# Patient Record
Sex: FEMALE | Race: WHITE | NOT HISPANIC OR LATINO | Employment: OTHER | URBAN - METROPOLITAN AREA
[De-identification: names, ages, dates, MRNs, and addresses within clinical notes are randomized per-mention and may not be internally consistent; named-entity substitution may affect disease eponyms.]

---

## 2017-03-05 ENCOUNTER — PATIENT MESSAGE (OUTPATIENT)
Dept: PHYSICAL MEDICINE AND REHAB | Facility: CLINIC | Age: 67
End: 2017-03-05

## 2017-03-29 ENCOUNTER — PATIENT MESSAGE (OUTPATIENT)
Dept: PHYSICAL MEDICINE AND REHAB | Facility: CLINIC | Age: 67
End: 2017-03-29

## 2017-04-12 ENCOUNTER — PATIENT OUTREACH (OUTPATIENT)
Dept: ADMINISTRATIVE | Facility: HOSPITAL | Age: 67
End: 2017-04-12

## 2017-04-17 ENCOUNTER — OFFICE VISIT (OUTPATIENT)
Dept: PHYSICAL MEDICINE AND REHAB | Facility: CLINIC | Age: 67
End: 2017-04-17
Payer: MEDICARE

## 2017-04-17 VITALS
BODY MASS INDEX: 18.39 KG/M2 | WEIGHT: 103.81 LBS | HEIGHT: 63 IN | SYSTOLIC BLOOD PRESSURE: 108 MMHG | DIASTOLIC BLOOD PRESSURE: 67 MMHG | HEART RATE: 73 BPM

## 2017-04-17 DIAGNOSIS — M71.21 BAKER'S CYST, RIGHT: Primary | ICD-10-CM

## 2017-04-17 PROCEDURE — 99999 PR PBB SHADOW E&M-EST. PATIENT-LVL II: CPT | Mod: PBBFAC,,, | Performed by: PHYSICAL MEDICINE & REHABILITATION

## 2017-04-17 PROCEDURE — 1159F MED LIST DOCD IN RCRD: CPT | Mod: S$GLB,,, | Performed by: PHYSICAL MEDICINE & REHABILITATION

## 2017-04-17 PROCEDURE — 1160F RVW MEDS BY RX/DR IN RCRD: CPT | Mod: S$GLB,,, | Performed by: PHYSICAL MEDICINE & REHABILITATION

## 2017-04-17 PROCEDURE — 20611 DRAIN/INJ JOINT/BURSA W/US: CPT | Mod: S$GLB,,, | Performed by: PHYSICAL MEDICINE & REHABILITATION

## 2017-04-17 PROCEDURE — 1126F AMNT PAIN NOTED NONE PRSNT: CPT | Mod: S$GLB,,, | Performed by: PHYSICAL MEDICINE & REHABILITATION

## 2017-04-17 PROCEDURE — 99213 OFFICE O/P EST LOW 20 MIN: CPT | Mod: 25,S$GLB,, | Performed by: PHYSICAL MEDICINE & REHABILITATION

## 2017-04-17 PROCEDURE — 1157F ADVNC CARE PLAN IN RCRD: CPT | Mod: S$GLB,,, | Performed by: PHYSICAL MEDICINE & REHABILITATION

## 2017-04-18 NOTE — PROGRESS NOTES
"[facility]    CHIEF COMPLAINT:   Chief Complaint   Patient presents with    Knee Pain     Holliday's cyst has returned. Interested in having it drained and "glued".      HISTORY OF PRESENT ILLNESS: Esther Segura is a 67 y.o. female who presents to me in follow-up for her right knee pain.  I last saw her about 4 months ago where we drained her right knee Baker's cyst.  She notes significant reduction in her symptoms following that procedure.  She notes the cyst seems to have reaccumulated over past several weeks.  She notes no pain at rest, however when she stretches the leg it does irritate the back of her knee.  She reports reduced range of motion secondary to pain.  The pain is achy in nature.  There is no excess redness or warmth.  She is not taking any pain medicine for this issue.  She presents today for repeat aspiration of the right knee Baker's cyst.    Review of Systems   Constitutional: Negative for fever.   HENT: Negative for drooling.    Eyes: Negative for discharge.   Respiratory: Negative for choking.    Cardiovascular: Negative for chest pain.   Genitourinary: Negative for flank pain.   Skin: Negative for wound.   Allergic/Immunologic: Negative for immunocompromised state.   Neurological: Negative for tremors and syncope.   Psychiatric/Behavioral: Negative for behavioral problems.     Past Medical History:   Past Medical History:   Diagnosis Date    Fever blister     Hyperlipidemia     Osteopenia        Past Surgical History:   Past Surgical History:   Procedure Laterality Date    achiles tendon       broken hand Right        Family History:   Family History   Problem Relation Age of Onset    Cancer Mother      lymphoma    Osteoporosis Father     Breast cancer Maternal Aunt     Ovarian cancer Neg Hx     Melanoma Neg Hx     Multiple sclerosis Neg Hx     Psoriasis Neg Hx     Eczema Neg Hx     Lupus Neg Hx     Acne Neg Hx     Depression Neg Hx     Suicidality Neg " "Hx        Medications:   Current Outpatient Prescriptions on File Prior to Visit   Medication Sig Dispense Refill    aspirin (ECOTRIN) 81 MG EC tablet Take 81 mg by mouth once daily.        atorvastatin (LIPITOR) 10 MG tablet take 1 tablet by mouth once daily 90 tablet 3    calcium-vitamin D (CALCIUM WITH VITAMIN D) 600 mg(1,500mg) -400 unit Tab Take by mouth 2 (two) times daily.        cetirizine (ZYRTEC) 10 MG tablet Take 10 mg by mouth once daily.      ginkgo biloba 40 mg Tab Take by mouth once daily.        GLUCOSAMINE HCL/CHONDRO PISANO A (GLUCOSAMINE-CHONDROITIN ORAL) Take by mouth.        multivitamin capsule Take 1 capsule by mouth once daily.         No current facility-administered medications on file prior to visit.        Allergies:   Review of patient's allergies indicates:   Allergen Reactions    Bee sting [allergen ext-venom-honey bee] Swelling       Social History:   Social History     Social History    Marital status:      Spouse name: N/A    Number of children: N/A    Years of education: N/A     Occupational History     [employer]     Social History Main Topics    Smoking status: Never Smoker    Smokeless tobacco: Never Used    Alcohol use Yes      Comment: ocassionally    Drug use: No    Sexual activity: No     Other Topics Concern    Not on file     Social History Narrative     Esther is a .    PHYSICAL EXAMINATION:   General    Vitals:    04/17/17 1543   BP: 108/67   Pulse: 73   Weight: 47.1 kg (103 lb 13.4 oz)   Height: 5' 3" (1.6 m)     Constitutional: Oriented to person, place, and time. No apparent distress. Appears well-developed and well-nourished. Pleasant.  HENT:   Head: Normocephalic and atraumatic.   Eyes: Right eye exhibits no discharge. Left eye exhibits no discharge. No scleral icterus.   Pulmonary/Chest: Effort normal. No respiratory distress.   Abdominal: There is no guarding.   Neurological: Alert and oriented to person, " place, and time.   Psychiatric: Behavior is normal.   Right Knee Exam     Tenderness   Right knee tenderness location: Soreness and fullness in the popliteal fossa.    Range of Motion   Extension: 0   Flexion: 130 (Some posterior knee pain with terminal flexion)     Tests   Bisi:  Medial - negative Lateral - negative  Lachman:  Anterior - negative    Posterior - negative  Drawer:       Anterior - negative    Posterior - negative  Varus: negative  Valgus: negative  Patellar Apprehension: negative    Other   Erythema: absent  Scars: absent  Sensation: normal  Pulse: present  Swelling: moderate  Other tests: no effusion present      Left Knee Exam   Left knee exam is normal.    Tenderness   The patient is experiencing no tenderness.         Range of Motion   Extension: normal   Flexion: normal     Muscle Strength     The patient has normal left knee strength.    Tests   Bisi:  Medial - negative Lateral - negative  Lachman:  Anterior - negative    Posterior - negative  Drawer:       Anterior - negative     Posterior - negative  Varus: negative  Valgus: negative  Patellar Apprehension: negative    Other   Erythema: absent  Scars: absent  Sensation: normal  Pulse: present  Swelling: none  Effusion: no effusion present        INSPECTION: There is no ecchymoses, erythema about the right knee.  GAIT/DYNAMIC: Her gait is normal.    Imaging  X-ray of the right knee from 12/5/2016: Mild bilateral medial compartment joint space loss.    Data Reviewed: X-ray    Supportive Actions: Independent visualization of images or test specimens    ASSESSMENT:   1. Baker's cyst, right      PLAN:     1.  Ms. Segura has reeking ablation of her right knee Baker's cyst.  We discussed her limited options including repeat aspiration, surgical excision, or any for the potential implementation of the fibrin glue.  She wishes to proceed with repeat aspiration today, without any backfilling of corticosteroid.  I believe this is reasonable and we  perform this procedure without issue, see separate procedure note.    2.  She was instructed to keep compression over the knee for the next 48 hours.    3. RTC prn.  We discussed potential fibrin glue injections if the cyst were to recur.    The above note was completed, in part, with the aid of Dragon dictation software/hardware. Translation errors may be present.

## 2017-04-18 NOTE — PROCEDURES
Large Joint Aspiration/Injection  Date/Time: 4/17/2017 3:40 PM  Performed by: JEFERSON ENCINAS  Authorized by: JEFERSON ENCINAS     Consent Done?:  Yes (Verbal)  Indications:  Pain and joint swelling  Procedure site marked: Yes    Timeout: Prior to procedure the correct patient, procedure, and site was verified      Location:  Knee  Site:  R knee  Prep: Patient was prepped and draped in usual sterile fashion    Ultrasonic Guidance for needle placement: Yes  Images are saved and documented.  Needle size:  18 G  Approach: Needle in plane, medial to lateral.  Aspirate amount (ml):  17  Aspirate:  Serous  Patient tolerance:  Patient tolerated the procedure well with no immediate complications    Additional Comments: Ultrasound guidance was used for correct needle placement, the images were saved will be uploaded to EMR.

## 2017-04-27 ENCOUNTER — OFFICE VISIT (OUTPATIENT)
Dept: FAMILY MEDICINE | Facility: CLINIC | Age: 67
End: 2017-04-27
Payer: MEDICARE

## 2017-04-27 VITALS
BODY MASS INDEX: 18.75 KG/M2 | HEIGHT: 63 IN | OXYGEN SATURATION: 99 % | WEIGHT: 105.81 LBS | DIASTOLIC BLOOD PRESSURE: 62 MMHG | HEART RATE: 57 BPM | SYSTOLIC BLOOD PRESSURE: 99 MMHG

## 2017-04-27 DIAGNOSIS — Z11.59 NEED FOR HEPATITIS C SCREENING TEST: ICD-10-CM

## 2017-04-27 DIAGNOSIS — Z00.00 ROUTINE MEDICAL EXAM: Primary | ICD-10-CM

## 2017-04-27 DIAGNOSIS — E78.5 HYPERLIPIDEMIA, UNSPECIFIED HYPERLIPIDEMIA TYPE: ICD-10-CM

## 2017-04-27 PROCEDURE — 99397 PER PM REEVAL EST PAT 65+ YR: CPT | Mod: 25,S$GLB,, | Performed by: FAMILY MEDICINE

## 2017-04-27 PROCEDURE — 90670 PCV13 VACCINE IM: CPT | Mod: S$GLB,,, | Performed by: FAMILY MEDICINE

## 2017-04-27 PROCEDURE — 99999 PR PBB SHADOW E&M-EST. PATIENT-LVL III: CPT | Mod: PBBFAC,,, | Performed by: FAMILY MEDICINE

## 2017-04-27 PROCEDURE — G0009 ADMIN PNEUMOCOCCAL VACCINE: HCPCS | Mod: S$GLB,,, | Performed by: FAMILY MEDICINE

## 2017-04-27 PROCEDURE — 99499 UNLISTED E&M SERVICE: CPT | Mod: S$GLB,,, | Performed by: FAMILY MEDICINE

## 2017-04-27 RX ORDER — ATORVASTATIN CALCIUM 10 MG/1
10 TABLET, FILM COATED ORAL DAILY
Qty: 90 TABLET | Refills: 3 | Status: SHIPPED | OUTPATIENT
Start: 2017-04-27 | End: 2018-01-22 | Stop reason: ALTCHOICE

## 2017-04-27 NOTE — PROGRESS NOTES
Subjective:       Patient ID: Esther Segura is a 67 y.o. female.    Chief Complaint: Annual Exam (PNeu vaccine / labs )    HPI     Here for a check up.    Seeing Dr. Mtz, physical medicine doctor, for right knee baker's cyst.      Last colonoscopy in 2011. Next one at age 68.      Last mmg and bmd in 2016.           Review of Systems      Review of Systems   Constitutional: Negative for fever and chills.   HENT: Negative for hearing loss and neck stiffness.    Eyes: Negative for redness and itching.   Respiratory: Negative for cough and choking.    Cardiovascular: Negative for chest pain and leg swelling.  Abdomen: Negative for abdominal pain and blood in stool.   Genitourinary: Negative for dysuria and flank pain.   Musculoskeletal: Negative for back pain and gait problem.   Neurological: Negative for light-headedness and headaches.   Hematological: Negative for adenopathy.   Psychiatric/Behavioral: Negative for behavioral problems.     Objective:      Physical Exam   Constitutional: She is oriented to person, place, and time. She appears well-developed. No distress.   HENT:   Head: Normocephalic.   Mouth/Throat: Oropharynx is clear and moist.   Eyes: Pupils are equal, round, and reactive to light.   Neck: Normal range of motion. Neck supple. No thyromegaly present.   Cardiovascular: Normal rate, regular rhythm and normal heart sounds.  Exam reveals no friction rub.    No murmur heard.  Pulmonary/Chest: Breath sounds normal. She has no wheezes. She has no rales.   Abdominal: Soft. Bowel sounds are normal. She exhibits no distension and no mass. There is no tenderness.   Musculoskeletal: Normal range of motion. She exhibits no edema or tenderness.   Neurological: She is alert and oriented to person, place, and time. She has normal reflexes. No cranial nerve deficit.   Skin: Skin is warm. No rash noted. No erythema.   Psychiatric: She has a normal mood and affect. Thought content normal.        Assessment:       1. Routine medical exam    2. Hyperlipidemia, unspecified hyperlipidemia type    3. Need for hepatitis C screening test        Plan:       Routine medical exam    Hyperlipidemia, unspecified hyperlipidemia type  -     Comprehensive metabolic panel; Future; Expected date: 4/27/17  -     Lipid panel; Future; Expected date: 4/27/17  -     TSH; Future; Expected date: 4/27/17    Need for hepatitis C screening test  -     Hepatitis C antibody; Future; Expected date: 4/27/17    Other orders  -     atorvastatin (LIPITOR) 10 MG tablet; Take 1 tablet (10 mg total) by mouth once daily.  Dispense: 90 tablet; Refill: 3  -     Pneumococcal Conjugate Vaccine (13 Valent) (IM)      Plan:  See orders  prevnar 13 vaccine today    Medication List with Changes/Refills   Current Medications    ASPIRIN (ECOTRIN) 81 MG EC TABLET    Take 81 mg by mouth once daily.      CALCIUM-VITAMIN D (CALCIUM WITH VITAMIN D) 600 MG(1,500MG) -400 UNIT TAB    Take by mouth 2 (two) times daily.      CETIRIZINE (ZYRTEC) 10 MG TABLET    Take 10 mg by mouth once daily.    GINKGO BILOBA 40 MG TAB    Take by mouth once daily.      GLUCOSAMINE HCL/CHONDRO PISANO A (GLUCOSAMINE-CHONDROITIN ORAL)    Take by mouth.      MULTIVITAMIN CAPSULE    Take 1 capsule by mouth once daily.     Changed and/or Refilled Medications    Modified Medication Previous Medication    ATORVASTATIN (LIPITOR) 10 MG TABLET atorvastatin (LIPITOR) 10 MG tablet       Take 1 tablet (10 mg total) by mouth once daily.    take 1 tablet by mouth once daily

## 2017-04-27 NOTE — MR AVS SNAPSHOT
[facility]  [address] [facility] [address] [place] [address]  Phone: [phone]  Fax: [phone]                  Esther Segura   2017 4:00 PM   Office Visit    Description:  Female : 1950   Provider:  Marcus Zheng MD   Department:  [facility]           Reason for Visit     Annual Exam           Diagnoses this Visit        Comments    Routine medical exam    -  Primary     Hyperlipidemia, unspecified hyperlipidemia type         Need for hepatitis C screening test                To Do List           Future Appointments        Provider Department Dept Phone    2017 10:45 AM ERNA HERNANDEZ [facility] - Lab [phone]    5/10/2017 2:00 PM Luis Dick MD [facility] - [phone]      Goals (5 Years of Data)     None      Follow-Up and Disposition     Return in about 1 year (around 2018).       These Medications        Disp Refills Start End    atorvastatin (LIPITOR) 10 MG tablet 90 tablet 3 2017     Take 1 tablet (10 mg total) by mouth once daily. - Oral    Pharmacy: [facility] [place] [address] [place] [address] [place] [address] [facility] [place] Ph #: [phone]         [facility]     [facility] Care [facility] -  Assistance  Unless otherwise directed by your provider, please contact [facility], our nurse care line that is available for  assistance.     Registered nurses in the [facility] provide: appointment scheduling, clinical advisement, health education, and other advisory services.  Call: [phone] (toll free)               Medications           Message regarding Medications     Verify the changes and/or additions to your medication regime listed below are the same as discussed with your clinician today.  If any of these changes or additions are incorrect, please notify your healthcare provider.        CHANGE how you are taking these medications     Start Taking Instead of     "atorvastatin (LIPITOR) 10 MG tablet atorvastatin (LIPITOR) 10 MG tablet    Dosage:  Take 1 tablet (10 mg total) by mouth once daily. Dosage:  take 1 tablet by mouth once daily    Reason for Change:  Reorder            Verify that the below list of medications is an accurate representation of the medications you are currently taking.  If none reported, the list may be blank. If incorrect, please contact your healthcare provider. Carry this list with you in case of emergency.           Current Medications     aspirin (ECOTRIN) 81 MG EC tablet Take 81 mg by mouth once daily.      atorvastatin (LIPITOR) 10 MG tablet Take 1 tablet (10 mg total) by mouth once daily.    calcium-vitamin D (CALCIUM WITH VITAMIN D) 600 mg(1,500mg) -400 unit Tab Take by mouth 2 (two) times daily.      cetirizine (ZYRTEC) 10 MG tablet Take 10 mg by mouth once daily.    ginkgo biloba 40 mg Tab Take by mouth once daily.      GLUCOSAMINE HCL/CHONDRO PISANO A (GLUCOSAMINE-CHONDROITIN ORAL) Take by mouth.      multivitamin capsule Take 1 capsule by mouth once daily.             Clinical Reference Information           Your Vitals Were     BP Pulse Height Weight SpO2 BMI    99/62 57 5' 3" (1.6 m) 48 kg (105 lb 13.1 oz) 99% 18.75 kg/m2      Blood Pressure          Most Recent Value    BP  99/62      Allergies as of 4/27/2017     Bee Sting [Allergen Ext-venom-honey Bee]      Immunizations Administered on Date of Encounter - 4/27/2017     Name Date Dose VIS Date Route    Pneumococcal Conjugate - 13 Valent  Incomplete 0.5 mL 11/5/2015 Intramuscular      Orders Placed During Today's Visit      Normal Orders This Visit    Pneumococcal Conjugate Vaccine (13 Valent) (IM)     Future Labs/Procedures Expected by Expires    Comprehensive metabolic panel  4/27/2017 7/26/2017    Hepatitis C antibody  4/27/2017 6/26/2018    Lipid panel  4/27/2017 7/26/2017    TSH  4/27/2017 7/26/2017      Language Assistance Services     ATTENTION: Language assistance services are " available, free of charge. Please call [phone].      ATENCIÓN: Si habla yazmin, tiene a santana disposición servicios gratuitos de asistencia lingüística. Llame al [phone].     CHÚ Ý: N?u b?n nói Ti?ng Vi?t, có các d?ch v? h? tr? ngôn ng? mi?n phí dành cho b?n. G?i s? [phone].         [facility] complies with applicable Federal civil rights laws and does not discriminate on the basis of race, color, national origin, age, disability, or sex.

## 2017-05-01 ENCOUNTER — LAB VISIT (OUTPATIENT)
Dept: LAB | Facility: HOSPITAL | Age: 67
End: 2017-05-01
Attending: FAMILY MEDICINE
Payer: MEDICARE

## 2017-05-01 DIAGNOSIS — E78.5 HYPERLIPIDEMIA, UNSPECIFIED HYPERLIPIDEMIA TYPE: ICD-10-CM

## 2017-05-01 DIAGNOSIS — Z11.59 NEED FOR HEPATITIS C SCREENING TEST: ICD-10-CM

## 2017-05-01 LAB
ALBUMIN SERPL BCP-MCNC: 4.2 G/DL
ALP SERPL-CCNC: 55 U/L
ALT SERPL W/O P-5'-P-CCNC: 18 U/L
ANION GAP SERPL CALC-SCNC: 8 MMOL/L
AST SERPL-CCNC: 28 U/L
BILIRUB SERPL-MCNC: 0.6 MG/DL
BUN SERPL-MCNC: 23 MG/DL
CALCIUM SERPL-MCNC: 10.3 MG/DL
CHLORIDE SERPL-SCNC: 103 MMOL/L
CHOLEST/HDLC SERPL: 2.4 {RATIO}
CO2 SERPL-SCNC: 30 MMOL/L
CREAT SERPL-MCNC: 0.9 MG/DL
EST. GFR  (AFRICAN AMERICAN): >60 ML/MIN/1.73 M^2
EST. GFR  (NON AFRICAN AMERICAN): >60 ML/MIN/1.73 M^2
GLUCOSE SERPL-MCNC: 90 MG/DL
HCV AB SERPL QL IA: NEGATIVE
HDL/CHOLESTEROL RATIO: 42 %
HDLC SERPL-MCNC: 100 MG/DL
HDLC SERPL-MCNC: 238 MG/DL
LDLC SERPL CALC-MCNC: 121.6 MG/DL
NONHDLC SERPL-MCNC: 138 MG/DL
POTASSIUM SERPL-SCNC: 4.5 MMOL/L
PROT SERPL-MCNC: 7.7 G/DL
SODIUM SERPL-SCNC: 141 MMOL/L
TRIGL SERPL-MCNC: 82 MG/DL
TSH SERPL DL<=0.005 MIU/L-ACNC: 3.38 UIU/ML

## 2017-05-01 PROCEDURE — 80053 COMPREHEN METABOLIC PANEL: CPT

## 2017-05-01 PROCEDURE — 36415 COLL VENOUS BLD VENIPUNCTURE: CPT | Mod: PO

## 2017-05-01 PROCEDURE — 80061 LIPID PANEL: CPT

## 2017-05-01 PROCEDURE — 84443 ASSAY THYROID STIM HORMONE: CPT

## 2017-05-01 PROCEDURE — 86803 HEPATITIS C AB TEST: CPT

## 2017-05-07 ENCOUNTER — PATIENT MESSAGE (OUTPATIENT)
Dept: FAMILY MEDICINE | Facility: CLINIC | Age: 67
End: 2017-05-07

## 2017-05-10 ENCOUNTER — HOSPITAL ENCOUNTER (OUTPATIENT)
Dept: RADIOLOGY | Facility: CLINIC | Age: 67
Discharge: HOME | End: 2017-05-10
Attending: OBSTETRICS & GYNECOLOGY
Payer: MEDICARE

## 2017-05-10 ENCOUNTER — OFFICE VISIT (OUTPATIENT)
Dept: OBSTETRICS AND GYNECOLOGY | Facility: CLINIC | Age: 67
End: 2017-05-10
Payer: MEDICARE

## 2017-05-10 VITALS
BODY MASS INDEX: 18.75 KG/M2 | DIASTOLIC BLOOD PRESSURE: 62 MMHG | SYSTOLIC BLOOD PRESSURE: 94 MMHG | WEIGHT: 105.81 LBS | HEIGHT: 63 IN

## 2017-05-10 DIAGNOSIS — Z12.4 PAP SMEAR FOR CERVICAL CANCER SCREENING: ICD-10-CM

## 2017-05-10 DIAGNOSIS — Z12.31 OTHER SCREENING MAMMOGRAM: ICD-10-CM

## 2017-05-10 DIAGNOSIS — Z01.419 VISIT FOR GYNECOLOGIC EXAMINATION: Primary | ICD-10-CM

## 2017-05-10 PROCEDURE — 99999 PR PBB SHADOW E&M-EST. PATIENT-LVL III: CPT | Mod: PBBFAC,,, | Performed by: OBSTETRICS & GYNECOLOGY

## 2017-05-10 PROCEDURE — 77067 SCR MAMMO BI INCL CAD: CPT | Mod: TC

## 2017-05-10 PROCEDURE — 77067 SCR MAMMO BI INCL CAD: CPT | Mod: 26,,, | Performed by: RADIOLOGY

## 2017-05-10 PROCEDURE — 77063 BREAST TOMOSYNTHESIS BI: CPT | Mod: 26,,, | Performed by: RADIOLOGY

## 2017-05-10 PROCEDURE — G0101 CA SCREEN;PELVIC/BREAST EXAM: HCPCS | Mod: S$GLB,,, | Performed by: OBSTETRICS & GYNECOLOGY

## 2017-05-10 PROCEDURE — 88175 CYTOPATH C/V AUTO FLUID REDO: CPT

## 2017-05-10 NOTE — PROGRESS NOTES
Chief Complaint   Patient presents with    Well Woman       History and Physical:  No LMP recorded. Patient is postmenopausal.       Esther Segura is a 67 y.o.  female who presents today for her routine annual GYN exam. The patient has no Gynecology complaints today. No bowel or bladder complaints.       Allergies:   Review of patient's allergies indicates:   Allergen Reactions    Bee sting [allergen ext-venom-honey bee] Swelling       Past Medical History:   Diagnosis Date    Fever blister     Hyperlipidemia     Osteopenia        Past Surgical History:   Procedure Laterality Date    achiles tendon       broken hand Right        MEDS:   Current Outpatient Prescriptions on File Prior to Visit   Medication Sig Dispense Refill    aspirin (ECOTRIN) 81 MG EC tablet Take 81 mg by mouth once daily.        atorvastatin (LIPITOR) 10 MG tablet Take 1 tablet (10 mg total) by mouth once daily. 90 tablet 3    calcium-vitamin D (CALCIUM WITH VITAMIN D) 600 mg(1,500mg) -400 unit Tab Take by mouth 2 (two) times daily.        cetirizine (ZYRTEC) 10 MG tablet Take 10 mg by mouth once daily.      ginkgo biloba 40 mg Tab Take by mouth once daily.        GLUCOSAMINE HCL/CHONDRO PISANO A (GLUCOSAMINE-CHONDROITIN ORAL) Take by mouth.        multivitamin capsule Take 1 capsule by mouth once daily.         No current facility-administered medications on file prior to visit.        OB History      Para Term  AB TAB SAB Ectopic Multiple Living    2 2                  Social History     Social History    Marital status:      Spouse name: N/A    Number of children: N/A    Years of education: N/A     Occupational History     [employer]     Social History Main Topics    Smoking status: Never Smoker    Smokeless tobacco: Never Used    Alcohol use Yes      Comment: ocassionally    Drug use: No    Sexual activity: No     Other Topics Concern    Not on file     Social History  "Narrative       Family History   Problem Relation Age of Onset    Cancer Mother      lymphoma    Osteoporosis Father     Breast cancer Maternal Aunt     Ovarian cancer Neg Hx     Melanoma Neg Hx     Multiple sclerosis Neg Hx     Psoriasis Neg Hx     Eczema Neg Hx     Lupus Neg Hx     Acne Neg Hx     Depression Neg Hx     Suicidality Neg Hx          Past medical and surgical history reviewed.   I have reviewed the patient's medical history in detail and updated the computerized patient record.        Review of System:   General: no chills, fever, night sweats, weight gain or weight loss  Psychological: no depression or suicidal ideation  Breasts: no new or changing breast lumps, nipple discharge or masses.  Respiratory: no cough, shortness of breath, or wheezing  Cardiovascular: no chest pain or dyspnea on exertion  Gastrointestinal: no abdominal pain, change in bowel habits, or black or bloody stools  Genito-Urinary: no incontinence, urinary frequency/urgency or vulvar/vaginal symptoms, pelvic pain or abnormal vaginal bleeding.  Musculoskeletal: no gait disturbance or muscular weakness      Physical Exam:   BP 94/62  Ht 5' 3" (1.6 m)  Wt 48 kg (105 lb 13.1 oz)  BMI 18.75 kg/m2  Constitutional: She is oriented to person, place, and time. She appears well-developed and well-nourished. No distress.   HENT:   Head: Normocephalic and atraumatic.   Eyes: Conjunctivae and EOM are normal. No scleral icterus.   Neck: Normal range of motion. Neck supple. No tracheal deviation present.   Cardiovascular: Normal rate.    Pulmonary/Chest: Effort normal. No respiratory distress. She exhibits no tenderness.  Breasts: are symmetrical.   Right breast exhibits no inverted nipple, no mass, no nipple discharge, no skin change and no tenderness.   Left breast exhibits no inverted nipple, no mass, no nipple discharge, no skin change and no tenderness.  Abdominal: Soft. She exhibits no distension and no mass. There is no " tenderness. There is no rebound and no guarding.   Genitourinary:    External rectal exam shows no thrombosed external hemorrhoids.    Pelvic exam was performed with patient supine.   No labial fusion.   There is no rash, lesion or injury on the right labia.   There is no rash, lesion or injury on the left labia.   No bleeding and no signs of injury around the vaginal introitus, urethra is without lesions and well supported. The cervix is visualized with no discharge, lesions or friability.   No vaginal discharge found. Pale and atrophic   No significant Cystocele, Enterocele or rectocele, and uterus well supported.   Bimanual exam:   The urethra is normal to palpation and there are no palpable vaginal wall masses.   Uterus is not deviated, not enlarged, not fixed, normal shape and not tender.   Cervix exhibits no motion tenderness.    Right adnexum displays no mass and no tenderness.   Left adnexum displays no mass and no tenderness.  Musculoskeletal: Normal range of motion.   Lymphadenopathy: No inguinal adenopathy present.   Neurological: She is alert and oriented to person, place, and time. Coordination normal.   Skin: Skin is warm and dry. She is not diaphoretic.   Psychiatric: She has a normal mood and affect.      Assessment:   Normal annual GYN exam  1. Pap smear for cervical cancer screening  Liquid-based pap smear, screening   2. Other screening mammogram  Mammo Digital Screening Bilat With CAD       Plan:   PAP  Mammogram  Follow up in 1 year.

## 2017-05-10 NOTE — MR AVS SNAPSHOT
[facility]  [address], [address]  [facility] [address]  Phone: [phone]  Fax: [phone]                  Esther Segura   5/10/2017 2:00 PM   Office Visit    Description:  Female : 1950   Provider:  Luis Dick MD   Department:  [facility]           Reason for Visit     Well Woman           Diagnoses this Visit        Comments    Pap smear for cervical cancer screening    -  Primary     Other screening mammogram                To Do List           Goals (5 Years of Data)     None      [facility]     [facility] -  Assistance  Unless otherwise directed by your provider, please contact [facility], our nurse care line that is available for  assistance.     Registered nurses in the [facility] provide: appointment scheduling, clinical advisement, health education, and other advisory services.  Call: [phone] (toll free)               Medications           Message regarding Medications     Verify the changes and/or additions to your medication regime listed below are the same as discussed with your clinician today.  If any of these changes or additions are incorrect, please notify your healthcare provider.             Verify that the below list of medications is an accurate representation of the medications you are currently taking.  If none reported, the list may be blank. If incorrect, please contact your healthcare provider. Carry this list with you in case of emergency.           Current Medications     aspirin (ECOTRIN) 81 MG EC tablet Take 81 mg by mouth once daily.      atorvastatin (LIPITOR) 10 MG tablet Take 1 tablet (10 mg total) by mouth once daily.    calcium-vitamin D (CALCIUM WITH VITAMIN D) 600 mg(1,500mg) -400 unit Tab Take by mouth 2 (two) times daily.      cetirizine (ZYRTEC) 10 MG tablet Take 10 mg by mouth once daily.    ginkgo biloba 40 mg Tab Take by mouth once  "daily.      GLUCOSAMINE HCL/CHONDRO SANTANA A (GLUCOSAMINE-CHONDROITIN ORAL) Take by mouth.      multivitamin capsule Take 1 capsule by mouth once daily.             Clinical Reference Information           Your Vitals Were     BP Height Weight BMI       94/62 5' 3" (1.6 m) 48 kg (105 lb 13.1 oz) 18.75 kg/m2       Blood Pressure          Most Recent Value    BP  94/62      Allergies as of 5/10/2017     Bee Sting [Allergen Ext-venom-honey Bee]      Immunizations Administered on Date of Encounter - 5/10/2017     None      Orders Placed During Today's Visit      Normal Orders This Visit    Liquid-based pap smear, screening     Future Labs/Procedures Expected by Expires    Mammo Digital Screening Bilat With CAD  5/10/2017 7/10/2018      Language Assistance Services     ATTENTION: Language assistance services are available, free of charge. Please call [phone].      ATENCIÓN: Si kari arce, tiene a santana disposición servicios gratuitos de asistencia lingüística. Llame al [phone].     [place] Ý: N?u b?n nói Ti?ng Vi?t, có các d?ch v? h? tr? ngôn ng? mi?n phí dành cho b?n. G?i s? [phone].         [facility] complies with applicable Federal civil rights laws and does not discriminate on the basis of race, color, national origin, age, disability, or sex.        "

## 2017-09-08 ENCOUNTER — OFFICE VISIT (OUTPATIENT)
Dept: FAMILY MEDICINE | Facility: CLINIC | Age: 67
End: 2017-09-08
Payer: MEDICARE

## 2017-09-08 VITALS
HEIGHT: 63 IN | DIASTOLIC BLOOD PRESSURE: 74 MMHG | BODY MASS INDEX: 18.79 KG/M2 | HEART RATE: 65 BPM | SYSTOLIC BLOOD PRESSURE: 123 MMHG | OXYGEN SATURATION: 98 % | WEIGHT: 106.06 LBS

## 2017-09-08 DIAGNOSIS — F41.9 ANXIETY: ICD-10-CM

## 2017-09-08 DIAGNOSIS — G25.2 RESTING TREMOR: Primary | ICD-10-CM

## 2017-09-08 PROCEDURE — 3008F BODY MASS INDEX DOCD: CPT | Mod: S$GLB,,, | Performed by: FAMILY MEDICINE

## 2017-09-08 PROCEDURE — 1159F MED LIST DOCD IN RCRD: CPT | Mod: S$GLB,,, | Performed by: FAMILY MEDICINE

## 2017-09-08 PROCEDURE — 99214 OFFICE O/P EST MOD 30 MIN: CPT | Mod: S$GLB,,, | Performed by: FAMILY MEDICINE

## 2017-09-08 PROCEDURE — 1126F AMNT PAIN NOTED NONE PRSNT: CPT | Mod: S$GLB,,, | Performed by: FAMILY MEDICINE

## 2017-09-08 PROCEDURE — 99999 PR PBB SHADOW E&M-EST. PATIENT-LVL III: CPT | Mod: PBBFAC,,, | Performed by: FAMILY MEDICINE

## 2017-09-08 RX ORDER — LORAZEPAM 0.5 MG/1
0.5 TABLET ORAL NIGHTLY
Qty: 30 TABLET | Refills: 0 | Status: SHIPPED | OUTPATIENT
Start: 2017-09-08 | End: 2018-01-22 | Stop reason: SDUPTHER

## 2017-09-08 NOTE — PROGRESS NOTES
Subjective:       Patient ID: Esther Segura is a 67 y.o. female.    Chief Complaint: Tremors (Right Arm )    HPI     C/o resting tremor in right hand x 2 months.  More frequent over the past 1 month.  No rigidity or bradykinesia noted.     Planning to see a neurologist in near future.     Reports some anxiety due to resting tremor.  Mostly at night. Unable to sleep.     Review of Systems      Review of Systems   Constitutional: Negative for fever and chills.   HENT: Negative for hearing loss and neck stiffness.    Eyes: Negative for redness and itching.   Respiratory: Negative for cough and choking.    Cardiovascular: Negative for chest pain and leg swelling.  Abdomen: Negative for abdominal pain and blood in stool.   Genitourinary: Negative for dysuria and flank pain.   Musculoskeletal: Negative for back pain and gait problem.   Neurological: Negative for light-headedness and headaches.   Hematological: Negative for adenopathy.       Objective:      Physical Exam   Constitutional: She is oriented to person, place, and time. She appears well-developed.   HENT:   Head: Normocephalic and atraumatic.   Eyes: Conjunctivae are normal. Pupils are equal, round, and reactive to light.   Neck: Normal range of motion.   Cardiovascular: Normal rate and regular rhythm.    No murmur heard.  Pulmonary/Chest: Effort normal and breath sounds normal. She has no wheezes.   Lymphadenopathy:     She has no cervical adenopathy.   Neurological: She is alert and oriented to person, place, and time.   Right thumb rolling tremor noted at rest with palm up.  No tremor of right hand with movement.        Assessment:       1. Resting tremor    2. Anxiety        Plan:       Resting tremor    Anxiety    Other orders  -     lorazepam (ATIVAN) 0.5 MG tablet; Take 1 tablet (0.5 mg total) by mouth every evening.  Dispense: 30 tablet; Refill: 0      Plan:  Start ativan. Pt to take prn  F/u with neurologist

## 2017-09-29 ENCOUNTER — OFFICE VISIT (OUTPATIENT)
Dept: NEUROLOGY | Facility: CLINIC | Age: 67
End: 2017-09-29
Payer: MEDICARE

## 2017-09-29 VITALS
WEIGHT: 103.38 LBS | HEIGHT: 63 IN | SYSTOLIC BLOOD PRESSURE: 129 MMHG | DIASTOLIC BLOOD PRESSURE: 74 MMHG | BODY MASS INDEX: 18.32 KG/M2 | HEART RATE: 79 BPM

## 2017-09-29 DIAGNOSIS — G20.C PARKINSONISM, UNSPECIFIED PARKINSONISM TYPE: Primary | ICD-10-CM

## 2017-09-29 PROCEDURE — 99999 PR PBB SHADOW E&M-EST. PATIENT-LVL III: CPT | Mod: PBBFAC,,, | Performed by: PSYCHIATRY & NEUROLOGY

## 2017-09-29 PROCEDURE — 1126F AMNT PAIN NOTED NONE PRSNT: CPT | Mod: S$GLB,,, | Performed by: PSYCHIATRY & NEUROLOGY

## 2017-09-29 PROCEDURE — 1159F MED LIST DOCD IN RCRD: CPT | Mod: S$GLB,,, | Performed by: PSYCHIATRY & NEUROLOGY

## 2017-09-29 PROCEDURE — 99205 OFFICE O/P NEW HI 60 MIN: CPT | Mod: S$GLB,,, | Performed by: PSYCHIATRY & NEUROLOGY

## 2017-09-29 PROCEDURE — 3008F BODY MASS INDEX DOCD: CPT | Mod: S$GLB,,, | Performed by: PSYCHIATRY & NEUROLOGY

## 2017-09-29 RX ORDER — CARBIDOPA AND LEVODOPA 25; 100 MG/1; MG/1
1 TABLET, EXTENDED RELEASE ORAL 2 TIMES DAILY
Qty: 60 TABLET | Refills: 5 | Status: SHIPPED | OUTPATIENT
Start: 2017-09-29 | End: 2018-01-22

## 2017-09-29 NOTE — PROGRESS NOTES
[facility] - NEUROLOGY  [facility] [place]    Date: September 29, 2017   Patient Name: Esther Segura   MRN: [id number]   PCP: Marcus Zheng  Referring Provider: Marcus Zheng MD    Assessment:      This is Esther Segura, 67 y.o. female with right arm resting tremor and subtle signs of parkinsonism on exam, history and exam most consistent with parkinson's disease.  We discussed alternate etiologies as well as role of medications and disease prognosis and will proceed with trial of sinemet which may be diagnostic as well as therapeutic.     Plan:      -  Sinemet CR  bid  -  EMG right arm    F/u 6 weeks for medication check       I discussed side effects of the medications. I asked the patient to  stop the medication if She notices serious adverse effects as we discussed and to seek immediate medical attention at an ER.     Masoud Hong MD  [facility]   Department of Neurology    Subjective:      HPI:   Ms. Esther Segura is a 67 y.o. 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  Since that time since has noted intermittent tremor in her right hand.  Tremor always occurs at rest and she will be able to terminate it by pressing down with her fingertips.  She has hardware placement in her right hand after fracture related to a fall three years ago but has not had pain or numbness in either the hand or the arm.  Symptoms are not disabling at this time and she does not appreciate any changes in dexterity or balance.  She feels her handwriting has changed but  does not appreciate changes in her handwriting or voice.  Mild difficulty maintaining sleep which has improved since eliminating pm EtOH and no RLS symptoms.  No anosmia or constipation.  She is a retired speech therapist and remains very active with yoga and weight  "training.    PAST MEDICAL HISTORY:  Past Medical History:   Diagnosis Date    Fever blister     Hyperlipidemia     Osteopenia        PAST SURGICAL HISTORY:  Past Surgical History:   Procedure Laterality Date    achiles tendon       broken hand Right        CURRENT MEDS:  Current Outpatient Prescriptions   Medication Sig Dispense Refill    aspirin (ECOTRIN) 81 MG EC tablet Take 81 mg by mouth once daily.        atorvastatin (LIPITOR) 10 MG tablet Take 1 tablet (10 mg total) by mouth once daily. 90 tablet 3    calcium-vitamin D (CALCIUM WITH VITAMIN D) 600 mg(1,500mg) -400 unit Tab Take by mouth 2 (two) times daily.        cetirizine (ZYRTEC) 10 MG tablet Take 10 mg by mouth once daily.      lorazepam (ATIVAN) 0.5 MG tablet Take 1 tablet (0.5 mg total) by mouth every evening. 30 tablet 0    multivitamin capsule Take 1 capsule by mouth once daily.        carbidopa-levodopa  mg (SINEMET CR)  mg TbSR Take 1 tablet by mouth 2 (two) times daily. 60 tablet 5     No current facility-administered medications for this visit.        ALLERGIES:  Review of patient's allergies indicates:   Allergen Reactions    Bee sting [allergen ext-venom-honey bee] Swelling       FAMILY HISTORY:  Family History   Problem Relation Age of Onset    Cancer Mother      lymphoma    Osteoporosis Father     Breast cancer Maternal Aunt     Ovarian cancer Neg Hx     Melanoma Neg Hx     Multiple sclerosis Neg Hx     Psoriasis Neg Hx     Eczema Neg Hx     Lupus Neg Hx     Acne Neg Hx     Depression Neg Hx     Suicidality Neg Hx        SOCIAL HISTORY:  Social History   Substance Use Topics    Smoking status: Never Smoker    Smokeless tobacco: Never Used    Alcohol use Yes      Comment: ocassionally       Review of Systems:  12 review of systems is negative except for the symptoms mentioned in HPI.        Objective:     Vitals:    09/29/17 1442   BP: 129/74   Pulse: 79   Weight: 46.9 kg (103 lb 6.3 oz)   Height: 5' 3" " (1.6 m)       General: NAD, well nourished   Eyes: no tearing, discharge, no erythema   ENT: moist mucous membranes of the oral cavity, nares patent    Neck: Supple, full range of motion  Cardiovascular: Warm and well perfused, pulses equal and symmetrical  Lungs: Normal work of breathing, normal chest wall excursions  Skin: No rash, lesions, or breakdown on exposed skin  Psychiatry: Mood and affect are appropriate   Abdomen: soft, non tender, non distended  Extremeties: No cyanosis, clubbing or edema.    Neurological   MENTAL STATUS: Alert and oriented to person, place, and time. Attention and concentration within normal limits. Speech without dysarthria, able to name and repeat without difficulty. Recent and remote memory within normal limits   CRANIAL NERVES: Visual fields intact. PERRL. EOMI. Facial sensation intact. Face symmetrical. Hearing grossly intact. Full shoulder shrug bilaterally. Tongue protrudes midline   SENSORY: Sensation is intact to light touch throughout.  Negative Romberg.   MOTOR: Subtle increase in tone in right arm v left. No pronator drift.  5/5 deltoid, biceps, triceps, interosseous, hand  bilaterally. 5/5 iliopsoas, knee extension/flexion, foot dorsi/plantarflexion bilaterally.  Mild bradykinesias in right hand, arises from chair with arms crossed without difficulty.  REFLEXES: Symmetric and 2+ throughout.   CEREBELLAR/COORDINATION/GAIT: Gait steady with decrease right arm swing and normal stride length.  Heel to shin intact. Finger to nose intact. Normal rapid alternating movements.

## 2017-09-29 NOTE — LETTER
September 29, 2017      Marcus Zheng MD  [address] [facility] [address] [place] [address]           [facility]  [address]  [place] [address]  Phone: [phone]  Fax: [phone]          Patient: Esther Segura   MR Number: [id number]   YOB: 1950   Date of Visit: 9/29/2017       Dear Dr. Marcus Zheng:    Thank you for referring Esther Segura to me for evaluation. Attached you will find relevant portions of my assessment and plan of care.    If you have questions, please do not hesitate to call me. I look forward to following Esther Segura along with you.    Sincerely,    Masoud Hong MD    Enclosure  CC:  No Recipients    If you would like to receive this communication electronically, please contact [email or web address] or [phone] to request more information on [employer] Link access.    For providers and/or their staff who would like to refer a patient to [facility], please contact us through our one-stop-shop provider referral line, [facility], at [phone].    If you feel you have received this communication in error or would no longer like to receive these types of communications, please e-mail [email or web address]

## 2017-10-13 ENCOUNTER — IMMUNIZATION (OUTPATIENT)
Dept: FAMILY MEDICINE | Facility: CLINIC | Age: 67
End: 2017-10-13
Payer: MEDICARE

## 2017-10-13 PROCEDURE — G0008 ADMIN INFLUENZA VIRUS VAC: HCPCS | Mod: S$GLB,,, | Performed by: INTERNAL MEDICINE

## 2017-10-13 PROCEDURE — 90662 IIV NO PRSV INCREASED AG IM: CPT | Mod: S$GLB,,, | Performed by: INTERNAL MEDICINE

## 2017-10-17 ENCOUNTER — PROCEDURE VISIT (OUTPATIENT)
Dept: NEUROLOGY | Facility: CLINIC | Age: 67
End: 2017-10-17
Payer: MEDICARE

## 2017-10-17 DIAGNOSIS — G20.C PARKINSONISM, UNSPECIFIED PARKINSONISM TYPE: ICD-10-CM

## 2017-10-17 PROCEDURE — 95886 MUSC TEST DONE W/N TEST COMP: CPT | Mod: S$GLB,,, | Performed by: PSYCHIATRY & NEUROLOGY

## 2017-10-17 PROCEDURE — 95909 NRV CNDJ TST 5-6 STUDIES: CPT | Mod: S$GLB,,, | Performed by: PSYCHIATRY & NEUROLOGY

## 2017-10-17 NOTE — PROCEDURES
Procedures   Please see scanned NCS/EMG procedure note    Pedro Parnell MD  [facility]

## 2017-10-18 ENCOUNTER — PATIENT MESSAGE (OUTPATIENT)
Dept: NEUROLOGY | Facility: CLINIC | Age: 67
End: 2017-10-18

## 2017-11-06 ENCOUNTER — OFFICE VISIT (OUTPATIENT)
Dept: PHYSICAL MEDICINE AND REHAB | Facility: CLINIC | Age: 67
End: 2017-11-06
Payer: MEDICARE

## 2017-11-06 VITALS
WEIGHT: 103 LBS | HEART RATE: 83 BPM | DIASTOLIC BLOOD PRESSURE: 64 MMHG | HEIGHT: 63 IN | BODY MASS INDEX: 18.25 KG/M2 | SYSTOLIC BLOOD PRESSURE: 105 MMHG

## 2017-11-06 DIAGNOSIS — M25.561 CHRONIC PAIN OF RIGHT KNEE: Primary | ICD-10-CM

## 2017-11-06 DIAGNOSIS — M71.21 BAKER'S CYST, RIGHT: ICD-10-CM

## 2017-11-06 DIAGNOSIS — G89.29 CHRONIC PAIN OF RIGHT KNEE: Primary | ICD-10-CM

## 2017-11-06 PROCEDURE — 99213 OFFICE O/P EST LOW 20 MIN: CPT | Mod: 25,S$GLB,, | Performed by: PHYSICAL MEDICINE & REHABILITATION

## 2017-11-06 PROCEDURE — 99999 PR PBB SHADOW E&M-EST. PATIENT-LVL II: CPT | Mod: PBBFAC,,, | Performed by: PHYSICAL MEDICINE & REHABILITATION

## 2017-11-06 PROCEDURE — 20611 DRAIN/INJ JOINT/BURSA W/US: CPT | Mod: S$GLB,,, | Performed by: PHYSICAL MEDICINE & REHABILITATION

## 2017-11-06 RX ORDER — BETAMETHASONE SODIUM PHOSPHATE AND BETAMETHASONE ACETATE 3; 3 MG/ML; MG/ML
3 INJECTION, SUSPENSION INTRA-ARTICULAR; INTRALESIONAL; INTRAMUSCULAR; SOFT TISSUE
Status: DISCONTINUED | OUTPATIENT
Start: 2017-11-06 | End: 2017-11-06 | Stop reason: HOSPADM

## 2017-11-06 RX ADMIN — BETAMETHASONE SODIUM PHOSPHATE AND BETAMETHASONE ACETATE 3 MG: 3; 3 INJECTION, SUSPENSION INTRA-ARTICULAR; INTRALESIONAL; INTRAMUSCULAR; SOFT TISSUE at 11:11

## 2017-11-06 NOTE — PROCEDURES
Large Joint Aspiration/Injection  Date/Time: 11/6/2017 11:56 AM  Performed by: JEFERSON ENCINAS  Authorized by: JEFERSON ENCINAS     Consent Done?:  Yes (Verbal)  Indications:  Joint swelling  Procedure site marked: Yes    Timeout: Prior to procedure the correct patient, procedure, and site was verified      Location:  Knee  Site:  R knee  Prep: Patient was prepped and draped in usual sterile fashion    Ultrasonic Guidance for needle placement: Yes  Images are saved and documented.  Needle size:  18 G  Approach: Needle in plane, medial to lateral.  Medications:  3 mg betamethasone acetate-betamethasone sodium phosphate 6 mg/mL  Aspirate amount (ml):  15  Aspirate:  Serous  Patient tolerance:  Patient tolerated the procedure well with no immediate complications    Additional Comments: Ultrasound guidance was used for correct needle placement, the images were saved will be uploaded to EMR.

## 2017-11-06 NOTE — PROGRESS NOTES
[facility]    CHIEF COMPLAINT:   Chief Complaint   Patient presents with    Knee Pain     right knee baker's cyst     HISTORY OF PRESENT ILLNESS: Esther Segura is a 67 y.o. female who presents to me in follow-up for her right knee Baker's cyst.  I last saw her for aspiration of the cyst in April of this year.  She reports the cyst remain decompressed for 2-3 months after and then began to slowly refill.  She denies any significant pain, however there is some discomfort with terminal knee flexion and extension.  She presents today for repeat drainage of this cyst.  She denies any redness or excess warmth of the area.  She denies any excess mechanical symptoms of the right knee.    Review of Systems   Constitutional: Negative for fever.   HENT: Negative for drooling.    Eyes: Negative for discharge.   Respiratory: Negative for choking.    Cardiovascular: Negative for chest pain.   Genitourinary: Negative for flank pain.   Skin: Negative for wound.   Allergic/Immunologic: Negative for immunocompromised state.   Neurological: Negative for tremors and syncope.   Psychiatric/Behavioral: Negative for behavioral problems.     Past Medical History:   Past Medical History:   Diagnosis Date    Fever blister     Hyperlipidemia     Osteopenia        Past Surgical History:   Past Surgical History:   Procedure Laterality Date    achiles tendon       broken hand Right        Family History:   Family History   Problem Relation Age of Onset    Cancer Mother      lymphoma    Osteoporosis Father     Breast cancer Maternal Aunt     Ovarian cancer Neg Hx     Melanoma Neg Hx     Multiple sclerosis Neg Hx     Psoriasis Neg Hx     Eczema Neg Hx     Lupus Neg Hx     Acne Neg Hx     Depression Neg Hx     Suicidality Neg Hx        Medications:   Current Outpatient Prescriptions on File Prior to Visit   Medication Sig Dispense Refill    aspirin (ECOTRIN) 81 MG EC tablet Take 81 mg by mouth once  "daily.        atorvastatin (LIPITOR) 10 MG tablet Take 1 tablet (10 mg total) by mouth once daily. 90 tablet 3    calcium-vitamin D (CALCIUM WITH VITAMIN D) 600 mg(1,500mg) -400 unit Tab Take by mouth 2 (two) times daily.        carbidopa-levodopa  mg (SINEMET CR)  mg TbSR Take 1 tablet by mouth 2 (two) times daily. 60 tablet 5    cetirizine (ZYRTEC) 10 MG tablet Take 10 mg by mouth once daily.      lorazepam (ATIVAN) 0.5 MG tablet Take 1 tablet (0.5 mg total) by mouth every evening. 30 tablet 0    multivitamin capsule Take 1 capsule by mouth once daily.         No current facility-administered medications on file prior to visit.        Allergies:   Review of patient's allergies indicates:   Allergen Reactions    Bee sting [allergen ext-venom-honey bee] Swelling       Social History:   Social History     Social History    Marital status:      Spouse name: N/A    Number of children: N/A    Years of education: N/A     Occupational History     [employer]     Social History Main Topics    Smoking status: Never Smoker    Smokeless tobacco: Never Used    Alcohol use Yes      Comment: ocassionally    Drug use: No    Sexual activity: No     Other Topics Concern    None     Social History Narrative    None     Esther is a .    PHYSICAL EXAMINATION:   General    Vitals:    11/06/17 1124   BP: 105/64   Pulse: 83   Weight: 46.7 kg (103 lb)   Height: 5' 3" (1.6 m)     Constitutional: Oriented to person, place, and time. No apparent distress. Appears well-developed and well-nourished. Pleasant.  HENT:   Head: Normocephalic and atraumatic.   Eyes: Right eye exhibits no discharge. Left eye exhibits no discharge. No scleral icterus.   Pulmonary/Chest: Effort normal. No respiratory distress.   Abdominal: There is no guarding.   Neurological: Alert and oriented to person, place, and time.   Psychiatric: Behavior is normal.   Right Knee Exam     Tenderness   Right " knee tenderness location: Soreness and fullness in the popliteal fossa.    Range of Motion   Extension: 0   Flexion: 130 (Some posterior knee pain with terminal flexion)     Tests   Bisi:  Medial - negative Lateral - negative  Lachman:  Anterior - negative    Posterior - negative  Drawer:       Anterior - negative    Posterior - negative  Varus: negative  Valgus: negative  Patellar Apprehension: negative    Other   Erythema: absent  Scars: absent  Sensation: normal  Pulse: present  Swelling: moderate  Other tests: no effusion present      Left Knee Exam   Left knee exam is normal.    Tenderness   The patient is experiencing no tenderness.         Range of Motion   Extension: normal   Flexion: normal     Muscle Strength     The patient has normal left knee strength.    Tests   Bisi:  Medial - negative Lateral - negative  Lachman:  Anterior - negative    Posterior - negative  Drawer:       Anterior - negative     Posterior - negative  Varus: negative  Valgus: negative  Patellar Apprehension: negative    Other   Erythema: absent  Scars: absent  Sensation: normal  Pulse: present  Swelling: none  Effusion: no effusion present        INSPECTION: There is no ecchymoses, erythema about the right knee.  GAIT/DYNAMIC: Her gait is normal.    Imaging  X-ray of the right knee from 12/5/2016: Mild bilateral medial compartment joint space loss.    Data Reviewed: X-ray    Supportive Actions: Independent visualization of images or test specimens    ASSESSMENT:   1. Chronic pain of right knee    2. Baker's cyst, right      PLAN:     1.  Ms. Segura presents today with a recurrent right knee Baker's cyst.  We discussed potentially using fibrin glue, however she is apprehension's about this procedure.  She wishes to perform repeat aspiration today which we did without issue.  See separate procedure note.  We were able to drain about 15 cc of viscous serous fluid, and then backfilled the area with solution of 1/2 cc of 1% lidocaine  and 1/2 cc of Celestone.    2.  She was instructed to keep compression over the knee for the next 48 hours.    3. RTC prn.  We discussed potential fibrin glue injections if the cyst were to recur.    The above note was completed, in part, with the aid of Dragon dictation software/hardware. Translation errors may be present.

## 2018-01-22 ENCOUNTER — OFFICE VISIT (OUTPATIENT)
Dept: FAMILY MEDICINE | Facility: CLINIC | Age: 68
End: 2018-01-22
Payer: MEDICARE

## 2018-01-22 VITALS
OXYGEN SATURATION: 98 % | SYSTOLIC BLOOD PRESSURE: 110 MMHG | HEART RATE: 72 BPM | WEIGHT: 103.19 LBS | HEIGHT: 63 IN | TEMPERATURE: 98 F | DIASTOLIC BLOOD PRESSURE: 74 MMHG | BODY MASS INDEX: 18.29 KG/M2

## 2018-01-22 DIAGNOSIS — F41.9 ANXIETY: ICD-10-CM

## 2018-01-22 DIAGNOSIS — G20.C PARKINSONISM, UNSPECIFIED PARKINSONISM TYPE: ICD-10-CM

## 2018-01-22 DIAGNOSIS — G25.2 RESTING TREMOR: Primary | ICD-10-CM

## 2018-01-22 PROCEDURE — 99999 PR PBB SHADOW E&M-EST. PATIENT-LVL III: CPT | Mod: PBBFAC,,, | Performed by: FAMILY MEDICINE

## 2018-01-22 PROCEDURE — 99214 OFFICE O/P EST MOD 30 MIN: CPT | Mod: S$GLB,,, | Performed by: FAMILY MEDICINE

## 2018-01-22 RX ORDER — LORAZEPAM 0.5 MG/1
0.5 TABLET ORAL NIGHTLY
Qty: 30 TABLET | Refills: 2 | Status: SHIPPED | OUTPATIENT
Start: 2018-01-22 | End: 2018-06-27 | Stop reason: SDUPTHER

## 2018-01-23 NOTE — PROGRESS NOTES
Subjective:       Patient ID: Esther Segura is a 67 y.o. female.    Chief Complaint: Follow-up, Resting tremor (Having some anxiety)    HPI     C/o resting tremor in right hand x 5 months.  Saw her neurologist, Dr. Hong, in 9/2017, and was diagnosed with parksonism.  Reports that sinemet did not help after taking the med for 2 weeks.  Seeing an acupuncturist.  Pt reports that she stopped her lipitor due to concerns about side effects of the medication pertaining to nerve function.      Taking ativan 0.5 mg 1/2 tab daily for anxiety.  Reports worsening anxiety over the past 4-6 weeks due to stress with her daughter and  being admitted to hospital last week.      Review of Systems      Review of Systems   Constitutional: Negative for fever and chills.   HENT: Negative for hearing loss and neck stiffness.    Eyes: Negative for redness and itching.   Respiratory: Negative for cough and choking.    Cardiovascular: Negative for chest pain and leg swelling.  Abdomen: Negative for abdominal pain and blood in stool.   Genitourinary: Negative for dysuria and flank pain.   Musculoskeletal: Negative for back pain and gait problem.   Neurological: Negative for light-headedness and headaches.   Hematological: Negative for adenopathy.         Objective:      Physical Exam   Constitutional: She appears well-developed.   HENT:   Head: Normocephalic and atraumatic.   Eyes: Conjunctivae are normal. Pupils are equal, round, and reactive to light.   Neck: Normal range of motion.   Cardiovascular: Normal rate and regular rhythm.    No murmur heard.  Pulmonary/Chest: Effort normal and breath sounds normal. She has no wheezes.   Lymphadenopathy:     She has no cervical adenopathy.       Assessment:       1. Resting tremor    2. Anxiety    3. Parkinsonism, unspecified Parkinsonism type        Plan:       Resting tremor    Anxiety    Parkinsonism, unspecified Parkinsonism type    Other orders  -     LORazepam (ATIVAN) 0.5  MG tablet; Take 1 tablet (0.5 mg total) by mouth every evening.  Dispense: 30 tablet; Refill: 2    plan:  rf ativan        Medication List with Changes/Refills   Current Medications    ASPIRIN (ECOTRIN) 81 MG EC TABLET    Take 81 mg by mouth once daily.      CALCIUM-VITAMIN D (CALCIUM WITH VITAMIN D) 600 MG(1,500MG) -400 UNIT TAB    Take by mouth 2 (two) times daily.      CETIRIZINE (ZYRTEC) 10 MG TABLET    Take 10 mg by mouth once daily.    GINKGO BILOBA EXTRACT ORAL    Take by mouth.    MULTIVITAMIN CAPSULE    Take 1 capsule by mouth once daily.      UBIQUINOL-B12-FA-RESVERATROL ORAL    Take by mouth.   Changed and/or Refilled Medications    Modified Medication Previous Medication    LORAZEPAM (ATIVAN) 0.5 MG TABLET lorazepam (ATIVAN) 0.5 MG tablet       Take 1 tablet (0.5 mg total) by mouth every evening.    Take 1 tablet (0.5 mg total) by mouth every evening.   Discontinued Medications    ATORVASTATIN (LIPITOR) 10 MG TABLET    Take 1 tablet (10 mg total) by mouth once daily.    CARBIDOPA-LEVODOPA  MG (SINEMET CR)  MG TBSR    Take 1 tablet by mouth 2 (two) times daily.

## 2018-02-06 ENCOUNTER — OFFICE VISIT (OUTPATIENT)
Dept: DERMATOLOGY | Facility: CLINIC | Age: 68
End: 2018-02-06
Payer: MEDICARE

## 2018-02-06 VITALS — BODY MASS INDEX: 18.25 KG/M2 | WEIGHT: 103 LBS | HEIGHT: 63 IN

## 2018-02-06 DIAGNOSIS — L82.1 SEBORRHEIC KERATOSES: Primary | ICD-10-CM

## 2018-02-06 DIAGNOSIS — D18.01 CHERRY ANGIOMA: ICD-10-CM

## 2018-02-06 DIAGNOSIS — L81.4 SOLAR LENTIGO: ICD-10-CM

## 2018-02-06 DIAGNOSIS — D22.9 MULTIPLE BENIGN NEVI: ICD-10-CM

## 2018-02-06 PROCEDURE — 1159F MED LIST DOCD IN RCRD: CPT | Mod: S$GLB,,, | Performed by: DERMATOLOGY

## 2018-02-06 PROCEDURE — 99213 OFFICE O/P EST LOW 20 MIN: CPT | Mod: S$GLB,,, | Performed by: DERMATOLOGY

## 2018-02-06 PROCEDURE — 3008F BODY MASS INDEX DOCD: CPT | Mod: S$GLB,,, | Performed by: DERMATOLOGY

## 2018-02-06 PROCEDURE — 1126F AMNT PAIN NOTED NONE PRSNT: CPT | Mod: S$GLB,,, | Performed by: DERMATOLOGY

## 2018-02-06 PROCEDURE — 99999 PR PBB SHADOW E&M-EST. PATIENT-LVL II: CPT | Mod: PBBFAC,,, | Performed by: DERMATOLOGY

## 2018-02-06 NOTE — PROGRESS NOTES
Subjective:       Patient ID:  Esther Segura is a 67 y.o. female who presents for   Chief Complaint   Patient presents with    Skin Check     TBSE    Spot     back     Patient last seen 11/21/2016 with benign lesions  No h/o skin cancer          Spot  - Initial  Affected locations: back  Duration: few months.  Signs / symptoms: itching  Severity: mild  Timing: constant  Aggravated by: nothing  Relieving factors/Treatments tried: OTC moisturizer  Improvement on treatment: mild        Review of Systems   Constitutional: Negative for fever, chills, weight loss, weight gain, fatigue, night sweats and malaise.   Skin: Positive for daily sunscreen use, activity-related sunscreen use and wears hat.   Hematologic/Lymphatic: Does not bruise/bleed easily.        Objective:    Physical Exam   Constitutional: She appears well-developed and well-nourished.   HENT:   Head:       Neurological: She is alert and oriented to person, place, and time.   Psychiatric: She has a normal mood and affect.   Skin:   Areas Examined (abnormalities noted in diagram):   Scalp / Hair Palpated and Inspected  Head / Face Inspection Performed  Neck Inspection Performed  Chest / Axilla Inspection Performed  Abdomen Inspection Performed  Genitals / Buttocks / Groin Inspection Performed  Back Inspection Performed  RUE Inspected  LUE Inspection Performed  RLE Inspected  LLE Inspection Performed  Nails and Digits Inspection Performed                   Diagram Legend     Erythematous scaling macule/papule c/w actinic keratosis       Vascular papule c/w angioma      Pigmented verrucoid papule/plaque c/w seborrheic keratosis      Yellow umbilicated papule c/w sebaceous hyperplasia      Irregularly shaped tan macule c/w lentigo     1-2 mm smooth white papules consistent with Milia      Movable subcutaneous cyst with punctum c/w epidermal inclusion cyst      Subcutaneous movable cyst c/w pilar cyst      Firm pink to brown papule c/w dermatofibroma       Pedunculated fleshy papule(s) c/w skin tag(s)      Evenly pigmented macule c/w junctional nevus     Mildly variegated pigmented, slightly irregular-bordered macule c/w mildly atypical nevus      Flesh colored to evenly pigmented papule c/w intradermal nevus       Pink pearly papule/plaque c/w basal cell carcinoma      Erythematous hyperkeratotic cursted plaque c/w SCC      Surgical scar with no sign of skin cancer recurrence      Open and closed comedones      Inflammatory papules and pustules      Verrucoid papule consistent consistent with wart     Erythematous eczematous patches and plaques     Dystrophic onycholytic nail with subungual debris c/w onychomycosis     Umbilicated papule    Erythematous-base heme-crusted tan verrucoid plaque consistent with inflamed seborrheic keratosis     Erythematous Silvery Scaling Plaque c/w Psoriasis     See annotation      Assessment / Plan:        Seborrheic keratoses  These are benign inherited growths without a malignant potential. Reassurance given to patient. No treatment is necessary.     Solar lentigo  This is a benign hyperpigmented sun induced lesion. Daily sun protection will reduce the number of new lesions. Treatment of these benign lesions are considered cosmetic.    Cherry angioma  This is a benign vascular lesion. Reassurance given. No treatment required.     Multiple benign nevi  Monitor for new mole or moles that are becoming bigger, darker, irritated, or developing irregular borders.     Patient instructed in importance in daily sun protection of at least spf 30. Sun avoidance and topical protection discussed.   Recommend Elta MD (physician office) COTZ sensitive (available online) for daily use on face and neck.  Patient encouraged to wear hat for all outdoor exposure.   Also discussed sun protective clothing.                 Follow-up in about 1 year (around 2/6/2019), or if symptoms worsen or fail to improve.

## 2018-03-06 ENCOUNTER — OFFICE VISIT (OUTPATIENT)
Dept: NEUROLOGY | Facility: CLINIC | Age: 68
End: 2018-03-06
Payer: MEDICARE

## 2018-03-06 VITALS
DIASTOLIC BLOOD PRESSURE: 76 MMHG | HEART RATE: 76 BPM | WEIGHT: 99.88 LBS | SYSTOLIC BLOOD PRESSURE: 130 MMHG | HEIGHT: 63 IN | BODY MASS INDEX: 17.7 KG/M2

## 2018-03-06 DIAGNOSIS — G20.A1 PARKINSON DISEASE: Primary | ICD-10-CM

## 2018-03-06 PROCEDURE — 99213 OFFICE O/P EST LOW 20 MIN: CPT | Mod: S$GLB,,, | Performed by: PSYCHIATRY & NEUROLOGY

## 2018-03-06 PROCEDURE — 99999 PR PBB SHADOW E&M-EST. PATIENT-LVL III: CPT | Mod: PBBFAC,,, | Performed by: PSYCHIATRY & NEUROLOGY

## 2018-03-06 RX ORDER — AMANTADINE HYDROCHLORIDE 100 MG/1
100 CAPSULE, GELATIN COATED ORAL 2 TIMES DAILY
Qty: 60 CAPSULE | Refills: 0 | Status: SHIPPED | OUTPATIENT
Start: 2018-03-06 | End: 2018-06-04

## 2018-03-06 NOTE — PROGRESS NOTES
[facility] - NEUROLOGY  [facility] [place]    Date: March 6, 2018   Patient Name: Esther Segura   MRN: [id number]   PCP: Marcus Zheng  Referring Provider: No ref. provider found    Assessment:      This is Esther Segura, 67 y.o. female with right arm resting tremor and subtle signs of parkinsonism on exam, history and exam most consistent with parkinson's disease.  She is reluctant to try medication that may contribute to orthostasis.       Plan:      -  Amantidine 100mg bid    F/u as needed       I discussed side effects of the medications. I asked the patient to stop the medication if she notices serious adverse effects as we discussed and to seek immediate medical attention at an ER.     Masoud Hong MD  [facility]   Department of Neurology    Subjective:   Attempted sinemet with some difficulty with diaphoresis and stopped, symptoms remain mild.  Takes ativan 0.25qod for anxiety.    HPI 9/2017:   Ms. Esther Segura is a 67 y.o. 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  Since that time since has noted intermittent tremor in her right hand.  Tremor always occurs at rest and she will be able to terminate it by pressing down with her fingertips.  She has hardware placement in her right hand after fracture related to a fall three years ago but has not had pain or numbness in either the hand or the arm.  Symptoms are not disabling at this time and she does not appreciate any changes in dexterity or balance.  She feels her handwriting has changed but  does not appreciate changes in her handwriting or voice.  Mild difficulty maintaining sleep which has improved since eliminating pm EtOH and no RLS symptoms.  No anosmia or constipation.  She is a retired speech therapist and remains very active with yoga and weight  "training.    PAST MEDICAL HISTORY:  Past Medical History:   Diagnosis Date    Fever blister     Hyperlipidemia     Osteopenia        PAST SURGICAL HISTORY:  Past Surgical History:   Procedure Laterality Date    achiles tendon       broken hand Right        CURRENT MEDS:  Current Outpatient Prescriptions   Medication Sig Dispense Refill    aspirin (ECOTRIN) 81 MG EC tablet Take 81 mg by mouth once daily.        calcium-vitamin D (CALCIUM WITH VITAMIN D) 600 mg(1,500mg) -400 unit Tab Take by mouth 2 (two) times daily.        cetirizine (ZYRTEC) 10 MG tablet Take 10 mg by mouth once daily.      GINKGO BILOBA EXTRACT ORAL Take by mouth.      multivitamin capsule Take 1 capsule by mouth once daily.        UBIQUINOL-B12-FA-RESVERATROL ORAL Take by mouth.      LORazepam (ATIVAN) 0.5 MG tablet Take 1 tablet (0.5 mg total) by mouth every evening. 30 tablet 2     No current facility-administered medications for this visit.        ALLERGIES:  Review of patient's allergies indicates:   Allergen Reactions    Bee sting [allergen ext-venom-honey bee] Swelling       FAMILY HISTORY:  Family History   Problem Relation Age of Onset    Cancer Mother      lymphoma    Osteoporosis Father     Breast cancer Maternal Aunt     Ovarian cancer Neg Hx     Melanoma Neg Hx     Multiple sclerosis Neg Hx     Psoriasis Neg Hx     Eczema Neg Hx     Lupus Neg Hx     Acne Neg Hx     Depression Neg Hx     Suicidality Neg Hx        SOCIAL HISTORY:  Social History   Substance Use Topics    Smoking status: Never Smoker    Smokeless tobacco: Never Used    Alcohol use Yes      Comment: ocassionally       Review of Systems:  12 review of systems is negative except for the symptoms mentioned in HPI.        Objective:     Vitals:    03/06/18 1347   BP: 130/76   Pulse: 76   Weight: 45.3 kg (99 lb 13.9 oz)   Height: 5' 3" (1.6 m)       General: NAD, well nourished   Eyes: no tearing, discharge, no erythema   ENT: moist mucous membranes " of the oral cavity, nares patent    Neck: Supple, full range of motion  Cardiovascular: Warm and well perfused, pulses equal and symmetrical  Lungs: Normal work of breathing, normal chest wall excursions  Skin: No rash, lesions, or breakdown on exposed skin  Psychiatry: Mood and affect are appropriate   Abdomen: soft, non tender, non distended  Extremeties: No cyanosis, clubbing or edema.    Neurological   MENTAL STATUS: Alert and oriented to person, place, and time. Attention and concentration within normal limits. Speech without dysarthria, able to name and repeat without difficulty. Recent and remote memory within normal limits   CRANIAL NERVES: Visual fields intact. PERRL. EOMI. Facial sensation intact. Face symmetrical. Hearing grossly intact. Full shoulder shrug bilaterally. Tongue protrudes midline   SENSORY: Sensation is intact to light touch throughout.  Negative Romberg.   MOTOR: Subtle increase in tone in right arm v left. No pronator drift.  5/5 deltoid, biceps, triceps, interosseous, hand  bilaterally. 5/5 iliopsoas, knee extension/flexion, foot dorsi/plantarflexion bilaterally.  Mild bradykinesias in right hand, arises from chair with arms crossed without difficulty.  CEREBELLAR/COORDINATION/GAIT: Gait steady with decrease right arm swing and normal stride length.  Heel to shin intact. Finger to nose intact. Normal rapid alternating movements.

## 2018-06-01 ENCOUNTER — PATIENT MESSAGE (OUTPATIENT)
Dept: FAMILY MEDICINE | Facility: CLINIC | Age: 68
End: 2018-06-01

## 2018-06-01 NOTE — TELEPHONE ENCOUNTER
----- Message from Tonjamarielena Kerrhelga sent at 6/1/2018  8:28 AM CDT -----  Contact: Self  Type:  Sooner Apoointment Request    Caller is requesting a sooner appointment.  Caller declined first available appointment listed below.  Caller will not accept being placed on the waitlist and is requesting a message be sent to doctor.    Name of Caller:  Patient  When is the first available appointment? 6/28  Symptoms:  F/u appt  Best Call Back Number:  [phone] (home)   Additional Information:  She is leaving to go out of town on Tues 6/5  for a while and would like to see the doctor before she leaves.

## 2018-06-04 ENCOUNTER — OFFICE VISIT (OUTPATIENT)
Dept: FAMILY MEDICINE | Facility: CLINIC | Age: 68
End: 2018-06-04
Payer: MEDICARE

## 2018-06-04 VITALS
BODY MASS INDEX: 18.29 KG/M2 | SYSTOLIC BLOOD PRESSURE: 100 MMHG | TEMPERATURE: 98 F | RESPIRATION RATE: 14 BRPM | WEIGHT: 103.19 LBS | DIASTOLIC BLOOD PRESSURE: 62 MMHG | HEART RATE: 77 BPM | HEIGHT: 63 IN | OXYGEN SATURATION: 99 %

## 2018-06-04 DIAGNOSIS — F41.8 DEPRESSION WITH ANXIETY: Primary | ICD-10-CM

## 2018-06-04 DIAGNOSIS — G20.C PARKINSONISM, UNSPECIFIED PARKINSONISM TYPE: ICD-10-CM

## 2018-06-04 PROCEDURE — 99999 PR PBB SHADOW E&M-EST. PATIENT-LVL III: CPT | Mod: PBBFAC,,, | Performed by: NURSE PRACTITIONER

## 2018-06-04 PROCEDURE — 99214 OFFICE O/P EST MOD 30 MIN: CPT | Mod: S$GLB,,, | Performed by: NURSE PRACTITIONER

## 2018-06-04 PROCEDURE — 99499 UNLISTED E&M SERVICE: CPT | Mod: S$GLB,,, | Performed by: NURSE PRACTITIONER

## 2018-06-04 RX ORDER — SERTRALINE HYDROCHLORIDE 25 MG/1
25 TABLET, FILM COATED ORAL DAILY
Qty: 30 TABLET | Refills: 5 | Status: SHIPPED | OUTPATIENT
Start: 2018-06-04 | End: 2018-10-12

## 2018-06-04 NOTE — PROGRESS NOTES
Subjective:       Patient ID: Esther Segura is a 68 y.o. female.    Chief Complaint: Anxiety    Ms. Segura is a new patient to me. She presents today for depression with anxiety.     HPI   She has tried lifestyle adjustments including yoga, acupuncture, meditation. Still feels anxious. Has been worked up for PD but no definite diagnosis. Sometimes trouble sleeping due to anxiety. Does not wish to take ativan all the time, interested in daily medication.   Vitals:    06/04/18 1452   BP: 100/62   Pulse: 77   Resp: 14   Temp: 98 °F (36.7 °C)     Review of Systems   Constitutional: Negative for diaphoresis and fever.   HENT: Negative for facial swelling and trouble swallowing.    Eyes: Negative for discharge and redness.   Respiratory: Negative for cough and shortness of breath.    Cardiovascular: Negative for chest pain and palpitations.   Gastrointestinal: Negative for abdominal pain and diarrhea.   Genitourinary: Negative for difficulty urinating.   Musculoskeletal: Negative for gait problem.   Skin: Negative for rash and wound.   Neurological: Negative for facial asymmetry and speech difficulty.   Psychiatric/Behavioral: Positive for dysphoric mood. Negative for confusion. The patient is nervous/anxious.        Past Medical History:   Diagnosis Date    Fever blister     Hyperlipidemia     Osteopenia      Objective:      Physical Exam   Constitutional: She is oriented to person, place, and time. She does not have a sickly appearance. No distress.   HENT:   Head: Normocephalic.   Right Ear: Hearing normal.   Left Ear: Hearing normal.   Nose: Nose normal.   Eyes: Conjunctivae and lids are normal.   Neck: No JVD present. No tracheal deviation present.   Cardiovascular: Normal rate and normal heart sounds.    Pulmonary/Chest: Effort normal and breath sounds normal.   Abdominal: Normal appearance. She exhibits no distension.   Musculoskeletal: She exhibits no deformity.   Neurological: She is alert and oriented to  person, place, and time.   Skin: She is not diaphoretic. No pallor.   Psychiatric: Her speech is normal and behavior is normal. Judgment and thought content normal. Her mood appears anxious. Cognition and memory are normal. She exhibits a depressed mood.   Nursing note and vitals reviewed.      Assessment:       1. Depression with anxiety    2. Parkinsonism, unspecified Parkinsonism type        Plan:       Depression with anxiety  -     sertraline (ZOLOFT) 25 MG tablet; Take 1 tablet (25 mg total) by mouth once daily.  Dispense: 30 tablet; Refill: 5  - Educated on mechanism of action, side effects, follow up  - Continue ativan on PRN basis     Parkinsonism, unspecified Parkinsonism type   Followed by neuro       Follow-up in about 1 month (around 7/4/2018) for anxiety FU .

## 2018-06-29 RX ORDER — LORAZEPAM 0.5 MG/1
0.25 TABLET ORAL EVERY OTHER DAY
Qty: 30 TABLET | Refills: 5 | Status: SHIPPED | OUTPATIENT
Start: 2018-06-29 | End: 2018-10-12 | Stop reason: SDUPTHER

## 2018-09-04 ENCOUNTER — PATIENT MESSAGE (OUTPATIENT)
Dept: FAMILY MEDICINE | Facility: CLINIC | Age: 68
End: 2018-09-04

## 2018-09-28 ENCOUNTER — PATIENT OUTREACH (OUTPATIENT)
Dept: ADMINISTRATIVE | Facility: HOSPITAL | Age: 68
End: 2018-09-28

## 2018-09-28 DIAGNOSIS — Z12.39 BREAST CANCER SCREENING: Primary | ICD-10-CM

## 2018-09-28 NOTE — PROGRESS NOTES
Health Maintenance Due   Topic Date Due    Mammogram  05/11/2018    Influenza Vaccine  08/01/2018

## 2018-10-12 ENCOUNTER — OFFICE VISIT (OUTPATIENT)
Dept: FAMILY MEDICINE | Facility: CLINIC | Age: 68
End: 2018-10-12
Payer: MEDICARE

## 2018-10-12 VITALS
DIASTOLIC BLOOD PRESSURE: 64 MMHG | SYSTOLIC BLOOD PRESSURE: 110 MMHG | OXYGEN SATURATION: 99 % | WEIGHT: 102.5 LBS | HEIGHT: 63 IN | BODY MASS INDEX: 18.16 KG/M2 | RESPIRATION RATE: 16 BRPM | HEART RATE: 73 BPM

## 2018-10-12 DIAGNOSIS — Z00.00 ROUTINE MEDICAL EXAM: Primary | ICD-10-CM

## 2018-10-12 DIAGNOSIS — R79.9 ABNORMAL FINDING OF BLOOD CHEMISTRY: ICD-10-CM

## 2018-10-12 DIAGNOSIS — R25.1 TREMOR: ICD-10-CM

## 2018-10-12 DIAGNOSIS — G20.C PARKINSONISM, UNSPECIFIED PARKINSONISM TYPE: ICD-10-CM

## 2018-10-12 PROCEDURE — 99213 OFFICE O/P EST LOW 20 MIN: CPT | Mod: PBBFAC,PO | Performed by: FAMILY MEDICINE

## 2018-10-12 PROCEDURE — 99213 OFFICE O/P EST LOW 20 MIN: CPT | Mod: S$PBB,,, | Performed by: FAMILY MEDICINE

## 2018-10-12 PROCEDURE — 99999 PR PBB SHADOW E&M-EST. PATIENT-LVL III: CPT | Mod: PBBFAC,,, | Performed by: FAMILY MEDICINE

## 2018-10-12 PROCEDURE — 90662 IIV NO PRSV INCREASED AG IM: CPT | Mod: PBBFAC,PO

## 2018-10-12 RX ORDER — LORAZEPAM 0.5 MG/1
0.25 TABLET ORAL EVERY OTHER DAY
Qty: 30 TABLET | Refills: 5 | Status: SHIPPED | OUTPATIENT
Start: 2018-10-12 | End: 2019-01-18

## 2018-10-12 NOTE — PROGRESS NOTES
Subjective:       Patient ID: Esther Segura is a 68 y.o. female.    Chief Complaint: Annual Exam    HPI     Here for a check up.     Taking ativan 0.25 mg every other day for the right hand resting tremor. Has mild parkinsons since 9/2017.     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        Review of Systems        Review of Systems   Constitutional: Negative for fever and chills.   HENT: Negative for hearing loss and neck stiffness.    Eyes: Negative for redness and itching.   Respiratory: Negative for cough and choking.    Cardiovascular: Negative for chest pain and leg swelling.  Abdomen: Negative for abdominal pain and blood in stool.   Genitourinary: Negative for dysuria and flank pain.   Musculoskeletal: Negative for back pain and gait problem.   Neurological: Negative for light-headedness and headaches.   Hematological: Negative for adenopathy.   Psychiatric/Behavioral: Negative for behavioral problems.       Objective:      Physical Exam   Constitutional: She is oriented to person, place, and time. She appears well-developed. No distress.   HENT:   Head: Normocephalic.   Mouth/Throat: Oropharynx is clear and moist.   Eyes: Pupils are equal, round, and reactive to light.   Neck: Normal range of motion. Neck supple. No thyromegaly present.   Cardiovascular: Normal rate, regular rhythm and normal heart sounds. Exam reveals no friction rub.   No murmur heard.  Pulmonary/Chest: Breath sounds normal. She has no wheezes. She has no rales.   Abdominal: Soft. Bowel sounds are normal. She exhibits no distension and no mass. There is no tenderness.   Musculoskeletal: Normal range of motion. She exhibits no edema or tenderness.   Neurological: She is alert and oriented to person, place, and time. She has normal reflexes. No cranial nerve deficit.   Skin: Skin is warm. No rash noted. No erythema.   Psychiatric: She has a normal mood and affect. Thought content normal.       Assessment:       1. Routine  medical exam    2. Abnormal finding of blood chemistry     3. Tremor     4. Parkinsonism, unspecified Parkinsonism type        Plan:       Routine medical exam  -     Comprehensive metabolic panel; Future; Expected date: 10/12/2018  -     Lipid panel; Future; Expected date: 10/12/2018  -     TSH; Future; Expected date: 10/12/2018  -     CBC auto differential; Future; Expected date: 10/12/2018    Abnormal finding of blood chemistry   -     Lipid panel; Future; Expected date: 10/12/2018    Tremor   -     TSH; Future; Expected date: 10/12/2018    Parkinsonism, unspecified Parkinsonism type    Other orders  -     LORazepam (ATIVAN) 0.5 MG tablet; Take 0.5 tablets (0.25 mg total) by mouth every other day.  Dispense: 30 tablet; Refill: 5  -     Influenza - High Dose (65+) (PF) (IM)            Plan:  Cont current meds  See orders       Medication List           Accurate as of 10/12/18 11:59 PM. If you have any questions, ask your nurse or doctor.               CONTINUE taking these medications    aspirin 81 MG EC tablet  Commonly known as:  ECOTRIN     CALCIUM WITH VITAMIN D 600 mg(1,500mg) -400 unit Tab  Generic drug:  calcium-vitamin D     cetirizine 10 MG tablet  Commonly known as:  ZYRTEC     GINKGO BILOBA EXTRACT ORAL     LORazepam 0.5 MG tablet  Commonly known as:  ATIVAN  Take 0.5 tablets (0.25 mg total) by mouth every other day.     multivitamin capsule     UBIQUINOL-B12-FA-RESVERATROL ORAL        STOP taking these medications    sertraline 25 MG tablet  Commonly known as:  ZOLOFT  Stopped by:  Marcus Zheng MD           Where to Get Your Medications      These medications were sent to [facility] [address] [place] [address] AT [facility] & [address] [address]    Hours:  24-hours Phone:  [phone]   · LORazepam 0.5 MG tablet

## 2018-10-15 ENCOUNTER — HOSPITAL ENCOUNTER (OUTPATIENT)
Dept: RADIOLOGY | Facility: CLINIC | Age: 68
Discharge: HOME | End: 2018-10-15
Attending: FAMILY MEDICINE
Payer: MEDICARE

## 2018-10-15 DIAGNOSIS — Z12.39 BREAST CANCER SCREENING: ICD-10-CM

## 2018-10-15 PROCEDURE — 77063 BREAST TOMOSYNTHESIS BI: CPT | Mod: 26,,, | Performed by: RADIOLOGY

## 2018-10-15 PROCEDURE — 77067 SCR MAMMO BI INCL CAD: CPT | Mod: 26,,, | Performed by: RADIOLOGY

## 2018-10-15 PROCEDURE — 77067 SCR MAMMO BI INCL CAD: CPT | Mod: TC,PO

## 2018-10-15 PROCEDURE — 77063 BREAST TOMOSYNTHESIS BI: CPT | Mod: TC,PO

## 2018-10-26 ENCOUNTER — OFFICE VISIT (OUTPATIENT)
Dept: DERMATOLOGY | Facility: CLINIC | Age: 68
End: 2018-10-26
Payer: MEDICARE

## 2018-10-26 DIAGNOSIS — L82.1 SEBORRHEIC KERATOSES: ICD-10-CM

## 2018-10-26 DIAGNOSIS — D22.9 MULTIPLE BENIGN NEVI: ICD-10-CM

## 2018-10-26 DIAGNOSIS — L57.0 ACTINIC KERATOSES: ICD-10-CM

## 2018-10-26 DIAGNOSIS — L81.4 SOLAR LENTIGO: ICD-10-CM

## 2018-10-26 DIAGNOSIS — D48.9 NEOPLASM OF UNCERTAIN BEHAVIOR: Primary | ICD-10-CM

## 2018-10-26 PROCEDURE — 88305 TISSUE EXAM BY PATHOLOGIST: CPT | Performed by: PATHOLOGY

## 2018-10-26 PROCEDURE — 99212 OFFICE O/P EST SF 10 MIN: CPT | Mod: PBBFAC,PO | Performed by: DERMATOLOGY

## 2018-10-26 PROCEDURE — 1101F PT FALLS ASSESS-DOCD LE1/YR: CPT | Mod: CPTII,,, | Performed by: DERMATOLOGY

## 2018-10-26 PROCEDURE — 11100 PR BIOPSY OF SKIN LESION: CPT | Mod: 59,S$PBB,, | Performed by: DERMATOLOGY

## 2018-10-26 PROCEDURE — 17000 DESTRUCT PREMALG LESION: CPT | Mod: S$PBB,51,59, | Performed by: DERMATOLOGY

## 2018-10-26 PROCEDURE — 17000 DESTRUCT PREMALG LESION: CPT | Mod: PBBFAC,PO | Performed by: DERMATOLOGY

## 2018-10-26 PROCEDURE — 99999 PR PBB SHADOW E&M-EST. PATIENT-LVL II: CPT | Mod: PBBFAC,,, | Performed by: DERMATOLOGY

## 2018-10-26 PROCEDURE — 11100 PR BIOPSY OF SKIN LESION: CPT | Mod: 59,PBBFAC,PO | Performed by: DERMATOLOGY

## 2018-10-26 PROCEDURE — 99213 OFFICE O/P EST LOW 20 MIN: CPT | Mod: 25,S$PBB,, | Performed by: DERMATOLOGY

## 2018-10-26 NOTE — PROGRESS NOTES
"  Subjective:       Patient ID:  Esther Segura is a 68 y.o. female who presents for   Chief Complaint   Patient presents with    Skin Check     TBSE    Spot     L hand     Patient last seen 02/2018 with benign lesions  No h/o skin cancer  New complaint today    Lesion on left dorsal hand, started as "little white dot", grew bigger, tried various creams (antifungal, cortisone), no change  Non itchy            Spot  - Initial  Affected locations: left hand  Duration: 1 month  Signs / symptoms: redness and growing  Severity: mild  Timing: constant  Aggravated by: nothing  Relieving factors/Treatments tried: OTC antibiotic cream and OTC antifungals (Cortisone cream)        Review of Systems   Constitutional: Negative for fever, chills and fatigue.   Skin: Positive for daily sunscreen use, activity-related sunscreen use and wears hat.   Hematologic/Lymphatic: Does not bruise/bleed easily.        Objective:    Physical Exam   Constitutional: She appears well-developed and well-nourished.   HENT:   Head:       Neurological: She is alert and oriented to person, place, and time.   Psychiatric: She has a normal mood and affect.   Skin:   Areas Examined (abnormalities noted in diagram):   Scalp / Hair Palpated and Inspected  Head / Face Inspection Performed  Neck Inspection Performed  Chest / Axilla Inspection Performed  Abdomen Inspection Performed  Genitals / Buttocks / Groin Inspection Performed  Back Inspection Performed  RUE Inspected  LUE Inspection Performed  RLE Inspected  LLE Inspection Performed  Nails and Digits Inspection Performed                       Diagram Legend     Erythematous scaling macule/papule c/w actinic keratosis       Vascular papule c/w angioma      Pigmented verrucoid papule/plaque c/w seborrheic keratosis      Yellow umbilicated papule c/w sebaceous hyperplasia      Irregularly shaped tan macule c/w lentigo     1-2 mm smooth white papules consistent with Milia      Movable subcutaneous " cyst with punctum c/w epidermal inclusion cyst      Subcutaneous movable cyst c/w pilar cyst      Firm pink to brown papule c/w dermatofibroma      Pedunculated fleshy papule(s) c/w skin tag(s)      Evenly pigmented macule c/w junctional nevus     Mildly variegated pigmented, slightly irregular-bordered macule c/w mildly atypical nevus      Flesh colored to evenly pigmented papule c/w intradermal nevus       Pink pearly papule/plaque c/w basal cell carcinoma      Erythematous hyperkeratotic cursted plaque c/w SCC      Surgical scar with no sign of skin cancer recurrence      Open and closed comedones      Inflammatory papules and pustules      Verrucoid papule consistent consistent with wart     Erythematous eczematous patches and plaques     Dystrophic onycholytic nail with subungual debris c/w onychomycosis     Umbilicated papule    Erythematous-base heme-crusted tan verrucoid plaque consistent with inflamed seborrheic keratosis     Erythematous Silvery Scaling Plaque c/w Psoriasis     See annotation          Assessment / Plan:      Pathology Orders:     Normal Orders This Visit    Tissue Specimen To Pathology, Dermatology     Questions:    Directional Terms:  Other(comment)    Clinical information:  Pink scaly plaque, granular pigmentation, pigmented AK vs Rodgers's vs other, partially sampled    Specific Site:  left dorsal hand        Neoplasm of uncertain behavior  -     Tissue Specimen To Pathology, Dermatology  Shave biopsy procedure note:    Shave biopsy performed after verbal consent including risk of infection, scar, recurrence, need for additional treatment of site. Area prepped with alcohol, anesthetized with approximately 1.0cc of 1% lidocaine with epinephrine. Lesional tissue shaved with razor blade. Hemostasis achieved with application of aluminum chloride followed by hyfrecation. No complications. Dressing applied. Wound care explained.    Actinic keratoses, R helix rim  Cryosurgery Procedure  Note    Verbal consent from the patient is obtained and the patient is aware of the precancerous quality and need for treatment of these lesions. Liquid nitrogen cryosurgery is applied to the 1 actinic keratoses, as detailed in the physical exam, to produce a freeze injury. The patient is aware that blisters may form and is instructed on wound care with gentle cleansing and use of vaseline ointment to keep moist until healed. The patient is supplied a handout on cryosurgery and is instructed to call if lesions do not completely resolve.    Seborrheic keratoses  These are benign inherited growths without a malignant potential. Reassurance given to patient. No treatment is necessary.     Solar lentigo  This is a benign hyperpigmented sun induced lesion. Daily sun protection will reduce the number of new lesions. Treatment of these benign lesions are considered cosmetic.    Multiple benign nevi  Discussed ABCDE's of nevi.  Monitor for new mole or moles that are becoming bigger, darker, irritated, or developing irregular borders. Brochure provided.    Patient instructed in importance in daily sun protection of at least spf 30. Mineral sunscreen ingredients preferred (Zinc +/- Titanium).   Recommend Elta MD for daily use on face and neck.  Patient encouraged to wear hat for all outdoor exposure.   Also discussed sun avoidance and use of protective clothing.           Follow-up in about 1 year (around 10/26/2019).

## 2018-10-26 NOTE — PATIENT INSTRUCTIONS
Shave Biopsy Wound Care    Your doctor has performed a shave biopsy today.  A band aid and vaseline ointment has been placed over the site.  This should remain in place for 24 hours.  It is recommended that you keep the area dry for the first 24 hours.  After 24 hours, you may remove the band aid and wash the area with warm soap and water and apply Vaseline jelly.  Many patients prefer to use Neosporin or Bacitracin ointment.  This is acceptable; however, know that you can develop an allergy to this medication even if you have used it safely for years.  It is important to keep the area moist.  Letting it dry out and get air slows healing time, and will worsen the scar.  Band aid is optional after first 24 hours.      If you notice increasing redness, tenderness, pain, or yellow drainage at the biopsy site, please notify your doctor.  These are signs of an infection.    If your biopsy site is bleeding, apply firm pressure for 15 minutes straight.  Repeat for another 15 minutes, if it is still bleeding.   If the surgical site continues to bleed, then please contact your doctor.       [facility]  [facility] - DERMATOLOGY  [address] [place] [address]  Dept: [phone]

## 2018-11-01 ENCOUNTER — TELEPHONE (OUTPATIENT)
Dept: DERMATOLOGY | Facility: CLINIC | Age: 68
End: 2018-11-01

## 2018-11-01 NOTE — TELEPHONE ENCOUNTER
----- Message from Cary Gann sent at 11/1/2018  2:17 PM CDT -----  Contact: self  Patient [phone] is returning call to Nurse Calabrese from earlier this afternoon/please call

## 2018-11-06 ENCOUNTER — OFFICE VISIT (OUTPATIENT)
Dept: OBSTETRICS AND GYNECOLOGY | Facility: CLINIC | Age: 68
End: 2018-11-06
Payer: MEDICARE

## 2018-11-06 VITALS — SYSTOLIC BLOOD PRESSURE: 113 MMHG | DIASTOLIC BLOOD PRESSURE: 69 MMHG | WEIGHT: 100.5 LBS | BODY MASS INDEX: 17.81 KG/M2

## 2018-11-06 DIAGNOSIS — N94.810 VULVAR VESTIBULITIS: ICD-10-CM

## 2018-11-06 DIAGNOSIS — Z01.419 ENCOUNTER FOR WELL WOMAN EXAM WITH ROUTINE GYNECOLOGICAL EXAM: ICD-10-CM

## 2018-11-06 PROCEDURE — G0101 CA SCREEN;PELVIC/BREAST EXAM: HCPCS | Mod: S$GLB,,, | Performed by: OBSTETRICS & GYNECOLOGY

## 2018-11-06 PROCEDURE — 99999 PR PBB SHADOW E&M-EST. PATIENT-LVL III: CPT | Mod: PBBFAC,,, | Performed by: OBSTETRICS & GYNECOLOGY

## 2018-11-06 RX ORDER — CLOTRIMAZOLE AND BETAMETHASONE DIPROPIONATE 10; .64 MG/G; MG/G
CREAM TOPICAL
Qty: 15 G | Refills: 3 | Status: SHIPPED | OUTPATIENT
Start: 2018-11-06 | End: 2019-01-18

## 2018-11-06 NOTE — PROGRESS NOTES
Subjective:       Patient ID: Esther Segura is a 68 y.o. female.    Chief Complaint:  Well Woman      History of Present Illness  HPI  68 y.o.  for annual exam. Patient reports irritation on right side of vaginal introitus for past month. Patient is not sexually active.Patient reports menopause at age 53 with no post menopausal bleeding and no HRT.    GYN & OB History  No LMP recorded. Patient is postmenopausal.   Date of Last Pap: 5/15/2017    OB History    Para Term  AB Living   3 3 2   0     SAB TAB Ectopic Multiple Live Births                  # Outcome Date GA Lbr Gareth/2nd Weight Sex Delivery Anes PTL Lv   3 Term            2 Para      Vag-Spont      1 Term                   Review of Systems  Review of Systems   Constitutional: Negative for activity change, appetite change, chills, diaphoresis, fatigue, fever and unexpected weight change.   HENT: Negative for mouth sores and tinnitus.    Eyes: Negative for discharge and visual disturbance.   Respiratory: Negative for cough, shortness of breath and wheezing.    Cardiovascular: Negative for chest pain, palpitations and leg swelling.   Gastrointestinal: Negative for abdominal pain, bloating, blood in stool, constipation, diarrhea, nausea and vomiting.   Endocrine: Negative for diabetes, hair loss, hot flashes, hyperthyroidism and hypothyroidism.   Genitourinary: Negative for decreased libido, dyspareunia, dysuria, flank pain, frequency, genital sores, hematuria, menorrhagia, menstrual problem, pelvic pain, urgency, vaginal bleeding, vaginal discharge, vaginal pain, dysmenorrhea, urinary incontinence, postcoital bleeding, postmenopausal bleeding and vaginal odor.   Musculoskeletal: Negative for back pain, joint swelling and myalgias.   Skin:  Negative for rash, no acne and hair changes.   Neurological: Negative for seizures, syncope, numbness and headaches.   Hematological: Negative for adenopathy. Does not bruise/bleed easily.    Psychiatric/Behavioral: Negative for depression and sleep disturbance. The patient is not nervous/anxious.    Breast: Negative for breast mass, breast pain, nipple discharge and skin changes          Objective:    Physical Exam:   Constitutional: She is oriented to person, place, and time. She appears well-developed and well-nourished. No distress.    HENT:   Head: Normocephalic.    Eyes: Pupils are equal, round, and reactive to light.    Neck: Normal range of motion.    Cardiovascular: Normal rate.     Pulmonary/Chest: Effort normal.   Breasts: no palpable masses or nipple discharge        Abdominal: Soft. She exhibits no distension. There is no tenderness.     Genitourinary:   Genitourinary Comments: Erythema noted at vestibular gland openings. Vaginal canal and cervix are atrophic appearing. Uterus is anteverted and small with no palpable adnexal masses or tenderness.           Musculoskeletal: Normal range of motion and moves all extremeties.       Neurological: She is alert and oriented to person, place, and time.    Skin: Skin is warm and dry.    Psychiatric: She has a normal mood and affect. Her behavior is normal. Judgment and thought content normal.          Assessment:        1. Encounter for well woman exam with routine gynecological exam    2. Vulvar vestibulitis                Plan:      Lotrisone cream BID

## 2018-11-19 ENCOUNTER — OFFICE VISIT (OUTPATIENT)
Dept: DERMATOLOGY | Facility: CLINIC | Age: 68
End: 2018-11-19
Payer: MEDICARE

## 2018-11-19 DIAGNOSIS — L57.0 ACTINIC KERATOSES: Primary | ICD-10-CM

## 2018-11-19 PROCEDURE — 99499 UNLISTED E&M SERVICE: CPT | Mod: S$GLB,,, | Performed by: DERMATOLOGY

## 2018-11-19 PROCEDURE — 99999 PR PBB SHADOW E&M-EST. PATIENT-LVL II: CPT | Mod: PBBFAC,,, | Performed by: DERMATOLOGY

## 2018-11-19 PROCEDURE — 17000 DESTRUCT PREMALG LESION: CPT | Mod: S$GLB,,, | Performed by: DERMATOLOGY

## 2018-11-19 PROCEDURE — 17003 DESTRUCT PREMALG LES 2-14: CPT | Mod: S$GLB,,, | Performed by: DERMATOLOGY

## 2018-11-19 NOTE — PATIENT INSTRUCTIONS

## 2018-11-19 NOTE — PROGRESS NOTES
Subjective:       Patient ID:  Esther Segura is a 68 y.o. female who presents for   Chief Complaint   Patient presents with    Actinic Keratosis     Reevaluate R helix rim and cryo to L dorsal hand     Patient last seen 10/26/2018,. bx of lesion on left dorsal hand (AK) and cryo of AK on R helix rim  Here for reevaluation        Actinic Keratosis  - Follow-up  Diagnosis: AK.  Currently using: Cryo.  Affected locations: left arm  Signs / symptoms: redness  Severity: mild        Review of Systems   Constitutional: Negative for fever, chills and fatigue.   Skin: Positive for daily sunscreen use, activity-related sunscreen use and wears hat.   Hematologic/Lymphatic: Does not bruise/bleed easily.        Objective:    Physical Exam   Constitutional: She appears well-developed and well-nourished. No distress.   Neurological: She is alert and oriented to person, place, and time. She is not disoriented.   Psychiatric: She has a normal mood and affect.   Skin:   Areas Examined (abnormalities noted in diagram):   Head / Face Inspection Performed  Nails and Digits Inspection Performed                  Diagram Legend     Erythematous scaling macule/papule c/w actinic keratosis       Vascular papule c/w angioma      Pigmented verrucoid papule/plaque c/w seborrheic keratosis      Yellow umbilicated papule c/w sebaceous hyperplasia      Irregularly shaped tan macule c/w lentigo     1-2 mm smooth white papules consistent with Milia      Movable subcutaneous cyst with punctum c/w epidermal inclusion cyst      Subcutaneous movable cyst c/w pilar cyst      Firm pink to brown papule c/w dermatofibroma      Pedunculated fleshy papule(s) c/w skin tag(s)      Evenly pigmented macule c/w junctional nevus     Mildly variegated pigmented, slightly irregular-bordered macule c/w mildly atypical nevus      Flesh colored to evenly pigmented papule c/w intradermal nevus       Pink pearly papule/plaque c/w basal cell carcinoma       Erythematous hyperkeratotic cursted plaque c/w SCC      Surgical scar with no sign of skin cancer recurrence      Open and closed comedones      Inflammatory papules and pustules      Verrucoid papule consistent consistent with wart     Erythematous eczematous patches and plaques     Dystrophic onycholytic nail with subungual debris c/w onychomycosis     Umbilicated papule    Erythematous-base heme-crusted tan verrucoid plaque consistent with inflamed seborrheic keratosis     Erythematous Silvery Scaling Plaque c/w Psoriasis     See annotation      Assessment / Plan:        Actinic keratoses, R helix, L dorsal hand    Cryosurgery Procedure Note    Verbal consent from the patient is obtained and the patient is aware of the precancerous quality and need for treatment of these lesions. Liquid nitrogen cryosurgery is applied to the 2 actinic keratoses, as detailed in the physical exam, to produce a freeze injury. The patient is aware that blisters may form and is instructed on wound care with gentle cleansing and use of vaseline ointment to keep moist until healed. The patient is supplied a handout on cryosurgery and is instructed to call if lesions do not completely resolve.           Follow-up in about 6 months (around 5/19/2019).

## 2019-01-14 ENCOUNTER — PATIENT MESSAGE (OUTPATIENT)
Dept: DERMATOLOGY | Facility: CLINIC | Age: 69
End: 2019-01-14

## 2019-01-18 ENCOUNTER — OFFICE VISIT (OUTPATIENT)
Dept: DERMATOLOGY | Facility: CLINIC | Age: 69
End: 2019-01-18
Payer: MEDICARE

## 2019-01-18 DIAGNOSIS — D48.9 NEOPLASM OF UNCERTAIN BEHAVIOR: Primary | ICD-10-CM

## 2019-01-18 PROCEDURE — 11102 TANGNTL BX SKIN SINGLE LES: CPT | Mod: S$GLB,,, | Performed by: DERMATOLOGY

## 2019-01-18 PROCEDURE — 99499 NO LOS: ICD-10-PCS | Mod: S$GLB,,, | Performed by: DERMATOLOGY

## 2019-01-18 PROCEDURE — 99499 UNLISTED E&M SERVICE: CPT | Mod: S$GLB,,, | Performed by: DERMATOLOGY

## 2019-01-18 PROCEDURE — 88305 TISSUE EXAM BY PATHOLOGIST: CPT | Performed by: PATHOLOGY

## 2019-01-18 PROCEDURE — 99999 PR PBB SHADOW E&M-EST. PATIENT-LVL II: ICD-10-PCS | Mod: PBBFAC,,, | Performed by: DERMATOLOGY

## 2019-01-18 PROCEDURE — 11102 PR TANGENTIAL BIOPSY, SKIN, SINGLE LESION: ICD-10-PCS | Mod: S$GLB,,, | Performed by: DERMATOLOGY

## 2019-01-18 PROCEDURE — 99999 PR PBB SHADOW E&M-EST. PATIENT-LVL II: CPT | Mod: PBBFAC,,, | Performed by: DERMATOLOGY

## 2019-01-18 PROCEDURE — 88305 TISSUE SPECIMEN TO PATHOLOGY, DERMATOLOGY: ICD-10-PCS | Mod: 26,,, | Performed by: PATHOLOGY

## 2019-01-18 NOTE — PROGRESS NOTES
Subjective:       Patient ID:  Esther Segura is a 68 y.o. female who presents for   Chief Complaint   Patient presents with    Spot     R forearm-Red, inflamed, and growing     Patient last seen 10/2018, bx o AK left dorsal hand  New lesion on R forearm x 3 weeks, slightly itchy, no prior trauma or puncture wound        Spot  - Initial  Affected locations: R forearm.  Duration: 3 weeks  Signs / symptoms: itching, non-healing and growing  Severity: mild  Timing: constant  Aggravated by: nothing  Relieving factors/Treatments tried: nothing        Review of Systems   Constitutional: Negative for fever, chills and fatigue.   Skin: Positive for daily sunscreen use, activity-related sunscreen use and wears hat.   Hematologic/Lymphatic: Does not bruise/bleed easily.        Objective:    Physical Exam   Constitutional: She appears well-developed and well-nourished. No distress.   Neurological: She is alert and oriented to person, place, and time. She is not disoriented.   Psychiatric: She has a normal mood and affect.   Skin:   Areas Examined (abnormalities noted in diagram):   RUE Inspected              Diagram Legend     Erythematous scaling macule/papule c/w actinic keratosis       Vascular papule c/w angioma      Pigmented verrucoid papule/plaque c/w seborrheic keratosis      Yellow umbilicated papule c/w sebaceous hyperplasia      Irregularly shaped tan macule c/w lentigo     1-2 mm smooth white papules consistent with Milia      Movable subcutaneous cyst with punctum c/w epidermal inclusion cyst      Subcutaneous movable cyst c/w pilar cyst      Firm pink to brown papule c/w dermatofibroma      Pedunculated fleshy papule(s) c/w skin tag(s)      Evenly pigmented macule c/w junctional nevus     Mildly variegated pigmented, slightly irregular-bordered macule c/w mildly atypical nevus      Flesh colored to evenly pigmented papule c/w intradermal nevus       Pink pearly papule/plaque c/w basal cell carcinoma       Erythematous hyperkeratotic cursted plaque c/w SCC      Surgical scar with no sign of skin cancer recurrence      Open and closed comedones      Inflammatory papules and pustules      Verrucoid papule consistent consistent with wart     Erythematous eczematous patches and plaques     Dystrophic onycholytic nail with subungual debris c/w onychomycosis     Umbilicated papule    Erythematous-base heme-crusted tan verrucoid plaque consistent with inflamed seborrheic keratosis     Erythematous Silvery Scaling Plaque c/w Psoriasis     See annotation          Assessment / Plan:      Pathology Orders:     Normal Orders This Visit    Tissue Specimen To Pathology, Dermatology     Questions:    Directional Terms:  Other(comment)    Clinical information:  Pink nodule, SCC/KA    Specific Site:  R forearm        Neoplasm of uncertain behavior  -     Tissue Specimen To Pathology, Dermatology    Shave biopsy procedure note:    Shave biopsy performed after verbal consent including risk of infection, scar, recurrence, need for additional treatment of site. Area prepped with alcohol, anesthetized with approximately 1.0cc of 1% lidocaine with epinephrine. Lesional tissue shaved with razor blade. Hemostasis achieved with application of aluminum chloride followed by hyfrecation. No complications. Dressing applied. Wound care explained.           Follow-up in about 1 year (around 1/18/2020).

## 2019-01-18 NOTE — PATIENT INSTRUCTIONS
Shave Biopsy Wound Care    Your doctor has performed a shave biopsy today.  A band aid and vaseline ointment has been placed over the site.  This should remain in place for 24 hours.  It is recommended that you keep the area dry for the first 24 hours.  After 24 hours, you may remove the band aid and wash the area with warm soap and water and apply Vaseline jelly.  Many patients prefer to use Neosporin or Bacitracin ointment.  This is acceptable; however, know that you can develop an allergy to this medication even if you have used it safely for years.  It is important to keep the area moist.  Letting it dry out and get air slows healing time, and will worsen the scar.  Band aid is optional after first 24 hours.      If you notice increasing redness, tenderness, pain, or yellow drainage at the biopsy site, please notify your doctor.  These are signs of an infection.    If your biopsy site is bleeding, apply firm pressure for 15 minutes straight.  Repeat for another 15 minutes, if it is still bleeding.   If the surgical site continues to bleed, then please contact your doctor.       [facility]  [facility] - DERMATOLOGY  [address] [place] [address]  Dept: [phone]

## 2019-01-25 ENCOUNTER — OFFICE VISIT (OUTPATIENT)
Dept: NEUROLOGY | Facility: CLINIC | Age: 69
End: 2019-01-25
Payer: MEDICARE

## 2019-01-25 VITALS
WEIGHT: 104.06 LBS | BODY MASS INDEX: 18.44 KG/M2 | HEIGHT: 63 IN | HEART RATE: 76 BPM | SYSTOLIC BLOOD PRESSURE: 115 MMHG | DIASTOLIC BLOOD PRESSURE: 66 MMHG

## 2019-01-25 DIAGNOSIS — G20.A1 PARKINSON'S DISEASE: ICD-10-CM

## 2019-01-25 PROCEDURE — 1101F PR PT FALLS ASSESS DOC 0-1 FALLS W/OUT INJ PAST YR: ICD-10-PCS | Mod: CPTII,S$GLB,, | Performed by: PSYCHIATRY & NEUROLOGY

## 2019-01-25 PROCEDURE — 99999 PR PBB SHADOW E&M-EST. PATIENT-LVL III: ICD-10-PCS | Mod: PBBFAC,,, | Performed by: PSYCHIATRY & NEUROLOGY

## 2019-01-25 PROCEDURE — 99499 RISK ADDL DX/OHS AUDIT: ICD-10-PCS | Mod: S$GLB,,, | Performed by: PSYCHIATRY & NEUROLOGY

## 2019-01-25 PROCEDURE — 1101F PT FALLS ASSESS-DOCD LE1/YR: CPT | Mod: CPTII,S$GLB,, | Performed by: PSYCHIATRY & NEUROLOGY

## 2019-01-25 PROCEDURE — 99213 PR OFFICE/OUTPT VISIT, EST, LEVL III, 20-29 MIN: ICD-10-PCS | Mod: S$GLB,,, | Performed by: PSYCHIATRY & NEUROLOGY

## 2019-01-25 PROCEDURE — 99499 UNLISTED E&M SERVICE: CPT | Mod: S$GLB,,, | Performed by: PSYCHIATRY & NEUROLOGY

## 2019-01-25 PROCEDURE — 99999 PR PBB SHADOW E&M-EST. PATIENT-LVL III: CPT | Mod: PBBFAC,,, | Performed by: PSYCHIATRY & NEUROLOGY

## 2019-01-25 PROCEDURE — 99213 OFFICE O/P EST LOW 20 MIN: CPT | Mod: S$GLB,,, | Performed by: PSYCHIATRY & NEUROLOGY

## 2019-01-25 RX ORDER — PNV NO.95/FERROUS FUM/FOLIC AC 28MG-0.8MG
TABLET ORAL
COMMUNITY
Start: 2010-12-13

## 2019-01-25 RX ORDER — LORAZEPAM 0.5 MG/1
TABLET ORAL
COMMUNITY
Start: 2016-10-31 | End: 2019-07-02 | Stop reason: SDUPTHER

## 2019-01-25 RX ORDER — UBIQUINOL 100 MG
100 CAPSULE ORAL
COMMUNITY
End: 2021-03-16

## 2019-01-25 RX ORDER — PRAMIPEXOLE DIHYDROCHLORIDE 0.5 MG/1
TABLET ORAL
Qty: 90 TABLET | Refills: 1 | Status: SHIPPED | OUTPATIENT
Start: 2019-01-25 | End: 2019-04-01

## 2019-01-25 NOTE — PROGRESS NOTES
[facility] - NEUROLOGY  [facility] [place]    Date: January 25, 2019   Patient Name: Esther Segura   MRN: [id number]   PCP: Marcus Zheng  Referring Provider: No ref. provider found    Assessment:      This is Esther Segura, 68 y.o. female with parkinson's disease with predominantly right sided symptoms which are mild at present.       Plan:      -  Mirapex 0.5 to 1mg qhs  -  Continue with regular exercise    F/u 6 months       I discussed side effects of the medications. I asked the patient to stop the medication if she notices serious adverse effects as we discussed and to seek immediate medical attention at an ER.     Masoud Hong MD  [facility]   Department of Neurology    Subjective:   Stopped amantidine with mild worsening of tremor since last visit but generally not limited by symptoms, continues regular yoga and weight training.  Some difficulty maintaining sleep, continues ativan 0.25mg prn.    3/2018  Attempted sinemet with some difficulty with diaphoresis and stopped, symptoms remain mild.  Takes ativan 0.25qod for anxiety.    HPI 9/2017:   Ms. Esther Segura is a 68 y.o. 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  Since that time since has noted intermittent tremor in her right hand.  Tremor always occurs at rest and she will be able to terminate it by pressing down with her fingertips.  She has hardware placement in her right hand after fracture related to a fall three years ago but has not had pain or numbness in either the hand or the arm.  Symptoms are not disabling at this time and she does not appreciate any changes in dexterity or balance.  She feels her handwriting has changed but  does not appreciate changes in her handwriting or voice.  Mild difficulty maintaining sleep which has improved since eliminating  pm EtOH and no RLS symptoms.  No anosmia or constipation.  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    PAST MEDICAL HISTORY:  Past Medical History:   Diagnosis Date    Fever blister     Hyperlipidemia     Osteopenia        PAST SURGICAL HISTORY:  Past Surgical History:   Procedure Laterality Date    achiles tendon       broken hand Right     OPEN REDUCTION INTERNAL FIXATION-HAND Right 12/19/2014    Performed by Malik Velazquez Jr., MD at [facility] OR       CURRENT MEDS:  Current Outpatient Medications   Medication Sig Dispense Refill    calcium carbonate-vitamin D3 (CALCIUM 500 + D) 500 mg(1,250mg) -400 unit Tab       coQ10, ubiquinol, 100 mg Cap Take 100 mg PE/kg/day by mouth.      ginkgo biloba 120 mg Tab       LORazepam (ATIVAN) 0.5 MG tablet       multivit 33-mtfolate-nac-chrom 2.5-200-1 mg-mg-mg Cap       pramipexole (MIRAPEX) 0.5 MG tablet Take 1 tab at bed time.  If no improvement in sleep after 1 week, may increase to 2 tabs at bed time. 90 tablet 1     No current facility-administered medications for this visit.        ALLERGIES:  Review of patient's allergies indicates:   Allergen Reactions    Bee sting [allergen ext-venom-honey bee] Swelling       FAMILY HISTORY:  Family History   Problem Relation Age of Onset    Cancer Mother         lymphoma    Osteoporosis Father     Breast cancer Maternal Aunt     Ovarian cancer Neg Hx     Melanoma Neg Hx     Multiple sclerosis Neg Hx     Psoriasis Neg Hx     Eczema Neg Hx     Lupus Neg Hx     Acne Neg Hx     Depression Neg Hx     Suicidality Neg Hx        SOCIAL HISTORY:  Social History     Tobacco Use    Smoking status: Never Smoker    Smokeless tobacco: Never Used   Substance Use Topics    Alcohol use: Yes     Comment: ocassionally    Drug use: No       Review of Systems:  12 review of systems is negative except for the symptoms mentioned in HPI.        Objective:     Vitals:    01/25/19 0956  "  BP: 115/66   Pulse: 76   Weight: 47.2 kg (104 lb 0.9 oz)   Height: 5' 3" (1.6 m)       General: NAD, well nourished   Eyes: no tearing, discharge, no erythema   ENT: moist mucous membranes of the oral cavity, nares patent    Neck: Supple, full range of motion  Cardiovascular: Warm and well perfused, pulses equal and symmetrical  Lungs: Normal work of breathing, normal chest wall excursions  Skin: No rash, lesions, or breakdown on exposed skin  Psychiatry: Mood and affect are appropriate   Abdomen: soft, non tender, non distended  Extremeties: No cyanosis, clubbing or edema.    Neurological   MENTAL STATUS: Alert and oriented to person, place, and time. Attention and concentration within normal limits. Speech without dysarthria, able to name and repeat without difficulty. Recent and remote memory within normal limits   CRANIAL NERVES: Visual fields intact. PERRL. EOMI. Facial sensation intact. Face symmetrical with hypomimia. Hearing grossly intact. Full shoulder shrug bilaterally. Tongue protrudes midline   SENSORY: Sensation is intact to light touch throughout.   MOTOR: Unable to appreciate rigidity today. No pronator drift.  5/5 deltoid, biceps, triceps, interosseous, hand  bilaterally. 5/5 iliopsoas, knee extension/flexion, foot dorsi/plantarflexion bilaterally.  Mild bradykinesias in and intermittent resting tremorright hand, arises from chair with arms crossed without difficulty.  CEREBELLAR/COORDINATION/GAIT: Gait steady with normal stride length and no appreciable discrepancy in arm swing.  Heel to shin intact. Finger to nose intact. Normal rapid alternating movements.       "

## 2019-02-01 ENCOUNTER — PATIENT MESSAGE (OUTPATIENT)
Dept: DERMATOLOGY | Facility: CLINIC | Age: 69
End: 2019-02-01

## 2019-02-11 ENCOUNTER — TELEPHONE (OUTPATIENT)
Dept: DERMATOLOGY | Facility: CLINIC | Age: 69
End: 2019-02-11

## 2019-02-11 ENCOUNTER — PROCEDURE VISIT (OUTPATIENT)
Dept: DERMATOLOGY | Facility: CLINIC | Age: 69
End: 2019-02-11
Payer: MEDICARE

## 2019-02-11 DIAGNOSIS — C44.622 SQUAMOUS CELL CARCINOMA OF SKIN OF RIGHT UPPER EXTREMITY, INCLUDING SHOULDER: Primary | ICD-10-CM

## 2019-02-11 PROCEDURE — 99499 UNLISTED E&M SERVICE: CPT | Mod: S$GLB,,, | Performed by: DERMATOLOGY

## 2019-02-11 PROCEDURE — 12032 PR LAYR CLOS WND TRUNK,ARM,LEG 2.6-7.5 CM: ICD-10-PCS | Mod: S$GLB,,, | Performed by: DERMATOLOGY

## 2019-02-11 PROCEDURE — 88305 TISSUE EXAM BY PATHOLOGIST: CPT | Mod: 26,,, | Performed by: PATHOLOGY

## 2019-02-11 PROCEDURE — 11602 EXC TR-EXT MAL+MARG 1.1-2 CM: CPT | Mod: 51,S$GLB,, | Performed by: DERMATOLOGY

## 2019-02-11 PROCEDURE — 12032 INTMD RPR S/A/T/EXT 2.6-7.5: CPT | Mod: S$GLB,,, | Performed by: DERMATOLOGY

## 2019-02-11 PROCEDURE — 88305 TISSUE SPECIMEN TO PATHOLOGY, DERMATOLOGY: ICD-10-PCS | Mod: 26,,, | Performed by: PATHOLOGY

## 2019-02-11 PROCEDURE — 88305 TISSUE EXAM BY PATHOLOGIST: CPT | Performed by: PATHOLOGY

## 2019-02-11 PROCEDURE — 11602 PR EXC SKIN MALIG 1.1-2 CM TRUNK,ARM,LEG: ICD-10-PCS | Mod: 51,S$GLB,, | Performed by: DERMATOLOGY

## 2019-02-11 PROCEDURE — 99499 NO LOS: ICD-10-PCS | Mod: S$GLB,,, | Performed by: DERMATOLOGY

## 2019-02-11 NOTE — PROGRESS NOTES
Subjective:       Patient ID:  Esther Segura is a 68 y.o. female who presents for No chief complaint on file.    Pt here for definitive excision scc/ka. bx 1/18/19  Feeling well today.   Denies pacemaker, defibrillator or blood thinners.   No additional cutaneous complaints.     FINAL PATHOLOGIC DIAGNOSIS  1. Skin, right forearm, shave biopsy:  - INVASIVE SQUAMOUS CELL CARCINOMA, WELL-DIFFERENTIATED, CRATERIFORM  (KERATOACANTHOMATOUS TYPE).  - THE TUMOR EXTENDS TO THE DEEP BIOPSY MARGIN.        Review of Systems   Constitutional: Negative for fever and chills.        Objective:    Physical Exam   Constitutional: She appears well-developed and well-nourished. No distress.   Neurological: She is alert and oriented to person, place, and time. She is not disoriented.   Psychiatric: She has a normal mood and affect.   Skin:   Areas Examined (abnormalities noted in diagram):   RUE Inspected                      Assessment / Plan:      Pathology Orders:     Normal Orders This Visit    Tissue Specimen To Pathology, Dermatology     Questions:    Directional Terms:  Other(comment)    Clinical Information:  bx proven SCC chesck margins    Specific Site:  R forearm        Squamous cell carcinoma of skin of right upper extremity, including shoulder  -     Tissue Specimen To Pathology, Dermatology    PROCEDURE: Elliptical excision with intermediate layered repair in order to decrease dead space, decrease tension and close large gap.    ANESTHETIC: 6 cc 1% Xylocaine with Epinephrine 1:100,000, buffered    SURGEON: Paula Lira MD  ASSISTANTS: Bharati Ugarte LPN ; Sivan Wiley LPN      PREOPERATIVE DIAGNOSIS:  Biopsy-proven Squamous Cell Carcinoma    POSTOPERATIVE DIAGNOSIS:  Same as preoperative diagnosis    PATHOLOGIC DIAGNOSIS: Pending    LOCATION: R foreram    INITIAL LESION SIZE: 0.8 cm    EXCISED DIAMETER: 1.6 cm    PREPARATION: The diagnosis, procedure, alternatives, benefits and risks, including but not  limited to: infection, bleeding/bruising, drug reactions, pain, scar or cosmetic defect, local sensation disturbances, wound dehiscence (separation of wound edges after sutures removed) and/or recurrence of present condition were explained to the patient. The patient elected to proceed.  Patient's identity was verified using 2 patient identifiers and the side and site was verified.  Time out period with surgeon, assistant and patient in surgical suite was taken.    PROCEDURE: The location noted above was prepped and draped in the usual sterile fashion. The area was anesthetized with intradermal buffered xylocaine. Lesional tissue was carefully marked with at least 4 mm margins of clinically normal skin in all directions. A fusiform elliptical excision was done with #15 blade carried down completely through the dermis into the subcutaneous tissues to the level of the subcutaneous fat, and dissection was carried out in that plane.  Electrocoagulation was used to obtain hemostasis. Blood loss was minimal. The wound was then approximated in a layered fashion with subcutaneous and intradermal sutures of 5.0 Monocryl, approximately 6 in number, and the wound was then superficially closed with simple interrupted sutures of 4.0 Prolene.    The patient tolerated the procedure well.    The area was cleaned and dressed appropriately and the patient was given wound care instructions, as well as an appointment for follow-up evaluation.    LENGTH OF REPAIR: 4 cm             Follow-up in about 10 days (around 2/21/2019).

## 2019-02-11 NOTE — PATIENT INSTRUCTIONS
Surgery Wound Care    Your doctor has performed an excision today.  A bandage and vaseline ointment has been placed over the site.  This should remain in place for 24 hours.  It is recommended that you keep the area dry for the first 24 hours.  After 24 hours, you may remove the band aid and wash the area with warm soap and water and apply Vaseline jelly or aquaphore.  Many patients prefer to use Neosporin or Bacitracin ointment.  This is acceptable; however know that you can develop an allergy to this medication even if you have used it safely for years.  It is important to keep the area moist.  Letting it dry out and get air slows healing time, will worsen the scar, and make it more difficult to remove the stitches if they were placed.        If you notice increasing redness, tenderness, pain, or yellow drainage at the biopsy or surgical site, please notify your doctor.  These are signs of an infection.    If your biopsy/surgical site is bleeding, apply firm pressure for 15 minutes straight.  Repeat for another 15 minutes, if it is still bleeding.   If the surgical site continues to bleed, then please contact your doctor.     [facility]  [facility] - DERMATOLOGY  [address] [place] [address]  Dept: [phone]

## 2019-02-19 ENCOUNTER — PATIENT MESSAGE (OUTPATIENT)
Dept: DERMATOLOGY | Facility: CLINIC | Age: 69
End: 2019-02-19

## 2019-02-20 ENCOUNTER — TELEPHONE (OUTPATIENT)
Dept: DERMATOLOGY | Facility: CLINIC | Age: 69
End: 2019-02-20

## 2019-02-20 NOTE — TELEPHONE ENCOUNTER
Patient wants to have Dr. Lira look at her suture site tomorrow after 11:00.  Denies any pain or foul odor. Instructed to seek emergency care if any fever or chills.

## 2019-02-20 NOTE — TELEPHONE ENCOUNTER
----- Message from Aruna Booker sent at 2/20/2019  8:21 AM CST -----  Contact: self [phone]  She thinks that the incision on her arm may be infected.  She would like to be seen today.  Please call her. Thank you!

## 2019-02-25 ENCOUNTER — CLINICAL SUPPORT (OUTPATIENT)
Dept: DERMATOLOGY | Facility: CLINIC | Age: 69
End: 2019-02-25
Payer: MEDICARE

## 2019-02-25 VITALS — BODY MASS INDEX: 18.43 KG/M2 | WEIGHT: 104 LBS | HEIGHT: 63 IN

## 2019-02-25 DIAGNOSIS — Z48.02 VISIT FOR SUTURE REMOVAL: Primary | ICD-10-CM

## 2019-02-25 PROCEDURE — 99999 PR PBB SHADOW E&M-EST. PATIENT-LVL II: ICD-10-PCS | Mod: PBBFAC,,,

## 2019-02-25 PROCEDURE — 99024 POSTOP FOLLOW-UP VISIT: CPT | Mod: S$GLB,,, | Performed by: DERMATOLOGY

## 2019-02-25 PROCEDURE — 99024 PR POST-OP FOLLOW-UP VISIT: ICD-10-PCS | Mod: S$GLB,,, | Performed by: DERMATOLOGY

## 2019-02-25 PROCEDURE — 99999 PR PBB SHADOW E&M-EST. PATIENT-LVL II: CPT | Mod: PBBFAC,,,

## 2019-02-25 NOTE — PROGRESS NOTES
Suture Removal note:  CC: 68 y.o. female patient is here for suture removal.         HPI: Patient is s/p excision of SCC from the right forearm on 1/18/2019.  Patient reports no problems.    WOUND PE:  Sutures intact.  Wound healing well.  Good approximation of skin edges.  No signs or symptoms of infection.    IMPRESSION: FINAL PATHOLOGIC DIAGNOSIS  Skin, right forearm, re-excision:  -SCAR (POST-SURGICAL)  -NO RESIDUAL SQUAMOUS CELL CARCINOMA IDENTIFIED     PLAN:  Sutures removed today.  Continue wound care.    RTC: In 6 months.

## 2019-03-31 ENCOUNTER — PATIENT MESSAGE (OUTPATIENT)
Dept: DERMATOLOGY | Facility: CLINIC | Age: 69
End: 2019-03-31

## 2019-04-01 ENCOUNTER — OFFICE VISIT (OUTPATIENT)
Dept: DERMATOLOGY | Facility: CLINIC | Age: 69
End: 2019-04-01
Payer: MEDICARE

## 2019-04-01 ENCOUNTER — PATIENT OUTREACH (OUTPATIENT)
Dept: ADMINISTRATIVE | Facility: HOSPITAL | Age: 69
End: 2019-04-01

## 2019-04-01 DIAGNOSIS — T81.89XA SUTURE GRANULOMA, INITIAL ENCOUNTER: Primary | ICD-10-CM

## 2019-04-01 PROCEDURE — 11900 INJECT SKIN LESIONS </W 7: CPT | Mod: S$GLB,,, | Performed by: DERMATOLOGY

## 2019-04-01 PROCEDURE — 99999 PR PBB SHADOW E&M-EST. PATIENT-LVL II: CPT | Mod: PBBFAC,,, | Performed by: DERMATOLOGY

## 2019-04-01 PROCEDURE — 99999 PR PBB SHADOW E&M-EST. PATIENT-LVL II: ICD-10-PCS | Mod: PBBFAC,,, | Performed by: DERMATOLOGY

## 2019-04-01 PROCEDURE — 11900 PR INJECTION INTO SKIN LESIONS, UP TO 7: ICD-10-PCS | Mod: S$GLB,,, | Performed by: DERMATOLOGY

## 2019-04-01 PROCEDURE — 99499 UNLISTED E&M SERVICE: CPT | Mod: S$GLB,,, | Performed by: DERMATOLOGY

## 2019-04-01 PROCEDURE — 99499 NO LOS: ICD-10-PCS | Mod: S$GLB,,, | Performed by: DERMATOLOGY

## 2019-04-01 RX ORDER — TRIAMCINOLONE ACETONIDE 40 MG/ML
8 INJECTION, SUSPENSION INTRA-ARTICULAR; INTRAMUSCULAR
Status: DISCONTINUED | OUTPATIENT
Start: 2019-04-01 | End: 2020-11-19 | Stop reason: CLARIF

## 2019-04-01 NOTE — PROGRESS NOTES
"  Subjective:       Patient ID:  Esther Segura is a 69 y.o. female who presents for   Chief Complaint   Patient presents with    Spot     R forearm-Red,raised, and dry     Patient last seen 2/11/2019 for excision of SCC/KA R forearm  Noticed new "bump" in close vicinity in addition to area of crusting present since the day of initial surgery  No bleeding, mild tenderness        Spot  - Initial  Affected locations: R forearm.  Duration: 2 days  Signs / symptoms: dryness, redness and growing  Severity: mild  Timing: constant  Aggravated by: nothing  Treatments tried: Coconut oil.  Improvement on treatment: no relief        Review of Systems   Constitutional: Negative for fever, chills and fatigue.   Skin: Positive for daily sunscreen use, activity-related sunscreen use and wears hat.   Hematologic/Lymphatic: Does not bruise/bleed easily.        Objective:    Physical Exam   Constitutional: She appears well-developed and well-nourished. No distress.   Neurological: She is alert and oriented to person, place, and time. She is not disoriented.   Psychiatric: She has a normal mood and affect.   Skin:   Areas Examined (abnormalities noted in diagram):   RUE Inspected              Diagram Legend     Erythematous scaling macule/papule c/w actinic keratosis       Vascular papule c/w angioma      Pigmented verrucoid papule/plaque c/w seborrheic keratosis      Yellow umbilicated papule c/w sebaceous hyperplasia      Irregularly shaped tan macule c/w lentigo     1-2 mm smooth white papules consistent with Milia      Movable subcutaneous cyst with punctum c/w epidermal inclusion cyst      Subcutaneous movable cyst c/w pilar cyst      Firm pink to brown papule c/w dermatofibroma      Pedunculated fleshy papule(s) c/w skin tag(s)      Evenly pigmented macule c/w junctional nevus     Mildly variegated pigmented, slightly irregular-bordered macule c/w mildly atypical nevus      Flesh colored to evenly pigmented papule c/w " intradermal nevus       Pink pearly papule/plaque c/w basal cell carcinoma      Erythematous hyperkeratotic cursted plaque c/w SCC      Surgical scar with no sign of skin cancer recurrence      Open and closed comedones      Inflammatory papules and pustules      Verrucoid papule consistent consistent with wart     Erythematous eczematous patches and plaques     Dystrophic onycholytic nail with subungual debris c/w onychomycosis     Umbilicated papule    Erythematous-base heme-crusted tan verrucoid plaque consistent with inflamed seborrheic keratosis     Erythematous Silvery Scaling Plaque c/w Psoriasis     See annotation                  Assessment / Plan:        Suture granuloma, initial encounter  Vs eruptive KA  Site of recent KA excision with negative bx margins    Intralesional Kenalog 40mg/cc (0.2 cc total) injected into 2 lesions on R forearm today after obtaining verbal consent including risk of surrounding hypopigmentation. Patient tolerated procedure well.    Units:1  NDC for Kenalog 40mg/cc:  6855-0098-10         No follow-ups on file.

## 2019-04-09 ENCOUNTER — PATIENT OUTREACH (OUTPATIENT)
Dept: ADMINISTRATIVE | Facility: HOSPITAL | Age: 69
End: 2019-04-09

## 2019-04-09 NOTE — LETTER
April 9, 2019    Esther Segura  [address]  [place] [address]             [facility]  [address]  [place] [address]  Phone: [phone] Dear Ms. Segura:    We have tried to reach you by [facility] email unsuccessfully.      [facility] is committed to your overall health.  To help you get the most out of each of your visits, we will review your information to make sure you are up to date on all of your recommended tests and/or procedures.       Marcus Zheng MD   has found that your chart shows you may be due for the following:     Colonoscopy     If you have had any of the above done at another facility, please bring the records with you or fax them to [phone] so that your record at [facility] will be complete. If you have not had any of these tests or procedures done recently and would like to complete this testing ,  please call [phone] or send a message through your [facility] portal to your provider's office.     If you have an upcoming scheduled appointment for the above test and/or procedures, please disregard this letter.       Sincerely,    Krysta Logan, Care Coordinator  [facility]  Phone: [phone]  Fax: [phone]

## 2019-04-09 NOTE — PROGRESS NOTES
Portal outreach un-read by patient.  Outreach mailed today  [facility] is committed to your overall health.  To help you get the most out of each of your visits, we will review your information to make sure you are up to date on all of your recommended tests and/or procedures.       Marcus Zheng MD   has found that your chart shows you may be due for the following:     Colonoscopy     If you have had any of the above done at another facility, please bring the records with you or fax them to [phone] so that your record at [facility] will be complete. If you have not had any of these tests or procedures done recently and would like to complete this testing ,  please call [phone] or send a message through your [facility] portal to your provider's office.     If you have an upcoming scheduled appointment for the above test and/or procedures, please disregard this letter.

## 2019-04-20 RX ORDER — LORAZEPAM 0.5 MG/1
TABLET ORAL
Qty: 30 TABLET | Refills: 2 | Status: SHIPPED | OUTPATIENT
Start: 2019-04-20 | End: 2020-01-23

## 2019-04-23 ENCOUNTER — OFFICE VISIT (OUTPATIENT)
Dept: DERMATOLOGY | Facility: CLINIC | Age: 69
End: 2019-04-23
Payer: MEDICARE

## 2019-04-23 VITALS — WEIGHT: 104.06 LBS | HEIGHT: 63 IN | BODY MASS INDEX: 18.44 KG/M2

## 2019-04-23 DIAGNOSIS — Z85.828 HISTORY OF NONMELANOMA SKIN CANCER: ICD-10-CM

## 2019-04-23 DIAGNOSIS — L82.1 SEBORRHEIC KERATOSES: Primary | ICD-10-CM

## 2019-04-23 PROCEDURE — 99999 PR PBB SHADOW E&M-EST. PATIENT-LVL II: ICD-10-PCS | Mod: PBBFAC,,, | Performed by: DERMATOLOGY

## 2019-04-23 PROCEDURE — 99212 PR OFFICE/OUTPT VISIT, EST, LEVL II, 10-19 MIN: ICD-10-PCS | Mod: S$GLB,,, | Performed by: DERMATOLOGY

## 2019-04-23 PROCEDURE — 99999 PR PBB SHADOW E&M-EST. PATIENT-LVL II: CPT | Mod: PBBFAC,,, | Performed by: DERMATOLOGY

## 2019-04-23 PROCEDURE — 99212 OFFICE O/P EST SF 10 MIN: CPT | Mod: S$GLB,,, | Performed by: DERMATOLOGY

## 2019-04-23 PROCEDURE — 1101F PR PT FALLS ASSESS DOC 0-1 FALLS W/OUT INJ PAST YR: ICD-10-PCS | Mod: CPTII,S$GLB,, | Performed by: DERMATOLOGY

## 2019-04-23 PROCEDURE — 1101F PT FALLS ASSESS-DOCD LE1/YR: CPT | Mod: CPTII,S$GLB,, | Performed by: DERMATOLOGY

## 2019-04-23 NOTE — PROGRESS NOTES
Subjective:       Patient ID:  Esther Segura is a 69 y.o. female who presents for   Chief Complaint   Patient presents with    Spot     right forearm, s/p injected granuloma, healed fine.  above right eyebrow and right nose,xmonths,asymptomatic     Patient last sen 4/2019  Recent E&S on R prox forearm with suture granuloma vs eruptive KA post op  S/p ILK 40 with resolution    Also c/o rough lesions on face x years, wishes to have examined again    Spot  - Follow-up  Symptom course: resolved  Affected locations: face  Signs / symptoms: asymptomatic  Severity: mild        Review of Systems   Constitutional: Negative for fever, chills and fatigue.   Skin: Positive for daily sunscreen use, activity-related sunscreen use and wears hat.   Hematologic/Lymphatic: Does not bruise/bleed easily.        Objective:    Physical Exam   Constitutional: She appears well-developed and well-nourished. No distress.   Neurological: She is alert and oriented to person, place, and time. She is not disoriented.   Psychiatric: She has a normal mood and affect.   Skin:   Areas Examined (abnormalities noted in diagram):   Head / Face Inspection Performed  RUE Inspected                   Diagram Legend     Erythematous scaling macule/papule c/w actinic keratosis       Vascular papule c/w angioma      Pigmented verrucoid papule/plaque c/w seborrheic keratosis      Yellow umbilicated papule c/w sebaceous hyperplasia      Irregularly shaped tan macule c/w lentigo     1-2 mm smooth white papules consistent with Milia      Movable subcutaneous cyst with punctum c/w epidermal inclusion cyst      Subcutaneous movable cyst c/w pilar cyst      Firm pink to brown papule c/w dermatofibroma      Pedunculated fleshy papule(s) c/w skin tag(s)      Evenly pigmented macule c/w junctional nevus     Mildly variegated pigmented, slightly irregular-bordered macule c/w mildly atypical nevus      Flesh colored to evenly pigmented papule c/w intradermal  nevus       Pink pearly papule/plaque c/w basal cell carcinoma      Erythematous hyperkeratotic cursted plaque c/w SCC      Surgical scar with no sign of skin cancer recurrence      Open and closed comedones      Inflammatory papules and pustules      Verrucoid papule consistent consistent with wart     Erythematous eczematous patches and plaques     Dystrophic onycholytic nail with subungual debris c/w onychomycosis     Umbilicated papule    Erythematous-base heme-crusted tan verrucoid plaque consistent with inflamed seborrheic keratosis     Erythematous Silvery Scaling Plaque c/w Psoriasis     See annotation      Assessment / Plan:        Seborrheic keratoses  These are benign inherited growths without a malignant potential. Reassurance given to patient. No treatment is necessary.     History of nonmelanoma skin cancer  Area of previous NMSC (R prox forearm) examined. Site well healed with no signs of recurrence.      Plan TBSE in 6 months               Follow up in about 6 months (around 10/23/2019).

## 2019-05-10 ENCOUNTER — OFFICE VISIT (OUTPATIENT)
Dept: DERMATOLOGY | Facility: CLINIC | Age: 69
End: 2019-05-10
Payer: MEDICARE

## 2019-05-10 VITALS — WEIGHT: 104.06 LBS | HEIGHT: 63 IN | BODY MASS INDEX: 18.44 KG/M2

## 2019-05-10 DIAGNOSIS — D48.5 NEOPLASM OF UNCERTAIN BEHAVIOR OF SKIN: Primary | ICD-10-CM

## 2019-05-10 PROCEDURE — 88305 TISSUE SPECIMEN TO PATHOLOGY, DERMATOLOGY: ICD-10-PCS | Mod: 26,,, | Performed by: PATHOLOGY

## 2019-05-10 PROCEDURE — 88305 TISSUE EXAM BY PATHOLOGIST: CPT | Performed by: PATHOLOGY

## 2019-05-10 PROCEDURE — 99999 PR PBB SHADOW E&M-EST. PATIENT-LVL III: CPT | Mod: PBBFAC,,, | Performed by: DERMATOLOGY

## 2019-05-10 PROCEDURE — 99499 UNLISTED E&M SERVICE: CPT | Mod: S$GLB,,, | Performed by: DERMATOLOGY

## 2019-05-10 PROCEDURE — 11102 PR TANGENTIAL BIOPSY, SKIN, SINGLE LESION: ICD-10-PCS | Mod: S$GLB,,, | Performed by: DERMATOLOGY

## 2019-05-10 PROCEDURE — 99999 PR PBB SHADOW E&M-EST. PATIENT-LVL III: ICD-10-PCS | Mod: PBBFAC,,, | Performed by: DERMATOLOGY

## 2019-05-10 PROCEDURE — 99499 NO LOS: ICD-10-PCS | Mod: S$GLB,,, | Performed by: DERMATOLOGY

## 2019-05-10 PROCEDURE — 11102 TANGNTL BX SKIN SINGLE LES: CPT | Mod: S$GLB,,, | Performed by: DERMATOLOGY

## 2019-05-10 NOTE — PROGRESS NOTES
Subjective:       Patient ID:  Esther Segura is a 69 y.o. female who presents for   Chief Complaint   Patient presents with    Follow-up     R forearm     Patient last seen 4/23/2019  H/o KA R forearm, excised 02/2019 with neg margins, clinical recurrence vs suture granuloma, s/p ILK 40 with improvement but recent nodular regrowth      Review of Systems   Constitutional: Negative for fever, chills and fatigue.   Skin: Positive for daily sunscreen use, activity-related sunscreen use and wears hat.   Hematologic/Lymphatic: Does not bruise/bleed easily.        Objective:    Physical Exam   Constitutional: She appears well-developed and well-nourished. No distress.   Neurological: She is alert and oriented to person, place, and time. She is not disoriented.   Psychiatric: She has a normal mood and affect.   Skin:   Areas Examined (abnormalities noted in diagram):   RUE Inspected              Diagram Legend     Erythematous scaling macule/papule c/w actinic keratosis       Vascular papule c/w angioma      Pigmented verrucoid papule/plaque c/w seborrheic keratosis      Yellow umbilicated papule c/w sebaceous hyperplasia      Irregularly shaped tan macule c/w lentigo     1-2 mm smooth white papules consistent with Milia      Movable subcutaneous cyst with punctum c/w epidermal inclusion cyst      Subcutaneous movable cyst c/w pilar cyst      Firm pink to brown papule c/w dermatofibroma      Pedunculated fleshy papule(s) c/w skin tag(s)      Evenly pigmented macule c/w junctional nevus     Mildly variegated pigmented, slightly irregular-bordered macule c/w mildly atypical nevus      Flesh colored to evenly pigmented papule c/w intradermal nevus       Pink pearly papule/plaque c/w basal cell carcinoma      Erythematous hyperkeratotic cursted plaque c/w SCC      Surgical scar with no sign of skin cancer recurrence      Open and closed comedones      Inflammatory papules and pustules      Verrucoid papule consistent  consistent with wart     Erythematous eczematous patches and plaques     Dystrophic onycholytic nail with subungual debris c/w onychomycosis     Umbilicated papule    Erythematous-base heme-crusted tan verrucoid plaque consistent with inflamed seborrheic keratosis     Erythematous Silvery Scaling Plaque c/w Psoriasis     See annotation      Assessment / Plan:      Pathology Orders:     Normal Orders This Visit    Tissue Specimen To Pathology, Dermatology     Questions:    Directional Terms:  Other(comment)    Clinical Information:  Scar (E&S KA), adjacent 3mm pink non healing papule, r/o SCC/KA    Specific Site:  R forearm        Neoplasm of uncertain behavior of skin  -     Tissue Specimen To Pathology, Dermatology    Punch biopsy procedure note:  Punch biopsy performed after verbal consent obtained. Area marked and prepped with alcohol. Approximately 1cc of 1% lidocaine with epinephrine injected. 5 mm disposable punch used to remove lesion. Hemostasis obtained and biopsy site closed with 3 Prolene sutures. Wound care instructions reviewed with patient and handout given.           Follow up in about 10 days (around 5/20/2019), or if symptoms worsen or fail to improve, for suture removal.

## 2019-05-10 NOTE — PATIENT INSTRUCTIONS
Punch Biopsy Wound Care    Your doctor has performed a punch biopsy today.  A band aid and antibiotic ointment has been placed over the site.  This should remain in place for 24 hours.  It is recommended that you keep the area dry for the first 24 hours.  After 24 hours, you may remove the band aid and wash the area with warm soap and water and apply Vaseline jelly.  Many patients prefer to use Neosporin or Bacitracin ointment.  This is acceptable; however know that you can develop an allergy to this medication even if you have used it safely for years.  It is important to keep the area moist.  Letting it dry out and get air slows healing time, will worsen the scar, and make it more difficult to remove the stitches if they were placed.  Band aid is optional after first 24 hours.      If you notice increasing redness, tenderness, pain, or yellow drainage at the biopsy or surgical site, please notify your doctor.  These are signs of an infection.    If your biopsy/surgical site is bleeding, apply firm pressure for 15 minutes straight.  Repeat for another 15 minutes, if it is still bleeding.   If the surgical site continues to bleed, then please contact your doctor.     [facility]  [facility] - DERMATOLOGY  [address] [place] [address] Ladarius Patel [id number]  Dept: [phone]

## 2019-05-17 ENCOUNTER — CLINICAL SUPPORT (OUTPATIENT)
Dept: DERMATOLOGY | Facility: CLINIC | Age: 69
End: 2019-05-17
Payer: MEDICARE

## 2019-05-17 VITALS — HEIGHT: 63 IN | WEIGHT: 104 LBS | BODY MASS INDEX: 18.43 KG/M2

## 2019-05-17 DIAGNOSIS — Z48.02 VISIT FOR SUTURE REMOVAL: Primary | ICD-10-CM

## 2019-05-17 PROCEDURE — 99999 PR PBB SHADOW E&M-EST. PATIENT-LVL III: ICD-10-PCS | Mod: PBBFAC,,,

## 2019-05-17 PROCEDURE — 99999 PR PBB SHADOW E&M-EST. PATIENT-LVL III: CPT | Mod: PBBFAC,,,

## 2019-05-17 NOTE — PROGRESS NOTES
Patient presents for suture removal. The wound is well healed without signs of infection.  The sutures are removed. PATH PENDING. Wound care and activity instructions given. Return prn.

## 2019-07-02 ENCOUNTER — OFFICE VISIT (OUTPATIENT)
Dept: NEUROLOGY | Facility: CLINIC | Age: 69
End: 2019-07-02
Payer: MEDICARE

## 2019-07-02 VITALS
HEIGHT: 62 IN | BODY MASS INDEX: 18.86 KG/M2 | HEART RATE: 84 BPM | SYSTOLIC BLOOD PRESSURE: 126 MMHG | DIASTOLIC BLOOD PRESSURE: 80 MMHG | WEIGHT: 102.5 LBS

## 2019-07-02 DIAGNOSIS — G20.A1 PARKINSON'S DISEASE: Primary | ICD-10-CM

## 2019-07-02 PROCEDURE — 99214 OFFICE O/P EST MOD 30 MIN: CPT | Mod: S$GLB,,, | Performed by: PSYCHIATRY & NEUROLOGY

## 2019-07-02 PROCEDURE — 1101F PT FALLS ASSESS-DOCD LE1/YR: CPT | Mod: CPTII,S$GLB,, | Performed by: PSYCHIATRY & NEUROLOGY

## 2019-07-02 PROCEDURE — 99499 RISK ADDL DX/OHS AUDIT: ICD-10-PCS | Mod: S$GLB,,, | Performed by: PSYCHIATRY & NEUROLOGY

## 2019-07-02 PROCEDURE — 99999 PR PBB SHADOW E&M-EST. PATIENT-LVL III: CPT | Mod: PBBFAC,,, | Performed by: PSYCHIATRY & NEUROLOGY

## 2019-07-02 PROCEDURE — 1101F PR PT FALLS ASSESS DOC 0-1 FALLS W/OUT INJ PAST YR: ICD-10-PCS | Mod: CPTII,S$GLB,, | Performed by: PSYCHIATRY & NEUROLOGY

## 2019-07-02 PROCEDURE — 99499 UNLISTED E&M SERVICE: CPT | Mod: S$GLB,,, | Performed by: PSYCHIATRY & NEUROLOGY

## 2019-07-02 PROCEDURE — 99214 PR OFFICE/OUTPT VISIT, EST, LEVL IV, 30-39 MIN: ICD-10-PCS | Mod: S$GLB,,, | Performed by: PSYCHIATRY & NEUROLOGY

## 2019-07-02 PROCEDURE — 99999 PR PBB SHADOW E&M-EST. PATIENT-LVL III: ICD-10-PCS | Mod: PBBFAC,,, | Performed by: PSYCHIATRY & NEUROLOGY

## 2019-07-02 RX ORDER — AMANTADINE HYDROCHLORIDE 100 MG/1
TABLET ORAL
Qty: 90 TABLET | Refills: 1 | Status: SHIPPED | OUTPATIENT
Start: 2019-07-02 | End: 2019-10-22

## 2019-07-02 NOTE — PROGRESS NOTES
[facility] - NEUROLOGY  [facility] [place]    Date: July 2, 2019   Patient Name: Esther Segura   MRN: [id number]   PCP: Marcus Zheng  Referring Provider: Self, Aaareferral    Assessment:      This is Esther Segura, 69 y.o. female with parkinson's disease with predominantly right sided symptoms with some progression over the past 6-12 months.       Plan:      -  Amantidine 50mg bid  -  Continue with regular exercise    Will follow up with movement disorders       I discussed side effects of the medications. I asked the patient to stop the medication if she notices serious adverse effects as we discussed and to seek immediate medical attention at an ER.     Masoud Hong MD  [facility]   Department of Neurology    Subjective:   Notes worsening of tremor since last visit and currently desires medication trial, not taking mirapex due to lightheadedness, continues regular yoga    1/2019  Stopped amantidine with mild worsening of tremor since last visit but generally not limited by symptoms, continues regular yoga and weight training.  Some difficulty maintaining sleep, continues ativan 0.25mg prn.  3/2018  Attempted sinemet with some difficulty with diaphoresis and stopped, symptoms remain mild.  Takes ativan 0.25qod for anxiety.    HPI 9/2017:   Ms. Esther Segura is a 69 y.o. 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  Since that time since has noted intermittent tremor in her right hand.  Tremor always occurs at rest and she will be able to terminate it by pressing down with her fingertips.  She has hardware placement in her right hand after fracture related to a fall three years ago but has not had pain or numbness in either the hand or the arm.  Symptoms are not disabling at this time and she does not appreciate any changes in  dexterity or balance.  She feels her handwriting has changed but  does not appreciate changes in her handwriting or voice.  Mild difficulty maintaining sleep which has improved since eliminating pm EtOH and no RLS symptoms.  No anosmia or constipation.  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    PAST MEDICAL HISTORY:  Past Medical History:   Diagnosis Date    Fever blister     Hyperlipidemia     Osteopenia     Squamous cell carcinoma 01/18/2019    right forearm       PAST SURGICAL HISTORY:  Past Surgical History:   Procedure Laterality Date    achiles tendon       broken hand Right     OPEN REDUCTION INTERNAL FIXATION-HAND Right 12/19/2014    Performed by Malik Velazquez Jr., MD at [facility] OR       CURRENT MEDS:  Current Outpatient Medications   Medication Sig Dispense Refill    BIOTIN ORAL Take by mouth.      calcium carbonate-vitamin D3 (CALCIUM 500 + D) 500 mg(1,250mg) -400 unit Tab       coQ10, ubiquinol, 100 mg Cap Take 100 mg PE/kg/day by mouth.      ginkgo biloba 120 mg Tab       LORazepam (ATIVAN) 0.5 MG tablet TAKE 1 TABLET BY MOUTH EVERY OTHER DAY (Patient taking differently: TAKE 1 TABLET BY MOUTH EVERY OTHER DAY. Patient ststaes she takes 1/2 every other day.) 30 tablet 2    multivit 33-mtfolate-nac-chrom 2.5-200-1 mg-mg-mg Cap        Current Facility-Administered Medications   Medication Dose Route Frequency Provider Last Rate Last Dose    triamcinolone acetonide injection 8 mg  8 mg Intradermal 1 time in Clinic/HOD Paula Lira MD           ALLERGIES:  Review of patient's allergies indicates:   Allergen Reactions    Bee sting [allergen ext-venom-honey bee] Swelling       FAMILY HISTORY:  Family History   Problem Relation Age of Onset    Cancer Mother         lymphoma    Osteoporosis Father     Breast cancer Maternal Aunt     Ovarian cancer Neg Hx     Melanoma Neg Hx     Multiple sclerosis Neg Hx     Psoriasis Neg Hx      "Eczema Neg Hx     Lupus Neg Hx     Acne Neg Hx     Depression Neg Hx     Suicidality Neg Hx        SOCIAL HISTORY:  Social History     Tobacco Use    Smoking status: Never Smoker    Smokeless tobacco: Never Used   Substance Use Topics    Alcohol use: Yes     Comment: ocassionally    Drug use: No       Review of Systems:  12 review of systems is negative except for the symptoms mentioned in HPI.        Objective:     Vitals:    07/02/19 1052   BP: 126/80   Pulse: 84   Weight: 46.5 kg (102 lb 8.2 oz)   Height: 5' 2" (1.575 m)       General: NAD, well nourished   Eyes: no tearing, discharge, no erythema   ENT: moist mucous membranes of the oral cavity, nares patent    Neck: Supple, full range of motion  Cardiovascular: Warm and well perfused, pulses equal and symmetrical  Lungs: Normal work of breathing, normal chest wall excursions  Skin: No rash, lesions, or breakdown on exposed skin  Psychiatry: Mood and affect are appropriate   Abdomen: soft, non tender, non distended  Extremeties: No cyanosis, clubbing or edema.    Neurological   MENTAL STATUS: Alert and oriented to person, place, and time. Attention and concentration within normal limits. Speech without dysarthria, able to name and repeat without difficulty. Recent and remote memory within normal limits   CRANIAL NERVES: Visual fields intact. PERRL. Some difficulty with vertical gaze noted. Facial sensation intact. Face symmetrical with hypomimia. Hearing grossly intact. Full shoulder shrug bilaterally. Tongue protrudes midline   MOTOR: Mild RUE rigidity. No pronator drift.  5/5 deltoid, biceps, triceps, interosseous, hand  bilaterally. 5/5 iliopsoas, knee extension/flexion, foot dorsi/plantarflexion bilaterally.  Mild to moderate bradykinesias in and intermittent resting tremor right hand and some irregularity in right foot tapping noted, arises from chair with arms crossed without difficulty.  CEREBELLAR/COORDINATION/GAIT: Gait steady with normal " stride length and no appreciable discrepancy in arm swing, smooth turn.  Heel to shin intact. Finger to nose intact. Normal rapid alternating movements.

## 2019-07-08 ENCOUNTER — TELEPHONE (OUTPATIENT)
Dept: CARDIOLOGY | Facility: CLINIC | Age: 69
End: 2019-07-08

## 2019-07-08 NOTE — TELEPHONE ENCOUNTER
Called  Imelda to let her know Dr. Mullen is not accepting new patients at this time and provided her with [facility] referral line.

## 2019-07-11 ENCOUNTER — OFFICE VISIT (OUTPATIENT)
Dept: FAMILY MEDICINE | Facility: CLINIC | Age: 69
End: 2019-07-11
Payer: MEDICARE

## 2019-07-11 VITALS
BODY MASS INDEX: 18.62 KG/M2 | SYSTOLIC BLOOD PRESSURE: 116 MMHG | DIASTOLIC BLOOD PRESSURE: 72 MMHG | WEIGHT: 101.19 LBS | HEIGHT: 62 IN | OXYGEN SATURATION: 97 % | HEART RATE: 76 BPM

## 2019-07-11 DIAGNOSIS — G20.C PARKINSONISM, UNSPECIFIED PARKINSONISM TYPE: ICD-10-CM

## 2019-07-11 DIAGNOSIS — F41.9 ANXIETY: Primary | ICD-10-CM

## 2019-07-11 DIAGNOSIS — Z12.11 COLON CANCER SCREENING: ICD-10-CM

## 2019-07-11 DIAGNOSIS — G25.2 RESTING TREMOR: ICD-10-CM

## 2019-07-11 PROCEDURE — 99214 OFFICE O/P EST MOD 30 MIN: CPT | Mod: S$GLB,,, | Performed by: FAMILY MEDICINE

## 2019-07-11 PROCEDURE — 99214 PR OFFICE/OUTPT VISIT, EST, LEVL IV, 30-39 MIN: ICD-10-PCS | Mod: S$GLB,,, | Performed by: FAMILY MEDICINE

## 2019-07-11 PROCEDURE — 1101F PR PT FALLS ASSESS DOC 0-1 FALLS W/OUT INJ PAST YR: ICD-10-PCS | Mod: CPTII,S$GLB,, | Performed by: FAMILY MEDICINE

## 2019-07-11 PROCEDURE — 99999 PR PBB SHADOW E&M-EST. PATIENT-LVL III: CPT | Mod: PBBFAC,,, | Performed by: FAMILY MEDICINE

## 2019-07-11 PROCEDURE — 99999 PR PBB SHADOW E&M-EST. PATIENT-LVL III: ICD-10-PCS | Mod: PBBFAC,,, | Performed by: FAMILY MEDICINE

## 2019-07-11 PROCEDURE — 1101F PT FALLS ASSESS-DOCD LE1/YR: CPT | Mod: CPTII,S$GLB,, | Performed by: FAMILY MEDICINE

## 2019-07-11 NOTE — PROGRESS NOTES
Subjective:       Patient ID: Esther Segura is a 69 y.o. female.    Chief Complaint: Hypertension    HPI     Started on amantadine for worsening tremor in right hand due to parkinsons. Will f/u with movement d/o neurologist next month.     Taking ativan every other day for anxiety.     Still performing and teaching yoga.       Review of Systems      Review of Systems   Constitutional: Negative for fever and chills.   HENT: Negative for hearing loss and neck stiffness.    Eyes: Negative for redness and itching.   Respiratory: Negative for cough and choking.    Cardiovascular: Negative for chest pain and leg swelling.  Abdomen: Negative for abdominal pain and blood in stool.   Genitourinary: Negative for dysuria and flank pain.   Musculoskeletal: Negative for back pain and gait problem.   Neurological: Negative for light-headedness and headaches.   Hematological: Negative for adenopathy.       Objective:      Physical Exam   Constitutional: She appears well-developed.   HENT:   Head: Normocephalic and atraumatic.   Eyes: Pupils are equal, round, and reactive to light. Conjunctivae are normal.   Neck: Normal range of motion.   Cardiovascular: Normal rate and regular rhythm.   No murmur heard.  Pulmonary/Chest: Effort normal and breath sounds normal.   Lymphadenopathy:     She has no cervical adenopathy.       Assessment:       1. Anxiety    2. Colon cancer screening    3. Resting tremor    4. Parkinsonism, unspecified Parkinsonism type        Plan:       Anxiety    Colon cancer screening  -     Case request GI: COLONOSCOPY    Resting tremor    Parkinsonism, unspecified Parkinsonism type                Plan:  See order  Cont current meds        Medication List with Changes/Refills   Current Medications    AMANTADINE  MG TAB    Take 1/2 tab at bed time for 1 week then take 1/2 tab twice daily    BIOTIN ORAL    Take by mouth.    CALCIUM CARBONATE-VITAMIN D3 (CALCIUM 500 + D) 500 MG(1,250MG) -400 UNIT TAB         COQ10, UBIQUINOL, 100 MG CAP    Take 100 mg PE/kg/day by mouth.    GINKGO BILOBA 120 MG TAB        LORAZEPAM (ATIVAN) 0.5 MG TABLET    TAKE 1 TABLET BY MOUTH EVERY OTHER DAY    MULTIVIT 33-MTFOLATE-NAC-CHROM 2.5-200-1 MG-MG-MG CAP

## 2019-08-07 DIAGNOSIS — M25.552 LEFT HIP PAIN: Primary | ICD-10-CM

## 2019-08-08 ENCOUNTER — OFFICE VISIT (OUTPATIENT)
Dept: ORTHOPEDICS | Facility: CLINIC | Age: 69
End: 2019-08-08
Payer: MEDICARE

## 2019-08-08 ENCOUNTER — HOSPITAL ENCOUNTER (OUTPATIENT)
Dept: RADIOLOGY | Facility: HOSPITAL | Age: 69
Discharge: HOME | End: 2019-08-08
Attending: ORTHOPAEDIC SURGERY
Payer: MEDICARE

## 2019-08-08 VITALS
HEIGHT: 62 IN | DIASTOLIC BLOOD PRESSURE: 84 MMHG | HEART RATE: 76 BPM | SYSTOLIC BLOOD PRESSURE: 142 MMHG | WEIGHT: 101 LBS | BODY MASS INDEX: 18.58 KG/M2

## 2019-08-08 DIAGNOSIS — M25.552 LEFT HIP PAIN: Primary | ICD-10-CM

## 2019-08-08 DIAGNOSIS — M25.552 LEFT HIP PAIN: ICD-10-CM

## 2019-08-08 PROCEDURE — 1101F PT FALLS ASSESS-DOCD LE1/YR: CPT | Mod: CPTII,S$GLB,, | Performed by: ORTHOPAEDIC SURGERY

## 2019-08-08 PROCEDURE — 73502 XR HIP 2 VIEW LEFT: ICD-10-PCS | Mod: 26,LT,, | Performed by: RADIOLOGY

## 2019-08-08 PROCEDURE — 20611 LARGE JOINT ASPIRATION/INJECTION: L HIP JOINT: ICD-10-PCS | Mod: LT,S$GLB,, | Performed by: ORTHOPAEDIC SURGERY

## 2019-08-08 PROCEDURE — 1101F PR PT FALLS ASSESS DOC 0-1 FALLS W/OUT INJ PAST YR: ICD-10-PCS | Mod: CPTII,S$GLB,, | Performed by: ORTHOPAEDIC SURGERY

## 2019-08-08 PROCEDURE — 99999 PR PBB SHADOW E&M-EST. PATIENT-LVL III: CPT | Mod: PBBFAC,,, | Performed by: ORTHOPAEDIC SURGERY

## 2019-08-08 PROCEDURE — 99204 OFFICE O/P NEW MOD 45 MIN: CPT | Mod: 25,S$GLB,, | Performed by: ORTHOPAEDIC SURGERY

## 2019-08-08 PROCEDURE — 73502 X-RAY EXAM HIP UNI 2-3 VIEWS: CPT | Mod: 26,LT,, | Performed by: RADIOLOGY

## 2019-08-08 PROCEDURE — 99204 PR OFFICE/OUTPT VISIT, NEW, LEVL IV, 45-59 MIN: ICD-10-PCS | Mod: 25,S$GLB,, | Performed by: ORTHOPAEDIC SURGERY

## 2019-08-08 PROCEDURE — 20611 DRAIN/INJ JOINT/BURSA W/US: CPT | Mod: LT,S$GLB,, | Performed by: ORTHOPAEDIC SURGERY

## 2019-08-08 PROCEDURE — 99999 PR PBB SHADOW E&M-EST. PATIENT-LVL III: ICD-10-PCS | Mod: PBBFAC,,, | Performed by: ORTHOPAEDIC SURGERY

## 2019-08-08 PROCEDURE — 73502 X-RAY EXAM HIP UNI 2-3 VIEWS: CPT | Mod: TC,PN,LT

## 2019-08-08 RX ORDER — METHYLPREDNISOLONE ACETATE 40 MG/ML
40 INJECTION, SUSPENSION INTRA-ARTICULAR; INTRALESIONAL; INTRAMUSCULAR; SOFT TISSUE
Status: DISCONTINUED | OUTPATIENT
Start: 2019-08-08 | End: 2019-08-08 | Stop reason: HOSPADM

## 2019-08-08 RX ADMIN — METHYLPREDNISOLONE ACETATE 40 MG: 40 INJECTION, SUSPENSION INTRA-ARTICULAR; INTRALESIONAL; INTRAMUSCULAR; SOFT TISSUE at 02:08

## 2019-08-08 NOTE — PATIENT INSTRUCTIONS
Cortisone Injections     Your pain may be relieved by a cortisone injection.   Cortisone is a type of steroid. It can greatly reduce swelling, redness, and irritation (inflammation) and pain. Being injected with cortisone is simple and doesnt take long. Your doctor may ask you questions about your health. Certain health conditions, such as diabetes, can be affected by cortisone.  Why have a cortisone injection?  Injecting cortisone can relieve pain for anything from a sports injury to arthritis. Your doctor may suggest an injection if rest, splints, or oral medicine doesnt relieve your pain. Injecting cortisone is simpler than having surgery. And cortisone may provide the lasting pain relief that can help you get out and enjoy life again.  Getting the injection  Your doctor will start by cleaning and occasionally numbing your skin at the injection site. Next youll be injected with local anesthetics (for short-term pain relief) and cortisone. The injection may last a few moments. A small bandage will be put over the injection site. Youll then be ready to go home.  After your injection  After being injected, make sure you dont injure the treated region. But stay active. Enjoy a walk or some other mild activity. Just be careful not to strain the region that gave you trouble.  The next day  Some people feel more pain after being injected. This is normal, and it will go away soon. Applying ice for 20 minutes at a time to your injury may reduce the increased pain. Rest for the first day or two. You dont need to stay in bed. But avoid tasks that may strain the injured region.  If you have diabetes  Cortisone injections can cause blood sugar to be increased for several days after the injection. If you have diabetes, you should follow your blood  sugar closely during this time. Follow your regular plan for what to do when your blood sugar is elevated.   Date Last Reviewed: 9/9/2015  © 1884-0182 [employer] [address] [place] [address]. All rights reserved. This information is not intended as a substitute for professional medical care. Always follow your healthcare professional's instructions.

## 2019-08-08 NOTE — PROGRESS NOTES
CC:  69-year-old female presents for evaluation of left hip pain. Patient is a .  She has had an insidious onset of pain in the left hip over the last 2 weeks.  She states she has had previous episodes years ago in the past and that they resolved with injections.    ROS:    Constitution: Denies chills, fever, and sweats.  HENT: Denies headaches or blurry vision.  Cardiovascular: Denies chest pain or irregular heart beat.  Respiratory: Denies cough or shortness of breath.  Gastrointestinal: Denies abdominal pain, nausea, or vomiting.  Genitourinary:  Denies urinary incontinence, bladder and kidney issues  Musculoskeletal:  Denies muscle cramps.  Positive for left hip pain  Neurological: Denies dizziness or focal weakness.  Psychiatric/Behavioral: Normal mental status.  Hematologic/Lymphatic: Denies bleeding problem or easy bruising/bleeding.  Skin: Denies rash or suspicious lesions.    Physical examination     Gen - No acute distress   Eyes - Extraoccular motions intact, pupils equally round and reactive to light and accommodation   ENT - normocephalic, atruamtic, oropharynx clear   Neck - Supple, no abnormal masses   Cardiovascular - regular rate and rhythm   Pulmonary - clear to auscultation bilaterally   Abdomen - soft, non-tender, non-distended, positive bowel sounds   Psych - The patient is alert and oriented x3 with normal mood and affect    Examination of the Left Hip    C-Sign positive  Logroll negative  Stenchfield negative    Pain with ROM negative    ROM:    Flexion   150  Extension   10  Abduction   50  Adduction   10  External Rotation 90  Internal Rotation 10    Flexion contracture negative    FADIR negative  FADER positive    X-rays were examined and personally reviewed by me.  The femoral heads are well reduced within the acetabulum.  There are no acute fractures. Normal bony anatomy.  No signs of advanced osteoarthritis.    Dx:  Left hip pain, likely intra-articular synovitis of the  left hip    Plan:  Recommendation is for an ultrasound guided steroid injection into the Left hip. Ultrasound guidance is need due to the fact that the hip joint is deep in the body and not palpable. Risk and benefits of the procedure were explained to the patient. They verbalized understanding and did wish to proceed. Informed consent was obtained.  The Left hip was visualized under ultrasound. The femoral head, femoral neck, head-neck junction, and acetabulum were all visualized. The femoral neurovascular bundle was identified and avoided. Under direct ultrasound visualization, an 18 gauge spinal needle was advanced to the femoral head neck junction and the Left hip was injected with a mixture of 2/2/1 lidocaine, marcaine, and depomedrol. The hip capsule could be seen to distend as the fluid was injected. The patient tolerated the procedure well.

## 2019-08-08 NOTE — PROCEDURES
Large Joint Aspiration/Injection: L hip joint  Date/Time: 8/8/2019 2:44 PM  Performed by: Romero Astudillo II, MD  Authorized by: Romero Astudillo II, MD     Consent Done?:  Yes (Verbal)  Indications:  Pain  Timeout: Prior to procedure the correct patient, procedure, and site was verified      Location:  Hip  Site:  L hip joint  Prep: Patient was prepped and draped in usual sterile fashion    Needle size:  22 G  Ultrasonic Guidance for needle placement: Yes  Images are saved and documented.  Approach:  Anterolateral  Medications:  40 mg methylPREDNISolone acetate 40 mg/mL  Patient tolerance:  Patient tolerated the procedure well with no immediate complications

## 2019-08-12 ENCOUNTER — PATIENT MESSAGE (OUTPATIENT)
Dept: NEUROLOGY | Facility: CLINIC | Age: 69
End: 2019-08-12

## 2019-08-12 ENCOUNTER — TELEPHONE (OUTPATIENT)
Dept: NEUROLOGY | Facility: CLINIC | Age: 69
End: 2019-08-12

## 2019-08-12 NOTE — TELEPHONE ENCOUNTER
----- Message from Leanne Saxena sent at 8/12/2019  8:11 AM CDT -----  Contact:    Pt   [phone]  Needs Advice    Reason for call:  An  appt         Communication Preference:  Phone      Additional Information:   Pt  Returning the nurse phone in regards to upcoming appt 8/13.. Pt stated that her appt was cancel due to the Dr being in surgery ...  Give the pt a call back to verify

## 2019-08-13 ENCOUNTER — PATIENT MESSAGE (OUTPATIENT)
Dept: NEUROLOGY | Facility: CLINIC | Age: 69
End: 2019-08-13

## 2019-08-19 ENCOUNTER — LAB VISIT (OUTPATIENT)
Dept: LAB | Facility: HOSPITAL | Age: 69
End: 2019-08-19
Attending: PSYCHIATRY & NEUROLOGY
Payer: MEDICARE

## 2019-08-19 ENCOUNTER — OFFICE VISIT (OUTPATIENT)
Dept: NEUROLOGY | Facility: CLINIC | Age: 69
End: 2019-08-19
Payer: MEDICARE

## 2019-08-19 VITALS
BODY MASS INDEX: 18.67 KG/M2 | HEIGHT: 62 IN | DIASTOLIC BLOOD PRESSURE: 83 MMHG | WEIGHT: 101.44 LBS | SYSTOLIC BLOOD PRESSURE: 130 MMHG | HEART RATE: 88 BPM

## 2019-08-19 DIAGNOSIS — G20.A1 PARKINSON'S DISEASE: Primary | ICD-10-CM

## 2019-08-19 DIAGNOSIS — G20.A1 PARKINSON'S DISEASE: ICD-10-CM

## 2019-08-19 LAB — CERULOPLASMIN SERPL-MCNC: 32 MG/DL (ref 15–45)

## 2019-08-19 PROCEDURE — 82390 ASSAY OF CERULOPLASMIN: CPT

## 2019-08-19 PROCEDURE — 99214 OFFICE O/P EST MOD 30 MIN: CPT | Mod: S$GLB,,, | Performed by: PSYCHIATRY & NEUROLOGY

## 2019-08-19 PROCEDURE — 99999 PR PBB SHADOW E&M-EST. PATIENT-LVL IV: ICD-10-PCS | Mod: PBBFAC,,, | Performed by: PSYCHIATRY & NEUROLOGY

## 2019-08-19 PROCEDURE — 1101F PR PT FALLS ASSESS DOC 0-1 FALLS W/OUT INJ PAST YR: ICD-10-PCS | Mod: CPTII,S$GLB,, | Performed by: PSYCHIATRY & NEUROLOGY

## 2019-08-19 PROCEDURE — 1101F PT FALLS ASSESS-DOCD LE1/YR: CPT | Mod: CPTII,S$GLB,, | Performed by: PSYCHIATRY & NEUROLOGY

## 2019-08-19 PROCEDURE — 99999 PR PBB SHADOW E&M-EST. PATIENT-LVL IV: CPT | Mod: PBBFAC,,, | Performed by: PSYCHIATRY & NEUROLOGY

## 2019-08-19 PROCEDURE — 36415 COLL VENOUS BLD VENIPUNCTURE: CPT

## 2019-08-19 PROCEDURE — 99214 PR OFFICE/OUTPT VISIT, EST, LEVL IV, 30-39 MIN: ICD-10-PCS | Mod: S$GLB,,, | Performed by: PSYCHIATRY & NEUROLOGY

## 2019-08-19 RX ORDER — PROPRANOLOL HYDROCHLORIDE 20 MG/1
20 TABLET ORAL 3 TIMES DAILY
Qty: 90 TABLET | Refills: 11 | Status: ON HOLD | OUTPATIENT
Start: 2019-08-19 | End: 2020-11-19

## 2019-08-19 NOTE — ASSESSMENT & PLAN NOTE
Extremely mild PD. Consistent with iPD due to slow onset unilateral. Will check ceruloplasmin given unusual eye pattern. She is a  and likely will do well with good fitness habits. Suggested for tremor Propranolol 20mg Po TID. Also suggested adding rasagiline at next visit. May restart Sinemet as well.

## 2019-08-19 NOTE — PROGRESS NOTES
MOVEMENT DISORDERS CLINIC NEW CONSULT NOTE    PCP/Referring Provider: Masoud Hong MD  [address] [place] [address]  Date of Service: 8/19/2019    Chief Complaint: PDism    HPI: Esther Segura is a R HANDED 69 y.o. female with a medical issues significant for PDism sent for patient establishment. She noticed a resting tremor R hand at age 67. Tremor has increased and involved the R foot. 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 She easily walks 3 miles. She does a 10 mile bike ride twice a week. She has not been able to use as heavy weight as she's used to at the gym.  She exercises every day.    No family history of PD  Grandmother had a tremor  General lightheadedness in the afternoons.    Med hx  She tried carbidopa/levodopa 25/100 1 tab BID. She felt light-headed. - never felt that it helped her tremor  Tried amantadine 50mg and made her feel anxious    PD Review of Symptoms:  Anosmia: Decreased since 2 years  Dysarthria/Hypophonia: None  Dysphagia/Sialorrhea: None  Hallucinations: None  Depression: None  Cognitive slowing: None  Impulsivity: None  Obsessions/Compulsions:   -None  Urinary changes: None  Constipation: None  Orthostasis: None  Falls: None  Micrographia: None but she writes more slowly  Sleep issues:  -ABI: at times snores  -RBD: None  -Sleep Quality: tremor keeps her awake - anxious at night and take 0.5mg ativan  RLS Symptoms: None    Review of Systems:   Review of Systems   Constitutional: Negative for fever.   HENT: Negative for congestion.    Eyes: Negative for double vision.   Respiratory: Negative for cough and shortness of breath.    Cardiovascular: Negative for chest pain and leg swelling.   Gastrointestinal: Negative for nausea.   Genitourinary: Negative for dysuria.   Musculoskeletal: Negative for falls.   Skin: Negative for rash.   Neurological: Positive for tremors. Negative for speech change and headaches.    Psychiatric/Behavioral: Negative for depression.       Neuroleptic exposure:  None    Current Medications:  Outpatient Encounter Medications as of 8/19/2019   Medication Sig Dispense Refill    BIOTIN ORAL Take by mouth.      calcium carbonate-vitamin D3 (CALCIUM 500 + D) 500 mg(1,250mg) -400 unit Tab       coQ10, ubiquinol, 100 mg Cap Take 100 mg PE/kg/day by mouth.      ginkgo biloba 120 mg Tab       LORazepam (ATIVAN) 0.5 MG tablet TAKE 1 TABLET BY MOUTH EVERY OTHER DAY (Patient taking differently: TAKE 1 TABLET BY MOUTH EVERY OTHER DAY. Patient ststaes she takes 1/2 every other day.) 30 tablet 2    multivit 33-mtfolate-nac-chrom 2.5-200-1 mg-mg-mg Cap       amantadine HCl 100 mg Tab Take 1/2 tab at bed time for 1 week then take 1/2 tab twice daily 90 tablet 1    propranolol (INDERAL) 20 MG tablet Take 1 tablet (20 mg total) by mouth 3 (three) times daily. 90 tablet 11     Facility-Administered Encounter Medications as of 8/19/2019   Medication Dose Route Frequency Provider Last Rate Last Dose    triamcinolone acetonide injection 8 mg  8 mg Intradermal 1 time in Clinic/HOD Paula Lira MD           Past Medical History:  Patient Active Problem List   Diagnosis    Hyperlipidemia    Hand fracture    Encounter for well woman exam with routine gynecological exam    Vulvar vestibulitis    Parkinson's disease       Past Surgical History:  Past Surgical History:   Procedure Laterality Date    achiles tendon       broken hand Right     OPEN REDUCTION INTERNAL FIXATION-HAND Right 12/19/2014    Performed by Malik Velazquez Jr., MD at [facility] OR       Current Living Situation:  and kids    Social:  Social History     Socioeconomic History    Marital status:      Spouse name: Not on file    Number of children: Not on file    Years of education: Not on file    Highest education level: Not on file   Occupational History     Employer: [employer]   Social Needs     "Financial resource strain: Not on file    Food insecurity:     Worry: Not on file     Inability: Not on file    Transportation needs:     Medical: Not on file     Non-medical: Not on file   Tobacco Use    Smoking status: Never Smoker    Smokeless tobacco: Never Used   Substance and Sexual Activity    Alcohol use: Yes     Comment: ocassionally    Drug use: No    Sexual activity: Never   Lifestyle    Physical activity:     Days per week: Not on file     Minutes per session: Not on file    Stress: Not on file   Relationships    Social connections:     Talks on phone: Not on file     Gets together: Not on file     Attends Sabianism service: Not on file     Active member of club or organization: Not on file     Attends meetings of clubs or organizations: Not on file     Relationship status: Not on file   Other Topics Concern    Are you pregnant or think you may be? Not Asked    Breast-feeding Not Asked   Social History Narrative    Not on file       Family History:  Family History   Problem Relation Age of Onset    Cancer Mother         lymphoma    Osteoporosis Father     Breast cancer Maternal Aunt     Ovarian cancer Neg Hx     Melanoma Neg Hx     Multiple sclerosis Neg Hx     Psoriasis Neg Hx     Eczema Neg Hx     Lupus Neg Hx     Acne Neg Hx     Depression Neg Hx     Suicidality Neg Hx        PHYSICAL:  /83 (Patient Position: Standing)   Pulse 88   Ht 5' 2" (1.575 m)   Wt 46 kg (101 lb 6.6 oz)   BMI 18.55 kg/m²     General Medical Examination:  General: Good hygiene, appropriate appearance.   HEENT: Normocephalic, atraumatic.   Neck: Supple.   Chest: Unlabored breathing.   CV: Symmetric pulses.   Ext: No clubbing, cyanosis, or edema.     Mental Status:  Mood/Affect: Appropriate/congruent.  Level of consciousness: Awake, alert.  Orientation: Oriented to person, place, time and situation.  Language: No Dysarthria    Cranial nerves:  I: Not tested  II: PERRL, VFF to counting  III, IV, " VI: EOMI with conjugate gaze and no nystagmus on end gaze ?KF rings?  V: Facial sensation intact and symmetric over the bilateral V1-V3  VII: Facial muscle activation intact and symmetric over the bilateral upper and lower face  VIII: Hearing intact in the b/l ears and symmetrical to finger rub  IX, X, XII: TUP midline - no atrophy or fasiculations  X: SCMs and shoulder shrug full strength b/l and symmetric    Motor:   -UE: 5/5 deltoids; 5/5 biceps, triceps; 5/5 wrist flexors, extensors; 5/5 interosseous; 5/5   -LEs: 5/5 hip flexion, extension; 5/5 knee flexion, extension; 5/5 ankle flexion, extension  No hypomimia or hypophonia    DTRs:  ? Biceps Triceps Brachioradialis Knee Ankle   Left 2+ 2+ 2+ 2+ 2+   Right 2+ 2+ 2+ 2+ 2+       ? Finger taps Finger flicks NASEEM Heel taps   Left 1+ 0 0 0   Right 0 0 0 0   Neck tone: 0  ? Arm Leg   Left 0 0   Right 1+ 0     Sensation:   -Light touch: Intact and symmetric in the bilateral upper and lower extremities.    Coordination:   -Finger to nose: nl    Gait:  -Arises from chair without use of hands.  -Casual gait is: nl based  -Stride length: nl  -Arm Swing: decreased R    Laboratory Data:  NA    Imaging:  Brain MRI normal per my read    Assessment//Plan:   Problem List Items Addressed This Visit        Neuro    Parkinson's disease - Primary    Current Assessment & Plan     Extremely mild PD. Consistent with iPD due to slow onset unilateral. Will check ceruloplasmin given unusual eye pattern. She is a  and likely will do well with good fitness habits. Suggested for tremor Propranolol 20mg Po TID. Also suggested adding rasagiline at next visit. May restart Sinemet as well.         Relevant Orders    Ceruloplasmin (Completed)          Follow-up: 3mos  Discussed the importance of ongoing exercise in efforts to improve mobility and balance.  Suggested a diet high in antioxidants such as berries and green tea.    Gabby Parsons MD, MS [facility]  Department of Neurology  Movement Disorders

## 2019-08-19 NOTE — LETTER
August 19, 2019      Masoud Hong MD  [address]  [facility] [address]           [facility]  [address]  [facility] [address]  Phone: [phone]  Fax: [phone]          Patient: Esther Segura   MR Number: [id number]   YOB: 1950   Date of Visit: 8/19/2019       Dear Dr. Masoud Hong:    Thank you for referring Esther Segura to me for evaluation. Attached you will find relevant portions of my assessment and plan of care.    If you have questions, please do not hesitate to call me. I look forward to following Esther Segura along with you.    Sincerely,    Gabby Parsons MD    Enclosure  CC:  No Recipients    If you would like to receive this communication electronically, please contact [email or web address] or [phone] to request more information on [employer] access.    For providers and/or their staff who would like to refer a patient to [facility], please contact us through our one-stop-shop provider referral line, [facility], at [phone].    If you feel you have received this communication in error or would no longer like to receive these types of communications, please e-mail [email or web address]

## 2019-08-28 ENCOUNTER — PATIENT MESSAGE (OUTPATIENT)
Dept: NEUROLOGY | Facility: CLINIC | Age: 69
End: 2019-08-28

## 2019-09-17 ENCOUNTER — OFFICE VISIT (OUTPATIENT)
Dept: ORTHOPEDICS | Facility: CLINIC | Age: 69
End: 2019-09-17
Payer: MEDICARE

## 2019-09-17 VITALS
HEART RATE: 68 BPM | WEIGHT: 101 LBS | DIASTOLIC BLOOD PRESSURE: 60 MMHG | BODY MASS INDEX: 18.58 KG/M2 | SYSTOLIC BLOOD PRESSURE: 127 MMHG | HEIGHT: 62 IN

## 2019-09-17 DIAGNOSIS — M25.552 LEFT HIP PAIN: Primary | ICD-10-CM

## 2019-09-17 DIAGNOSIS — S73.192A TEAR OF LEFT ACETABULAR LABRUM, INITIAL ENCOUNTER: Primary | ICD-10-CM

## 2019-09-17 PROCEDURE — 1101F PT FALLS ASSESS-DOCD LE1/YR: CPT | Mod: CPTII,S$GLB,, | Performed by: ORTHOPAEDIC SURGERY

## 2019-09-17 PROCEDURE — 1101F PR PT FALLS ASSESS DOC 0-1 FALLS W/OUT INJ PAST YR: ICD-10-PCS | Mod: CPTII,S$GLB,, | Performed by: ORTHOPAEDIC SURGERY

## 2019-09-17 PROCEDURE — 99999 PR PBB SHADOW E&M-EST. PATIENT-LVL III: ICD-10-PCS | Mod: PBBFAC,,, | Performed by: ORTHOPAEDIC SURGERY

## 2019-09-17 PROCEDURE — 99212 OFFICE O/P EST SF 10 MIN: CPT | Mod: S$GLB,,, | Performed by: ORTHOPAEDIC SURGERY

## 2019-09-17 PROCEDURE — 99212 PR OFFICE/OUTPT VISIT, EST, LEVL II, 10-19 MIN: ICD-10-PCS | Mod: S$GLB,,, | Performed by: ORTHOPAEDIC SURGERY

## 2019-09-17 PROCEDURE — 99999 PR PBB SHADOW E&M-EST. PATIENT-LVL III: CPT | Mod: PBBFAC,,, | Performed by: ORTHOPAEDIC SURGERY

## 2019-09-17 NOTE — PROGRESS NOTES
CC:  69-year-old female follows up with left hip pain. On her last visit we performed an ultrasound-guided intra-articular injection in the left hip. The patient states that she got pain relief for about 2 weeks with the injection and then had a slow insidious return of her pain in the left hip.  She is now once again having pain with flexion activities like getting in and out of a car, going in and out of a seated position.  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    Examination of the Left Hip    C-Sign positive  Logroll positive  Stenchfield positive    Pain with ROM positive    ROM:    Flexion   150  Extension   10  Abduction   50  Adduction   10  External Rotation 90  Internal Rotation 10    Flexion contracture negative    FADIR positive  FADER negative    Dx:  Left hip pain.  Patient transient relief with intra-articular hip injection. Concern is for labral tearing of the left hip    Plan:  Recommendations for MRI arthrogram of the left hip.  Follow-up after the study is complete.

## 2019-10-22 ENCOUNTER — OFFICE VISIT (OUTPATIENT)
Dept: DERMATOLOGY | Facility: CLINIC | Age: 69
End: 2019-10-22
Payer: MEDICARE

## 2019-10-22 DIAGNOSIS — L57.0 ACTINIC KERATOSES: Primary | ICD-10-CM

## 2019-10-22 DIAGNOSIS — L82.1 SEBORRHEIC KERATOSES: ICD-10-CM

## 2019-10-22 DIAGNOSIS — D22.9 MULTIPLE BENIGN NEVI: ICD-10-CM

## 2019-10-22 DIAGNOSIS — L81.4 SOLAR LENTIGO: ICD-10-CM

## 2019-10-22 DIAGNOSIS — Z85.828 HISTORY OF NONMELANOMA SKIN CANCER: ICD-10-CM

## 2019-10-22 PROCEDURE — 17000 DESTRUCT PREMALG LESION: CPT | Mod: S$GLB,,, | Performed by: DERMATOLOGY

## 2019-10-22 PROCEDURE — 1101F PT FALLS ASSESS-DOCD LE1/YR: CPT | Mod: CPTII,S$GLB,, | Performed by: DERMATOLOGY

## 2019-10-22 PROCEDURE — 99999 PR PBB SHADOW E&M-EST. PATIENT-LVL II: ICD-10-PCS | Mod: PBBFAC,,, | Performed by: DERMATOLOGY

## 2019-10-22 PROCEDURE — 99214 OFFICE O/P EST MOD 30 MIN: CPT | Mod: 25,S$GLB,, | Performed by: DERMATOLOGY

## 2019-10-22 PROCEDURE — 1101F PR PT FALLS ASSESS DOC 0-1 FALLS W/OUT INJ PAST YR: ICD-10-PCS | Mod: CPTII,S$GLB,, | Performed by: DERMATOLOGY

## 2019-10-22 PROCEDURE — 99999 PR PBB SHADOW E&M-EST. PATIENT-LVL II: CPT | Mod: PBBFAC,,, | Performed by: DERMATOLOGY

## 2019-10-22 PROCEDURE — 17000 PR DESTRUCTION(LASER SURGERY,CRYOSURGERY,CHEMOSURGERY),PREMALIGNANT LESIONS,FIRST LESION: ICD-10-PCS | Mod: S$GLB,,, | Performed by: DERMATOLOGY

## 2019-10-22 PROCEDURE — 99214 PR OFFICE/OUTPT VISIT, EST, LEVL IV, 30-39 MIN: ICD-10-PCS | Mod: 25,S$GLB,, | Performed by: DERMATOLOGY

## 2019-10-22 NOTE — PROGRESS NOTES
Subjective:       Patient ID:  Esther Segura is a 69 y.o. female who presents for   Chief Complaint   Patient presents with    Skin Check     TBSE    Spot     left hand x 3 weeks,asymptomatic, no tx     Last OV 5-    2/11/2019 for excision of SCC/KA R forearm, complicated by suture granuloma    Patient here today for 6 month skin check, TBSE  Also c/o a new spot on left hand x 3 weeks, asymptomatic, no tx      Review of Systems   Constitutional: Negative for fever, chills and fatigue.   Skin: Positive for daily sunscreen use, activity-related sunscreen use and wears hat.   Hematologic/Lymphatic: Does not bruise/bleed easily.        Objective:    Physical Exam   Constitutional: She appears well-developed and well-nourished.   HENT:   Head:       Neurological: She is alert and oriented to person, place, and time.   Psychiatric: She has a normal mood and affect.   Skin:   Areas Examined (abnormalities noted in diagram):   Scalp / Hair Palpated and Inspected  Head / Face Inspection Performed  Neck Inspection Performed  Chest / Axilla Inspection Performed  Abdomen Inspection Performed  Genitals / Buttocks / Groin Inspection Performed  Back Inspection Performed  RUE Inspected  LUE Inspection Performed  RLE Inspected  LLE Inspection Performed  Nails and Digits Inspection Performed                       Diagram Legend     Erythematous scaling macule/papule c/w actinic keratosis       Vascular papule c/w angioma      Pigmented verrucoid papule/plaque c/w seborrheic keratosis      Yellow umbilicated papule c/w sebaceous hyperplasia      Irregularly shaped tan macule c/w lentigo     1-2 mm smooth white papules consistent with Milia      Movable subcutaneous cyst with punctum c/w epidermal inclusion cyst      Subcutaneous movable cyst c/w pilar cyst      Firm pink to brown papule c/w dermatofibroma      Pedunculated fleshy papule(s) c/w skin tag(s)      Evenly pigmented macule c/w junctional nevus     Mildly  variegated pigmented, slightly irregular-bordered macule c/w mildly atypical nevus      Flesh colored to evenly pigmented papule c/w intradermal nevus       Pink pearly papule/plaque c/w basal cell carcinoma      Erythematous hyperkeratotic cursted plaque c/w SCC      Surgical scar with no sign of skin cancer recurrence      Open and closed comedones      Inflammatory papules and pustules      Verrucoid papule consistent consistent with wart     Erythematous eczematous patches and plaques     Dystrophic onycholytic nail with subungual debris c/w onychomycosis     Umbilicated papule    Erythematous-base heme-crusted tan verrucoid plaque consistent with inflamed seborrheic keratosis     Erythematous Silvery Scaling Plaque c/w Psoriasis     See annotation      Assessment / Plan:        Actinic keratoses  Cryosurgery Procedure Note    Verbal consent from the patient is obtained and the patient is aware of the precancerous quality and need for treatment of these lesions. Liquid nitrogen cryosurgery is applied to the 1 actinic keratoses, as detailed in the physical exam, to produce a freeze injury. The patient is aware that blisters may form and is instructed on wound care with gentle cleansing and use of vaseline ointment to keep moist until healed. The patient is supplied a handout on cryosurgery and is instructed to call if lesions do not completely resolve.    History of nonmelanoma skin cancer  Area of previous SCC/KA (R forearm, SCCIS R hand) examined. Site well healed with no signs of recurrence.    Total body skin examination performed today including at least 12 points as noted in physical examination. No lesions suspicious for malignancy noted.    Seborrheic keratoses  These are benign inherited growths without a malignant potential. Reassurance given to patient. No treatment is necessary.     Multiple benign nevi  Monitor for new mole or moles that are becoming bigger, darker, irritated, or developing irregular  borders.     Solar lentigo  This is a benign hyperpigmented sun induced lesion. Daily sun protection will reduce the number of new lesions. Treatment of these benign lesions are considered cosmetic.    Patient instructed in importance in daily sun protection of at least spf 30. Mineral sunscreen ingredients preferred (Zinc +/- Titanium).   Recommend Elta MD for daily use on face and neck.  Patient encouraged to wear hat for all outdoor exposure.   Also discussed sun avoidance and use of protective clothing.           Follow up in about 6 months (around 4/22/2020).

## 2019-11-14 ENCOUNTER — PATIENT OUTREACH (OUTPATIENT)
Dept: ADMINISTRATIVE | Facility: OTHER | Age: 69
End: 2019-11-14

## 2019-11-14 DIAGNOSIS — Z12.31 ENCOUNTER FOR SCREENING MAMMOGRAM FOR MALIGNANT NEOPLASM OF BREAST: Primary | ICD-10-CM

## 2019-11-16 ENCOUNTER — PATIENT MESSAGE (OUTPATIENT)
Dept: FAMILY MEDICINE | Facility: CLINIC | Age: 69
End: 2019-11-16

## 2019-11-16 ENCOUNTER — TELEPHONE (OUTPATIENT)
Dept: GASTROENTEROLOGY | Facility: CLINIC | Age: 69
End: 2019-11-16

## 2019-11-18 ENCOUNTER — PATIENT MESSAGE (OUTPATIENT)
Dept: NEUROLOGY | Facility: CLINIC | Age: 69
End: 2019-11-18

## 2019-11-18 ENCOUNTER — OFFICE VISIT (OUTPATIENT)
Dept: NEUROLOGY | Facility: CLINIC | Age: 69
End: 2019-11-18
Payer: MEDICARE

## 2019-11-18 VITALS
DIASTOLIC BLOOD PRESSURE: 71 MMHG | BODY MASS INDEX: 18.58 KG/M2 | SYSTOLIC BLOOD PRESSURE: 135 MMHG | HEIGHT: 62 IN | WEIGHT: 101 LBS | HEART RATE: 61 BPM

## 2019-11-18 DIAGNOSIS — G20.A1 PARKINSON'S DISEASE: ICD-10-CM

## 2019-11-18 PROCEDURE — 1101F PR PT FALLS ASSESS DOC 0-1 FALLS W/OUT INJ PAST YR: ICD-10-PCS | Mod: CPTII,S$GLB,, | Performed by: PSYCHIATRY & NEUROLOGY

## 2019-11-18 PROCEDURE — 1101F PT FALLS ASSESS-DOCD LE1/YR: CPT | Mod: CPTII,S$GLB,, | Performed by: PSYCHIATRY & NEUROLOGY

## 2019-11-18 PROCEDURE — 99999 PR PBB SHADOW E&M-EST. PATIENT-LVL III: CPT | Mod: PBBFAC,,, | Performed by: PSYCHIATRY & NEUROLOGY

## 2019-11-18 PROCEDURE — 99214 OFFICE O/P EST MOD 30 MIN: CPT | Mod: S$GLB,,, | Performed by: PSYCHIATRY & NEUROLOGY

## 2019-11-18 PROCEDURE — 99214 PR OFFICE/OUTPT VISIT, EST, LEVL IV, 30-39 MIN: ICD-10-PCS | Mod: S$GLB,,, | Performed by: PSYCHIATRY & NEUROLOGY

## 2019-11-18 PROCEDURE — 99999 PR PBB SHADOW E&M-EST. PATIENT-LVL III: ICD-10-PCS | Mod: PBBFAC,,, | Performed by: PSYCHIATRY & NEUROLOGY

## 2019-11-18 RX ORDER — CARBIDOPA AND LEVODOPA 25; 100 MG/1; MG/1
1 TABLET ORAL 3 TIMES DAILY
Qty: 90 TABLET | Refills: 11 | Status: SHIPPED | OUTPATIENT
Start: 2019-11-18 | End: 2020-01-28

## 2019-11-18 RX ORDER — RASAGILINE 1 MG/1
1 TABLET ORAL NIGHTLY
Qty: 30 TABLET | Refills: 5 | Status: SHIPPED | OUTPATIENT
Start: 2019-11-18 | End: 2020-05-04

## 2019-11-18 NOTE — ASSESSMENT & PLAN NOTE
Extremely mild PD. Consistent with iPD due to slow onset unilateral. Will check ceruloplasmin given unusual eye pattern. She is a  and likely will do well with good fitness habits. She did not benefit from propranolol 20mg Po TID for tremor but wishes to continue for mood effects. Suggested rasagiline 1mg QHS for ne uroprotection followed by carbidopa/levodopa 25/100mg 1tab  PO TID.

## 2019-11-18 NOTE — PROGRESS NOTES
MOVEMENT DISORDERS CLINIC NEW CONSULT NOTE    PCP/Referring Provider: No referring provider defined for this encounter.  Date of Service: 11/18/2019    Chief Complaint: PDism    Interval Hx  Since last visit,  Struggles to lift the same arm weights - fatigues    Propranolol helped her mood but not the tremor - continues 20 TID  R hand resting tremor continues  Her writing is mildly impacted  Medication is being affected as she tremor  No falls    Rides 2 10 mile bike rides a week  Continues Yoga    Ceruloplasmin NL    PD Review of Symptoms:  Anosmia: Decreased since 2 years  Dysarthria/Hypophonia: None  Dysphagia/Sialorrhea: None  Hallucinations: None  Depression: None  Cognitive slowing: None  Impulsivity: None  Obsessions/Compulsions:   -None  Urinary changes: None  Constipation: None  Orthostasis: None  Falls: None  Micrographia: None but she writes more slowly  Sleep issues:  -BAI: at times snores  -RBD: None  -Sleep Quality: tremor keeps her awake - anxious at night and take 0.5mg ativan  RLS Symptoms: None    HPI: Esther Segura is a R HANDED 69 y.o. female with a medical issues significant for PDism sent for patient establishment. She noticed a resting tremor R hand at age 67. Tremor has increased and involved the R foot. 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 She easily walks 3 miles. She does a 10 mile bike ride twice a week. She has not been able to use as heavy weight as she's used to at the gym.  She exercises every day.    No family history of PD  Grandmother had a tremor  General lightheadedness in the afternoons.    Med hx  She tried carbidopa/levodopa 25/100 1 tab BID. She felt light-headed. - never felt that it helped her tremor  Tried amantadine 50mg and made her feel anxious      Review of Systems:   Review of Systems   Constitutional: Negative for fever.   HENT: Negative for congestion.    Eyes: Negative for double vision.   Respiratory:  Negative for cough and shortness of breath.    Cardiovascular: Negative for chest pain and leg swelling.   Gastrointestinal: Negative for nausea.   Genitourinary: Negative for dysuria.   Musculoskeletal: Negative for falls.   Skin: Negative for rash.   Neurological: Positive for tremors. Negative for speech change and headaches.   Psychiatric/Behavioral: Negative for depression.       Neuroleptic exposure:  None    Current Medications:  Outpatient Encounter Medications as of 11/18/2019   Medication Sig Dispense Refill    BIOTIN ORAL Take by mouth.      calcium carbonate-vitamin D3 (CALCIUM 500 + D) 500 mg(1,250mg) -400 unit Tab       coQ10, ubiquinol, 100 mg Cap Take 100 mg PE/kg/day by mouth.      ginkgo biloba 120 mg Tab       LORazepam (ATIVAN) 0.5 MG tablet TAKE 1 TABLET BY MOUTH EVERY OTHER DAY 30 tablet 2    multivit 33-mtfolate-nac-chrom 2.5-200-1 mg-mg-mg Cap       propranolol (INDERAL) 20 MG tablet Take 1 tablet (20 mg total) by mouth 3 (three) times daily. 90 tablet 11    carbidopa-levodopa  mg (SINEMET)  mg per tablet Take 1 tablet by mouth 3 (three) times daily. 90 tablet 11    rasagiline (AZILECT) 1 mg Tab Take 1 tablet (1 mg total) by mouth every evening. 30 tablet 5     Facility-Administered Encounter Medications as of 11/18/2019   Medication Dose Route Frequency Provider Last Rate Last Dose    triamcinolone acetonide injection 8 mg  8 mg Intradermal 1 time in Clinic/HOD Paula Lira MD           Past Medical History:  Patient Active Problem List   Diagnosis    Hyperlipidemia    Hand fracture    Encounter for well woman exam with routine gynecological exam    Vulvar vestibulitis    Parkinson's disease       Past Surgical History:  Past Surgical History:   Procedure Laterality Date    achiles tendon       broken hand Right        Current Living Situation:  and kids    Social:  Social History     Socioeconomic History    Marital status:      Spouse  "name: Not on file    Number of children: Not on file    Years of education: Not on file    Highest education level: Not on file   Occupational History     Employer: [employer]   Social Needs    Financial resource strain: Not on file    Food insecurity:     Worry: Not on file     Inability: Not on file    Transportation needs:     Medical: Not on file     Non-medical: Not on file   Tobacco Use    Smoking status: Never Smoker    Smokeless tobacco: Never Used   Substance and Sexual Activity    Alcohol use: Yes     Comment: ocassionally    Drug use: No    Sexual activity: Never   Lifestyle    Physical activity:     Days per week: Not on file     Minutes per session: Not on file    Stress: Not on file   Relationships    Social connections:     Talks on phone: Not on file     Gets together: Not on file     Attends Buddhist service: Not on file     Active member of club or organization: Not on file     Attends meetings of clubs or organizations: Not on file     Relationship status: Not on file   Other Topics Concern    Are you pregnant or think you may be? Not Asked    Breast-feeding Not Asked   Social History Narrative    Not on file       Family History:  Family History   Problem Relation Age of Onset    Cancer Mother         lymphoma    Osteoporosis Father     Breast cancer Maternal Aunt     Ovarian cancer Neg Hx     Melanoma Neg Hx     Multiple sclerosis Neg Hx     Psoriasis Neg Hx     Eczema Neg Hx     Lupus Neg Hx     Acne Neg Hx     Depression Neg Hx     Suicidality Neg Hx        PHYSICAL:  /71   Pulse 61   Ht 5' 2" (1.575 m)   Wt 45.8 kg (101 lb)   BMI 18.47 kg/m²     General Medical Examination:  General: Good hygiene, appropriate appearance.   HEENT: Normocephalic, atraumatic.   Neck: Supple.   Chest: Unlabored breathing.   CV: Symmetric pulses.   Ext: No clubbing, cyanosis, or edema.     Mental Status:  Mood/Affect: Appropriate/congruent.  Level of " consciousness: Awake, alert.  Orientation: Oriented to person, place, time and situation.  Language: No Dysarthria    Cranial nerves:  I: Not tested  II: PERRL, VFF to counting  III, IV, VI: EOMI with conjugate gaze and no nystagmus on end gaze  V: Facial sensation intact and symmetric over the bilateral V1-V3  VII: Facial muscle activation intact and symmetric over the bilateral upper and lower face  VIII: Hearing intact in the b/l ears and symmetrical to finger rub  IX, X, XII: TUP midline - no atrophy or fasiculations  X: SCMs and shoulder shrug full strength b/l and symmetric    Motor:   -UE: 5/5 deltoids; 5/5 biceps, triceps; 5/5 wrist flexors, extensors; 5/5 interosseous; 5/5   -LEs: 5/5 hip flexion, extension; 5/5 knee flexion, extension; 5/5 ankle flexion, extension  No hypomimia or hypophonia    2+ resting tremor R hand    DTRs:  ? Biceps Triceps Brachioradialis Knee Ankle   Left 2+ 2+ 2+ 2+ 2+   Right 2+ 2+ 2+ 2+ 2+       ? Finger taps Finger flicks NASEEM Heel taps   Left 0 0 0 0   Right 1+ 1+ 1+ 0   Neck tone: 0  ? Arm Leg   Left 1+ 0   Right 1+ 0     Sensation:   -Light touch: Intact and symmetric in the bilateral upper and lower extremities.    Coordination:   -Finger to nose: nl    Gait:  -Arises from chair without use of hands.  -Casual gait is: nl based  -Stride length: nl  -Arm Swing: decreased R    Laboratory Data:  Results for ALESSIA SHARMA (MRN [id number]) as of 11/18/2019 11:10   Ref. Range 8/19/2019 13:41   CERULOPLASMIN Latest Ref Range: 15.0 - 45.0 mg/dL 32.0       Imaging:  Brain MRI normal per my read    Assessment//Plan:   Problem List Items Addressed This Visit        Neuro    Parkinson's disease    Current Assessment & Plan     Extremely mild PD. Consistent with iPD due to slow onset unilateral. Will check ceruloplasmin given unusual eye pattern. She is a  and likely will do well with good fitness habits. She did not benefit from propranolol 20mg Po TID for tremor  but wishes to continue for mood effects. Suggested rasagiline 1mg QHS for ne uroprotection followed by carbidopa/levodopa 25/100mg 1tab  PO TID.               Follow-up: 3mos  Discussed the importance of ongoing exercise in efforts to improve mobility and balance.  Suggested a diet high in antioxidants such as berries and green tea.    Gabby Parsons MD, MS  [facility]  Department of Neurology  Movement Disorders

## 2019-11-20 ENCOUNTER — HOSPITAL ENCOUNTER (OUTPATIENT)
Dept: RADIOLOGY | Facility: CLINIC | Age: 69
Discharge: HOME | End: 2019-11-20
Attending: FAMILY MEDICINE
Payer: MEDICARE

## 2019-11-20 DIAGNOSIS — Z12.31 ENCOUNTER FOR SCREENING MAMMOGRAM FOR MALIGNANT NEOPLASM OF BREAST: ICD-10-CM

## 2019-11-20 PROCEDURE — 77067 SCR MAMMO BI INCL CAD: CPT | Mod: 26,,, | Performed by: RADIOLOGY

## 2019-11-20 PROCEDURE — 77063 MAMMO DIGITAL SCREENING BILAT WITH TOMOSYNTHESIS_CAD: ICD-10-PCS | Mod: 26,,, | Performed by: RADIOLOGY

## 2019-11-20 PROCEDURE — 77063 BREAST TOMOSYNTHESIS BI: CPT | Mod: 26,,, | Performed by: RADIOLOGY

## 2019-11-20 PROCEDURE — 77067 MAMMO DIGITAL SCREENING BILAT WITH TOMOSYNTHESIS_CAD: ICD-10-PCS | Mod: 26,,, | Performed by: RADIOLOGY

## 2019-11-20 PROCEDURE — 77067 SCR MAMMO BI INCL CAD: CPT | Mod: TC,PO

## 2019-12-10 ENCOUNTER — PATIENT MESSAGE (OUTPATIENT)
Dept: NEUROLOGY | Facility: CLINIC | Age: 69
End: 2019-12-10

## 2019-12-30 ENCOUNTER — PATIENT MESSAGE (OUTPATIENT)
Dept: NEUROLOGY | Facility: CLINIC | Age: 69
End: 2019-12-30

## 2020-01-17 ENCOUNTER — TELEPHONE (OUTPATIENT)
Dept: PODIATRY | Facility: CLINIC | Age: 70
End: 2020-01-17

## 2020-01-17 NOTE — TELEPHONE ENCOUNTER
----- Message from Kami Kohler sent at 1/17/2020  9:09 AM CST -----  Contact: Pt  Type:  Sooner Apoointment Request    Caller is requesting a sooner appointment.  Caller declined first available appointment listed below.  Caller will not accept being placed on the waitlist and is requesting a message be sent to doctor.    Name of Caller:  Pt  When is the first available appointment?  3/11/20  Symptoms:  bruising on both big toes   Best Call Back Number:  [phone]

## 2020-01-17 NOTE — TELEPHONE ENCOUNTER
----- Message from Karlie Howell sent at 1/17/2020  8:48 AM CST -----  Contact: patient  Type: Needs Medical Advice    Who Called:  Patient  Best Call Back Number:   Additional Information: Would like to schedule-please advise-thank you

## 2020-01-21 NOTE — PROGRESS NOTES
Refill Routing Note     Medication(s) are appropriate for refill:    Medication Outside of Protocol    Appointments  past 15m or future 3m with PCP    Date Provider   Last Visit   7/11/2019 Marcus Zheng MD   Next Visit   Visit date not found Marcus Zheng MD       Automatic Epic Protocol Generated Data:    Requested Prescriptions   Pending Prescriptions Disp Refills    LORazepam (ATIVAN) 0.5 MG tablet [Pharmacy Med Name: LORAZEPAM 0.5MG TABLETS] 30 tablet      Sig: TAKE ONE TABLET BY MOUTH EVERY OTHER DAY.       Anticonvulsants Protocol Passed - 1/21/2020  2:59 PM        Passed - Visit with Authorizing provider in past 9 months or upcoming 90 days        Passed - No active pregnancy on record

## 2020-01-22 ENCOUNTER — TELEPHONE (OUTPATIENT)
Dept: GASTROENTEROLOGY | Facility: CLINIC | Age: 70
End: 2020-01-22

## 2020-01-22 NOTE — TELEPHONE ENCOUNTER
Attempted to contact pt to schedule ordered c-scope. No answer, msg left on vmail. Number provided.

## 2020-01-23 RX ORDER — LORAZEPAM 0.5 MG/1
TABLET ORAL
Qty: 30 TABLET | Refills: 2 | Status: SHIPPED | OUTPATIENT
Start: 2020-01-23 | End: 2020-07-15

## 2020-01-28 ENCOUNTER — TELEPHONE (OUTPATIENT)
Dept: GASTROENTEROLOGY | Facility: CLINIC | Age: 70
End: 2020-01-28

## 2020-01-28 ENCOUNTER — PATIENT MESSAGE (OUTPATIENT)
Dept: NEUROLOGY | Facility: CLINIC | Age: 70
End: 2020-01-28

## 2020-01-28 RX ORDER — CARBIDOPA AND LEVODOPA 25; 100 MG/1; MG/1
1.5 TABLET ORAL 3 TIMES DAILY
Qty: 135 TABLET | Refills: 11 | Status: SHIPPED | OUTPATIENT
Start: 2020-01-28 | End: 2020-10-12

## 2020-01-28 NOTE — TELEPHONE ENCOUNTER
Spoke with pt and ordered cscope scheduled to be done on Feb. 25th with Dr. Lyn in [place] at pt's request. Pt verbalized understanding of all information given and instructions mailed to her home. Phone number provided to her for questions or concerns.

## 2020-01-29 ENCOUNTER — TELEPHONE (OUTPATIENT)
Dept: GASTROENTEROLOGY | Facility: CLINIC | Age: 70
End: 2020-01-29

## 2020-01-29 NOTE — TELEPHONE ENCOUNTER
----- Message from Roxana Valero sent at 1/29/2020  8:21 AM CST -----  Contact: Patient  Type: Needs Medical Advice    Who Called:  Patient  Best Call Back Number:   Additional Information: Calling to reschedule upcoming colonoscopy. Patient advised to only call her back in the afternoon.

## 2020-01-29 NOTE — TELEPHONE ENCOUNTER
Spoke with pt and rescheduled her procedure and switched her to Dr. Mensah to accommodate her schedule.

## 2020-02-05 ENCOUNTER — PATIENT OUTREACH (OUTPATIENT)
Dept: ADMINISTRATIVE | Facility: OTHER | Age: 70
End: 2020-02-05

## 2020-02-06 NOTE — PROGRESS NOTES
Chart reviewed.   Requested updates from Care Everywhere.  Immunizations reconciled.   HM updated.  Current open case request for GI on file.

## 2020-02-07 ENCOUNTER — OFFICE VISIT (OUTPATIENT)
Dept: PODIATRY | Facility: CLINIC | Age: 70
End: 2020-02-07
Payer: MEDICARE

## 2020-02-07 ENCOUNTER — PATIENT MESSAGE (OUTPATIENT)
Dept: PODIATRY | Facility: CLINIC | Age: 70
End: 2020-02-07

## 2020-02-07 VITALS — DIASTOLIC BLOOD PRESSURE: 71 MMHG | SYSTOLIC BLOOD PRESSURE: 109 MMHG | HEART RATE: 81 BPM | RESPIRATION RATE: 14 BRPM

## 2020-02-07 DIAGNOSIS — B35.1 ONYCHOMYCOSIS DUE TO DERMATOPHYTE: Primary | ICD-10-CM

## 2020-02-07 PROCEDURE — 1126F AMNT PAIN NOTED NONE PRSNT: CPT | Mod: S$GLB,,, | Performed by: PODIATRIST

## 2020-02-07 PROCEDURE — 1159F PR MEDICATION LIST DOCUMENTED IN MEDICAL RECORD: ICD-10-PCS | Mod: S$GLB,,, | Performed by: PODIATRIST

## 2020-02-07 PROCEDURE — 99203 PR OFFICE/OUTPT VISIT, NEW, LEVL III, 30-44 MIN: ICD-10-PCS | Mod: S$GLB,,, | Performed by: PODIATRIST

## 2020-02-07 PROCEDURE — 1126F PR PAIN SEVERITY QUANTIFIED, NO PAIN PRESENT: ICD-10-PCS | Mod: S$GLB,,, | Performed by: PODIATRIST

## 2020-02-07 PROCEDURE — 1101F PR PT FALLS ASSESS DOC 0-1 FALLS W/OUT INJ PAST YR: ICD-10-PCS | Mod: CPTII,S$GLB,, | Performed by: PODIATRIST

## 2020-02-07 PROCEDURE — 99999 PR PBB SHADOW E&M-EST. PATIENT-LVL III: ICD-10-PCS | Mod: PBBFAC,,, | Performed by: PODIATRIST

## 2020-02-07 PROCEDURE — 99999 PR PBB SHADOW E&M-EST. PATIENT-LVL III: CPT | Mod: PBBFAC,,, | Performed by: PODIATRIST

## 2020-02-07 PROCEDURE — 99203 OFFICE O/P NEW LOW 30 MIN: CPT | Mod: S$GLB,,, | Performed by: PODIATRIST

## 2020-02-07 PROCEDURE — 1159F MED LIST DOCD IN RCRD: CPT | Mod: S$GLB,,, | Performed by: PODIATRIST

## 2020-02-07 PROCEDURE — 1101F PT FALLS ASSESS-DOCD LE1/YR: CPT | Mod: CPTII,S$GLB,, | Performed by: PODIATRIST

## 2020-02-19 ENCOUNTER — PATIENT MESSAGE (OUTPATIENT)
Dept: NEUROLOGY | Facility: CLINIC | Age: 70
End: 2020-02-19

## 2020-02-20 NOTE — PROGRESS NOTES
Subjective:      Patient ID: Esther Segura is a 69 y.o. female.    Chief Complaint: Nail Care (Nail on right great toe bruised/coming off)    Esther is a 69 y.o. female who presents to the clinic complaining of thick and discolored toenails on the right foot great toe that is also loosening. Esther is inquiring about treatment options. No pain noted from the area no prior treatments      Review of Systems   Constitution: Negative for chills and fever.   Cardiovascular: Negative for claudication and leg swelling.   Respiratory: Negative for shortness of breath.    Skin: Positive for nail changes. Negative for itching and rash.   Musculoskeletal: Negative for muscle cramps, muscle weakness and myalgias.   Gastrointestinal: Negative for nausea and vomiting.   Neurological: Negative for focal weakness, loss of balance, numbness and paresthesias.           Objective:      Physical Exam   Constitutional: She is oriented to person, place, and time. She appears well-developed and well-nourished. No distress.   Cardiovascular:   Pulses:       Dorsalis pedis pulses are 2+ on the right side, and 2+ on the left side.        Posterior tibial pulses are 2+ on the right side, and 2+ on the left side.   < 3 sec capillary refill time to toes 1-5 bilateral. Toes and feet are warm to touch proximally with normal distal cooling b/l. There is some hair growth on the feet and toes b/l. There is no edema b/l. No spider veins or varicosities present b/l.      Musculoskeletal:   Equinus noted b/l ankles with < 10 deg DF noted. MMT 5/5 in DF/PF/Inv/Ev resistance with no reproduction of pain in any direction. Passive range of motion of ankle and pedal joints is painless b/l.     Neurological: She is alert and oriented to person, place, and time. She has normal strength. She displays no atrophy and no tremor. No sensory deficit. She exhibits normal muscle tone.   Negative tinel sign bilateral.   Skin: Skin is warm, dry and intact.  No abrasion, no bruising, no burn, no ecchymosis, no laceration, no lesion, no petechiae and no rash noted. She is not diaphoretic. No cyanosis or erythema. No pallor. Nails show no clubbing.   Skin temperature, texture and turgor within normal limits.    Right great toe nail is thick and discolored loosening and subungual debris present   Psychiatric: She has a normal mood and affect. Her behavior is normal.             Assessment:       Encounter Diagnosis   Name Primary?    Onychomycosis due to dermatophyte Yes         Plan:       Esther was seen today for nail care.    Diagnoses and all orders for this visit:    Onychomycosis due to dermatophyte  -     efinaconazole (JUBLIA) 10 % Sha; Apply 1 application topically once daily.      I counseled the patient on her conditions, their implications and medical management.    Discussed treatment options for fungal toenails to include topical vs oral vs laser vs combined therapy in detail.    Jublia used daily, discussed that it is only 20-30 percent effective.    Return PRN    Abdi Samuels DPM

## 2020-02-21 ENCOUNTER — PATIENT MESSAGE (OUTPATIENT)
Dept: NEUROLOGY | Facility: CLINIC | Age: 70
End: 2020-02-21

## 2020-03-03 ENCOUNTER — PATIENT MESSAGE (OUTPATIENT)
Dept: NEUROLOGY | Facility: CLINIC | Age: 70
End: 2020-03-03

## 2020-03-12 ENCOUNTER — TELEPHONE (OUTPATIENT)
Dept: GASTROENTEROLOGY | Facility: CLINIC | Age: 70
End: 2020-03-12

## 2020-03-12 ENCOUNTER — PATIENT MESSAGE (OUTPATIENT)
Dept: ENDOSCOPY | Facility: HOSPITAL | Age: 70
End: 2020-03-12

## 2020-03-12 NOTE — TELEPHONE ENCOUNTER
----- Message from Beth Edwards sent at 3/12/2020  6:03 AM CDT -----  Contact: Pt  Pt requesting to speak with someone to cancel procedure on 3/13/2020    Please call and advise         Phone [phone]

## 2020-04-16 ENCOUNTER — TELEPHONE (OUTPATIENT)
Dept: SURGERY | Facility: HOSPITAL | Age: 70
End: 2020-04-16

## 2020-04-16 NOTE — TELEPHONE ENCOUNTER
Patient called the [facility] today and would like to reschedule procedure that is scheduled in May. Please contact patient to reschedule.

## 2020-04-16 NOTE — TELEPHONE ENCOUNTER
R/s procedure per pt request. Pt verbalized understanding. Updated paperwork mailed to address on file.

## 2020-05-04 RX ORDER — RASAGILINE 1 MG/1
TABLET ORAL
Qty: 30 TABLET | Refills: 5 | Status: SHIPPED | OUTPATIENT
Start: 2020-05-04 | End: 2020-11-10 | Stop reason: SDUPTHER

## 2020-05-24 ENCOUNTER — PATIENT MESSAGE (OUTPATIENT)
Dept: NEUROLOGY | Facility: CLINIC | Age: 70
End: 2020-05-24

## 2020-06-04 ENCOUNTER — PATIENT MESSAGE (OUTPATIENT)
Dept: ENDOSCOPY | Facility: HOSPITAL | Age: 70
End: 2020-06-04

## 2020-06-05 ENCOUNTER — PATIENT MESSAGE (OUTPATIENT)
Dept: ENDOSCOPY | Facility: HOSPITAL | Age: 70
End: 2020-06-05

## 2020-06-23 ENCOUNTER — PATIENT OUTREACH (OUTPATIENT)
Dept: ADMINISTRATIVE | Facility: OTHER | Age: 70
End: 2020-06-23

## 2020-06-24 ENCOUNTER — OFFICE VISIT (OUTPATIENT)
Dept: GASTROENTEROLOGY | Facility: CLINIC | Age: 70
End: 2020-06-24
Payer: MEDICARE

## 2020-06-24 VITALS
SYSTOLIC BLOOD PRESSURE: 113 MMHG | HEIGHT: 62 IN | HEART RATE: 82 BPM | DIASTOLIC BLOOD PRESSURE: 70 MMHG | WEIGHT: 99.63 LBS | BODY MASS INDEX: 18.33 KG/M2

## 2020-06-24 DIAGNOSIS — Z12.11 SCREENING FOR COLORECTAL CANCER: ICD-10-CM

## 2020-06-24 DIAGNOSIS — G20.A1 PARKINSON'S DISEASE: Primary | ICD-10-CM

## 2020-06-24 DIAGNOSIS — R10.13 EPIGASTRIC ABDOMINAL PAIN: ICD-10-CM

## 2020-06-24 DIAGNOSIS — Z12.12 SCREENING FOR COLORECTAL CANCER: ICD-10-CM

## 2020-06-24 PROCEDURE — 1159F PR MEDICATION LIST DOCUMENTED IN MEDICAL RECORD: ICD-10-PCS | Mod: S$GLB,,, | Performed by: INTERNAL MEDICINE

## 2020-06-24 PROCEDURE — 99204 PR OFFICE/OUTPT VISIT, NEW, LEVL IV, 45-59 MIN: ICD-10-PCS | Mod: S$GLB,,, | Performed by: INTERNAL MEDICINE

## 2020-06-24 PROCEDURE — 1101F PT FALLS ASSESS-DOCD LE1/YR: CPT | Mod: CPTII,S$GLB,, | Performed by: INTERNAL MEDICINE

## 2020-06-24 PROCEDURE — 99204 OFFICE O/P NEW MOD 45 MIN: CPT | Mod: S$GLB,,, | Performed by: INTERNAL MEDICINE

## 2020-06-24 PROCEDURE — 1125F AMNT PAIN NOTED PAIN PRSNT: CPT | Mod: S$GLB,,, | Performed by: INTERNAL MEDICINE

## 2020-06-24 PROCEDURE — 99999 PR PBB SHADOW E&M-EST. PATIENT-LVL IV: CPT | Mod: PBBFAC,,, | Performed by: INTERNAL MEDICINE

## 2020-06-24 PROCEDURE — 1159F MED LIST DOCD IN RCRD: CPT | Mod: S$GLB,,, | Performed by: INTERNAL MEDICINE

## 2020-06-24 PROCEDURE — 1125F PR PAIN SEVERITY QUANTIFIED, PAIN PRESENT: ICD-10-PCS | Mod: S$GLB,,, | Performed by: INTERNAL MEDICINE

## 2020-06-24 PROCEDURE — 1101F PR PT FALLS ASSESS DOC 0-1 FALLS W/OUT INJ PAST YR: ICD-10-PCS | Mod: CPTII,S$GLB,, | Performed by: INTERNAL MEDICINE

## 2020-06-24 PROCEDURE — 99999 PR PBB SHADOW E&M-EST. PATIENT-LVL IV: ICD-10-PCS | Mod: PBBFAC,,, | Performed by: INTERNAL MEDICINE

## 2020-06-24 NOTE — PROGRESS NOTES
Subjective:       Patient ID: Esther Segura is a 70 y.o. female.    This patient is new to me.  Referring provider:  Dr. Zheng for abdominal pain.      Chief Complaint: Abdominal Pain, Nausea, Gas, and Urinary Urgency    Abdominal Pain  This is a new problem. The current episode started more than 1 month ago. The onset quality is undetermined. Episode frequency: daily. Duration: variable. The problem has been waxing and waning. The pain is located in the epigastric region. The pain is at a severity of 5/10. The pain is moderate. The quality of the pain is aching and burning. The abdominal pain does not radiate. Pertinent negatives include no arthralgias, constipation, diarrhea, dysuria, fever, headaches, hematochezia, hematuria, melena, myalgias, nausea, vomiting or weight loss. Nothing aggravates the pain. The pain is relieved by nothing. She has tried nothing for the symptoms. The treatment provided no relief. Prior workup: Last colonoscopy per notes from PCP Dr. Zheng was several years ago and patient is due for screening. Her past medical history is significant for GERD.     Review of Systems   Constitutional: Negative for chills, fatigue, fever and weight loss.   HENT: Negative for sore throat and trouble swallowing.    Respiratory: Negative for cough, shortness of breath and wheezing.    Cardiovascular: Negative for chest pain and palpitations.   Gastrointestinal: Positive for abdominal pain. Negative for blood in stool, constipation, diarrhea, hematochezia, melena, nausea and vomiting.   Genitourinary: Negative for dysuria and hematuria.   Musculoskeletal: Negative for arthralgias and myalgias.   Integumentary:  Negative for color change and rash.   Neurological: Negative for dizziness and headaches.   Hematological: Negative for adenopathy.   Psychiatric/Behavioral: Negative for confusion. The patient is not nervous/anxious.    All other systems reviewed and are negative.        Objective:      "  Vitals:    06/24/20 1352   BP: 113/70   BP Location: Left arm   Patient Position: Sitting   Pulse: 82   Weight: 45.2 kg (99 lb 10.4 oz)   Height: 5' 2" (1.575 m)       Physical Exam  Constitutional:       Appearance: She is well-developed.   HENT:      Head: Normocephalic and atraumatic.   Eyes:      General: No scleral icterus.     Pupils: Pupils are equal, round, and reactive to light.   Neck:      Musculoskeletal: Normal range of motion.   Cardiovascular:      Rate and Rhythm: Normal rate and regular rhythm.      Heart sounds: No murmur.   Pulmonary:      Effort: Pulmonary effort is normal.      Breath sounds: Normal breath sounds. No wheezing.   Abdominal:      General: Bowel sounds are normal. There is no distension.      Palpations: Abdomen is soft.      Tenderness: There is abdominal tenderness (mild, epigastric).   Musculoskeletal:         General: No tenderness.   Lymphadenopathy:      Cervical: No cervical adenopathy.   Skin:     General: Skin is warm and dry.      Findings: No rash.   Neurological:      Mental Status: She is alert.           Lab Results   Component Value Date    WBC 3.82 (L) 10/15/2018    HGB 13.1 10/15/2018    HCT 40.3 10/15/2018     (H) 10/15/2018     10/15/2018         CMP  Sodium   Date Value Ref Range Status   10/15/2018 141 136 - 145 mmol/L Final     Potassium   Date Value Ref Range Status   10/15/2018 4.0 3.5 - 5.1 mmol/L Final     Chloride   Date Value Ref Range Status   10/15/2018 104 95 - 110 mmol/L Final     CO2   Date Value Ref Range Status   10/15/2018 30 (H) 23 - 29 mmol/L Final     Glucose   Date Value Ref Range Status   10/15/2018 87 70 - 110 mg/dL Final     BUN, Bld   Date Value Ref Range Status   10/15/2018 17 8 - 23 mg/dL Final     Creatinine   Date Value Ref Range Status   10/15/2018 0.8 0.5 - 1.4 mg/dL Final     Calcium   Date Value Ref Range Status   10/15/2018 10.1 8.7 - 10.5 mg/dL Final     Total Protein   Date Value Ref Range Status   10/15/2018 " 7.2 6.0 - 8.4 g/dL Final     Albumin   Date Value Ref Range Status   10/15/2018 4.0 3.5 - 5.2 g/dL Final     Total Bilirubin   Date Value Ref Range Status   10/15/2018 0.5 0.1 - 1.0 mg/dL Final     Comment:     For infants and newborns, interpretation of results should be based  on gestational age, weight and in agreement with clinical  observations.  Premature Infant recommended reference ranges:  Up to 24 hours.............<8.0 mg/dL  Up to 48 hours............<12.0 mg/dL  3-5 days..................<15.0 mg/dL  6-29 days.................<15.0 mg/dL       Alkaline Phosphatase   Date Value Ref Range Status   10/15/2018 50 (L) 55 - 135 U/L Final     AST   Date Value Ref Range Status   10/15/2018 27 10 - 40 U/L Final     ALT   Date Value Ref Range Status   10/15/2018 16 10 - 44 U/L Final     Anion Gap   Date Value Ref Range Status   10/15/2018 7 (L) 8 - 16 mmol/L Final     eGFR if    Date Value Ref Range Status   10/15/2018 >60.0 >60 mL/min/1.73 m^2 Final     eGFR if non    Date Value Ref Range Status   10/15/2018 >60.0 >60 mL/min/1.73 m^2 Final     Comment:     Calculation used to obtain the estimated glomerular filtration  rate (eGFR) is the CKD-EPI equation.          Past ECG was independently visualized and reviewed by me and showed NSR.    Old records from Dr. Zheng reviewed and are as summarized above in the HPI.    Assessment:       1. Parkinson's disease    2. Epigastric abdominal pain    3. Screening for colorectal cancer        Plan:       1.  EGD/colonoscopy for above  2.  Antireflux precautions including avoidance of late night eating and lying down soon after eating.     3.  Avoid NSAIDs  4.  Check ultrasound  5.  Check labs on day of scope.  6.  Further recommendations to follow after above.  7.  Communication will be sent to the referring provider, Dr. Zheng regarding my assessment and plan on this patient via EPIC.

## 2020-07-02 ENCOUNTER — TELEPHONE (OUTPATIENT)
Dept: GASTROENTEROLOGY | Facility: CLINIC | Age: 70
End: 2020-07-02

## 2020-07-02 ENCOUNTER — HOSPITAL ENCOUNTER (OUTPATIENT)
Dept: RADIOLOGY | Facility: HOSPITAL | Age: 70
Discharge: HOME | End: 2020-07-02
Attending: INTERNAL MEDICINE
Payer: MEDICARE

## 2020-07-02 DIAGNOSIS — Z12.12 SCREENING FOR COLORECTAL CANCER: ICD-10-CM

## 2020-07-02 DIAGNOSIS — K76.9 LIVER LESION: Primary | ICD-10-CM

## 2020-07-02 DIAGNOSIS — R10.13 EPIGASTRIC ABDOMINAL PAIN: ICD-10-CM

## 2020-07-02 DIAGNOSIS — G20.A1 PARKINSON'S DISEASE: ICD-10-CM

## 2020-07-02 DIAGNOSIS — Z12.11 SCREENING FOR COLORECTAL CANCER: ICD-10-CM

## 2020-07-02 PROCEDURE — 76700 US ABDOMEN COMPLETE: ICD-10-PCS | Mod: 26,,, | Performed by: RADIOLOGY

## 2020-07-02 PROCEDURE — 76700 US EXAM ABDOM COMPLETE: CPT | Mod: 26,,, | Performed by: RADIOLOGY

## 2020-07-02 PROCEDURE — 76700 US EXAM ABDOM COMPLETE: CPT | Mod: TC

## 2020-07-06 ENCOUNTER — LAB VISIT (OUTPATIENT)
Dept: PRIMARY CARE CLINIC | Facility: CLINIC | Age: 70
End: 2020-07-06
Payer: MEDICARE

## 2020-07-06 PROCEDURE — U0003 INFECTIOUS AGENT DETECTION BY NUCLEIC ACID (DNA OR RNA); SEVERE ACUTE RESPIRATORY SYNDROME CORONAVIRUS 2 (SARS-COV-2) (CORONAVIRUS DISEASE [COVID-19]), AMPLIFIED PROBE TECHNIQUE, MAKING USE OF HIGH THROUGHPUT TECHNOLOGIES AS DESCRIBED BY CMS-2020-01-R: HCPCS

## 2020-07-07 LAB — SARS-COV-2 RNA RESP QL NAA+PROBE: NOT DETECTED

## 2020-07-09 ENCOUNTER — ANESTHESIA (OUTPATIENT)
Dept: ENDOSCOPY | Facility: HOSPITAL | Age: 70
End: 2020-07-09
Payer: MEDICARE

## 2020-07-09 ENCOUNTER — ANESTHESIA EVENT (OUTPATIENT)
Dept: ENDOSCOPY | Facility: HOSPITAL | Age: 70
End: 2020-07-09
Payer: MEDICARE

## 2020-07-09 ENCOUNTER — HOSPITAL ENCOUNTER (OUTPATIENT)
Facility: HOSPITAL | Age: 70
Discharge: HOME | End: 2020-07-09
Attending: INTERNAL MEDICINE | Admitting: INTERNAL MEDICINE
Payer: MEDICARE

## 2020-07-09 VITALS
SYSTOLIC BLOOD PRESSURE: 125 MMHG | RESPIRATION RATE: 16 BRPM | DIASTOLIC BLOOD PRESSURE: 60 MMHG | WEIGHT: 100 LBS | OXYGEN SATURATION: 100 % | HEIGHT: 62 IN | BODY MASS INDEX: 18.4 KG/M2 | TEMPERATURE: 98 F | HEART RATE: 69 BPM

## 2020-07-09 DIAGNOSIS — K29.70 GASTRITIS, PRESENCE OF BLEEDING UNSPECIFIED, UNSPECIFIED CHRONICITY, UNSPECIFIED GASTRITIS TYPE: ICD-10-CM

## 2020-07-09 DIAGNOSIS — K57.90 DIVERTICULOSIS: Primary | ICD-10-CM

## 2020-07-09 DIAGNOSIS — K64.8 INTERNAL HEMORRHOIDS: ICD-10-CM

## 2020-07-09 DIAGNOSIS — R10.13 EPIGASTRIC PAIN: ICD-10-CM

## 2020-07-09 PROCEDURE — D9220A PRA ANESTHESIA: ICD-10-PCS | Mod: CRNA,,, | Performed by: NURSE ANESTHETIST, CERTIFIED REGISTERED

## 2020-07-09 PROCEDURE — 88305 TISSUE EXAM BY PATHOLOGIST: ICD-10-PCS | Mod: 26,,, | Performed by: PATHOLOGY

## 2020-07-09 PROCEDURE — G0121 COLON CA SCRN NOT HI RSK IND: HCPCS | Performed by: INTERNAL MEDICINE

## 2020-07-09 PROCEDURE — 37000008 HC ANESTHESIA 1ST 15 MINUTES: Performed by: INTERNAL MEDICINE

## 2020-07-09 PROCEDURE — G0121 COLON CA SCRN NOT HI RSK IND: ICD-10-PCS | Mod: ,,, | Performed by: INTERNAL MEDICINE

## 2020-07-09 PROCEDURE — G0121 COLON CA SCRN NOT HI RSK IND: HCPCS | Mod: ,,, | Performed by: INTERNAL MEDICINE

## 2020-07-09 PROCEDURE — 43239 EGD BIOPSY SINGLE/MULTIPLE: CPT | Performed by: INTERNAL MEDICINE

## 2020-07-09 PROCEDURE — D9220A PRA ANESTHESIA: ICD-10-PCS | Mod: ANES,,, | Performed by: ANESTHESIOLOGY

## 2020-07-09 PROCEDURE — 37000009 HC ANESTHESIA EA ADD 15 MINS: Performed by: INTERNAL MEDICINE

## 2020-07-09 PROCEDURE — 88305 TISSUE EXAM BY PATHOLOGIST: CPT | Mod: 26,,, | Performed by: PATHOLOGY

## 2020-07-09 PROCEDURE — 25000003 PHARM REV CODE 250: Performed by: INTERNAL MEDICINE

## 2020-07-09 PROCEDURE — 25000003 PHARM REV CODE 250: Performed by: NURSE ANESTHETIST, CERTIFIED REGISTERED

## 2020-07-09 PROCEDURE — D9220A PRA ANESTHESIA: Mod: CRNA,,, | Performed by: NURSE ANESTHETIST, CERTIFIED REGISTERED

## 2020-07-09 PROCEDURE — 43239 EGD BIOPSY SINGLE/MULTIPLE: CPT | Mod: 51,,, | Performed by: INTERNAL MEDICINE

## 2020-07-09 PROCEDURE — 63600175 PHARM REV CODE 636 W HCPCS: Performed by: NURSE ANESTHETIST, CERTIFIED REGISTERED

## 2020-07-09 PROCEDURE — 27201012 HC FORCEPS, HOT/COLD, DISP: Performed by: INTERNAL MEDICINE

## 2020-07-09 PROCEDURE — D9220A PRA ANESTHESIA: Mod: ANES,,, | Performed by: ANESTHESIOLOGY

## 2020-07-09 PROCEDURE — 43239 PR EGD, FLEX, W/BIOPSY, SGL/MULTI: ICD-10-PCS | Mod: 51,,, | Performed by: INTERNAL MEDICINE

## 2020-07-09 PROCEDURE — 88305 TISSUE EXAM BY PATHOLOGIST: CPT | Performed by: PATHOLOGY

## 2020-07-09 RX ORDER — LIDOCAINE HCL/PF 100 MG/5ML
SYRINGE (ML) INTRAVENOUS
Status: DISCONTINUED | OUTPATIENT
Start: 2020-07-09 | End: 2020-07-09

## 2020-07-09 RX ORDER — PROPOFOL 10 MG/ML
INJECTION, EMULSION INTRAVENOUS
Status: DISCONTINUED | OUTPATIENT
Start: 2020-07-09 | End: 2020-07-09

## 2020-07-09 RX ORDER — SODIUM CHLORIDE 9 MG/ML
INJECTION, SOLUTION INTRAVENOUS CONTINUOUS
Status: DISCONTINUED | OUTPATIENT
Start: 2020-07-09 | End: 2020-07-09 | Stop reason: HOSPADM

## 2020-07-09 RX ADMIN — PROPOFOL 50 MG: 10 INJECTION, EMULSION INTRAVENOUS at 11:07

## 2020-07-09 RX ADMIN — PROPOFOL 100 MG: 10 INJECTION, EMULSION INTRAVENOUS at 11:07

## 2020-07-09 RX ADMIN — SODIUM CHLORIDE: 0.9 INJECTION, SOLUTION INTRAVENOUS at 10:07

## 2020-07-09 RX ADMIN — LIDOCAINE HYDROCHLORIDE 50 MG: 20 INJECTION INTRAVENOUS at 11:07

## 2020-07-09 NOTE — PROVATION PATIENT INSTRUCTIONS
Discharge Summary/Instructions after an Endoscopic Procedure  Patient Name: Esther Segura  Patient MRN: [id number]  Patient YOB: 1950  Thursday, July 9, 2020  Markus Mckeon MD  RESTRICTIONS:  During your procedure today, you received medications for sedation.  These   medications may affect your judgment, balance and coordination.  Therefore,   for 24 hours, you have the following restrictions:   - DO NOT drive a car, operate machinery, make legal/financial decisions,   sign important papers or drink alcohol.    ACTIVITY:  Today: no heavy lifting, straining or running due to procedural   sedation/anesthesia.  The following day: return to full activity including work.  DIET:  Eat and drink normally unless instructed otherwise.     TREATMENT FOR COMMON SIDE EFFECTS:  - Mild abdominal pain, nausea, belching, bloating or excessive gas:  rest,   eat lightly and use a heating pad.  - Sore Throat: treat with throat lozenges and/or gargle with warm salt   water.  - Because air was used during the procedure, expelling large amounts of air   from your rectum or belching is normal.  - If a bowel prep was taken, you may not have a bowel movement for 1-3 days.    This is normal.  SYMPTOMS TO WATCH FOR AND REPORT TO YOUR PHYSICIAN:  1. Abdominal pain or bloating, other than gas cramps.  2. Chest pain.  3. Back pain.  4. Signs of infection such as: chills or fever occurring within 24 hours   after the procedure.  5. Rectal bleeding, which would show as bright red, maroon, or black stools.   (A tablespoon of blood from the rectum is not serious, especially if   hemorrhoids are present.)  6. Vomiting.  7. Weakness or dizziness.  GO DIRECTLY TO THE NEAREST EMERGENCY ROOM IF YOU HAVE ANY OF THE FOLLOWING:      Difficulty breathing              Chills and/or fever over 101 F   Persistent vomiting and/or vomiting blood   Severe abdominal pain   Severe chest pain   Black, tarry stools   Bleeding- more than one  tablespoon   Any other symptom or condition that you feel may need urgent attention  Your doctor recommends these additional instructions:  If any biopsies were taken, your doctors clinic will contact you in 1 to 2   weeks with any results.  - Patient has a contact number available for emergencies.  The signs and   symptoms of potential delayed complications were discussed with the   patient.  Return to normal activities tomorrow.  Written discharge   instructions were provided to the patient.   - Resume previous diet.   - Continue present medications.   - Await pathology results.   - Discharge patient to home (ambulatory).   - Follow an antireflux regimen.   - Perform a colonoscopy today.   - Return to my office after studies are complete.  For questions, problems or results please call your physician - Markus Mckeon MD at Work:  [phone].  [facility], EMERGENCY ROOM PHONE NUMBER: [phone]  IF A COMPLICATION OR EMERGENCY SITUATION ARISES AND YOU ARE UNABLE TO REACH   YOUR PHYSICIAN - GO DIRECTLY TO THE EMERGENCY ROOM.  Markus Mckeon MD  7/9/2020 11:32:52 AM  This report has been verified and signed electronically.  PROVATION

## 2020-07-09 NOTE — PROVATION PATIENT INSTRUCTIONS
Discharge Summary/Instructions after an Endoscopic Procedure  Patient Name: Esther Segura  Patient MRN: [id number]  Patient YOB: 1950  Thursday, July 9, 2020  Markus Mckeon MD  RESTRICTIONS:  During your procedure today, you received medications for sedation.  These   medications may affect your judgment, balance and coordination.  Therefore,   for 24 hours, you have the following restrictions:   - DO NOT drive a car, operate machinery, make legal/financial decisions,   sign important papers or drink alcohol.    ACTIVITY:  Today: no heavy lifting, straining or running due to procedural   sedation/anesthesia.  The following day: return to full activity including work.  DIET:  Eat and drink normally unless instructed otherwise.     TREATMENT FOR COMMON SIDE EFFECTS:  - Mild abdominal pain, nausea, belching, bloating or excessive gas:  rest,   eat lightly and use a heating pad.  - Sore Throat: treat with throat lozenges and/or gargle with warm salt   water.  - Because air was used during the procedure, expelling large amounts of air   from your rectum or belching is normal.  - If a bowel prep was taken, you may not have a bowel movement for 1-3 days.    This is normal.  SYMPTOMS TO WATCH FOR AND REPORT TO YOUR PHYSICIAN:  1. Abdominal pain or bloating, other than gas cramps.  2. Chest pain.  3. Back pain.  4. Signs of infection such as: chills or fever occurring within 24 hours   after the procedure.  5. Rectal bleeding, which would show as bright red, maroon, or black stools.   (A tablespoon of blood from the rectum is not serious, especially if   hemorrhoids are present.)  6. Vomiting.  7. Weakness or dizziness.  GO DIRECTLY TO THE NEAREST EMERGENCY ROOM IF YOU HAVE ANY OF THE FOLLOWING:      Difficulty breathing              Chills and/or fever over 101 F   Persistent vomiting and/or vomiting blood   Severe abdominal pain   Severe chest pain   Black, tarry stools   Bleeding- more than one  tablespoon   Any other symptom or condition that you feel may need urgent attention  Your doctor recommends these additional instructions:  If any biopsies were taken, your doctors clinic will contact you in 1 to 2   weeks with any results.  - Patient has a contact number available for emergencies.  The signs and   symptoms of potential delayed complications were discussed with the   patient.  Return to normal activities tomorrow.  Written discharge   instructions were provided to the patient.   - High fiber diet.   - Continue present medications.   - No repeat colonoscopy due to age, the absence of colonic polyps and no   evidence of mucosal or other abnormalities on today's exam.   - Discharge patient to home (ambulatory).   - Return to my office PRN.  For questions, problems or results please call your physician - Markus Mckeon MD at Work:  [phone].  [facility], EMERGENCY ROOM PHONE NUMBER: [phone]  IF A COMPLICATION OR EMERGENCY SITUATION ARISES AND YOU ARE UNABLE TO REACH   YOUR PHYSICIAN - GO DIRECTLY TO THE EMERGENCY ROOM.  Markus Mckeon MD  7/9/2020 11:50:06 AM  This report has been verified and signed electronically.  PROVATION

## 2020-07-09 NOTE — ANESTHESIA PREPROCEDURE EVALUATION
07/09/2020  Esther Segura is a 70 y.o., female.    Anesthesia Evaluation    I have reviewed the Patient Summary Reports.    I have reviewed the Nursing Notes. I have reviewed the NPO Status.   I have reviewed the Medications.     Review of Systems  Anesthesia Hx:  No problems with previous Anesthesia    Social:  Non-Smoker, Social Alcohol Use    Hematology/Oncology:  Hematology Normal   Oncology Normal     EENT/Dental:EENT/Dental Normal   Cardiovascular:   hyperlipidemia    Pulmonary:  Pulmonary Normal    Hepatic/GI:   Bowel Prep.    Musculoskeletal:  Musculoskeletal Normal    Neurological:  Neurology Normal Parkinson's disease    Endocrine:  Endocrine Normal    Dermatological:  Skin Normal    Psych:  Psychiatric Normal           Physical Exam  General:  Well nourished    Airway/Jaw/Neck:  Airway Findings: Mouth Opening: Normal Tongue: Normal  General Airway Assessment: Adult  Mallampati: III  Improves to II with phonation.  TM Distance: Normal, at least 6 cm        Eyes/Ears/Nose:Pt with laceration to nose and lip s/p fall in tub this am.     Dental:  Dental Findings: In tact   Chest/Lungs:  Chest/Lungs Findings: Clear to auscultation, Normal Respiratory Rate     Heart/Vascular:  Heart Findings: Rate: Normal  Rhythm: Regular Rhythm        Mental Status:  Mental Status Findings:  Cooperative, Alert and Oriented         Anesthesia Plan  Type of Anesthesia, risks & benefits discussed:  Anesthesia Type:  general  Patient's Preference:   Intra-op Monitoring Plan: standard ASA monitors  Intra-op Monitoring Plan Comments:   Post Op Pain Control Plan:   Post Op Pain Control Plan Comments:   Induction:   IV  Beta Blocker:  Patient is on a Beta-Blocker and has received one dose within the past 24 hours (No further documentation required).       Informed Consent: Patient understands risks and agrees with  Anesthesia plan.  Questions answered. Anesthesia consent signed with patient.  ASA Score: 2     Day of Surgery Review of History & Physical: I have interviewed and examined the patient. I have reviewed the patient's H&P dated:    H&P update referred to the provider.         Ready For Surgery From Anesthesia Perspective.

## 2020-07-09 NOTE — PLAN OF CARE
Vss, jefry po fluids, denies pain, ambulates easily. IV removed, catheter intact. Discharge instructions provided and states understanding. States ready to go home.  Discharged from facility with family per wheelchair.

## 2020-07-09 NOTE — ANESTHESIA POSTPROCEDURE EVALUATION
Anesthesia Post Evaluation    Patient: Esther Segura    Procedure(s) Performed: Procedure(s) (LRB):  EGD (ESOPHAGOGASTRODUODENOSCOPY) (N/A)  COLONOSCOPY (N/A)    Final Anesthesia Type: general    Patient location during evaluation: PACU  Patient participation: Yes- Able to Participate  Level of consciousness: awake and alert  Post-procedure vital signs: reviewed and stable  Pain management: adequate  Airway patency: patent    PONV status at discharge: No PONV  Anesthetic complications: no      Cardiovascular status: hemodynamically stable  Respiratory status: unassisted and room air  Hydration status: euvolemic  Follow-up not needed.          Vitals Value Taken Time   /60 07/09/20 1210        Pulse 69 07/09/20 1210   Resp 16 07/09/20 1210   SpO2 100 % 07/09/20 1210         Event Time   Out of Recovery 12:18:10         Pain/Isaac Score: Isaac Score: 10 (7/9/2020 12:10 PM)

## 2020-07-09 NOTE — TRANSFER OF CARE
"Anesthesia Transfer of Care Note    Patient: Esther Segura    Procedure(s) Performed: Procedure(s) (LRB):  EGD (ESOPHAGOGASTRODUODENOSCOPY) (N/A)  COLONOSCOPY (N/A)    Patient location: PACU    Anesthesia Type: general    Transport from OR: Transported from OR on 2-3 L/min O2 by NC with adequate spontaneous ventilation    Post pain: adequate analgesia    Post assessment: no apparent anesthetic complications and tolerated procedure well    Post vital signs: stable    Level of consciousness: sedated and responds to stimulation    Nausea/Vomiting: no nausea/vomiting    Complications: none    Transfer of care protocol was followed      Last vitals:   Visit Vitals  BP (!) 110/56   Pulse 74   Temp 36.5 °C (97.7 °F) (Skin)   Resp 16   Ht 5' 2" (1.575 m)   Wt 45.4 kg (100 lb)   SpO2 100%   Breastfeeding No   BMI 18.29 kg/m²     "

## 2020-07-09 NOTE — INTERVAL H&P NOTE
The patient has been examined and the H&P has been reviewed:    I concur with the findings and changes have been noted since the H&P was written: Patient suffered a fall this morning prior to presentation and had evaluation in ER before proceeding to endoscopy.  She is ready to proceed with endoscopy.  See ER notes for details.    Anesthesia/Surgery risks, benefits and alternative options discussed and understood by patient/family.          Active Hospital Problems    Diagnosis  POA    Epigastric pain [R10.13]  Yes      Resolved Hospital Problems   No resolved problems to display.

## 2020-07-13 NOTE — PROGRESS NOTES
Refill Routing Note   Medication(s) are not appropriate for processing by [facility]:       - Outside of protocol           Medication reconciliation completed: No      Automatic Epic Generated Protocol Data:    Requested Prescriptions   Pending Prescriptions Disp Refills    LORazepam (ATIVAN) 0.5 MG tablet [Pharmacy Med Name: LORAZEPAM 0.5MG TABLETS] 30 tablet      Sig: TAKE 1 TABLET BY MOUTH EVERY OTHER DAY       Anticonvulsants Protocol Failed - 7/13/2020  3:35 AM        Failed - Visit with Authorizing provider in past 9 months or upcoming 90 days        Passed - No active pregnancy on record              Appointments  past 12m or future 3m with PCP    Date Provider   Last Visit   7/11/2019 Marcus Zheng MD   Next Visit   Visit date not found Marcus Zheng MD   ED visits in past 90 days: 1     Note composed:12:38 PM 07/13/2020

## 2020-07-14 LAB
FINAL PATHOLOGIC DIAGNOSIS: NORMAL
GROSS: NORMAL

## 2020-07-15 RX ORDER — LORAZEPAM 0.5 MG/1
TABLET ORAL
Qty: 30 TABLET | Refills: 2 | Status: SHIPPED | OUTPATIENT
Start: 2020-07-15 | End: 2020-11-08 | Stop reason: SDUPTHER

## 2020-07-16 ENCOUNTER — HOSPITAL ENCOUNTER (OUTPATIENT)
Dept: RADIOLOGY | Facility: HOSPITAL | Age: 70
Discharge: HOME | End: 2020-07-16
Attending: INTERNAL MEDICINE
Payer: MEDICARE

## 2020-07-16 DIAGNOSIS — K76.9 LIVER LESION: ICD-10-CM

## 2020-07-16 DIAGNOSIS — K76.9 LIVER DISEASE, UNSPECIFIED: ICD-10-CM

## 2020-07-16 PROCEDURE — 74177 CT ABDOMEN PELVIS WITH CONTRAST: ICD-10-PCS | Mod: 26,,, | Performed by: RADIOLOGY

## 2020-07-16 PROCEDURE — 25500020 PHARM REV CODE 255

## 2020-07-16 PROCEDURE — 74177 CT ABD & PELVIS W/CONTRAST: CPT | Mod: 26,,, | Performed by: RADIOLOGY

## 2020-07-16 PROCEDURE — 74177 CT ABD & PELVIS W/CONTRAST: CPT | Mod: TC

## 2020-07-16 RX ADMIN — IOHEXOL 75 ML: 350 INJECTION, SOLUTION INTRAVENOUS at 11:07

## 2020-07-23 ENCOUNTER — HOSPITAL ENCOUNTER (OUTPATIENT)
Dept: RADIOLOGY | Facility: HOSPITAL | Age: 70
Discharge: HOME | End: 2020-07-23
Attending: INTERNAL MEDICINE
Payer: MEDICARE

## 2020-07-23 DIAGNOSIS — K76.9 LIVER DISEASE, UNSPECIFIED: ICD-10-CM

## 2020-07-23 PROCEDURE — 74183 MRI ABD W/O CNTR FLWD CNTR: CPT | Mod: TC

## 2020-07-23 PROCEDURE — 74183 MRI ABD W/O CNTR FLWD CNTR: CPT | Mod: 26,,, | Performed by: RADIOLOGY

## 2020-07-23 PROCEDURE — 25500020 PHARM REV CODE 255

## 2020-07-23 PROCEDURE — 74183 MRI ABDOMEN W WO CONTRAST: ICD-10-PCS | Mod: 26,,, | Performed by: RADIOLOGY

## 2020-07-23 PROCEDURE — A9585 GADOBUTROL INJECTION: HCPCS

## 2020-07-23 RX ORDER — GADOBUTROL 604.72 MG/ML
INJECTION INTRAVENOUS
Status: COMPLETED
Start: 2020-07-23 | End: 2020-07-23

## 2020-07-23 RX ADMIN — GADOBUTROL 4 ML: 604.72 INJECTION INTRAVENOUS at 06:07

## 2020-07-24 ENCOUNTER — PATIENT MESSAGE (OUTPATIENT)
Dept: GASTROENTEROLOGY | Facility: CLINIC | Age: 70
End: 2020-07-24

## 2020-07-27 ENCOUNTER — TELEPHONE (OUTPATIENT)
Dept: GASTROENTEROLOGY | Facility: HOSPITAL | Age: 70
End: 2020-07-27

## 2020-07-27 ENCOUNTER — TELEPHONE (OUTPATIENT)
Dept: HEPATOLOGY | Facility: CLINIC | Age: 70
End: 2020-07-27

## 2020-07-27 ENCOUNTER — OFFICE VISIT (OUTPATIENT)
Dept: DERMATOLOGY | Facility: CLINIC | Age: 70
End: 2020-07-27
Payer: MEDICARE

## 2020-07-27 ENCOUNTER — TELEPHONE (OUTPATIENT)
Dept: GASTROENTEROLOGY | Facility: CLINIC | Age: 70
End: 2020-07-27

## 2020-07-27 DIAGNOSIS — L81.4 SOLAR LENTIGO: ICD-10-CM

## 2020-07-27 DIAGNOSIS — D18.01 CHERRY ANGIOMA: ICD-10-CM

## 2020-07-27 DIAGNOSIS — Z85.828 HISTORY OF NONMELANOMA SKIN CANCER: ICD-10-CM

## 2020-07-27 DIAGNOSIS — D22.9 MULTIPLE BENIGN NEVI: ICD-10-CM

## 2020-07-27 DIAGNOSIS — L82.1 SEBORRHEIC KERATOSES: Primary | ICD-10-CM

## 2020-07-27 PROCEDURE — 99214 OFFICE O/P EST MOD 30 MIN: CPT | Mod: S$GLB,,, | Performed by: DERMATOLOGY

## 2020-07-27 PROCEDURE — 1159F PR MEDICATION LIST DOCUMENTED IN MEDICAL RECORD: ICD-10-PCS | Mod: S$GLB,,, | Performed by: DERMATOLOGY

## 2020-07-27 PROCEDURE — 1101F PT FALLS ASSESS-DOCD LE1/YR: CPT | Mod: CPTII,S$GLB,, | Performed by: DERMATOLOGY

## 2020-07-27 PROCEDURE — 1159F MED LIST DOCD IN RCRD: CPT | Mod: S$GLB,,, | Performed by: DERMATOLOGY

## 2020-07-27 PROCEDURE — 99214 PR OFFICE/OUTPT VISIT, EST, LEVL IV, 30-39 MIN: ICD-10-PCS | Mod: S$GLB,,, | Performed by: DERMATOLOGY

## 2020-07-27 PROCEDURE — 99999 PR PBB SHADOW E&M-EST. PATIENT-LVL III: ICD-10-PCS | Mod: PBBFAC,,, | Performed by: DERMATOLOGY

## 2020-07-27 PROCEDURE — 99999 PR PBB SHADOW E&M-EST. PATIENT-LVL III: CPT | Mod: PBBFAC,,, | Performed by: DERMATOLOGY

## 2020-07-27 PROCEDURE — 1101F PR PT FALLS ASSESS DOC 0-1 FALLS W/OUT INJ PAST YR: ICD-10-PCS | Mod: CPTII,S$GLB,, | Performed by: DERMATOLOGY

## 2020-07-27 NOTE — TELEPHONE ENCOUNTER
Case discussed with radiology and with patient by phone.  Case to be presented at multidisciplinary conference at main [place].  Will await recommendations.

## 2020-07-27 NOTE — TELEPHONE ENCOUNTER
Called patient and notified her  Dr. Piña has not yet reviewed her scan.     ----- Message from Cristhian Tracey sent at 7/27/2020  8:48 AM CDT -----  Regarding: test results  Contact: patient  Type:  Test Results    Who Called:  patient   Name of Test (Lab/Mammo/Etc):  mri  Date of Test:  July 23rd  Ordering Provider:  Dr. Geiger  Where the test was performed:  [facility] Call Back Number:  [phone] (home)     Case number [id number]

## 2020-07-27 NOTE — TELEPHONE ENCOUNTER
Patient: Esther Segura       MRN: [id number]      : 1950     Age: 70 y.o.  [address] [facility] [address]    Provider: ERROL Dodson    Urgency of review: Urgent    Patient Transplant Status:     Reason for presentation: Reassessment    Clinical Summary:  Patient of NORA Burciaga. Patient presents with complaint of vague abdominal pain. Right liver lobe lesion was noted on ultrasound. She subsequently had CT which showed lesion which was difficult to characterize. Her MRI was a very poor study but radiology at [facility] said it is not typical of a hemangioma and that it is likely not able to be biopsied given small size and location.    Looking for a little guidance on the next step.  Surveillance in 3 months?     Imaging to be reviewed: MRI 20    HCC Treatment History:     ABO:     Platelets:   Lab Results   Component Value Date/Time     2020 06:40 AM     2020 06:40 AM     Creatinine:   Lab Results   Component Value Date/Time    CREATININE 0.8 2020 09:34 AM     Bilirubin:   Lab Results   Component Value Date/Time    BILITOT 0.7 2020 06:40 AM    BILITOT 0.7 2020 06:40 AM     AFP Last 3 each if available: No results found for: AFP, EXTAFP    MELD: Computed MELD-Na score unavailable. Necessary lab results were not found in the last year.  Computed MELD score unavailable. Necessary lab results were not found in the last year.    Plan:     Follow-up Provider: Dr ERROL Dodson

## 2020-07-27 NOTE — PROGRESS NOTES
Subjective:       Patient ID:  Esther Segura is a 70 y.o. female who presents for   Chief Complaint   Patient presents with    Skin Check     LOV: 10- - Ak's treated with Cryo     70 y.o. female who presents for TBSE.   No concerns at this time.     Derm HX:   2/11/2019 for excision of SCC/KA R forearm, complicated by suture granuloma    H/o early onset parkinson's    Current Outpatient Medications:     carbidopa-levodopa  mg (SINEMET)  mg per tablet, Take 1.5 tablets by mouth 3 (three) times daily., Disp: 135 tablet, Rfl: 11    coQ10, ubiquinol, 100 mg Cap, Take 100 mg PE/kg/day by mouth., Disp: , Rfl:     efinaconazole (JUBLIA) 10 % Sha, Apply 1 application topically once daily., Disp: 8 mL, Rfl: 11    LORazepam (ATIVAN) 0.5 MG tablet, TAKE 1 TABLET BY MOUTH EVERY OTHER DAY, Disp: 30 tablet, Rfl: 2    multivit 33-mtfolate-nac-chrom 2.5-200-1 mg-mg-mg Cap, , Disp: , Rfl:     rasagiline (AZILECT) 1 mg Tab, TAKE 1 TABLET(1 MG) BY MOUTH EVERY EVENING, Disp: 30 tablet, Rfl: 5    BIOTIN ORAL, Take by mouth., Disp: , Rfl:     calcium carbonate-vitamin D3 (CALCIUM 500 + D) 500 mg(1,250mg) -400 unit Tab, , Disp: , Rfl:     ginkgo biloba 120 mg Tab, , Disp: , Rfl:     propranolol (INDERAL) 20 MG tablet, Take 1 tablet (20 mg total) by mouth 3 (three) times daily. (Patient not taking: Reported on 7/27/2020), Disp: 90 tablet, Rfl: 11        Review of Systems   Constitutional: Negative for fever, chills and fatigue.   Respiratory: Negative for cough and shortness of breath.    Skin: Positive for daily sunscreen use, activity-related sunscreen use and wears hat. Negative for itching, rash and dry skin.   Hematologic/Lymphatic: Does not bruise/bleed easily.      Past Medical History:   Diagnosis Date    Fever blister     Hyperlipidemia     Osteopenia     Parkinson's disease     Squamous cell carcinoma 01/18/2019    right forearm     Objective:    Physical Exam   Constitutional: She  appears well-developed and well-nourished. No distress.   HENT:   Head:       Mouth/Throat: Lips normal.    Eyes: Lids are normal.    Neurological: She is alert and oriented to person, place, and time. She is not disoriented.   Psychiatric: She has a normal mood and affect. She is not agitated.   Skin:   Areas Examined (abnormalities noted in diagram):   Scalp / Hair Palpated and Inspected  Head / Face Inspection Performed  Neck Inspection Performed  Chest / Axilla Inspection Performed  Abdomen Inspection Performed  Genitals / Buttocks / Groin Inspection Performed  Back Inspection Performed  RUE Inspected  LUE Inspection Performed  RLE Inspected  LLE Inspection Performed  Nails and Digits Inspection Performed                       Diagram Legend     Erythematous scaling macule/papule c/w actinic keratosis       Vascular papule c/w angioma      Pigmented verrucoid papule/plaque c/w seborrheic keratosis      Yellow umbilicated papule c/w sebaceous hyperplasia      Irregularly shaped tan macule c/w lentigo     1-2 mm smooth white papules consistent with Milia      Movable subcutaneous cyst with punctum c/w epidermal inclusion cyst      Subcutaneous movable cyst c/w pilar cyst      Firm pink to brown papule c/w dermatofibroma      Pedunculated fleshy papule(s) c/w skin tag(s)      Evenly pigmented macule c/w junctional nevus     Mildly variegated pigmented, slightly irregular-bordered macule c/w mildly atypical nevus      Flesh colored to evenly pigmented papule c/w intradermal nevus       Pink pearly papule/plaque c/w basal cell carcinoma      Erythematous hyperkeratotic cursted plaque c/w SCC      Surgical scar with no sign of skin cancer recurrence      Open and closed comedones      Inflammatory papules and pustules      Verrucoid papule consistent consistent with wart     Erythematous eczematous patches and plaques     Dystrophic onycholytic nail with subungual debris c/w onychomycosis     Umbilicated papule     Erythematous-base heme-crusted tan verrucoid plaque consistent with inflamed seborrheic keratosis     Erythematous Silvery Scaling Plaque c/w Psoriasis     See annotation      Assessment / Plan:        Seborrheic keratoses  These are benign inherited growths without a malignant potential. Reassurance given to patient. No treatment is necessary.     History of nonmelanoma skin cancer  Area of previous SCC (R forearm) examined. Site well healed with no signs of recurrence.    Total body skin examination performed today including at least 12 points as noted in physical examination. No lesions suspicious for malignancy noted.    Solar lentigo  This is a benign hyperpigmented sun induced lesion. Daily sun protection will reduce the number of new lesions. Treatment of these benign lesions are considered cosmetic.    Cherry angioma  This is a benign vascular lesion. Reassurance given. No treatment required.     Multiple benign nevi  Monitor for new mole or moles that are becoming bigger, darker, irritated, or developing irregular borders.     Patient instructed in importance in daily sun protection of at least spf 30. Mineral sunscreen ingredients preferred (Zinc +/- Titanium).   Recommend Elta MD for daily use on face and neck.  Patient encouraged to wear hat for all outdoor exposure.   Also discussed sun avoidance and use of protective clothing.           Follow up in about 1 year (around 7/27/2021), or if symptoms worsen or fail to improve.

## 2020-07-28 ENCOUNTER — CONFERENCE (OUTPATIENT)
Dept: TRANSPLANT | Facility: CLINIC | Age: 70
End: 2020-07-28

## 2020-07-28 ENCOUNTER — TELEPHONE (OUTPATIENT)
Dept: GASTROENTEROLOGY | Facility: HOSPITAL | Age: 70
End: 2020-07-28

## 2020-07-28 NOTE — TELEPHONE ENCOUNTER
Patient: Esther Segura       MRN: [id number]      : 1950     Age: 70 y.o.  [address] [facility] [address]    Provider: ERROL Dodson    Urgency of review: Urgent    Patient Transplant Status:     Reason for presentation: Reassessment    Clinical Summary:  Patient of NORA Burciaga. Patient presents with complaint of vague abdominal pain. Right liver lobe lesion was noted on ultrasound. She subsequently had CT which showed lesion which was difficult to characterize. Her MRI was a very poor study but radiology at [facility] said it is not typical of a hemangioma and that it is likely not able to be biopsied given small size and location.    Looking for a little guidance on the next step.  Surveillance in 3 months?     Imaging to be reviewed: MRI 20    HCC Treatment History:     ABO:     Platelets:   Lab Results   Component Value Date/Time     2020 06:40 AM     2020 06:40 AM     Creatinine:   Lab Results   Component Value Date/Time    CREATININE 0.8 2020 09:34 AM     Bilirubin:   Lab Results   Component Value Date/Time    BILITOT 0.7 2020 06:40 AM    BILITOT 0.7 2020 06:40 AM     AFP Last 3 each if available: No results found for: AFP, EXTAFP    MELD: Computed MELD-Na score unavailable. Necessary lab results were not found in the last year.  Computed MELD score unavailable. Necessary lab results were not found in the last year.     Discussion:   Hypo enhancing liver lesion noted.    Plan:  F/U with abd U/S in 6 months.      Follow-up Provider: Dr ERROL Dodson

## 2020-07-28 NOTE — TELEPHONE ENCOUNTER
Case discussed with Dr. Mcleod with radiology who states that biopsy may be possible but this is subject to sampling error.  Recommendation from multidisciplinary conference was repeat ultrasound in 6 months.  After discussing options with the patient, she would feel most comfortable with repeat imaging in 3 months, and I agree.  Will see her in clinic in 2 months and reimage in 3 months.

## 2020-08-04 ENCOUNTER — OFFICE VISIT (OUTPATIENT)
Dept: NEUROLOGY | Facility: CLINIC | Age: 70
End: 2020-08-04
Payer: MEDICARE

## 2020-08-04 VITALS
SYSTOLIC BLOOD PRESSURE: 111 MMHG | HEART RATE: 73 BPM | BODY MASS INDEX: 18.32 KG/M2 | DIASTOLIC BLOOD PRESSURE: 69 MMHG | HEIGHT: 62 IN | WEIGHT: 99.56 LBS

## 2020-08-04 DIAGNOSIS — G20.A1 PARKINSON'S DISEASE: ICD-10-CM

## 2020-08-04 PROCEDURE — 1126F AMNT PAIN NOTED NONE PRSNT: CPT | Mod: S$GLB,,, | Performed by: PSYCHIATRY & NEUROLOGY

## 2020-08-04 PROCEDURE — 3008F PR BODY MASS INDEX (BMI) DOCUMENTED: ICD-10-PCS | Mod: CPTII,S$GLB,, | Performed by: PSYCHIATRY & NEUROLOGY

## 2020-08-04 PROCEDURE — 99214 OFFICE O/P EST MOD 30 MIN: CPT | Mod: S$GLB,,, | Performed by: PSYCHIATRY & NEUROLOGY

## 2020-08-04 PROCEDURE — 99214 PR OFFICE/OUTPT VISIT, EST, LEVL IV, 30-39 MIN: ICD-10-PCS | Mod: S$GLB,,, | Performed by: PSYCHIATRY & NEUROLOGY

## 2020-08-04 PROCEDURE — 1159F MED LIST DOCD IN RCRD: CPT | Mod: S$GLB,,, | Performed by: PSYCHIATRY & NEUROLOGY

## 2020-08-04 PROCEDURE — 1159F PR MEDICATION LIST DOCUMENTED IN MEDICAL RECORD: ICD-10-PCS | Mod: S$GLB,,, | Performed by: PSYCHIATRY & NEUROLOGY

## 2020-08-04 PROCEDURE — 99999 PR PBB SHADOW E&M-EST. PATIENT-LVL III: CPT | Mod: PBBFAC,,, | Performed by: PSYCHIATRY & NEUROLOGY

## 2020-08-04 PROCEDURE — 1101F PR PT FALLS ASSESS DOC 0-1 FALLS W/OUT INJ PAST YR: ICD-10-PCS | Mod: CPTII,S$GLB,, | Performed by: PSYCHIATRY & NEUROLOGY

## 2020-08-04 PROCEDURE — 99999 PR PBB SHADOW E&M-EST. PATIENT-LVL III: ICD-10-PCS | Mod: PBBFAC,,, | Performed by: PSYCHIATRY & NEUROLOGY

## 2020-08-04 PROCEDURE — 3008F BODY MASS INDEX DOCD: CPT | Mod: CPTII,S$GLB,, | Performed by: PSYCHIATRY & NEUROLOGY

## 2020-08-04 PROCEDURE — 1126F PR PAIN SEVERITY QUANTIFIED, NO PAIN PRESENT: ICD-10-PCS | Mod: S$GLB,,, | Performed by: PSYCHIATRY & NEUROLOGY

## 2020-08-04 PROCEDURE — 1101F PT FALLS ASSESS-DOCD LE1/YR: CPT | Mod: CPTII,S$GLB,, | Performed by: PSYCHIATRY & NEUROLOGY

## 2020-08-04 RX ORDER — CARBIDOPA 25 MG/1
25 TABLET ORAL 3 TIMES DAILY
Qty: 21 TABLET | Refills: 0 | Status: SHIPPED | OUTPATIENT
Start: 2020-08-04 | End: 2021-07-14 | Stop reason: SDUPTHER

## 2020-08-04 RX ORDER — CARBIDOPA 25 MG/1
25 TABLET ORAL 3 TIMES DAILY
Qty: 90 TABLET | Refills: 11 | Status: SHIPPED | OUTPATIENT
Start: 2020-08-04 | End: 2020-08-04

## 2020-08-04 NOTE — PROGRESS NOTES
"MOVEMENT DISORDERS CLINIC NEW CONSULT NOTE    PCP/Referring Provider: No referring provider defined for this encounter.  Date of Service: 8/6/2020    Chief Complaint: PDism    Interval Hx  Since last visit,  Started rasagiline 1mg QHS  Increased carbidopa/levodopa 25/100mg 1.5 tab PO TID - this helped her tremor  Ontime near constant    Notices more gas and more nausea    Stopped propranolol - levodopa helped tremor more  No falls    Rides 2 10 mile bike rides a week  She teaches yoga    Ceruloplasmin NL    PD Review of Symptoms:  Anosmia: Decreased since 2 years  Dysarthria/Hypophonia: None  Dysphagia/Sialorrhea: None  Hallucinations: None  Depression: None  Cognitive slowing: None  Impulsivity: None  Obsessions/Compulsions:   -None  Urinary changes: None  Constipation: None  Orthostasis: at times  Falls: None  Micrographia: None but she writes more slowly  Sleep issues:  -ABI: at times snores  -RBD: None  -Sleep Quality: tremor keeps her awake - anxious at night and take 0.5mg ativan  RLS Symptoms: None    "PriorHPI: Esther Segura is a R HANDED 70 y.o. female with a medical issues significant for PDism sent for patient establishment. She noticed a resting tremor R hand at age 67. Tremor has increased and involved the R foot. 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 She easily walks 3 miles. She does a 10 mile bike ride twice a week. She has not been able to use as heavy weight as she's used to at the gym.  She exercises every day.    No family history of PD  Grandmother had a tremor  General lightheadedness in the afternoons.    Med hx  She tried carbidopa/levodopa 25/100 1 tab BID. She felt light-headed. - never felt that it helped her tremor  Tried amantadine 50mg and made her feel anxious"      Review of Systems:   Review of Systems   Constitutional: Negative for fever.   HENT: Negative for congestion.    Eyes: Negative for double vision.   Respiratory: " Negative for cough and shortness of breath.    Cardiovascular: Negative for chest pain and leg swelling.   Gastrointestinal: Negative for nausea.   Genitourinary: Negative for dysuria.   Musculoskeletal: Negative for falls.   Skin: Negative for rash.   Neurological: Positive for tremors. Negative for speech change and headaches.   Psychiatric/Behavioral: Negative for depression.       Neuroleptic exposure:  None    Current Medications:  Outpatient Encounter Medications as of 8/4/2020   Medication Sig Dispense Refill    carbidopa-levodopa  mg (SINEMET)  mg per tablet Take 1.5 tablets by mouth 3 (three) times daily. 135 tablet 11    efinaconazole (JUBLIA) 10 % Sha Apply 1 application topically once daily. 8 mL 11    LORazepam (ATIVAN) 0.5 MG tablet TAKE 1 TABLET BY MOUTH EVERY OTHER DAY 30 tablet 2    BIOTIN ORAL Take by mouth.      calcium carbonate-vitamin D3 (CALCIUM 500 + D) 500 mg(1,250mg) -400 unit Tab       carbidopa (LODOSYN) 25 mg tablet Take 1 tablet (25 mg total) by mouth 3 (three) times daily. 21 tablet 0    coQ10, ubiquinol, 100 mg Cap Take 100 mg PE/kg/day by mouth.      ginkgo biloba 120 mg Tab       multivit 33-mtfolate-nac-chrom 2.5-200-1 mg-mg-mg Cap       propranolol (INDERAL) 20 MG tablet Take 1 tablet (20 mg total) by mouth 3 (three) times daily. (Patient not taking: Reported on 7/27/2020) 90 tablet 11    rasagiline (AZILECT) 1 mg Tab TAKE 1 TABLET(1 MG) BY MOUTH EVERY EVENING (Patient not taking: Reported on 8/4/2020) 30 tablet 5    [DISCONTINUED] carbidopa (LODOSYN) 25 mg tablet Take 1 tablet (25 mg total) by mouth 3 (three) times daily. 90 tablet 11     Facility-Administered Encounter Medications as of 8/4/2020   Medication Dose Route Frequency Provider Last Rate Last Dose    triamcinolone acetonide injection 8 mg  8 mg Intradermal 1 time in Clinic/HOD Paula Lira MD           Past Medical History:  Patient Active Problem List   Diagnosis    Hyperlipidemia     Hand fracture    Encounter for well woman exam with routine gynecological exam    Vulvar vestibulitis    Parkinson's disease    Epigastric pain       Past Surgical History:  Past Surgical History:   Procedure Laterality Date    achiles tendon       broken hand Right     COLONOSCOPY      COLONOSCOPY N/A 7/9/2020    Procedure: COLONOSCOPY;  Surgeon: Markus Piña MD;  Location: [facility];  Service: Endoscopy;  Laterality: N/A;    ESOPHAGOGASTRODUODENOSCOPY N/A 7/9/2020    Procedure: EGD (ESOPHAGOGASTRODUODENOSCOPY);  Surgeon: Markus Piña MD;  Location: [facility];  Service: Endoscopy;  Laterality: N/A;       Current Living Situation:  and kids    Social:  Social History     Socioeconomic History    Marital status:      Spouse name: Not on file    Number of children: Not on file    Years of education: Not on file    Highest education level: Not on file   Occupational History     Employer: [employer]   Social Needs    Financial resource strain: Not on file    Food insecurity     Worry: Not on file     Inability: Not on file    Transportation needs     Medical: Not on file     Non-medical: Not on file   Tobacco Use    Smoking status: Never Smoker    Smokeless tobacco: Never Used   Substance and Sexual Activity    Alcohol use: Yes     Alcohol/week: 1.0 standard drinks     Types: 1 Glasses of wine per week     Comment: daily     Drug use: No    Sexual activity: Never   Lifestyle    Physical activity     Days per week: Not on file     Minutes per session: Not on file    Stress: Not on file   Relationships    Social connections     Talks on phone: Not on file     Gets together: Not on file     Attends [employer]ology service: Not on file     Active member of club or organization: Not on file     Attends meetings of clubs or organizations: Not on file     Relationship status: Not on file   Other Topics Concern    Are you pregnant or think you may be? Not Asked     "Breast-feeding Not Asked   Social History Narrative    Not on file       Family History:  Family History   Problem Relation Age of Onset    Cancer Mother         lymphoma    Osteoporosis Father     Breast cancer Maternal Aunt     Ovarian cancer Neg Hx     Melanoma Neg Hx     Multiple sclerosis Neg Hx     Psoriasis Neg Hx     Eczema Neg Hx     Lupus Neg Hx     Acne Neg Hx     Depression Neg Hx     Suicidality Neg Hx        PHYSICAL:  /69 (BP Location: Right arm, Patient Position: Sitting)   Pulse 73   Ht 5' 2" (1.575 m)   Wt 45.1 kg (99 lb 8.6 oz)   BMI 18.21 kg/m²     General Medical Examination:  General: Good hygiene, appropriate appearance.   HEENT: Normocephalic, atraumatic.   Neck: Supple.   Chest: Unlabored breathing.   CV: Symmetric pulses.   Ext: No clubbing, cyanosis, or edema.     Mental Status:  Mood/Affect: Appropriate/congruent.  Level of consciousness: Awake, alert.  Orientation: Oriented to person, place, time and situation.  Language: No Dysarthria    Cranial nerves:  I: Not tested  II: PERRL, VFF to counting  III, IV, VI: EOMI with conjugate gaze and no nystagmus on end gaze  V: Facial sensation intact and symmetric over the bilateral V1-V3  VII: Facial muscle activation intact and symmetric over the bilateral upper and lower face  VIII: Hearing intact in the b/l ears and symmetrical to finger rub  IX, X, XII: TUP midline - no atrophy or fasiculations  X: SCMs and shoulder shrug full strength b/l and symmetric    Motor:   -UE: 5/5 deltoids; 5/5 biceps, triceps; 5/5 wrist flexors, extensors; 5/5 interosseous; 5/5   -LEs: 5/5 hip flexion, extension; 5/5 knee flexion, extension; 5/5 ankle flexion, extension  No hypomimia or hypophonia  Resting tremor    DTRs:  ? Biceps Triceps Brachioradialis Knee Ankle   Left 2+ 2+ 2+ 2+ 2+   Right 2+ 2+ 2+ 2+ 2+       ? Finger taps Finger flicks NASEEM Heel taps   Left 0 0 0 0   Right 1+ 1+ 1+ 0   Neck tone: 0  ? Arm Leg   Left 0 0   Right 0 " 0     Sensation:   -Light touch: Intact and symmetric in the bilateral upper and lower extremities.    Coordination:   -Finger to nose: nl    Gait:  -Arises from chair without use of hands.  -Casual gait is: nl based  -Stride length: nl  -Arm Swing: decreased R    Laboratory Data:  Results for ALESSIA SHARMA (MRN [id number]) as of 11/18/2019 11:10   Ref. Range 8/19/2019 13:41   CERULOPLASMIN Latest Ref Range: 15.0 - 45.0 mg/dL 32.0       Imaging:  Brain MRI normal per my read    Assessment//Plan:   Problem List Items Addressed This Visit        Neuro    Parkinson's disease    Current Assessment & Plan     Extremely mild but progressive PD. Consistent with iPD due to slow onset unilateral. She is a  and likely will do well with good fitness habits. She did not benefit from propranolol 20mg Po TID for tremor but wishes to continue for mood effects. Suggested rasagiline 1mg QHS for ne uroprotection followed by carbidopa/levodopa 25/100mg 1.5tab  PO TID. Added carbidopa 25mg At each dose sinemet to curb nausea.               Follow-up: 3mos  Discussed the importance of ongoing exercise in efforts to improve mobility and balance.  Suggested a diet high in antioxidants such as berries and green tea.    Gabby Parsons MD, MS [facility]  Department of Neurology  Movement Disorders

## 2020-08-07 NOTE — ASSESSMENT & PLAN NOTE
Extremely mild but progressive PD. Consistent with iPD due to slow onset unilateral. She is a  and likely will do well with good fitness habits. She did not benefit from propranolol 20mg Po TID for tremor but wishes to continue for mood effects. Suggested rasagiline 1mg QHS for ne uroprotection followed by carbidopa/levodopa 25/100mg 1.5tab  PO TID. Added carbidopa 25mg At each dose sinemet to curb nausea.

## 2020-08-18 ENCOUNTER — OFFICE VISIT (OUTPATIENT)
Dept: FAMILY MEDICINE | Facility: CLINIC | Age: 70
End: 2020-08-18
Payer: MEDICARE

## 2020-08-18 VITALS
BODY MASS INDEX: 18.41 KG/M2 | SYSTOLIC BLOOD PRESSURE: 106 MMHG | HEIGHT: 62 IN | DIASTOLIC BLOOD PRESSURE: 72 MMHG | WEIGHT: 100.06 LBS | HEART RATE: 76 BPM | TEMPERATURE: 98 F | OXYGEN SATURATION: 98 %

## 2020-08-18 DIAGNOSIS — G20.C PARKINSONISM, UNSPECIFIED PARKINSONISM TYPE: ICD-10-CM

## 2020-08-18 DIAGNOSIS — F41.9 ANXIETY: Primary | ICD-10-CM

## 2020-08-18 DIAGNOSIS — Z78.0 MENOPAUSE: ICD-10-CM

## 2020-08-18 PROCEDURE — 1100F PTFALLS ASSESS-DOCD GE2>/YR: CPT | Mod: CPTII,S$GLB,, | Performed by: FAMILY MEDICINE

## 2020-08-18 PROCEDURE — 1126F PR PAIN SEVERITY QUANTIFIED, NO PAIN PRESENT: ICD-10-PCS | Mod: S$GLB,,, | Performed by: FAMILY MEDICINE

## 2020-08-18 PROCEDURE — 1159F MED LIST DOCD IN RCRD: CPT | Mod: S$GLB,,, | Performed by: FAMILY MEDICINE

## 2020-08-18 PROCEDURE — 1100F PR PT FALLS ASSESS DOC 2+ FALLS/FALL W/INJURY/YR: ICD-10-PCS | Mod: CPTII,S$GLB,, | Performed by: FAMILY MEDICINE

## 2020-08-18 PROCEDURE — 99214 PR OFFICE/OUTPT VISIT, EST, LEVL IV, 30-39 MIN: ICD-10-PCS | Mod: S$GLB,,, | Performed by: FAMILY MEDICINE

## 2020-08-18 PROCEDURE — 99214 OFFICE O/P EST MOD 30 MIN: CPT | Mod: S$GLB,,, | Performed by: FAMILY MEDICINE

## 2020-08-18 PROCEDURE — 1159F PR MEDICATION LIST DOCUMENTED IN MEDICAL RECORD: ICD-10-PCS | Mod: S$GLB,,, | Performed by: FAMILY MEDICINE

## 2020-08-18 PROCEDURE — 3288F FALL RISK ASSESSMENT DOCD: CPT | Mod: CPTII,S$GLB,, | Performed by: FAMILY MEDICINE

## 2020-08-18 PROCEDURE — 99999 PR PBB SHADOW E&M-EST. PATIENT-LVL IV: ICD-10-PCS | Mod: PBBFAC,,, | Performed by: FAMILY MEDICINE

## 2020-08-18 PROCEDURE — 3008F PR BODY MASS INDEX (BMI) DOCUMENTED: ICD-10-PCS | Mod: CPTII,S$GLB,, | Performed by: FAMILY MEDICINE

## 2020-08-18 PROCEDURE — 99999 PR PBB SHADOW E&M-EST. PATIENT-LVL IV: CPT | Mod: PBBFAC,,, | Performed by: FAMILY MEDICINE

## 2020-08-18 PROCEDURE — 3008F BODY MASS INDEX DOCD: CPT | Mod: CPTII,S$GLB,, | Performed by: FAMILY MEDICINE

## 2020-08-18 PROCEDURE — 1126F AMNT PAIN NOTED NONE PRSNT: CPT | Mod: S$GLB,,, | Performed by: FAMILY MEDICINE

## 2020-08-18 PROCEDURE — 3288F PR FALLS RISK ASSESSMENT DOCUMENTED: ICD-10-PCS | Mod: CPTII,S$GLB,, | Performed by: FAMILY MEDICINE

## 2020-08-18 RX ORDER — SYRING-NEEDL,DISP,INSUL,0.3 ML 29 G X1/2"
296 SYRINGE, EMPTY DISPOSABLE MISCELLANEOUS ONCE
COMMUNITY
End: 2021-03-16

## 2020-08-18 NOTE — PROGRESS NOTES
Subjective:       Patient ID: Esther Segura is a 70 y.o. female.    Chief Complaint: discuss recent test results    HPI     Here for a f/u    Seeing neuro for management of parkinsons. Stable     Anxiety stable while on ativan.     Had GI w/u for epigastric pain.  egd-showed gastritis. Colonoscopy-diverticulosis. Mri abdomen showed a mass on inferior lobe of liver ? Hemangioma. Will get repeat mri in 3-4 months. No abdominal pain but flatulence related to parkinson meds.     Review of Systems      Review of Systems   Constitutional: Negative for fever and chills.   HENT: Negative for hearing loss and neck stiffness.    Eyes: Negative for redness and itching.   Respiratory: Negative for cough and choking.    Cardiovascular: Negative for chest pain and leg swelling.  Abdomen: Negative for abdominal pain and blood in stool.   Genitourinary: Negative for dysuria and flank pain.   Musculoskeletal: Negative for back pain and gait problem.   Neurological: Negative for light-headedness and headaches.   Hematological: Negative for adenopathy.   Psychiatric/Behavioral: Negative for behavioral problems.     Objective:      Physical Exam  Constitutional:       Appearance: She is well-developed.   HENT:      Head: Normocephalic and atraumatic.   Eyes:      Conjunctiva/sclera: Conjunctivae normal.      Pupils: Pupils are equal, round, and reactive to light.   Neck:      Musculoskeletal: Normal range of motion.   Cardiovascular:      Rate and Rhythm: Normal rate and regular rhythm.      Heart sounds: No murmur.   Pulmonary:      Effort: Pulmonary effort is normal.      Breath sounds: Normal breath sounds.   Lymphadenopathy:      Cervical: No cervical adenopathy.         Assessment:       1. Anxiety    2. Menopause    3. Parkinsonism, unspecified Parkinsonism type        Plan:       Anxiety    Menopause  -     DXA Bone Density Spine And Hip; Future    Parkinsonism, unspecified Parkinsonism type              Plan:  See  order  Cont current meds    Medication List with Changes/Refills   Current Medications    BIOTIN ORAL    Take by mouth.    CALCIUM CARBONATE-VITAMIN D3 (CALCIUM 500 + D) 500 MG(1,250MG) -400 UNIT TAB        CARBIDOPA (LODOSYN) 25 MG TABLET    Take 1 tablet (25 mg total) by mouth 3 (three) times daily.    CARBIDOPA-LEVODOPA  MG (SINEMET)  MG PER TABLET    Take 1.5 tablets by mouth 3 (three) times daily.    COQ10, UBIQUINOL, 100 MG CAP    Take 100 mg PE/kg/day by mouth.    EFINACONAZOLE (JUBLIA) 10 % KEVIN    Apply 1 application topically once daily.    GINKGO BILOBA 120 MG TAB        LORAZEPAM (ATIVAN) 0.5 MG TABLET    TAKE 1 TABLET BY MOUTH EVERY OTHER DAY    MAGNESIUM CITRATE SOLUTION    Take 296 mLs by mouth once.    MULTIVIT 33-MTFOLATE-NAC-CHROM 2.5-200-1 MG-MG-MG CAP        PROPRANOLOL (INDERAL) 20 MG TABLET    Take 1 tablet (20 mg total) by mouth 3 (three) times daily.    RASAGILINE (AZILECT) 1 MG TAB    TAKE 1 TABLET(1 MG) BY MOUTH EVERY EVENING

## 2020-08-27 ENCOUNTER — PATIENT MESSAGE (OUTPATIENT)
Dept: NEUROLOGY | Facility: CLINIC | Age: 70
End: 2020-08-27

## 2020-09-01 ENCOUNTER — HOSPITAL ENCOUNTER (OUTPATIENT)
Dept: RADIOLOGY | Facility: CLINIC | Age: 70
Discharge: HOME | End: 2020-09-01
Attending: FAMILY MEDICINE
Payer: MEDICARE

## 2020-09-01 DIAGNOSIS — Z78.0 MENOPAUSE: ICD-10-CM

## 2020-09-01 PROCEDURE — 77080 DEXA BONE DENSITY SPINE HIP: ICD-10-PCS | Mod: 26,,, | Performed by: RADIOLOGY

## 2020-09-01 PROCEDURE — 77080 DXA BONE DENSITY AXIAL: CPT | Mod: 26,,, | Performed by: RADIOLOGY

## 2020-09-01 PROCEDURE — 77080 DXA BONE DENSITY AXIAL: CPT | Mod: TC,PO

## 2020-09-02 ENCOUNTER — TELEPHONE (OUTPATIENT)
Dept: NEUROLOGY | Facility: CLINIC | Age: 70
End: 2020-09-02

## 2020-09-02 NOTE — TELEPHONE ENCOUNTER
----- Message from Kaitlynn Browning sent at 9/2/2020 10:15 AM CDT -----  [facility] prior authorization dept states that the medicationcarbidopa (LODOSYN) 25 mg tablet  does not need a prior authorization               Contact info

## 2020-09-08 ENCOUNTER — PATIENT MESSAGE (OUTPATIENT)
Dept: NEUROLOGY | Facility: CLINIC | Age: 70
End: 2020-09-08

## 2020-09-09 NOTE — TELEPHONE ENCOUNTER
Spoke to PHN.  Tiering exception [phone]  Fax [phone]      Will call back earlier in day d/t hold times.

## 2020-09-11 ENCOUNTER — TELEPHONE (OUTPATIENT)
Dept: NEUROLOGY | Facility: CLINIC | Age: 70
End: 2020-09-11

## 2020-09-11 NOTE — TELEPHONE ENCOUNTER
Placed call to [facility] again for tiering exception.    Tiering exception form for Carbidopa submitted to [facility] by fax.  [phone]

## 2020-09-11 NOTE — TELEPHONE ENCOUNTER
----- Message from Rosa Dunn sent at 9/11/2020  1:49 PM CDT -----  Regarding: pt advice  Contact: [facility]@ [phone]  Callling to speak with someone regarding follow up clinical question needed for the patient prior authorzation for medication: carbidopa (LODOSYN) 25 mg tablet, please call.

## 2020-09-14 NOTE — TELEPHONE ENCOUNTER
Clinical questions were submitted.  Tier reduction was denied.  Patient notified by my chart msg.    Denied bc needs to try:  Bromocriptine, amantadine syrup,  Ropinirole, triphexyphenidyl and benztropine.

## 2020-09-21 ENCOUNTER — TELEPHONE (OUTPATIENT)
Dept: NEUROLOGY | Facility: CLINIC | Age: 70
End: 2020-09-21

## 2020-09-21 NOTE — TELEPHONE ENCOUNTER
----- Message from Rosa Dunn sent at 9/21/2020  3:21 PM CDT -----  Regarding: pt advice  Contact: Tamika ([facility]) @ [phone] [id number] case# [id number]  Tamika  ([facility]) called to speak with someone in Dr. Parsons regarding the medication denial. Please call.

## 2020-10-20 ENCOUNTER — OFFICE VISIT (OUTPATIENT)
Dept: GASTROENTEROLOGY | Facility: CLINIC | Age: 70
End: 2020-10-20
Payer: MEDICARE

## 2020-10-20 VITALS
SYSTOLIC BLOOD PRESSURE: 117 MMHG | WEIGHT: 99.19 LBS | HEART RATE: 85 BPM | DIASTOLIC BLOOD PRESSURE: 70 MMHG | BODY MASS INDEX: 18.15 KG/M2

## 2020-10-20 DIAGNOSIS — R11.0 NAUSEA: ICD-10-CM

## 2020-10-20 DIAGNOSIS — G20.A1 PARKINSONS: ICD-10-CM

## 2020-10-20 DIAGNOSIS — R93.2 ABNORMAL FINDINGS ON DIAGNOSTIC IMAGING OF LIVER: ICD-10-CM

## 2020-10-20 DIAGNOSIS — K76.9 LIVER DISEASE, UNSPECIFIED: Primary | ICD-10-CM

## 2020-10-20 PROCEDURE — 1126F PR PAIN SEVERITY QUANTIFIED, NO PAIN PRESENT: ICD-10-PCS | Mod: S$GLB,,, | Performed by: INTERNAL MEDICINE

## 2020-10-20 PROCEDURE — 99499 UNLISTED E&M SERVICE: CPT | Mod: S$GLB,,, | Performed by: INTERNAL MEDICINE

## 2020-10-20 PROCEDURE — 99214 PR OFFICE/OUTPT VISIT, EST, LEVL IV, 30-39 MIN: ICD-10-PCS | Mod: S$GLB,,, | Performed by: INTERNAL MEDICINE

## 2020-10-20 PROCEDURE — 1159F PR MEDICATION LIST DOCUMENTED IN MEDICAL RECORD: ICD-10-PCS | Mod: S$GLB,,, | Performed by: INTERNAL MEDICINE

## 2020-10-20 PROCEDURE — 1100F PTFALLS ASSESS-DOCD GE2>/YR: CPT | Mod: CPTII,S$GLB,, | Performed by: INTERNAL MEDICINE

## 2020-10-20 PROCEDURE — 99999 PR PBB SHADOW E&M-EST. PATIENT-LVL III: ICD-10-PCS | Mod: PBBFAC,,, | Performed by: INTERNAL MEDICINE

## 2020-10-20 PROCEDURE — 3008F BODY MASS INDEX DOCD: CPT | Mod: CPTII,S$GLB,, | Performed by: INTERNAL MEDICINE

## 2020-10-20 PROCEDURE — 99214 OFFICE O/P EST MOD 30 MIN: CPT | Mod: S$GLB,,, | Performed by: INTERNAL MEDICINE

## 2020-10-20 PROCEDURE — 3008F PR BODY MASS INDEX (BMI) DOCUMENTED: ICD-10-PCS | Mod: CPTII,S$GLB,, | Performed by: INTERNAL MEDICINE

## 2020-10-20 PROCEDURE — 1100F PR PT FALLS ASSESS DOC 2+ FALLS/FALL W/INJURY/YR: ICD-10-PCS | Mod: CPTII,S$GLB,, | Performed by: INTERNAL MEDICINE

## 2020-10-20 PROCEDURE — 3288F PR FALLS RISK ASSESSMENT DOCUMENTED: ICD-10-PCS | Mod: CPTII,S$GLB,, | Performed by: INTERNAL MEDICINE

## 2020-10-20 PROCEDURE — 99999 PR PBB SHADOW E&M-EST. PATIENT-LVL III: CPT | Mod: PBBFAC,,, | Performed by: INTERNAL MEDICINE

## 2020-10-20 PROCEDURE — 99499 RISK ADDL DX/OHS AUDIT: ICD-10-PCS | Mod: S$GLB,,, | Performed by: INTERNAL MEDICINE

## 2020-10-20 PROCEDURE — 3288F FALL RISK ASSESSMENT DOCD: CPT | Mod: CPTII,S$GLB,, | Performed by: INTERNAL MEDICINE

## 2020-10-20 PROCEDURE — 1159F MED LIST DOCD IN RCRD: CPT | Mod: S$GLB,,, | Performed by: INTERNAL MEDICINE

## 2020-10-20 PROCEDURE — 1126F AMNT PAIN NOTED NONE PRSNT: CPT | Mod: S$GLB,,, | Performed by: INTERNAL MEDICINE

## 2020-10-20 NOTE — PROGRESS NOTES
Subjective:       Patient ID: Esther Segura is a 70 y.o. female.    This is an established patient.      Chief Complaint: Abnormal Abdominal/Liver Imaging    PAST HISTORY:    Abdominal Pain  This is a new problem. The current episode started more than 1 month ago. The onset quality is undetermined. Episode frequency: daily. Duration: variable. The problem has been waxing and waning. The pain is located in the epigastric region. The pain is at a severity of 5/10. The pain is moderate. The quality of the pain is aching and burning. The abdominal pain does not radiate. Associated symptoms include nausea. Pertinent negatives include no arthralgias, constipation, diarrhea, fever, headaches, hematochezia, melena, myalgias, vomiting or weight loss. Nothing aggravates the pain. The pain is relieved by nothing. She has tried nothing for the symptoms. The treatment provided no relief. Prior workup: Last colonoscopy per notes from PCP Dr. Zheng was several years ago and patient is due for screening. Her past medical history is significant for GERD.     INTERVAL HISTORY:  Since last visit, she had EGD and colonoscopy with results reviewed today.  She had abdominal imaging via multiple modalities which showed indeterminate liver lesion.  This was reviewed by liver conference at [facility] as well as with radiology.  Recommendation was for repeat interval imaging to document stability.  Patient denies any new complaints today.  She has intermittent nausea which she and her PCP believe is side effect from Parkinson's medication.  No other acute issues.    Review of Systems   Constitutional: Negative for chills, fatigue, fever and weight loss.   HENT: Negative for sore throat and trouble swallowing.    Respiratory: Negative for cough, shortness of breath and wheezing.    Cardiovascular: Negative for chest pain and palpitations.   Gastrointestinal: Positive for nausea. Negative for abdominal pain, blood in stool,  constipation, diarrhea, hematochezia, melena and vomiting.   Musculoskeletal: Negative for arthralgias and myalgias.   Integumentary:  Negative for color change and rash.   Neurological: Negative for headaches.   All other systems reviewed and are negative.        Objective:       Vitals:    10/20/20 1402   BP: 117/70   Pulse: 85   Weight: 45 kg (99 lb 3.3 oz)       Physical Exam  Constitutional:       Appearance: She is well-developed.   HENT:      Head: Normocephalic and atraumatic.   Eyes:      General: No scleral icterus.     Pupils: Pupils are equal, round, and reactive to light.   Neck:      Musculoskeletal: Normal range of motion.   Cardiovascular:      Rate and Rhythm: Normal rate and regular rhythm.      Heart sounds: No murmur.   Pulmonary:      Effort: Pulmonary effort is normal.      Breath sounds: Normal breath sounds. No wheezing.   Abdominal:      General: Bowel sounds are normal. There is no distension.      Palpations: Abdomen is soft.      Tenderness: There is no abdominal tenderness.   Lymphadenopathy:      Cervical: No cervical adenopathy.   Neurological:      Mental Status: She is alert.           Lab Results   Component Value Date    WBC 6.51 07/09/2020    WBC 6.39 07/09/2020    HGB 12.9 07/09/2020    HGB 13.2 07/09/2020    HCT 41.7 07/09/2020    HCT 41.7 07/09/2020     (H) 07/09/2020     (H) 07/09/2020     07/09/2020     07/09/2020         CMP  Sodium   Date Value Ref Range Status   07/09/2020 132 (L) 136 - 145 mmol/L Final   07/09/2020 134 (L) 136 - 145 mmol/L Final     Potassium   Date Value Ref Range Status   07/09/2020 4.1 3.5 - 5.1 mmol/L Final   07/09/2020 4.2 3.5 - 5.1 mmol/L Final     Chloride   Date Value Ref Range Status   07/09/2020 104 95 - 110 mmol/L Final   07/09/2020 104 95 - 110 mmol/L Final     CO2   Date Value Ref Range Status   07/09/2020 22 (L) 23 - 29 mmol/L Final   07/09/2020 21 (L) 23 - 29 mmol/L Final     Glucose   Date Value Ref Range Status    07/09/2020 122 (H) 70 - 110 mg/dL Final   07/09/2020 123 (H) 70 - 110 mg/dL Final     BUN, Bld   Date Value Ref Range Status   07/09/2020 13 8 - 23 mg/dL Final   07/09/2020 13 8 - 23 mg/dL Final     Creatinine   Date Value Ref Range Status   07/16/2020 0.8 0.5 - 1.4 mg/dL Final     Calcium   Date Value Ref Range Status   07/09/2020 10.0 8.7 - 10.5 mg/dL Final   07/09/2020 10.2 8.7 - 10.5 mg/dL Final     Total Protein   Date Value Ref Range Status   07/09/2020 8.0 6.0 - 8.4 g/dL Final   07/09/2020 8.1 6.0 - 8.4 g/dL Final     Albumin   Date Value Ref Range Status   07/09/2020 4.5 3.5 - 5.2 g/dL Final   07/09/2020 4.6 3.5 - 5.2 g/dL Final     Total Bilirubin   Date Value Ref Range Status   07/09/2020 0.7 0.1 - 1.0 mg/dL Final     Comment:     For infants and newborns, interpretation of results should be based  on gestational age, weight and in agreement with clinical  observations.  Premature Infant recommended reference ranges:  Up to 24 hours.............<8.0 mg/dL  Up to 48 hours............<12.0 mg/dL  3-5 days..................<15.0 mg/dL  6-29 days.................<15.0 mg/dL     07/09/2020 0.7 0.1 - 1.0 mg/dL Final     Comment:     For infants and newborns, interpretation of results should be based  on gestational age, weight and in agreement with clinical  observations.  Premature Infant recommended reference ranges:  Up to 24 hours.............<8.0 mg/dL  Up to 48 hours............<12.0 mg/dL  3-5 days..................<15.0 mg/dL  6-29 days.................<15.0 mg/dL       Alkaline Phosphatase   Date Value Ref Range Status   07/09/2020 67 55 - 135 U/L Final   07/09/2020 68 55 - 135 U/L Final     AST   Date Value Ref Range Status   07/09/2020 28 10 - 40 U/L Final   07/09/2020 29 10 - 40 U/L Final     ALT   Date Value Ref Range Status   07/09/2020 7 (L) 10 - 44 U/L Final   07/09/2020 5 (L) 10 - 44 U/L Final     Anion Gap   Date Value Ref Range Status   07/09/2020 6 (L) 8 - 16 mmol/L Final   07/09/2020 9 8 -  16 mmol/L Final     eGFR if    Date Value Ref Range Status   07/16/2020 >60 >60 mL/min/1.73 m^2 Final     eGFR if non    Date Value Ref Range Status   07/16/2020 >60 >60 mL/min/1.73 m^2 Final     Comment:     Calculation used to obtain the estimated glomerular filtration  rate (eGFR) is the CKD-EPI equation.          Past ECG was independently visualized and reviewed by me and showed NSR.      Assessment:       1. Liver disease, unspecified    2. Abnormal findings on diagnostic imaging of liver    3. Nausea    4. Parkinsons        Plan:       1.  Repeat MRI for above  2.  Antireflux precautions including avoidance of late night eating and lying down soon after eating.     3.  Further recommendations to follow after above.

## 2020-10-29 ENCOUNTER — HOSPITAL ENCOUNTER (OUTPATIENT)
Dept: RADIOLOGY | Facility: HOSPITAL | Age: 70
Discharge: HOME | End: 2020-10-29
Attending: INTERNAL MEDICINE
Payer: MEDICARE

## 2020-10-29 ENCOUNTER — PATIENT MESSAGE (OUTPATIENT)
Dept: GASTROENTEROLOGY | Facility: CLINIC | Age: 70
End: 2020-10-29

## 2020-10-29 DIAGNOSIS — K76.9 LIVER DISEASE, UNSPECIFIED: ICD-10-CM

## 2020-10-29 DIAGNOSIS — K76.9 LIVER LESION: Primary | ICD-10-CM

## 2020-10-29 PROCEDURE — A9585 GADOBUTROL INJECTION: HCPCS | Performed by: INTERNAL MEDICINE

## 2020-10-29 PROCEDURE — 74183 MRI ABD W/O CNTR FLWD CNTR: CPT | Mod: 26,,, | Performed by: RADIOLOGY

## 2020-10-29 PROCEDURE — 25500020 PHARM REV CODE 255: Performed by: INTERNAL MEDICINE

## 2020-10-29 PROCEDURE — 74183 MRI ABDOMEN W WO CONTRAST: ICD-10-PCS | Mod: 26,,, | Performed by: RADIOLOGY

## 2020-10-29 PROCEDURE — 74183 MRI ABD W/O CNTR FLWD CNTR: CPT | Mod: TC

## 2020-10-29 RX ORDER — GADOBUTROL 604.72 MG/ML
5 INJECTION INTRAVENOUS
Status: COMPLETED | OUTPATIENT
Start: 2020-10-29 | End: 2020-10-29

## 2020-10-29 RX ADMIN — GADOBUTROL 5 ML: 604.72 INJECTION INTRAVENOUS at 08:10

## 2020-10-30 ENCOUNTER — PATIENT MESSAGE (OUTPATIENT)
Dept: GASTROENTEROLOGY | Facility: CLINIC | Age: 70
End: 2020-10-30

## 2020-10-30 NOTE — TELEPHONE ENCOUNTER
Spoke with patient by phone.  Explained risks and benefits of liver lesion biopsy.  She understands and is agreeable with proceeding.  Also addressed MRI report which states that gallbladder is surgically absent.  Per radiology, this was a transcription error which will be amended/corrected.

## 2020-11-09 RX ORDER — LORAZEPAM 0.5 MG/1
0.5 TABLET ORAL EVERY OTHER DAY
Qty: 30 TABLET | Refills: 2 | Status: SHIPPED | OUTPATIENT
Start: 2020-11-09 | End: 2021-09-04 | Stop reason: SDUPTHER

## 2020-11-10 ENCOUNTER — PATIENT MESSAGE (OUTPATIENT)
Dept: NEUROLOGY | Facility: CLINIC | Age: 70
End: 2020-11-10

## 2020-11-10 DIAGNOSIS — K76.9 LIVER LESION: Primary | ICD-10-CM

## 2020-11-10 RX ORDER — RASAGILINE 1 MG/1
TABLET ORAL
Qty: 30 TABLET | Refills: 11 | Status: SHIPPED | OUTPATIENT
Start: 2020-11-10 | End: 2021-10-25

## 2020-11-13 ENCOUNTER — PATIENT MESSAGE (OUTPATIENT)
Dept: NEUROLOGY | Facility: CLINIC | Age: 70
End: 2020-11-13

## 2020-11-19 ENCOUNTER — HOSPITAL ENCOUNTER (OUTPATIENT)
Dept: RADIOLOGY | Facility: HOSPITAL | Age: 70
Discharge: HOME | End: 2020-11-19
Attending: INTERNAL MEDICINE
Payer: MEDICARE

## 2020-11-19 ENCOUNTER — HOSPITAL ENCOUNTER (OUTPATIENT)
Facility: HOSPITAL | Age: 70
Discharge: HOME | End: 2020-11-19
Attending: INTERNAL MEDICINE | Admitting: ANESTHESIOLOGY
Payer: MEDICARE

## 2020-11-19 VITALS
DIASTOLIC BLOOD PRESSURE: 60 MMHG | SYSTOLIC BLOOD PRESSURE: 119 MMHG | HEART RATE: 72 BPM | RESPIRATION RATE: 18 BRPM | OXYGEN SATURATION: 97 %

## 2020-11-19 DIAGNOSIS — K76.9 LIVER LESION: ICD-10-CM

## 2020-11-19 DIAGNOSIS — K76.9 LIVER LESION: Primary | ICD-10-CM

## 2020-11-19 PROCEDURE — 88307 TISSUE EXAM BY PATHOLOGIST: CPT | Performed by: PATHOLOGY

## 2020-11-19 PROCEDURE — 47000 US BIOPSY LIVER BY RADIOLOGIST: ICD-10-PCS | Mod: ,,, | Performed by: RADIOLOGY

## 2020-11-19 PROCEDURE — 47000 NEEDLE BIOPSY OF LIVER PERQ: CPT | Mod: ,,, | Performed by: RADIOLOGY

## 2020-11-19 PROCEDURE — 88313 SPECIAL STAINS GROUP 2: CPT | Mod: 26,,, | Performed by: PATHOLOGY

## 2020-11-19 PROCEDURE — 71000015 HC POSTOP RECOV 1ST HR

## 2020-11-19 PROCEDURE — 25000003 PHARM REV CODE 250: Performed by: RADIOLOGY

## 2020-11-19 PROCEDURE — 88313 SPECIAL STAINS GROUP 2: CPT | Mod: 59 | Performed by: PATHOLOGY

## 2020-11-19 PROCEDURE — 63600175 PHARM REV CODE 636 W HCPCS: Performed by: RADIOLOGY

## 2020-11-19 PROCEDURE — 47000 NEEDLE BIOPSY OF LIVER PERQ: CPT

## 2020-11-19 PROCEDURE — 88313 PR  SPECIAL STAINS,GROUP II: ICD-10-PCS | Mod: 26,,, | Performed by: PATHOLOGY

## 2020-11-19 PROCEDURE — 76942 US BIOPSY LIVER BY RADIOLOGIST: ICD-10-PCS | Mod: 26,,, | Performed by: RADIOLOGY

## 2020-11-19 PROCEDURE — 71000016 HC POSTOP RECOV ADDL HR

## 2020-11-19 PROCEDURE — 88307 TISSUE EXAM BY PATHOLOGIST: CPT | Mod: 26,,, | Performed by: PATHOLOGY

## 2020-11-19 PROCEDURE — 76942 ECHO GUIDE FOR BIOPSY: CPT | Mod: 26,,, | Performed by: RADIOLOGY

## 2020-11-19 PROCEDURE — 88342 IMHCHEM/IMCYTCHM 1ST ANTB: CPT | Performed by: PATHOLOGY

## 2020-11-19 PROCEDURE — 88307 PR  SURG PATH,LEVEL V: ICD-10-PCS | Mod: 26,,, | Performed by: PATHOLOGY

## 2020-11-19 RX ORDER — FENTANYL CITRATE 50 UG/ML
25 INJECTION, SOLUTION INTRAMUSCULAR; INTRAVENOUS
Status: DISCONTINUED | OUTPATIENT
Start: 2020-11-19 | End: 2020-11-19

## 2020-11-19 RX ORDER — MIDAZOLAM HYDROCHLORIDE 1 MG/ML
1 INJECTION INTRAMUSCULAR; INTRAVENOUS
Status: DISCONTINUED | OUTPATIENT
Start: 2020-11-19 | End: 2020-11-19

## 2020-11-19 RX ORDER — LIDOCAINE HYDROCHLORIDE 10 MG/ML
1 INJECTION, SOLUTION EPIDURAL; INFILTRATION; INTRACAUDAL; PERINEURAL ONCE
Status: COMPLETED | OUTPATIENT
Start: 2020-11-19 | End: 2020-11-19

## 2020-11-19 RX ADMIN — FENTANYL CITRATE 50 MCG: 50 INJECTION, SOLUTION INTRAMUSCULAR; INTRAVENOUS at 11:11

## 2020-11-19 RX ADMIN — LIDOCAINE HYDROCHLORIDE 100 MG: 10 INJECTION, SOLUTION EPIDURAL; INFILTRATION; INTRACAUDAL; PERINEURAL at 11:11

## 2020-11-19 RX ADMIN — MIDAZOLAM HYDROCHLORIDE 1 MG: 1 INJECTION, SOLUTION INTRAMUSCULAR; INTRAVENOUS at 11:11

## 2020-11-19 NOTE — OP NOTE
Interventional Radiology Procedure Note    Procedure: right liver biopsy    Pre procedure diagnosis: right liver lesion    Post procedure diagnosis: same    : MD Barbra    Specimens removed: 3 18 gauge cores of right hepatic lesion    Estimated Blood Loss: 0 ml     Complications: none     Findings: right hepatic lesion

## 2020-11-19 NOTE — H&P
[facility]  History & Physical - Short Stay  Interventional Radiology    SUBJECTIVE:     Chief Complaint/Reason for Admission: right hepatic lesion    History of Present Illness:  Esther Segura is a 70 y.o. female with a history of right hepatic lesion    OBJECTIVE:       Physical Exam:  Awake, alert and oriented   In no acute distress  Pe,  Eomi  No labored breathing   Moves extremities spontaneously    ASSESSMENT/PLAN:     Right hepatic lesion    Patient will undergo biopsy    Sedation/Anesthesia Assessment:  ASA Classification: III = Severe systemic disease limiting activity  Mallampati Score: II (hard and soft palate, upper portion of tonsils anduvula visible)    Sedation History: no problems    Sedation Plan: moderate

## 2020-11-19 NOTE — NURSING
Pt arrived via stretcher from [facility]. AAOx4,- procedure explained and consent obtained by Dr. Belle. Time out performed. Pt received Fentanyl  50 mcg and Versed 1 mg IV. Vital signs monitored and remained stable for duration of procedure. Biopsies collected By Dr. Belle. Specimens submitted to lab for Pathology. Bandage applied to pts right lateral abdomen. CDI- Report given at bedside- Pt transported back to room via bed.

## 2020-11-19 NOTE — DISCHARGE SUMMARY
Radiology Discharge Summary      Admit date: 11/19/2020  9:29 AM  Discharge date: November 19, 2020    Instructions Given to patient: YesVerbal    Diet: Regular    Activity:NO Restrictions    Medications on discharge (List): Refer to Discharge Medication List    Hospital Course: uneventul    Description of Condition on Discharge: Chief Complaint Resolved    Discharge Disposition: Home    Discharge Diagnosis: right liver lesion

## 2020-11-19 NOTE — DISCHARGE INSTRUCTIONS
Discharge Instructions for Liver Biopsy  You had a procedure called liver biopsy. A healthcare provider used a special needle to remove a small piece of tissue from your liver. Then it was examined for signs of damage or disease. A liver biopsy is ordered after other tests have shown that your liver is not working properly. You may also have a liver biopsy when liver disease is suspected, to determine whether there is too much iron in the liver, or to rule out cancer.  Home care  Recommendations include the following:   · Because you had anesthesia, you should not drive until the day after your biopsy.   · Remove the bandage covering the biopsy site 48 hours after the procedure.  · Rest for 6 hours and take it easy when you arrive home.  · Dont shower for 24 hours after the biopsy. If you wish, you may wash yourself with a sponge or washcloth. When you are able to shower, dont scrub the site. Gently wash the area and pat it dry.  · Dont lift anything heavier than 10 pounds for up to 1 week after the procedure, or as advised by your healthcare provider.  · Don't do strenuous activities or exercises for up to 1 week after the procedure.  · Ask your healthcare provider when you can return to work.  · Do not start taking blood thinners without clear instructions from your healthcare provider.  Follow-up care  Make a follow-up appointment as directed by our staff.     When to call your healthcare provider  Call your healthcare provider immediately if you have any of the following:  · Bleeding from the biopsy site  · Dizziness or lightheadedness  · Sudden or increased shortness of breath  · Sudden chest pain  · Fever of 100.4°F (38.0°C) or higher, or as directed by your healthcare provider  · Shaking chills  · Yellow eyes or skin  · Increasing redness, tenderness, or swelling at the biopsy site  · Drainage from the biopsy site  · Opening of the biopsy site  · Vomiting blood  · Rectal bleeding or bloody  stools  · Increasing pain, with or without activity, in the liver or belly area, or pain shooting to the right shoulder   Date Last Reviewed: 7/1/2016 © 2000-2017 [employer]. [address] [place] [address], [place] [address]. All rights reserved. This information is not intended as a substitute for professional medical care. Always follow your healthcare professional's instructions.      Post op instructions for prevention of DVT  What is deep vein thrombosis?  Deep vein thrombosis (DVT) is the medical term for blood clots in the deep veins of the leg.  These blood clots can be dangerous.  A DVT can block a blood vessel and keep blood from getting where it needs to go.  Another problem is that the clot can travel to other parts of the body such as the lungs.  A clot that travels to the lungs is called a pulmonary embolus (PE) and can cause serious problems with breathing which can lead to death.  Am I at risk for DVT/PE?  If you are not very active, you are at risk of DVT.  Anyone confined to bed, sitting for long periods of time, recovering from surgery, etc. increases the risk of DVT.  Other risk factors are cancer diagnosis, certain medications, estrogen replacement in any form,older age, obesity, pregnancy, smoking, history of clotting disorders, and dehydration.  How will I know if I have a DVT?   Swelling in the lower leg   Pain   Warmth, redness, hardness or bulging of the vein  If you have any of these symptoms, call your doctors office right away.  Some people will not have any symptoms until the clot moves to the lungs.  What are the symptoms of a PE?   Panting, shortness of breath, or trouble breathing   Sharp, knife-like chest pain when you breathe   Coughing or coughing up blood   Rapid heartbeat  If you have any of these symptoms or get worse quickly, call 911 for emergency treatment.  How can I prevent a DVT?   Avoid long periods of inactivity and dont cross your legs--get up and  walk around every hour or so.   Stay active--walking after surgery is highly encouraged.  This means you should get out of the house and walk in the neighborhood.  Going up and down stairs will not impair healing (unless advised against such activity by your doctor).     Drink plenty of noncaffeinated, nonalcoholic fluids each day to prevent dehydration.   Wear special support stockings, if they have been advised by your doctor.   If you travel, stop at least once an hour and walk around.   Avoid smoking (assistance with stopping is available through your healthcare provider)  Always notify your doctor if you are not able to follow the post operative instructions that are given to you at the time of discharge.  It may be necessary to prescribe one of the medications available to prevent DVT.    We hope your stay was comfortable as you heal now, mend and rest.    For we have enjoyed taking care of you by giving your our best.    And as you get better, by regaining your health and strength;   We count it as a privilege to have served you and hope your time at [facility] was well spent.      Thank  You!!!

## 2020-11-19 NOTE — PLAN OF CARE
Discharge instructions given to pt who voices understanding   Taking po without nausea.  Dressing to RUQ of abd intact w/small amt bloody drainage noted, unchanged since arrival to post op.  All questions answered.  All personal belongings w/pt

## 2020-11-20 VITALS
SYSTOLIC BLOOD PRESSURE: 135 MMHG | OXYGEN SATURATION: 100 % | TEMPERATURE: 98 F | BODY MASS INDEX: 18.4 KG/M2 | DIASTOLIC BLOOD PRESSURE: 66 MMHG | WEIGHT: 100 LBS | HEIGHT: 62 IN | RESPIRATION RATE: 12 BRPM | HEART RATE: 74 BPM

## 2020-12-02 ENCOUNTER — PATIENT MESSAGE (OUTPATIENT)
Dept: GASTROENTEROLOGY | Facility: CLINIC | Age: 70
End: 2020-12-02

## 2020-12-26 LAB
COMMENT: NORMAL
FINAL PATHOLOGIC DIAGNOSIS: NORMAL
GROSS: NORMAL
SUPPLEMENTAL DIAGNOSIS: NORMAL

## 2021-01-04 ENCOUNTER — PATIENT MESSAGE (OUTPATIENT)
Dept: FAMILY MEDICINE | Facility: CLINIC | Age: 71
End: 2021-01-04

## 2021-01-22 ENCOUNTER — PATIENT MESSAGE (OUTPATIENT)
Dept: ADMINISTRATIVE | Facility: OTHER | Age: 71
End: 2021-01-22

## 2021-03-10 ENCOUNTER — PATIENT OUTREACH (OUTPATIENT)
Dept: ADMINISTRATIVE | Facility: OTHER | Age: 71
End: 2021-03-10

## 2021-03-16 ENCOUNTER — OFFICE VISIT (OUTPATIENT)
Dept: DERMATOLOGY | Facility: CLINIC | Age: 71
End: 2021-03-16
Payer: MEDICARE

## 2021-03-16 DIAGNOSIS — L81.4 SOLAR LENTIGO: ICD-10-CM

## 2021-03-16 DIAGNOSIS — Z85.828 HISTORY OF NONMELANOMA SKIN CANCER: ICD-10-CM

## 2021-03-16 DIAGNOSIS — D22.9 MULTIPLE BENIGN NEVI: ICD-10-CM

## 2021-03-16 DIAGNOSIS — D18.01 CHERRY ANGIOMA: ICD-10-CM

## 2021-03-16 DIAGNOSIS — L90.5 SCAR: ICD-10-CM

## 2021-03-16 DIAGNOSIS — L82.1 SEBORRHEIC KERATOSES: Primary | ICD-10-CM

## 2021-03-16 PROCEDURE — 1126F PR PAIN SEVERITY QUANTIFIED, NO PAIN PRESENT: ICD-10-PCS | Mod: S$GLB,,, | Performed by: DERMATOLOGY

## 2021-03-16 PROCEDURE — 99213 PR OFFICE/OUTPT VISIT, EST, LEVL III, 20-29 MIN: ICD-10-PCS | Mod: 25,S$GLB,, | Performed by: DERMATOLOGY

## 2021-03-16 PROCEDURE — 1126F AMNT PAIN NOTED NONE PRSNT: CPT | Mod: S$GLB,,, | Performed by: DERMATOLOGY

## 2021-03-16 PROCEDURE — 1159F MED LIST DOCD IN RCRD: CPT | Mod: S$GLB,,, | Performed by: DERMATOLOGY

## 2021-03-16 PROCEDURE — 99213 OFFICE O/P EST LOW 20 MIN: CPT | Mod: 25,S$GLB,, | Performed by: DERMATOLOGY

## 2021-03-16 PROCEDURE — 99999 PR PBB SHADOW E&M-EST. PATIENT-LVL III: CPT | Mod: PBBFAC,,, | Performed by: DERMATOLOGY

## 2021-03-16 PROCEDURE — 99999 PR PBB SHADOW E&M-EST. PATIENT-LVL III: ICD-10-PCS | Mod: PBBFAC,,, | Performed by: DERMATOLOGY

## 2021-03-16 PROCEDURE — 1159F PR MEDICATION LIST DOCUMENTED IN MEDICAL RECORD: ICD-10-PCS | Mod: S$GLB,,, | Performed by: DERMATOLOGY

## 2021-05-27 ENCOUNTER — TELEPHONE (OUTPATIENT)
Dept: FAMILY MEDICINE | Facility: CLINIC | Age: 71
End: 2021-05-27

## 2021-05-27 DIAGNOSIS — Z12.31 ENCOUNTER FOR SCREENING MAMMOGRAM FOR MALIGNANT NEOPLASM OF BREAST: ICD-10-CM

## 2021-05-27 DIAGNOSIS — Z12.39 ENCOUNTER FOR OTHER SCREENING FOR MALIGNANT NEOPLASM OF BREAST: Primary | ICD-10-CM

## 2021-05-28 ENCOUNTER — HOSPITAL ENCOUNTER (OUTPATIENT)
Dept: RADIOLOGY | Facility: CLINIC | Age: 71
Discharge: HOME | End: 2021-05-28
Attending: FAMILY MEDICINE
Payer: MEDICARE

## 2021-05-28 DIAGNOSIS — Z12.31 ENCOUNTER FOR SCREENING MAMMOGRAM FOR MALIGNANT NEOPLASM OF BREAST: ICD-10-CM

## 2021-05-28 PROCEDURE — 77067 SCR MAMMO BI INCL CAD: CPT | Mod: 26,,, | Performed by: RADIOLOGY

## 2021-05-28 PROCEDURE — 77067 MAMMO DIGITAL SCREENING BILAT WITH TOMO: ICD-10-PCS | Mod: 26,,, | Performed by: RADIOLOGY

## 2021-05-28 PROCEDURE — 77063 MAMMO DIGITAL SCREENING BILAT WITH TOMO: ICD-10-PCS | Mod: 26,,, | Performed by: RADIOLOGY

## 2021-05-28 PROCEDURE — 77067 SCR MAMMO BI INCL CAD: CPT | Mod: TC,PO

## 2021-05-28 PROCEDURE — 77063 BREAST TOMOSYNTHESIS BI: CPT | Mod: 26,,, | Performed by: RADIOLOGY

## 2021-09-03 ENCOUNTER — PATIENT MESSAGE (OUTPATIENT)
Dept: NEUROLOGY | Facility: CLINIC | Age: 71
End: 2021-09-03

## 2021-10-11 ENCOUNTER — PATIENT MESSAGE (OUTPATIENT)
Dept: ADMINISTRATIVE | Facility: HOSPITAL | Age: 71
End: 2021-10-11

## 2021-10-11 DIAGNOSIS — E78.5 HYPERLIPIDEMIA, UNSPECIFIED HYPERLIPIDEMIA TYPE: Primary | ICD-10-CM

## 2021-10-15 ENCOUNTER — PATIENT MESSAGE (OUTPATIENT)
Dept: ADMINISTRATIVE | Facility: HOSPITAL | Age: 71
End: 2021-10-15
Payer: MEDICARE

## 2021-10-19 ENCOUNTER — LAB VISIT (OUTPATIENT)
Dept: LAB | Facility: HOSPITAL | Age: 71
End: 2021-10-19
Attending: FAMILY MEDICINE
Payer: MEDICARE

## 2021-10-19 DIAGNOSIS — E78.5 HYPERLIPIDEMIA, UNSPECIFIED HYPERLIPIDEMIA TYPE: ICD-10-CM

## 2021-10-19 LAB
ALBUMIN SERPL BCP-MCNC: 4.1 G/DL (ref 3.5–5.2)
ALP SERPL-CCNC: 54 U/L (ref 55–135)
ALT SERPL W/O P-5'-P-CCNC: <5 U/L (ref 10–44)
ANION GAP SERPL CALC-SCNC: 10 MMOL/L (ref 8–16)
AST SERPL-CCNC: 25 U/L (ref 10–40)
BASOPHILS # BLD AUTO: 0.06 K/UL (ref 0–0.2)
BASOPHILS NFR BLD: 1.5 % (ref 0–1.9)
BILIRUB SERPL-MCNC: 0.5 MG/DL (ref 0.1–1)
BUN SERPL-MCNC: 25 MG/DL (ref 8–23)
CALCIUM SERPL-MCNC: 9.6 MG/DL (ref 8.7–10.5)
CHLORIDE SERPL-SCNC: 102 MMOL/L (ref 95–110)
CHOLEST SERPL-MCNC: 242 MG/DL (ref 120–199)
CHOLEST/HDLC SERPL: 2.3 {RATIO} (ref 2–5)
CO2 SERPL-SCNC: 28 MMOL/L (ref 23–29)
CREAT SERPL-MCNC: 1 MG/DL (ref 0.5–1.4)
DIFFERENTIAL METHOD: ABNORMAL
EOSINOPHIL # BLD AUTO: 0.1 K/UL (ref 0–0.5)
EOSINOPHIL NFR BLD: 2.5 % (ref 0–8)
ERYTHROCYTE [DISTWIDTH] IN BLOOD BY AUTOMATED COUNT: 12.8 % (ref 11.5–14.5)
EST. GFR  (AFRICAN AMERICAN): >60 ML/MIN/1.73 M^2
EST. GFR  (NON AFRICAN AMERICAN): 56 ML/MIN/1.73 M^2
GLUCOSE SERPL-MCNC: 77 MG/DL (ref 70–110)
HCT VFR BLD AUTO: 38.3 % (ref 37–48.5)
HDLC SERPL-MCNC: 104 MG/DL (ref 40–75)
HDLC SERPL: 43 % (ref 20–50)
HGB BLD-MCNC: 12.3 G/DL (ref 12–16)
IMM GRANULOCYTES # BLD AUTO: 0.01 K/UL (ref 0–0.04)
IMM GRANULOCYTES NFR BLD AUTO: 0.2 % (ref 0–0.5)
LDLC SERPL CALC-MCNC: 127 MG/DL (ref 63–159)
LYMPHOCYTES # BLD AUTO: 1.7 K/UL (ref 1–4.8)
LYMPHOCYTES NFR BLD: 41.5 % (ref 18–48)
MCH RBC QN AUTO: 33.4 PG (ref 27–31)
MCHC RBC AUTO-ENTMCNC: 32.1 G/DL (ref 32–36)
MCV RBC AUTO: 104 FL (ref 82–98)
MONOCYTES # BLD AUTO: 0.5 K/UL (ref 0.3–1)
MONOCYTES NFR BLD: 11.1 % (ref 4–15)
NEUTROPHILS # BLD AUTO: 1.8 K/UL (ref 1.8–7.7)
NEUTROPHILS NFR BLD: 43.2 % (ref 38–73)
NONHDLC SERPL-MCNC: 138 MG/DL
NRBC BLD-RTO: 0 /100 WBC
PLATELET # BLD AUTO: 299 K/UL (ref 150–450)
PMV BLD AUTO: 10.1 FL (ref 9.2–12.9)
POTASSIUM SERPL-SCNC: 4 MMOL/L (ref 3.5–5.1)
PROT SERPL-MCNC: 7.3 G/DL (ref 6–8.4)
RBC # BLD AUTO: 3.68 M/UL (ref 4–5.4)
SODIUM SERPL-SCNC: 140 MMOL/L (ref 136–145)
TRIGL SERPL-MCNC: 55 MG/DL (ref 30–150)
TSH SERPL DL<=0.005 MIU/L-ACNC: 3.29 UIU/ML (ref 0.4–4)
WBC # BLD AUTO: 4.07 K/UL (ref 3.9–12.7)

## 2021-10-19 PROCEDURE — 84443 ASSAY THYROID STIM HORMONE: CPT | Performed by: FAMILY MEDICINE

## 2021-10-19 PROCEDURE — 85025 COMPLETE CBC W/AUTO DIFF WBC: CPT | Performed by: FAMILY MEDICINE

## 2021-10-19 PROCEDURE — 80061 LIPID PANEL: CPT | Performed by: FAMILY MEDICINE

## 2021-10-19 PROCEDURE — 80053 COMPREHEN METABOLIC PANEL: CPT | Performed by: FAMILY MEDICINE

## 2021-10-19 PROCEDURE — 36415 COLL VENOUS BLD VENIPUNCTURE: CPT | Mod: PO | Performed by: FAMILY MEDICINE

## 2021-10-22 ENCOUNTER — OFFICE VISIT (OUTPATIENT)
Dept: FAMILY MEDICINE | Facility: CLINIC | Age: 71
End: 2021-10-22
Payer: MEDICARE

## 2021-10-22 VITALS
SYSTOLIC BLOOD PRESSURE: 110 MMHG | OXYGEN SATURATION: 98 % | WEIGHT: 101.44 LBS | DIASTOLIC BLOOD PRESSURE: 64 MMHG | HEIGHT: 62 IN | HEART RATE: 73 BPM | BODY MASS INDEX: 18.67 KG/M2

## 2021-10-22 DIAGNOSIS — G20.C PARKINSONISM, UNSPECIFIED PARKINSONISM TYPE: ICD-10-CM

## 2021-10-22 DIAGNOSIS — Z00.00 ROUTINE MEDICAL EXAM: Primary | ICD-10-CM

## 2021-10-22 DIAGNOSIS — F41.9 ANXIETY: ICD-10-CM

## 2021-10-22 PROCEDURE — 99213 PR OFFICE/OUTPT VISIT, EST, LEVL III, 20-29 MIN: ICD-10-PCS | Mod: S$GLB,,, | Performed by: FAMILY MEDICINE

## 2021-10-22 PROCEDURE — 3008F PR BODY MASS INDEX (BMI) DOCUMENTED: ICD-10-PCS | Mod: CPTII,S$GLB,, | Performed by: FAMILY MEDICINE

## 2021-10-22 PROCEDURE — 3288F FALL RISK ASSESSMENT DOCD: CPT | Mod: CPTII,S$GLB,, | Performed by: FAMILY MEDICINE

## 2021-10-22 PROCEDURE — 99499 RISK ADDL DX/OHS AUDIT: ICD-10-PCS | Mod: S$GLB,,, | Performed by: FAMILY MEDICINE

## 2021-10-22 PROCEDURE — 1101F PT FALLS ASSESS-DOCD LE1/YR: CPT | Mod: CPTII,S$GLB,, | Performed by: FAMILY MEDICINE

## 2021-10-22 PROCEDURE — 99999 PR PBB SHADOW E&M-EST. PATIENT-LVL III: CPT | Mod: PBBFAC,,, | Performed by: FAMILY MEDICINE

## 2021-10-22 PROCEDURE — 3008F BODY MASS INDEX DOCD: CPT | Mod: CPTII,S$GLB,, | Performed by: FAMILY MEDICINE

## 2021-10-22 PROCEDURE — 99213 OFFICE O/P EST LOW 20 MIN: CPT | Mod: S$GLB,,, | Performed by: FAMILY MEDICINE

## 2021-10-22 PROCEDURE — 1101F PR PT FALLS ASSESS DOC 0-1 FALLS W/OUT INJ PAST YR: ICD-10-PCS | Mod: CPTII,S$GLB,, | Performed by: FAMILY MEDICINE

## 2021-10-22 PROCEDURE — 1159F PR MEDICATION LIST DOCUMENTED IN MEDICAL RECORD: ICD-10-PCS | Mod: CPTII,S$GLB,, | Performed by: FAMILY MEDICINE

## 2021-10-22 PROCEDURE — 1126F PR PAIN SEVERITY QUANTIFIED, NO PAIN PRESENT: ICD-10-PCS | Mod: CPTII,S$GLB,, | Performed by: FAMILY MEDICINE

## 2021-10-22 PROCEDURE — 1159F MED LIST DOCD IN RCRD: CPT | Mod: CPTII,S$GLB,, | Performed by: FAMILY MEDICINE

## 2021-10-22 PROCEDURE — 99499 UNLISTED E&M SERVICE: CPT | Mod: S$GLB,,, | Performed by: FAMILY MEDICINE

## 2021-10-22 PROCEDURE — 99999 PR PBB SHADOW E&M-EST. PATIENT-LVL III: ICD-10-PCS | Mod: PBBFAC,,, | Performed by: FAMILY MEDICINE

## 2021-10-22 PROCEDURE — 3288F PR FALLS RISK ASSESSMENT DOCUMENTED: ICD-10-PCS | Mod: CPTII,S$GLB,, | Performed by: FAMILY MEDICINE

## 2021-10-22 PROCEDURE — 1126F AMNT PAIN NOTED NONE PRSNT: CPT | Mod: CPTII,S$GLB,, | Performed by: FAMILY MEDICINE

## 2021-10-28 ENCOUNTER — OFFICE VISIT (OUTPATIENT)
Dept: URGENT CARE | Facility: CLINIC | Age: 71
End: 2021-10-28
Payer: MEDICARE

## 2021-10-28 VITALS
OXYGEN SATURATION: 98 % | TEMPERATURE: 97 F | WEIGHT: 104.19 LBS | HEIGHT: 62 IN | BODY MASS INDEX: 19.17 KG/M2 | SYSTOLIC BLOOD PRESSURE: 125 MMHG | DIASTOLIC BLOOD PRESSURE: 73 MMHG | RESPIRATION RATE: 20 BRPM | HEART RATE: 78 BPM

## 2021-10-28 DIAGNOSIS — M25.561 ACUTE PAIN OF RIGHT KNEE: Primary | ICD-10-CM

## 2021-10-28 PROCEDURE — 3074F PR MOST RECENT SYSTOLIC BLOOD PRESSURE < 130 MM HG: ICD-10-PCS | Mod: CPTII,S$GLB,, | Performed by: NURSE PRACTITIONER

## 2021-10-28 PROCEDURE — 3078F DIAST BP <80 MM HG: CPT | Mod: CPTII,S$GLB,, | Performed by: NURSE PRACTITIONER

## 2021-10-28 PROCEDURE — 73562 XR KNEE 3 VIEW RIGHT: ICD-10-PCS | Mod: RT,S$GLB,, | Performed by: RADIOLOGY

## 2021-10-28 PROCEDURE — 99203 OFFICE O/P NEW LOW 30 MIN: CPT | Mod: S$GLB,,, | Performed by: NURSE PRACTITIONER

## 2021-10-28 PROCEDURE — 3008F BODY MASS INDEX DOCD: CPT | Mod: CPTII,S$GLB,, | Performed by: NURSE PRACTITIONER

## 2021-10-28 PROCEDURE — 99203 PR OFFICE/OUTPT VISIT, NEW, LEVL III, 30-44 MIN: ICD-10-PCS | Mod: S$GLB,,, | Performed by: NURSE PRACTITIONER

## 2021-10-28 PROCEDURE — 3008F PR BODY MASS INDEX (BMI) DOCUMENTED: ICD-10-PCS | Mod: CPTII,S$GLB,, | Performed by: NURSE PRACTITIONER

## 2021-10-28 PROCEDURE — 3074F SYST BP LT 130 MM HG: CPT | Mod: CPTII,S$GLB,, | Performed by: NURSE PRACTITIONER

## 2021-10-28 PROCEDURE — 73562 X-RAY EXAM OF KNEE 3: CPT | Mod: RT,S$GLB,, | Performed by: RADIOLOGY

## 2021-10-28 PROCEDURE — 3078F PR MOST RECENT DIASTOLIC BLOOD PRESSURE < 80 MM HG: ICD-10-PCS | Mod: CPTII,S$GLB,, | Performed by: NURSE PRACTITIONER

## 2021-10-28 PROCEDURE — 1159F PR MEDICATION LIST DOCUMENTED IN MEDICAL RECORD: ICD-10-PCS | Mod: CPTII,S$GLB,, | Performed by: NURSE PRACTITIONER

## 2021-10-28 PROCEDURE — 1159F MED LIST DOCD IN RCRD: CPT | Mod: CPTII,S$GLB,, | Performed by: NURSE PRACTITIONER

## 2021-10-28 RX ORDER — IBUPROFEN 600 MG/1
600 TABLET ORAL EVERY 6 HOURS PRN
Qty: 30 TABLET | Refills: 0 | Status: SHIPPED | OUTPATIENT
Start: 2021-10-28

## 2021-11-02 ENCOUNTER — OFFICE VISIT (OUTPATIENT)
Dept: ORTHOPEDICS | Facility: CLINIC | Age: 71
End: 2021-11-02
Payer: MEDICARE

## 2021-11-02 VITALS — RESPIRATION RATE: 18 BRPM | HEIGHT: 62 IN | BODY MASS INDEX: 19.14 KG/M2 | WEIGHT: 104 LBS

## 2021-11-02 DIAGNOSIS — M25.561 ACUTE PAIN OF RIGHT KNEE: Primary | ICD-10-CM

## 2021-11-02 PROCEDURE — 3008F BODY MASS INDEX DOCD: CPT | Mod: CPTII,S$GLB,, | Performed by: ORTHOPAEDIC SURGERY

## 2021-11-02 PROCEDURE — 3288F FALL RISK ASSESSMENT DOCD: CPT | Mod: CPTII,S$GLB,, | Performed by: ORTHOPAEDIC SURGERY

## 2021-11-02 PROCEDURE — 1159F PR MEDICATION LIST DOCUMENTED IN MEDICAL RECORD: ICD-10-PCS | Mod: CPTII,S$GLB,, | Performed by: ORTHOPAEDIC SURGERY

## 2021-11-02 PROCEDURE — 99203 PR OFFICE/OUTPT VISIT, NEW, LEVL III, 30-44 MIN: ICD-10-PCS | Mod: S$GLB,,, | Performed by: ORTHOPAEDIC SURGERY

## 2021-11-02 PROCEDURE — 3288F PR FALLS RISK ASSESSMENT DOCUMENTED: ICD-10-PCS | Mod: CPTII,S$GLB,, | Performed by: ORTHOPAEDIC SURGERY

## 2021-11-02 PROCEDURE — 1101F PT FALLS ASSESS-DOCD LE1/YR: CPT | Mod: CPTII,S$GLB,, | Performed by: ORTHOPAEDIC SURGERY

## 2021-11-02 PROCEDURE — 1101F PR PT FALLS ASSESS DOC 0-1 FALLS W/OUT INJ PAST YR: ICD-10-PCS | Mod: CPTII,S$GLB,, | Performed by: ORTHOPAEDIC SURGERY

## 2021-11-02 PROCEDURE — 1159F MED LIST DOCD IN RCRD: CPT | Mod: CPTII,S$GLB,, | Performed by: ORTHOPAEDIC SURGERY

## 2021-11-02 PROCEDURE — 1160F RVW MEDS BY RX/DR IN RCRD: CPT | Mod: CPTII,S$GLB,, | Performed by: ORTHOPAEDIC SURGERY

## 2021-11-02 PROCEDURE — 1125F AMNT PAIN NOTED PAIN PRSNT: CPT | Mod: CPTII,S$GLB,, | Performed by: ORTHOPAEDIC SURGERY

## 2021-11-02 PROCEDURE — 1160F PR REVIEW ALL MEDS BY PRESCRIBER/CLIN PHARMACIST DOCUMENTED: ICD-10-PCS | Mod: CPTII,S$GLB,, | Performed by: ORTHOPAEDIC SURGERY

## 2021-11-02 PROCEDURE — 99999 PR PBB SHADOW E&M-EST. PATIENT-LVL III: ICD-10-PCS | Mod: PBBFAC,,, | Performed by: ORTHOPAEDIC SURGERY

## 2021-11-02 PROCEDURE — 1125F PR PAIN SEVERITY QUANTIFIED, PAIN PRESENT: ICD-10-PCS | Mod: CPTII,S$GLB,, | Performed by: ORTHOPAEDIC SURGERY

## 2021-11-02 PROCEDURE — 99999 PR PBB SHADOW E&M-EST. PATIENT-LVL III: CPT | Mod: PBBFAC,,, | Performed by: ORTHOPAEDIC SURGERY

## 2021-11-02 PROCEDURE — 3008F PR BODY MASS INDEX (BMI) DOCUMENTED: ICD-10-PCS | Mod: CPTII,S$GLB,, | Performed by: ORTHOPAEDIC SURGERY

## 2021-11-02 PROCEDURE — 99203 OFFICE O/P NEW LOW 30 MIN: CPT | Mod: S$GLB,,, | Performed by: ORTHOPAEDIC SURGERY

## 2021-11-09 ENCOUNTER — PES CALL (OUTPATIENT)
Dept: ADMINISTRATIVE | Facility: CLINIC | Age: 71
End: 2021-11-09
Payer: MEDICARE

## 2021-11-16 ENCOUNTER — OFFICE VISIT (OUTPATIENT)
Dept: DERMATOLOGY | Facility: CLINIC | Age: 71
End: 2021-11-16
Payer: MEDICARE

## 2021-11-16 VITALS — BODY MASS INDEX: 19.14 KG/M2 | HEIGHT: 62 IN | WEIGHT: 104 LBS

## 2021-11-16 DIAGNOSIS — D18.01 CHERRY ANGIOMA: ICD-10-CM

## 2021-11-16 DIAGNOSIS — D22.9 MULTIPLE BENIGN NEVI: ICD-10-CM

## 2021-11-16 DIAGNOSIS — L82.1 SEBORRHEIC KERATOSES: ICD-10-CM

## 2021-11-16 DIAGNOSIS — Z85.828 HISTORY OF NONMELANOMA SKIN CANCER: ICD-10-CM

## 2021-11-16 DIAGNOSIS — L81.4 SOLAR LENTIGO: ICD-10-CM

## 2021-11-16 DIAGNOSIS — L90.5 SCAR: ICD-10-CM

## 2021-11-16 DIAGNOSIS — L57.0 ACTINIC KERATOSES: Primary | ICD-10-CM

## 2021-11-16 PROCEDURE — 99213 OFFICE O/P EST LOW 20 MIN: CPT | Mod: 25,S$GLB,, | Performed by: DERMATOLOGY

## 2021-11-16 PROCEDURE — 3288F PR FALLS RISK ASSESSMENT DOCUMENTED: ICD-10-PCS | Mod: CPTII,S$GLB,, | Performed by: DERMATOLOGY

## 2021-11-16 PROCEDURE — 1160F RVW MEDS BY RX/DR IN RCRD: CPT | Mod: CPTII,S$GLB,, | Performed by: DERMATOLOGY

## 2021-11-16 PROCEDURE — 3288F FALL RISK ASSESSMENT DOCD: CPT | Mod: CPTII,S$GLB,, | Performed by: DERMATOLOGY

## 2021-11-16 PROCEDURE — 1101F PR PT FALLS ASSESS DOC 0-1 FALLS W/OUT INJ PAST YR: ICD-10-PCS | Mod: CPTII,S$GLB,, | Performed by: DERMATOLOGY

## 2021-11-16 PROCEDURE — 3008F BODY MASS INDEX DOCD: CPT | Mod: CPTII,S$GLB,, | Performed by: DERMATOLOGY

## 2021-11-16 PROCEDURE — 99999 PR PBB SHADOW E&M-EST. PATIENT-LVL III: ICD-10-PCS | Mod: PBBFAC,,, | Performed by: DERMATOLOGY

## 2021-11-16 PROCEDURE — 99213 PR OFFICE/OUTPT VISIT, EST, LEVL III, 20-29 MIN: ICD-10-PCS | Mod: 25,S$GLB,, | Performed by: DERMATOLOGY

## 2021-11-16 PROCEDURE — 99999 PR PBB SHADOW E&M-EST. PATIENT-LVL III: CPT | Mod: PBBFAC,,, | Performed by: DERMATOLOGY

## 2021-11-16 PROCEDURE — 17000 DESTRUCT PREMALG LESION: CPT | Mod: S$GLB,,, | Performed by: DERMATOLOGY

## 2021-11-16 PROCEDURE — 1126F PR PAIN SEVERITY QUANTIFIED, NO PAIN PRESENT: ICD-10-PCS | Mod: CPTII,S$GLB,, | Performed by: DERMATOLOGY

## 2021-11-16 PROCEDURE — 1159F PR MEDICATION LIST DOCUMENTED IN MEDICAL RECORD: ICD-10-PCS | Mod: CPTII,S$GLB,, | Performed by: DERMATOLOGY

## 2021-11-16 PROCEDURE — 3008F PR BODY MASS INDEX (BMI) DOCUMENTED: ICD-10-PCS | Mod: CPTII,S$GLB,, | Performed by: DERMATOLOGY

## 2021-11-16 PROCEDURE — 1159F MED LIST DOCD IN RCRD: CPT | Mod: CPTII,S$GLB,, | Performed by: DERMATOLOGY

## 2021-11-16 PROCEDURE — 1126F AMNT PAIN NOTED NONE PRSNT: CPT | Mod: CPTII,S$GLB,, | Performed by: DERMATOLOGY

## 2021-11-16 PROCEDURE — 1101F PT FALLS ASSESS-DOCD LE1/YR: CPT | Mod: CPTII,S$GLB,, | Performed by: DERMATOLOGY

## 2021-11-16 PROCEDURE — 1160F PR REVIEW ALL MEDS BY PRESCRIBER/CLIN PHARMACIST DOCUMENTED: ICD-10-PCS | Mod: CPTII,S$GLB,, | Performed by: DERMATOLOGY

## 2021-11-16 PROCEDURE — 17000 PR DESTRUCTION(LASER SURGERY,CRYOSURGERY,CHEMOSURGERY),PREMALIGNANT LESIONS,FIRST LESION: ICD-10-PCS | Mod: S$GLB,,, | Performed by: DERMATOLOGY

## 2021-12-02 ENCOUNTER — OFFICE VISIT (OUTPATIENT)
Dept: NEUROLOGY | Facility: CLINIC | Age: 71
End: 2021-12-02
Payer: MEDICARE

## 2021-12-02 VITALS
WEIGHT: 103.94 LBS | BODY MASS INDEX: 19.01 KG/M2 | DIASTOLIC BLOOD PRESSURE: 70 MMHG | SYSTOLIC BLOOD PRESSURE: 125 MMHG | HEART RATE: 83 BPM

## 2021-12-02 DIAGNOSIS — G20.A1 PARKINSON'S DISEASE: ICD-10-CM

## 2021-12-02 PROCEDURE — 99213 OFFICE O/P EST LOW 20 MIN: CPT | Mod: S$GLB,,, | Performed by: PSYCHIATRY & NEUROLOGY

## 2021-12-02 PROCEDURE — 99999 PR PBB SHADOW E&M-EST. PATIENT-LVL III: CPT | Mod: PBBFAC,,, | Performed by: PSYCHIATRY & NEUROLOGY

## 2021-12-02 PROCEDURE — 99999 PR PBB SHADOW E&M-EST. PATIENT-LVL III: ICD-10-PCS | Mod: PBBFAC,,, | Performed by: PSYCHIATRY & NEUROLOGY

## 2021-12-02 PROCEDURE — 99213 PR OFFICE/OUTPT VISIT, EST, LEVL III, 20-29 MIN: ICD-10-PCS | Mod: S$GLB,,, | Performed by: PSYCHIATRY & NEUROLOGY

## 2022-02-14 ENCOUNTER — OFFICE VISIT (OUTPATIENT)
Dept: FAMILY MEDICINE | Facility: CLINIC | Age: 72
End: 2022-02-14
Payer: MEDICARE

## 2022-02-14 VITALS
OXYGEN SATURATION: 98 % | DIASTOLIC BLOOD PRESSURE: 80 MMHG | BODY MASS INDEX: 19.15 KG/M2 | TEMPERATURE: 98 F | HEART RATE: 83 BPM | SYSTOLIC BLOOD PRESSURE: 124 MMHG | WEIGHT: 104.06 LBS | HEIGHT: 62 IN

## 2022-02-14 DIAGNOSIS — Z01.818 PREOPERATIVE CLEARANCE: Primary | ICD-10-CM

## 2022-02-14 PROCEDURE — 3074F PR MOST RECENT SYSTOLIC BLOOD PRESSURE < 130 MM HG: ICD-10-PCS | Mod: CPTII,S$GLB,, | Performed by: NURSE PRACTITIONER

## 2022-02-14 PROCEDURE — 99999 PR PBB SHADOW E&M-EST. PATIENT-LVL III: CPT | Mod: PBBFAC,,, | Performed by: NURSE PRACTITIONER

## 2022-02-14 PROCEDURE — 3074F SYST BP LT 130 MM HG: CPT | Mod: CPTII,S$GLB,, | Performed by: NURSE PRACTITIONER

## 2022-02-14 PROCEDURE — 3008F PR BODY MASS INDEX (BMI) DOCUMENTED: ICD-10-PCS | Mod: CPTII,S$GLB,, | Performed by: NURSE PRACTITIONER

## 2022-02-14 PROCEDURE — 99213 PR OFFICE/OUTPT VISIT, EST, LEVL III, 20-29 MIN: ICD-10-PCS | Mod: S$GLB,,, | Performed by: NURSE PRACTITIONER

## 2022-02-14 PROCEDURE — 3079F DIAST BP 80-89 MM HG: CPT | Mod: CPTII,S$GLB,, | Performed by: NURSE PRACTITIONER

## 2022-02-14 PROCEDURE — 99213 OFFICE O/P EST LOW 20 MIN: CPT | Mod: S$GLB,,, | Performed by: NURSE PRACTITIONER

## 2022-02-14 PROCEDURE — 1126F AMNT PAIN NOTED NONE PRSNT: CPT | Mod: CPTII,S$GLB,, | Performed by: NURSE PRACTITIONER

## 2022-02-14 PROCEDURE — 99999 PR PBB SHADOW E&M-EST. PATIENT-LVL III: ICD-10-PCS | Mod: PBBFAC,,, | Performed by: NURSE PRACTITIONER

## 2022-02-14 PROCEDURE — 3079F PR MOST RECENT DIASTOLIC BLOOD PRESSURE 80-89 MM HG: ICD-10-PCS | Mod: CPTII,S$GLB,, | Performed by: NURSE PRACTITIONER

## 2022-02-14 PROCEDURE — 1159F MED LIST DOCD IN RCRD: CPT | Mod: CPTII,S$GLB,, | Performed by: NURSE PRACTITIONER

## 2022-02-14 PROCEDURE — 3008F BODY MASS INDEX DOCD: CPT | Mod: CPTII,S$GLB,, | Performed by: NURSE PRACTITIONER

## 2022-02-14 PROCEDURE — 1159F PR MEDICATION LIST DOCUMENTED IN MEDICAL RECORD: ICD-10-PCS | Mod: CPTII,S$GLB,, | Performed by: NURSE PRACTITIONER

## 2022-02-14 PROCEDURE — 1126F PR PAIN SEVERITY QUANTIFIED, NO PAIN PRESENT: ICD-10-PCS | Mod: CPTII,S$GLB,, | Performed by: NURSE PRACTITIONER

## 2022-02-14 NOTE — PROGRESS NOTES
Subjective:       Patient ID: Esther Segura is a 71 y.o. female.    Chief Complaint: Pre-op Exam (Cataract sx 2/22/2022 Rt OD)    HPI   Patient with history of Parkinson's disease and anxiety presents for preoperative clearance for cataract surgery. Chronic conditions are controlled. She is without complaints.     Vitals:    02/14/22 1323   BP: 124/80   Pulse: 83   Temp: 98 °F (36.7 °C)     Review of Systems   Constitutional: Negative for diaphoresis and fever.   HENT: Negative for facial swelling and trouble swallowing.    Respiratory: Negative for cough and shortness of breath.    Cardiovascular: Negative for chest pain.   Gastrointestinal: Negative for abdominal pain.   Genitourinary: Negative for difficulty urinating.   Musculoskeletal: Negative for gait problem.   Skin: Negative for pallor and rash.   Neurological: Negative for speech difficulty.   Psychiatric/Behavioral: Negative for confusion. The patient is not nervous/anxious.        Past Medical History:   Diagnosis Date    Fever blister     Hyperlipidemia     Osteopenia     Parkinson's disease     Squamous cell carcinoma 01/18/2019    right forearm     Objective:      Physical Exam  Vitals and nursing note reviewed.   Constitutional:       General: She is not in acute distress.     Appearance: She is not diaphoretic.   HENT:      Head: Normocephalic.   Eyes:      General:         Right eye: No discharge.         Left eye: No discharge.   Neck:      Trachea: No tracheal deviation.   Cardiovascular:      Rate and Rhythm: Normal rate.      Heart sounds: Normal heart sounds. No murmur heard.      Pulmonary:      Effort: Pulmonary effort is normal.      Breath sounds: Normal breath sounds.   Musculoskeletal:      Right lower leg: No edema.      Left lower leg: No edema.   Skin:     Coloration: Skin is not pale.   Neurological:      Mental Status: She is alert and oriented to person, place, and time.   Psychiatric:         Speech: Speech normal.          Behavior: Behavior normal.         Thought Content: Thought content normal.         Judgment: Judgment normal.         Assessment:       1. Preoperative clearance        Plan:       Preoperative clearance        cleared for cataract surgery         Medication List with Changes/Refills   Current Medications    BIOTIN ORAL    Take by mouth.    CALCIUM CARBONATE-VITAMIN D3 500 MG(1,250MG) -400 UNIT TAB        CARBIDOPA (LODOSYN) 25 MG TABLET    TAKE 1 TABLET(25 MG) BY MOUTH THREE TIMES DAILY    CARBIDOPA-LEVODOPA  MG (SINEMET)  MG PER TABLET    TAKE 1 AND 1/2 TABLETS BY MOUTH THREE TIMES DAILY    IBUPROFEN (ADVIL,MOTRIN) 600 MG TABLET    Take 1 tablet (600 mg total) by mouth every 6 (six) hours as needed for Pain (knee pain).    LACTOBACILLUS RHAMNOSUS GG (CULTURELLE) 10 BILLION CELL CAPSULE    Take 1 capsule by mouth once daily. 15 billion count    LORAZEPAM (ATIVAN) 0.5 MG TABLET    Take 1 tablet (0.5 mg total) by mouth every other day.    MAGNESIUM CITRATE ORAL    Take by mouth once. Taking 448mg OTC capsule    MULTIVIT 33-MTFOLATE-NAC-CHROM 2.5-200-1 MG-MG-MG CAP        RASAGILINE (AZILECT) 1 MG TAB    TAKE 1 TABLET BY MOUTH ONCE DAILY IN THE EVENING   Discontinued Medications    RASAGILINE (AZILECT) 1 MG TAB    TAKE 1 TABLET BY MOUTH ONCE DAILY IN THE EVENING

## 2022-02-21 ENCOUNTER — PATIENT MESSAGE (OUTPATIENT)
Dept: NEUROLOGY | Facility: CLINIC | Age: 72
End: 2022-02-21
Payer: MEDICARE

## 2022-04-11 ENCOUNTER — DOCUMENTATION ONLY (OUTPATIENT)
Dept: FAMILY MEDICINE | Facility: CLINIC | Age: 72
End: 2022-04-11
Payer: MEDICARE

## 2022-05-09 ENCOUNTER — PATIENT MESSAGE (OUTPATIENT)
Dept: SMOKING CESSATION | Facility: CLINIC | Age: 72
End: 2022-05-09
Payer: MEDICARE

## 2022-07-08 ENCOUNTER — OFFICE VISIT (OUTPATIENT)
Dept: DERMATOLOGY | Facility: CLINIC | Age: 72
End: 2022-07-08
Payer: MEDICARE

## 2022-07-08 VITALS — BODY MASS INDEX: 19.14 KG/M2 | WEIGHT: 104 LBS | HEIGHT: 62 IN

## 2022-07-08 DIAGNOSIS — Z85.828 HISTORY OF NONMELANOMA SKIN CANCER: ICD-10-CM

## 2022-07-08 DIAGNOSIS — D22.9 MULTIPLE BENIGN NEVI: ICD-10-CM

## 2022-07-08 DIAGNOSIS — L82.1 SEBORRHEIC KERATOSES: ICD-10-CM

## 2022-07-08 DIAGNOSIS — L82.0 INFLAMED SEBORRHEIC KERATOSIS: Primary | ICD-10-CM

## 2022-07-08 DIAGNOSIS — D18.01 CHERRY ANGIOMA: ICD-10-CM

## 2022-07-08 DIAGNOSIS — L90.5 SCAR: ICD-10-CM

## 2022-07-08 PROCEDURE — 3008F PR BODY MASS INDEX (BMI) DOCUMENTED: ICD-10-PCS | Mod: CPTII,S$GLB,, | Performed by: DERMATOLOGY

## 2022-07-08 PROCEDURE — 1126F AMNT PAIN NOTED NONE PRSNT: CPT | Mod: CPTII,S$GLB,, | Performed by: DERMATOLOGY

## 2022-07-08 PROCEDURE — 17110 DESTRUCTION B9 LES UP TO 14: CPT | Mod: S$GLB,,, | Performed by: DERMATOLOGY

## 2022-07-08 PROCEDURE — 99999 PR PBB SHADOW E&M-EST. PATIENT-LVL III: CPT | Mod: PBBFAC,,, | Performed by: DERMATOLOGY

## 2022-07-08 PROCEDURE — 17110 PR DESTRUCTION BENIGN LESIONS UP TO 14: ICD-10-PCS | Mod: S$GLB,,, | Performed by: DERMATOLOGY

## 2022-07-08 PROCEDURE — 3288F PR FALLS RISK ASSESSMENT DOCUMENTED: ICD-10-PCS | Mod: CPTII,S$GLB,, | Performed by: DERMATOLOGY

## 2022-07-08 PROCEDURE — 1101F PR PT FALLS ASSESS DOC 0-1 FALLS W/OUT INJ PAST YR: ICD-10-PCS | Mod: CPTII,S$GLB,, | Performed by: DERMATOLOGY

## 2022-07-08 PROCEDURE — 99213 PR OFFICE/OUTPT VISIT, EST, LEVL III, 20-29 MIN: ICD-10-PCS | Mod: 25,S$GLB,, | Performed by: DERMATOLOGY

## 2022-07-08 PROCEDURE — 1160F PR REVIEW ALL MEDS BY PRESCRIBER/CLIN PHARMACIST DOCUMENTED: ICD-10-PCS | Mod: CPTII,S$GLB,, | Performed by: DERMATOLOGY

## 2022-07-08 PROCEDURE — 1101F PT FALLS ASSESS-DOCD LE1/YR: CPT | Mod: CPTII,S$GLB,, | Performed by: DERMATOLOGY

## 2022-07-08 PROCEDURE — 3008F BODY MASS INDEX DOCD: CPT | Mod: CPTII,S$GLB,, | Performed by: DERMATOLOGY

## 2022-07-08 PROCEDURE — 99213 OFFICE O/P EST LOW 20 MIN: CPT | Mod: 25,S$GLB,, | Performed by: DERMATOLOGY

## 2022-07-08 PROCEDURE — 1159F MED LIST DOCD IN RCRD: CPT | Mod: CPTII,S$GLB,, | Performed by: DERMATOLOGY

## 2022-07-08 PROCEDURE — 1159F PR MEDICATION LIST DOCUMENTED IN MEDICAL RECORD: ICD-10-PCS | Mod: CPTII,S$GLB,, | Performed by: DERMATOLOGY

## 2022-07-08 PROCEDURE — 1160F RVW MEDS BY RX/DR IN RCRD: CPT | Mod: CPTII,S$GLB,, | Performed by: DERMATOLOGY

## 2022-07-08 PROCEDURE — 99999 PR PBB SHADOW E&M-EST. PATIENT-LVL III: ICD-10-PCS | Mod: PBBFAC,,, | Performed by: DERMATOLOGY

## 2022-07-08 PROCEDURE — 3288F FALL RISK ASSESSMENT DOCD: CPT | Mod: CPTII,S$GLB,, | Performed by: DERMATOLOGY

## 2022-07-08 PROCEDURE — 1126F PR PAIN SEVERITY QUANTIFIED, NO PAIN PRESENT: ICD-10-PCS | Mod: CPTII,S$GLB,, | Performed by: DERMATOLOGY

## 2022-07-08 NOTE — PROGRESS NOTES
Subjective:       Patient ID:  Esther Segura is a 72 y.o. female who presents for   Chief Complaint   Patient presents with    Skin Check     tbsc    Spot     Above right eye brow     LOV- 11/16/21- ak, sk, lentigo, nevi    Here today for a TBSC  C/o spot above right eyebrow    Derm HX:   2/11/2019 for excision of SCC/KA R forearm, complicated by suture granuloma  Has no fhx of MM    Current Outpatient Medications:     BIOTIN ORAL, Take by mouth., Disp: , Rfl:     calcium carbonate-vitamin D3 500 mg(1,250mg) -400 unit Tab, , Disp: , Rfl:     carbidopa (LODOSYN) 25 mg tablet, TAKE 1 TABLET(25 MG) BY MOUTH THREE TIMES DAILY, Disp: 90 tablet, Rfl: 11    carbidopa-levodopa  mg (SINEMET)  mg per tablet, TAKE 1 AND 1/2 TABLETS BY MOUTH THREE TIMES DAILY, Disp: 405 tablet, Rfl: 03    ibuprofen (ADVIL,MOTRIN) 600 MG tablet, Take 1 tablet (600 mg total) by mouth every 6 (six) hours as needed for Pain (knee pain)., Disp: 30 tablet, Rfl: 0    Lactobacillus rhamnosus GG (CULTURELLE) 10 billion cell capsule, Take 1 capsule by mouth once daily. 15 billion count, Disp: , Rfl:     LORazepam (ATIVAN) 0.5 MG tablet, Take 1 tablet (0.5 mg total) by mouth every other day., Disp: 30 tablet, Rfl: 2    MAGNESIUM CITRATE ORAL, Take by mouth once. Taking 448mg OTC capsule, Disp: , Rfl:     multivit 33-mtfolate-nac-chrom 2.5-200-1 mg-mg-mg Cap, , Disp: , Rfl:     rasagiline (AZILECT) 1 mg Tab, TAKE 1 TABLET BY MOUTH ONCE DAILY IN THE EVENING, Disp: 90 tablet, Rfl: 3        Review of Systems   Constitutional: Negative for fever, chills and fatigue.   Respiratory: Negative for cough and shortness of breath.    Skin: Positive for daily sunscreen use, activity-related sunscreen use and wears hat. Negative for itching, rash and dry skin.   Hematologic/Lymphatic: Does not bruise/bleed easily.        Objective:    Physical Exam   Constitutional: She appears well-developed and well-nourished. No distress.   HENT:    Mouth/Throat: Lips normal.    Eyes: Lids are normal.    Neurological: She is alert and oriented to person, place, and time. She is not disoriented.   Psychiatric: She has a normal mood and affect. She is not agitated.   Skin:   Areas Examined (abnormalities noted in diagram):   Scalp / Hair Palpated and Inspected  Head / Face Inspection Performed  Neck Inspection Performed  Chest / Axilla Inspection Performed  Abdomen Inspection Performed  Genitals / Buttocks / Groin Inspection Performed  Back Inspection Performed  RUE Inspected  LUE Inspection Performed  RLE Inspected  LLE Inspection Performed  Nails and Digits Inspection Performed                       Diagram Legend     Erythematous scaling macule/papule c/w actinic keratosis       Vascular papule c/w angioma      Pigmented verrucoid papule/plaque c/w seborrheic keratosis      Yellow umbilicated papule c/w sebaceous hyperplasia      Irregularly shaped tan macule c/w lentigo     1-2 mm smooth white papules consistent with Milia      Movable subcutaneous cyst with punctum c/w epidermal inclusion cyst      Subcutaneous movable cyst c/w pilar cyst      Firm pink to brown papule c/w dermatofibroma      Pedunculated fleshy papule(s) c/w skin tag(s)      Evenly pigmented macule c/w junctional nevus     Mildly variegated pigmented, slightly irregular-bordered macule c/w mildly atypical nevus      Flesh colored to evenly pigmented papule c/w intradermal nevus       Pink pearly papule/plaque c/w basal cell carcinoma      Erythematous hyperkeratotic cursted plaque c/w SCC      Surgical scar with no sign of skin cancer recurrence      Open and closed comedones      Inflammatory papules and pustules      Verrucoid papule consistent consistent with wart     Erythematous eczematous patches and plaques     Dystrophic onycholytic nail with subungual debris c/w onychomycosis     Umbilicated papule    Erythematous-base heme-crusted tan verrucoid plaque consistent with inflamed  seborrheic keratosis     Erythematous Silvery Scaling Plaque c/w Psoriasis     See annotation      Assessment / Plan:        Inflamed seborrheic keratosis  Verbal consent obtained. 1 lesion(s) destroyed with electrodesiccation after anesthesia with 1% lidocaine with epinephrine. No complications.    Seborrheic keratoses  These are benign inherited growths without a malignant potential. Reassurance given to patient. No treatment is necessary.     History of nonmelanoma skin cancer  Scar  Area of previous melanoma examined. Site well healed with no signs of recurrence.  Total body skin examination performed today including at least 12 points as noted in physical examination. No lesions suspicious for malignancy noted.    Cherry angioma  This is a benign vascular lesion. Reassurance given. No treatment required.     Multiple benign nevi  Careful dermoscopy evaluation of nevi performed with none identified as needing biopsy today  Monitor for new mole or moles that are becoming bigger, darker, irritated, or developing irregular borders.     Patient instructed in importance in daily broad spectrum sun protection of at least spf 30. Mineral sunscreen ingredients preferred (Zinc +/- Titanium) and can be found OTC.   Recommend Elta MD for daily use on face and neck.  Patient encouraged to wear hat for all outdoor exposure.   Also discussed sun avoidance and use of protective clothing.             Follow up in about 1 year (around 7/8/2023), or if symptoms worsen or fail to improve.

## 2022-07-19 ENCOUNTER — PATIENT MESSAGE (OUTPATIENT)
Dept: FAMILY MEDICINE | Facility: CLINIC | Age: 72
End: 2022-07-19
Payer: MEDICARE

## 2022-07-19 DIAGNOSIS — Z12.39 ENCOUNTER FOR OTHER SCREENING FOR MALIGNANT NEOPLASM OF BREAST: Primary | ICD-10-CM

## 2022-07-19 DIAGNOSIS — Z12.31 ENCOUNTER FOR SCREENING MAMMOGRAM FOR MALIGNANT NEOPLASM OF BREAST: ICD-10-CM

## 2022-07-21 ENCOUNTER — PATIENT MESSAGE (OUTPATIENT)
Dept: FAMILY MEDICINE | Facility: CLINIC | Age: 72
End: 2022-07-21
Payer: MEDICARE

## 2022-08-06 ENCOUNTER — PATIENT MESSAGE (OUTPATIENT)
Dept: FAMILY MEDICINE | Facility: CLINIC | Age: 72
End: 2022-08-06
Payer: MEDICARE

## 2022-08-06 DIAGNOSIS — G20.A1 PARKINSON'S DISEASE: ICD-10-CM

## 2022-08-08 ENCOUNTER — HOSPITAL ENCOUNTER (OUTPATIENT)
Dept: RADIOLOGY | Facility: HOSPITAL | Age: 72
Discharge: HOME | End: 2022-08-08
Attending: FAMILY MEDICINE
Payer: MEDICARE

## 2022-08-08 ENCOUNTER — PATIENT MESSAGE (OUTPATIENT)
Dept: FAMILY MEDICINE | Facility: CLINIC | Age: 72
End: 2022-08-08
Payer: MEDICARE

## 2022-08-08 DIAGNOSIS — Z12.31 ENCOUNTER FOR SCREENING MAMMOGRAM FOR MALIGNANT NEOPLASM OF BREAST: ICD-10-CM

## 2022-08-08 DIAGNOSIS — Z12.39 ENCOUNTER FOR OTHER SCREENING FOR MALIGNANT NEOPLASM OF BREAST: ICD-10-CM

## 2022-08-08 PROCEDURE — 77067 SCR MAMMO BI INCL CAD: CPT | Mod: TC

## 2022-08-08 PROCEDURE — 77067 MAMMO DIGITAL SCREENING BILAT WITH TOMO: ICD-10-PCS | Mod: 26,,, | Performed by: RADIOLOGY

## 2022-08-08 PROCEDURE — 77067 SCR MAMMO BI INCL CAD: CPT | Mod: 26,,, | Performed by: RADIOLOGY

## 2022-08-08 PROCEDURE — 77063 BREAST TOMOSYNTHESIS BI: CPT | Mod: 26,,, | Performed by: RADIOLOGY

## 2022-08-08 PROCEDURE — 77063 BREAST TOMOSYNTHESIS BI: CPT | Mod: TC

## 2022-08-08 PROCEDURE — 77063 MAMMO DIGITAL SCREENING BILAT WITH TOMO: ICD-10-PCS | Mod: 26,,, | Performed by: RADIOLOGY

## 2022-08-08 RX ORDER — CARBIDOPA 25 MG/1
25 TABLET ORAL 3 TIMES DAILY
Qty: 270 TABLET | Refills: 3 | OUTPATIENT
Start: 2022-08-08

## 2022-08-22 ENCOUNTER — PATIENT MESSAGE (OUTPATIENT)
Dept: NEUROLOGY | Facility: CLINIC | Age: 72
End: 2022-08-22
Payer: MEDICARE

## 2022-08-24 ENCOUNTER — OFFICE VISIT (OUTPATIENT)
Dept: FAMILY MEDICINE | Facility: CLINIC | Age: 72
End: 2022-08-24
Payer: MEDICARE

## 2022-08-24 VITALS
DIASTOLIC BLOOD PRESSURE: 60 MMHG | HEIGHT: 62 IN | OXYGEN SATURATION: 97 % | BODY MASS INDEX: 18.7 KG/M2 | HEART RATE: 72 BPM | TEMPERATURE: 98 F | WEIGHT: 101.63 LBS | SYSTOLIC BLOOD PRESSURE: 90 MMHG

## 2022-08-24 DIAGNOSIS — L25.5 CONTACT DERMATITIS DUE TO PLANT: Primary | ICD-10-CM

## 2022-08-24 PROCEDURE — 3008F PR BODY MASS INDEX (BMI) DOCUMENTED: ICD-10-PCS | Mod: CPTII,S$GLB,, | Performed by: FAMILY MEDICINE

## 2022-08-24 PROCEDURE — 1160F PR REVIEW ALL MEDS BY PRESCRIBER/CLIN PHARMACIST DOCUMENTED: ICD-10-PCS | Mod: CPTII,S$GLB,, | Performed by: FAMILY MEDICINE

## 2022-08-24 PROCEDURE — 1159F PR MEDICATION LIST DOCUMENTED IN MEDICAL RECORD: ICD-10-PCS | Mod: CPTII,S$GLB,, | Performed by: FAMILY MEDICINE

## 2022-08-24 PROCEDURE — 3288F PR FALLS RISK ASSESSMENT DOCUMENTED: ICD-10-PCS | Mod: CPTII,S$GLB,, | Performed by: FAMILY MEDICINE

## 2022-08-24 PROCEDURE — 3074F PR MOST RECENT SYSTOLIC BLOOD PRESSURE < 130 MM HG: ICD-10-PCS | Mod: CPTII,S$GLB,, | Performed by: FAMILY MEDICINE

## 2022-08-24 PROCEDURE — 3078F PR MOST RECENT DIASTOLIC BLOOD PRESSURE < 80 MM HG: ICD-10-PCS | Mod: CPTII,S$GLB,, | Performed by: FAMILY MEDICINE

## 2022-08-24 PROCEDURE — 99999 PR PBB SHADOW E&M-EST. PATIENT-LVL III: ICD-10-PCS | Mod: PBBFAC,,, | Performed by: FAMILY MEDICINE

## 2022-08-24 PROCEDURE — 1160F RVW MEDS BY RX/DR IN RCRD: CPT | Mod: CPTII,S$GLB,, | Performed by: FAMILY MEDICINE

## 2022-08-24 PROCEDURE — 3008F BODY MASS INDEX DOCD: CPT | Mod: CPTII,S$GLB,, | Performed by: FAMILY MEDICINE

## 2022-08-24 PROCEDURE — 1159F MED LIST DOCD IN RCRD: CPT | Mod: CPTII,S$GLB,, | Performed by: FAMILY MEDICINE

## 2022-08-24 PROCEDURE — 3288F FALL RISK ASSESSMENT DOCD: CPT | Mod: CPTII,S$GLB,, | Performed by: FAMILY MEDICINE

## 2022-08-24 PROCEDURE — 3074F SYST BP LT 130 MM HG: CPT | Mod: CPTII,S$GLB,, | Performed by: FAMILY MEDICINE

## 2022-08-24 PROCEDURE — 1101F PT FALLS ASSESS-DOCD LE1/YR: CPT | Mod: CPTII,S$GLB,, | Performed by: FAMILY MEDICINE

## 2022-08-24 PROCEDURE — 1126F PR PAIN SEVERITY QUANTIFIED, NO PAIN PRESENT: ICD-10-PCS | Mod: CPTII,S$GLB,, | Performed by: FAMILY MEDICINE

## 2022-08-24 PROCEDURE — 99213 OFFICE O/P EST LOW 20 MIN: CPT | Mod: S$GLB,,, | Performed by: FAMILY MEDICINE

## 2022-08-24 PROCEDURE — 99213 PR OFFICE/OUTPT VISIT, EST, LEVL III, 20-29 MIN: ICD-10-PCS | Mod: S$GLB,,, | Performed by: FAMILY MEDICINE

## 2022-08-24 PROCEDURE — 1126F AMNT PAIN NOTED NONE PRSNT: CPT | Mod: CPTII,S$GLB,, | Performed by: FAMILY MEDICINE

## 2022-08-24 PROCEDURE — 1101F PR PT FALLS ASSESS DOC 0-1 FALLS W/OUT INJ PAST YR: ICD-10-PCS | Mod: CPTII,S$GLB,, | Performed by: FAMILY MEDICINE

## 2022-08-24 PROCEDURE — 99999 PR PBB SHADOW E&M-EST. PATIENT-LVL III: CPT | Mod: PBBFAC,,, | Performed by: FAMILY MEDICINE

## 2022-08-24 PROCEDURE — 3078F DIAST BP <80 MM HG: CPT | Mod: CPTII,S$GLB,, | Performed by: FAMILY MEDICINE

## 2022-08-24 RX ORDER — BETAMETHASONE DIPROPIONATE 0.5 MG/G
CREAM TOPICAL 2 TIMES DAILY
Qty: 15 G | Refills: 0 | Status: SHIPPED | OUTPATIENT
Start: 2022-08-24 | End: 2022-08-31 | Stop reason: SDUPTHER

## 2022-08-24 RX ORDER — PREDNISONE 20 MG/1
TABLET ORAL
Qty: 10 TABLET | Refills: 0 | Status: SHIPPED | OUTPATIENT
Start: 2022-08-24 | End: 2022-08-31 | Stop reason: SDUPTHER

## 2022-08-24 RX ORDER — PREDNISONE 20 MG/1
TABLET ORAL
Qty: 10 TABLET | Refills: 0 | OUTPATIENT
Start: 2022-08-24 | End: 2022-08-24 | Stop reason: SDUPTHER

## 2022-08-24 NOTE — PATIENT INSTRUCTIONS
Take both Benadryl 25 mg at bedtime and either Zyrtec or Claritin once a day.    An ice water compress can reduce itch as well.

## 2022-08-24 NOTE — PROGRESS NOTES
Subjective:       Patient ID: Esther Segura is a 72 y.o. female.    Chief Complaint: Rash (Left arm)    New to me patient here for UC visit.  Rash left arm  Onset 1-2 days after working in yard.  Itches, no spread and no throat swelling or chest tightness or wheezing.  Benadryl tried and causes drowsiness.    Review of Systems   Constitutional: Negative for fever.   Respiratory: Negative for shortness of breath.    Cardiovascular: Negative for chest pain.   Gastrointestinal: Negative for abdominal pain and nausea.   Skin: Positive for rash.   All other systems reviewed and are negative.      Objective:      Physical Exam  Constitutional:       General: She is not in acute distress.     Appearance: She is well-developed.   Cardiovascular:      Rate and Rhythm: Normal rate and regular rhythm.      Heart sounds: No murmur heard.  Pulmonary:      Effort: Pulmonary effort is normal.      Breath sounds: Normal breath sounds.   Skin:               Assessment:       1. Contact dermatitis due to plant        Plan:       Contact dermatitis due to plant  -     augmented betamethasone dipropionate (DIPROLENE-AF) 0.05 % cream; Apply topically 2 (two) times daily.  Dispense: 15 g; Refill: 0  -     Discontinue: predniSONE (DELTASONE) 20 MG tablet; One BID for 3 days then once a day orally  Dispense: 10 tablet; Refill: 0  -     predniSONE (DELTASONE) 20 MG tablet; One BID for 3 days then once a day orally  Dispense: 10 tablet; Refill: 0    Will treat w topical Diprolene and if needed, she will get the Prednisone.  Patient Instructions   Take both Benadryl 25 mg at bedtime and either Zyrtec or Claritin once a day.    An ice water compress can reduce itch as well.

## 2022-08-31 ENCOUNTER — PATIENT MESSAGE (OUTPATIENT)
Dept: FAMILY MEDICINE | Facility: CLINIC | Age: 72
End: 2022-08-31
Payer: MEDICARE

## 2022-08-31 DIAGNOSIS — L25.5 CONTACT DERMATITIS DUE TO PLANT: ICD-10-CM

## 2022-08-31 RX ORDER — PREDNISONE 20 MG/1
TABLET ORAL
Qty: 10 TABLET | Refills: 0 | Status: SHIPPED | OUTPATIENT
Start: 2022-08-31 | End: 2022-08-31 | Stop reason: SDUPTHER

## 2022-08-31 RX ORDER — PREDNISONE 20 MG/1
TABLET ORAL
Qty: 10 TABLET | Refills: 0 | Status: SHIPPED | OUTPATIENT
Start: 2022-08-31 | End: 2022-11-16 | Stop reason: SDUPTHER

## 2022-08-31 RX ORDER — BETAMETHASONE DIPROPIONATE 0.5 MG/G
CREAM TOPICAL 2 TIMES DAILY
Qty: 15 G | Refills: 0 | Status: SHIPPED | OUTPATIENT
Start: 2022-08-31 | End: 2022-11-16 | Stop reason: SDUPTHER

## 2022-08-31 NOTE — TELEPHONE ENCOUNTER
Call placed to patient regarding attached portal message. Patient has used up supply of Diprolene Cream that was e-scribed on 8-24-22. Requesting refill. States prefers to use [facility] on [facility]. Please advise. Thank you.

## 2022-09-07 ENCOUNTER — PATIENT MESSAGE (OUTPATIENT)
Dept: FAMILY MEDICINE | Facility: CLINIC | Age: 72
End: 2022-09-07
Payer: MEDICARE

## 2022-10-06 ENCOUNTER — PES CALL (OUTPATIENT)
Dept: ADMINISTRATIVE | Facility: CLINIC | Age: 72
End: 2022-10-06
Payer: MEDICARE

## 2022-10-18 ENCOUNTER — OFFICE VISIT (OUTPATIENT)
Dept: UROLOGY | Facility: CLINIC | Age: 72
End: 2022-10-18
Payer: MEDICARE

## 2022-10-18 VITALS — SYSTOLIC BLOOD PRESSURE: 134 MMHG | TEMPERATURE: 71 F | DIASTOLIC BLOOD PRESSURE: 69 MMHG

## 2022-10-18 DIAGNOSIS — N39.3 SUI (STRESS URINARY INCONTINENCE, FEMALE): Primary | ICD-10-CM

## 2022-10-18 LAB
BILIRUBIN, UA POC OHS: NEGATIVE
BLOOD, UA POC OHS: NEGATIVE
CLARITY, UA POC OHS: CLEAR
COLOR, UA POC OHS: YELLOW
GLUCOSE, UA POC OHS: NEGATIVE
KETONES, UA POC OHS: NEGATIVE
LEUKOCYTES, UA POC OHS: NEGATIVE
NITRITE, UA POC OHS: NEGATIVE
PH, UA POC OHS: 5.5
POC RESIDUAL URINE VOLUME: 0 ML (ref 0–100)
PROTEIN, UA POC OHS: NEGATIVE
SPECIFIC GRAVITY, UA POC OHS: 1.02
UROBILINOGEN, UA POC OHS: 0.2

## 2022-10-18 PROCEDURE — 81003 POCT URINALYSIS(INSTRUMENT): ICD-10-PCS | Mod: QW,S$GLB,, | Performed by: UROLOGY

## 2022-10-18 PROCEDURE — 99999 PR PBB SHADOW E&M-EST. PATIENT-LVL III: ICD-10-PCS | Mod: PBBFAC,,, | Performed by: UROLOGY

## 2022-10-18 PROCEDURE — 3075F PR MOST RECENT SYSTOLIC BLOOD PRESS GE 130-139MM HG: ICD-10-PCS | Mod: CPTII,S$GLB,, | Performed by: UROLOGY

## 2022-10-18 PROCEDURE — 99204 OFFICE O/P NEW MOD 45 MIN: CPT | Mod: S$GLB,,, | Performed by: UROLOGY

## 2022-10-18 PROCEDURE — 99999 PR PBB SHADOW E&M-EST. PATIENT-LVL III: CPT | Mod: PBBFAC,,, | Performed by: UROLOGY

## 2022-10-18 PROCEDURE — 1159F PR MEDICATION LIST DOCUMENTED IN MEDICAL RECORD: ICD-10-PCS | Mod: CPTII,S$GLB,, | Performed by: UROLOGY

## 2022-10-18 PROCEDURE — 1160F PR REVIEW ALL MEDS BY PRESCRIBER/CLIN PHARMACIST DOCUMENTED: ICD-10-PCS | Mod: CPTII,S$GLB,, | Performed by: UROLOGY

## 2022-10-18 PROCEDURE — 3078F PR MOST RECENT DIASTOLIC BLOOD PRESSURE < 80 MM HG: ICD-10-PCS | Mod: CPTII,S$GLB,, | Performed by: UROLOGY

## 2022-10-18 PROCEDURE — 99204 PR OFFICE/OUTPT VISIT, NEW, LEVL IV, 45-59 MIN: ICD-10-PCS | Mod: S$GLB,,, | Performed by: UROLOGY

## 2022-10-18 PROCEDURE — 3078F DIAST BP <80 MM HG: CPT | Mod: CPTII,S$GLB,, | Performed by: UROLOGY

## 2022-10-18 PROCEDURE — 1159F MED LIST DOCD IN RCRD: CPT | Mod: CPTII,S$GLB,, | Performed by: UROLOGY

## 2022-10-18 PROCEDURE — 1126F PR PAIN SEVERITY QUANTIFIED, NO PAIN PRESENT: ICD-10-PCS | Mod: CPTII,S$GLB,, | Performed by: UROLOGY

## 2022-10-18 PROCEDURE — 1126F AMNT PAIN NOTED NONE PRSNT: CPT | Mod: CPTII,S$GLB,, | Performed by: UROLOGY

## 2022-10-18 PROCEDURE — 3075F SYST BP GE 130 - 139MM HG: CPT | Mod: CPTII,S$GLB,, | Performed by: UROLOGY

## 2022-10-18 PROCEDURE — 51798 POCT BLADDER SCAN: ICD-10-PCS | Mod: S$GLB,,, | Performed by: UROLOGY

## 2022-10-18 PROCEDURE — 51798 US URINE CAPACITY MEASURE: CPT | Mod: S$GLB,,, | Performed by: UROLOGY

## 2022-10-18 PROCEDURE — 1160F RVW MEDS BY RX/DR IN RCRD: CPT | Mod: CPTII,S$GLB,, | Performed by: UROLOGY

## 2022-10-18 PROCEDURE — 81003 URINALYSIS AUTO W/O SCOPE: CPT | Mod: QW,S$GLB,, | Performed by: UROLOGY

## 2022-10-18 NOTE — PATIENT INSTRUCTIONS
Esther Segura is a 72 y.o. female     1. TIMOTHY (stress urinary incontinence, female)         Plan:    Specific to this patient: (in addition see below)  Refer to pelvic floor PT- sent message to gissell cross. She has mixed incontinence TIMOTHY>UUI. Could have component of OAB bc of rosy galo's but really sounds more like TIMOTHY. She's a  and would like to proceed with conservative treatment first- will refer to pelvic floor PT.   Timed voiding- don't hold urine >3 hours  Holding off on med  Return in 4 months to review symptoms, possible CMG if she feels like symptoms worsening and wants a procedure.

## 2022-10-18 NOTE — PROGRESS NOTES
[facility] Department of Urology  Pcp: MD Rocio Mendez  DOS: 10/18/2022  Referred by: Self, Aaareferral    Initial consult by me in clinic on 10/18/22:    Esther Segura is a very pleasant 72 y.o. female who is a new patient to our department referred for incontinence.  The patient reports urinary incontinence of several years duration. Sounds mostly like TIMOTHY. Worsening now leaking when goiing from sitting to standing.   The patient has not seen a urologist in the past. Previous oab meds tried: No.   Symptoms: DTF: q 2 hours. Daytime urgency: yes, especially in am. Daytime pad/depends usage: 1. Pads: thin. Wears a thin pad but irritates skin. However changes underwear 2x a night.   NTF:0-1x per night.Night-time pad/depends usage: 0  Overactive bladder risk factors/associations:  Dietary - Caffeine intake: No. Neurogenic - diagnosed with parkinson's in 2016. Some incontinence worse with this. Back surgery: No. Stroke: No. Diabetes: No. Bowel - stool incontinence: No. constipaton now.   The patient also has what sounds like symptoms of Stress incontinence G2, P2.The patient reports (stress) incontinence associated with coughing lifting laughing sneezing sitting to standing  or other exertional activities. She has tried kegels. . Says she hasn't been faithful to it.   Symptoms/complaints of prolapse: No. Hysterectomy: No. Prior bladder or vaginal surgery: No. Bladder scan PVR today was 0mL.  The patient is not prone to recurrent utis.  Previous abdominal imaging: ctap 7/16/20: L2.3cm cyst.  Smoking history: none. Family history of kidney cancer, bladder cancer: none. Anticoagulation:  No  Ua today: neg    Her most bothersome complaint today is: timothy now occurring when she goes from sitting to standing    Review of Systems:   As above     Urine history: family history of kidney, bladder or prostate cancer:No, personal or family history of kidney stones: No,tobacco use: No,  anticoagulation: No  10/18/22 neg     Past Medical History:   Diagnosis Date    Fever blister     Hyperlipidemia     Osteopenia     Parkinson's disease     Squamous cell carcinoma 01/18/2019    right forearm         Exam Findings:  Vitals:    10/18/22 1033   BP: 134/69   Temp: (!) 71 °F (21.7 °C)   Pulse: 71. Not temp      Assessment:   Esther Segura is a 72 y.o. female     1. TIMOTHY (stress urinary incontinence, female)         Plan:    Specific to this patient: (in addition see below)  Refer to pelvic floor PT- sent message to gissell cross. She has mixed incontinence TIMOTHY>UUI. Could have component of OAB bc of rosy galo's but really sounds more like TIMOTHY. She's a  and would like to proceed with conservative treatment first- will refer to pelvic floor PT.   Timed voiding- don't hold urine >3 hours  Holding off on med  Return in 4 months to review symptoms, possible CMG if she feels like symptoms worsening and wants a procedure.

## 2022-11-16 ENCOUNTER — PATIENT MESSAGE (OUTPATIENT)
Dept: FAMILY MEDICINE | Facility: CLINIC | Age: 72
End: 2022-11-16
Payer: MEDICARE

## 2022-12-04 ENCOUNTER — PATIENT MESSAGE (OUTPATIENT)
Dept: DERMATOLOGY | Facility: CLINIC | Age: 72
End: 2022-12-04
Payer: MEDICARE

## 2022-12-09 ENCOUNTER — OFFICE VISIT (OUTPATIENT)
Dept: NEUROLOGY | Facility: CLINIC | Age: 72
End: 2022-12-09
Payer: MEDICARE

## 2022-12-09 VITALS
HEIGHT: 62 IN | BODY MASS INDEX: 18.58 KG/M2 | HEART RATE: 76 BPM | DIASTOLIC BLOOD PRESSURE: 81 MMHG | SYSTOLIC BLOOD PRESSURE: 144 MMHG | WEIGHT: 101 LBS

## 2022-12-09 DIAGNOSIS — G20.A1 PARKINSON'S DISEASE: ICD-10-CM

## 2022-12-09 PROCEDURE — 99214 PR OFFICE/OUTPT VISIT, EST, LEVL IV, 30-39 MIN: ICD-10-PCS | Mod: S$GLB,,, | Performed by: PSYCHIATRY & NEUROLOGY

## 2022-12-09 PROCEDURE — 99214 OFFICE O/P EST MOD 30 MIN: CPT | Mod: S$GLB,,, | Performed by: PSYCHIATRY & NEUROLOGY

## 2022-12-09 RX ORDER — CARBIDOPA 25 MG/1
1 TABLET ORAL 3 TIMES DAILY
Qty: 90 TABLET | Refills: 12 | Status: SHIPPED | OUTPATIENT
Start: 2022-12-09 | End: 2022-12-27 | Stop reason: SDUPTHER

## 2022-12-09 NOTE — PROGRESS NOTES
"[facility]     PCP/Referring Provider: No referring provider defined for this encounter.  Date of Service: 12/9/2022    Chief Complaint: PDism    Interval Hx  Since last visit,  Continues rasagiline 1mg QHS  Added carbidopa TID and no nausea  Continues carbidopa/levodopa 25/100mg 1.5 tab PO TID - this helped her tremor  Ontime near constant    At times R leg cramping    Notices fatigue after 2pm - 4pm    No falls  Rides mile bike rides a week  She teaches yoga and riding bike  Ceruloplasmin NL    Med hx  Stopped propranolol - levodopa helped tremor more    PD Review of Symptoms:  Anosmia: Decreased since 2 years  Dysarthria/Hypophonia: None  Dysphagia/Sialorrhea: None  Hallucinations: None  Depression: None  Cognitive slowing: None  Impulsivity: None  Obsessions/Compulsions:   -None  Urinary changes: None  Constipation: at times  Orthostasis: None  Falls: None  Micrographia: None but she writes more slowly  Sleep issues:  -RBD: None  -Sleep Quality: sleeps fine  RLS Symptoms: None    "PriorHPI: Esther Seugra is a R HANDED 72 y.o. female with a medical issues significant for PDism sent for patient establishment. She noticed a resting tremor R hand at age 67. Tremor has increased and involved the R foot. 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 She easily walks 3 miles. She does a 10 mile bike ride twice a week. She has not been able to use as heavy weight as she's used to at the gym.  She exercises every day.    No family history of PD  Grandmother had a tremor  General lightheadedness in the afternoons.    Med hx  She tried carbidopa/levodopa 25/100 1 tab BID. She felt light-headed. - never felt that it helped her tremor  Tried amantadine 50mg and made her feel anxious"      Review of Systems:   Review of Systems   Constitutional:  Negative for fever.   HENT:  Negative for congestion.    Eyes:  Negative for double vision.   Respiratory:  Negative " for cough and shortness of breath.    Cardiovascular:  Negative for chest pain and leg swelling.   Gastrointestinal:  Negative for nausea.   Genitourinary:  Negative for dysuria.   Musculoskeletal:  Negative for falls.   Skin:  Negative for rash.   Neurological:  Positive for tremors. Negative for speech change and headaches.   Psychiatric/Behavioral:  Negative for depression.      Neuroleptic exposure:  None    Current Medications:  Outpatient Encounter Medications as of 12/9/2022   Medication Sig Dispense Refill    augmented betamethasone dipropionate (DIPROLENE-AF) 0.05 % cream Apply topically 2 (two) times daily. 15 g 0    calcium carbonate-vitamin D3 500 mg(1,250mg) -400 unit Tab       carbidopa (LODOSYN) 25 mg tablet Take 1 tablet (25 mg total) by mouth 3 (three) times daily. 90 tablet 12    carbidopa-levodopa  mg (SINEMET)  mg per tablet TAKE 1 AND 1/2 TABLETS BY MOUTH THREE TIMES DAILY 405 tablet 03    ibuprofen (ADVIL,MOTRIN) 600 MG tablet Take 1 tablet (600 mg total) by mouth every 6 (six) hours as needed for Pain (knee pain). 30 tablet 0    Lactobacillus rhamnosus GG (CULTURELLE) 10 billion cell capsule Take 1 capsule by mouth once daily. 15 billion count      LORazepam (ATIVAN) 0.5 MG tablet Take 1 tablet (0.5 mg total) by mouth every other day. 30 tablet 2    MAGNESIUM CITRATE ORAL Take by mouth once. Taking 448mg OTC capsule      multivit 33-mtfolate-nac-chrom 2.5-200-1 mg-mg-mg Cap       predniSONE (DELTASONE) 20 MG tablet One BID for 3 days then once a day orally 10 tablet 0    rasagiline (AZILECT) 1 mg Tab TAKE 1 TABLET BY MOUTH ONCE DAILY IN THE EVENING 90 tablet 3    [DISCONTINUED] carbidopa (LODOSYN) 25 mg tablet TAKE 1 TABLET BY MOUTH THREE TIMES DAILY 90 tablet 0     No facility-administered encounter medications on file as of 12/9/2022.       Past Medical History:  Patient Active Problem List   Diagnosis    Hyperlipidemia    Hand fracture    Encounter for well woman exam with  routine gynecological exam    Vulvar vestibulitis    Parkinson's disease    Epigastric pain    Liver lesion       Past Surgical History:  Past Surgical History:   Procedure Laterality Date    achiles tendon       broken hand Right     COLONOSCOPY      COLONOSCOPY N/A 7/9/2020    Procedure: COLONOSCOPY;  Surgeon: Markus Piña MD;  Location: [facility] ENDO;  Service: Endoscopy;  Laterality: N/A;    ESOPHAGOGASTRODUODENOSCOPY N/A 7/9/2020    Procedure: EGD (ESOPHAGOGASTRODUODENOSCOPY);  Surgeon: Markus Piña MD;  Location: [facility] ENDO;  Service: Endoscopy;  Laterality: N/A;    LIVER BIOPSY N/A 11/19/2020    Procedure: BIOPSY, LIVER;  Surgeon: Dosc Diagnostic Provider;  Location: [facility] OR;  Service: General;  Laterality: N/A;       Current Living Situation:  and kids    Social:  Social History     Socioeconomic History    Marital status:    Occupational History     Employer: [employer]   Tobacco Use    Smoking status: Never    Smokeless tobacco: Never   Substance and Sexual Activity    Alcohol use: Yes     Alcohol/week: 1.0 standard drink     Types: 1 Glasses of wine per week     Comment: daily     Drug use: No    Sexual activity: Never       Family History:  Family History   Problem Relation Age of Onset    Cancer Mother         lymphoma    Osteoporosis Father     Breast cancer Maternal Aunt     Ovarian cancer Neg Hx     Melanoma Neg Hx     Multiple sclerosis Neg Hx     Psoriasis Neg Hx     Eczema Neg Hx     Lupus Neg Hx     Acne Neg Hx     Depression Neg Hx     Suicidality Neg Hx        PHYSICAL:  There were no vitals taken for this visit.    General Medical Examination:  General: Good hygiene, appropriate appearance.   HEENT: Normocephalic, atraumatic.   Neck: Supple.   Chest: Unlabored breathing.   CV: Symmetric pulses.   Ext: No clubbing, cyanosis, or edema.     Mental Status:  Mood/Affect: Appropriate/congruent.  Level of consciousness: Awake, alert.  Orientation: Oriented to person,  place, time and situation.  Language: No Dysarthria    Cranial nerves:  I: Not tested  II: PERRL, VFF to counting  III, IV, VI: EOMI with conjugate gaze and no nystagmus on end gaze  V: Facial sensation intact and symmetric over the bilateral V1-V3  VII: Facial muscle activation intact and symmetric over the bilateral upper and lower face  VIII: Hearing intact in the b/l ears and symmetrical to finger rub  IX, X, XII: TUP midline - no atrophy or fasiculations  X: SCMs and shoulder shrug full strength b/l and symmetric    Motor:   -UE: 5/5 deltoids; 5/5 biceps, triceps; 5/5 wrist flexors, extensors; 5/5 interosseous; 5/5   -LEs: 5/5 hip flexion, extension; 5/5 knee flexion, extension; 5/5 ankle flexion, extension  No hypomimia or hypophonia    No resting tremor  DTRs:  ? Biceps Triceps Brachioradialis Knee Ankle   Left 2+ 2+ 2+ 2+    Right 2+ 2+ 2+ 2+        ? Finger taps Finger flicks NASEEM Heel taps   Left 0 0 0 0   Right 1+ 1+ 0 0   Neck tone: 0  ? Arm Leg   Left 0 0   Right 0 0     Sensation:   -Light touch: Intact and symmetric in the bilateral upper and lower extremities.    Coordination:   -Finger to nose: nl    Gait:  -Arises from chair without use of hands.  -Casual gait is: nl based  -Stride length: nl  -Arm Swing: decreased R    Laboratory Data:  Results for ALESSIA SHARMA (MRN [id number]) as of 11/18/2019 11:10   Ref. Range 8/19/2019 13:41   CERULOPLASMIN Latest Ref Range: 15.0 - 45.0 mg/dL 32.0       Imaging:  Brain MRI normal per my read    Assessment//Plan:   Problem List Items Addressed This Visit          Neuro    Parkinson's disease    Current Assessment & Plan     Extremely mild but progressive PD. Consistent with iPD due to slow onset unilateral. She is a  and likely will do well with good fitness habits.     Suggested continue rasagiline 1mg QHS for neuroprotection followed by carbidopa/levodopa 25/100mg 1.5tab  PO TID. Added carbidopa 25mg At each dose sinemet to curb  nausea.    Suggested screen for timing of dystonic spells R foot cramping           Relevant Medications    carbidopa (LODOSYN) 25 mg tablet   Gabby Parsons MD, MS [facility]  Department of Neurology  Movement Disorders

## 2022-12-09 NOTE — ASSESSMENT & PLAN NOTE
Extremely mild but progressive PD. Consistent with iPD due to slow onset unilateral. She is a  and likely will do well with good fitness habits.     Suggested continue rasagiline 1mg QHS for neuroprotection followed by carbidopa/levodopa 25/100mg 1.5tab  PO TID. Added carbidopa 25mg At each dose sinemet to curb nausea.    Suggested screen for timing of dystonic spells R foot cramping

## 2022-12-15 ENCOUNTER — CLINICAL SUPPORT (OUTPATIENT)
Dept: REHABILITATION | Facility: HOSPITAL | Age: 72
End: 2022-12-15
Attending: UROLOGY
Payer: MEDICARE

## 2022-12-15 DIAGNOSIS — N39.3 SUI (STRESS URINARY INCONTINENCE, FEMALE): ICD-10-CM

## 2022-12-15 DIAGNOSIS — M62.89 PELVIC FLOOR DYSFUNCTION: ICD-10-CM

## 2022-12-15 PROCEDURE — 97162 PT EVAL MOD COMPLEX 30 MIN: CPT | Mod: PN

## 2022-12-15 PROCEDURE — 97530 THERAPEUTIC ACTIVITIES: CPT | Mod: PN

## 2022-12-15 NOTE — PATIENT INSTRUCTIONS
"Home Exercise Program: 12/15/2022    DIAPHRAGMATIC BREATHING     The diaphragm is a dome shaped muscle that forms the floor of the rib cage. It is the most efficient muscle for breathing and relaxation, although most people are not used to using the diaphragm. Diaphragmatic or belly breathing is an important technique to learn because it helps settle down or relax the autonomic nervous system. The correct use of diaphragmatic breathing can help to quiet brain activity resulting in the relaxation of all the muscles and organs of the body. This is accomplished by slow rhythmic breathing concentrated in the diaphragm muscle rather than the chest.    How to do proper relaxation breathing:    Start by lying on your back or reclining in a chair in a relaxed position. Place one hand on your chest and the other on your abdomen.  Relax your jaw by placing your tongue on the floor of your mouth and keeping your teeth slightly apart.   Take a deep breath in, letting the abdomen expand and rise while you keep your upper chest, neck and shoulders relaxed.   As you breathe out, allow your abdomen and chest to fall. Exhale completely.  It doesn't matter if you breathe in/out through your nose and/or mouth. Do whichever feels comfortable.  Remember to breathe slowly.  Do not force your breathing. Do not hold your breath.  Repeat while doing stretches listed below:          "single knee to chest" - lay on your back, use your hands to pull your left knee to your chest, keeping the right leg straight on the bed. Hold this pose while you incorporate your diaphragmatic breathing. Repeat on the opposite side. Complete 3 sets of 10 breaths on both legs.       "Child's Pose" - first start by getting onto your hands and knees, then move your feet so they are touching and your knees are wider than your hips. Sit back so that you are sitting on your heels and reach your arms forward. Think about resting in this position. Complete 3 sets of 10 " "breaths.        Prone Figure 4 (not pictured):   Lay on your stomach. Bring your right knee out so that your foot is against the side of your left knee and you're making a "4" with your legs. Complete 3 sets of 10 breaths.       "

## 2022-12-15 NOTE — PROGRESS NOTES
[facility]  Pelvic Health Physical Therapy Initial Evaluation    Date: 12/15/2022   Name: Esther Macario ria  [facility] Number: [id number]  Therapy Diagnosis:   Encounter Diagnoses   Name Primary?    TIMOTHY (stress urinary incontinence, female)     Pelvic floor dysfunction      Physician: Rocio Cruz    Physician Orders: PT Eval and Treat   Medical Diagnosis from Referral: N39.3 (ICD-10-CM) - TIMOTHY (stress urinary incontinence, female)  Evaluation Date: 12/15/2022  Authorization Period Expiration:  Plan of Care Expiration: 2023  Visit # / Visits authorized:     Time In: 11:30  Time Out: 12:23  Total Appointment Time (timed & untimed codes): 53 minutes    Precautions: universal    Subjective     Date of onset: three years ago     History of current condition - Esther reports: having bladder leakage that has gotten worse over time. It started out with coughing and sneezing. Sometimes she has leakage, other times she does not. Notices leakage is worse when she is constipated. Taking cinemat 6 years ago due to Parkinson's Disease diagnosis.     OB/GYN History: , vaginal delivery, and episiotomy  Sexually active? No  Pain with vaginal exams, intercourse or tampon use? No    Pain:  Not applicable     Bladder/Bowel History: urinary incontinence  Frequency of urination:   Daytime: 8-10 times. Thinks she could wait for more than two hours, but usually wont.            Nighttime: occasionally, but never more than once   Difficulty initiating urine stream: No  Urine stream: strong  Complete emptying: No, feels like she has to force it at the end.   Bladder leakage: Yes  Frequency of incidents: some days not at all, other days all day.   Amount leaked (urine):  varies, some times is just a small amount, sometimes full bladder emptying   Urinary Urgency: Yes, Able to delay the urge for at least 15 minute(s).  Frequency of bowel movements: once a day, but a lot of times it is just  little pieces.   Difficulty initiating BM: No  Quality/Shape of BM: [place] Stool Chart type one or two   Does Patient Feel Empty after BM? Yes  Fiber Supplements or Laxative Use? No, has tried a few but the smallest dose is too much. Tried Doculax. Has tried Metamucil, did not like it. Eats lots of whole grains including flaxseed   Colon leakage: Yes, only gas not stool. Not sure if it is this medication she is on. Seems to have started after colonoscopy   Form of protection:  on days she is having leakage she does wear pads . Will fold up toilet paper as well  Number of pads required in 24 hours: two on days when she is leakage       Medical History: Esther  has a past medical history of Fever blister, Hyperlipidemia, Osteopenia, Parkinson's disease, and Squamous cell carcinoma (01/18/2019).     Surgical History: Esther Segura  has a past surgical history that includes achiles tendon ; broken hand (Right); Colonoscopy; Esophagogastroduodenoscopy (N/A, 7/9/2020); Colonoscopy (N/A, 7/9/2020); and Liver biopsy (N/A, 11/19/2020).    Medications: Esther has a current medication list which includes the following prescription(s): augmented betamethasone dipropionate, calcium carbonate-vitamin d3, carbidopa, carbidopa-levodopa  mg, ibuprofen, lactobacillus rhamnosus gg, lorazepam, magnesium citrate, multivit 33-mtfolate-nac-chrom, prednisone, and rasagiline.    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   Occupation: Pt works as a retired speech therapist and job-related duties include .  Prior Level of Function: independent   Current Level of Function: see above     Types of  fluid intake: drinks half gallon of water a day. Sometimes will have a cup of tea or coffee in the morning, but no caffeine. Does have a glass of wine with dinner   Diet: eats a lot of fruits and vegetables, does not eat red meat    Habitus: well developed, well nourished  Abuse/Neglect: No     Pts goals: decrease leakage     OBJECTIVE     See EMR under MEDIA for written consent provided 12/15/2022  Chaperone: declined    BREATHING MECHANICS ASSESSMENT   Thorax Assessment During Quiet Respiration: WNL excursion of ribcage and WNL excursion of abdominal wall  Thorax Assessment During Deep Respiration: WNL excursion of ribcage and WNL excursion of abdominal wall    VAGINAL PELVIC FLOOR EXAM    EXTERNAL ASSESSMENT  Introitus: WNL  Skin condition: WNL  Scarring: none   Sensation: WNL   Pain: none  Voluntary contraction: visible lift and accessory muscle use  Voluntary relaxation: visible drop  Involuntary contraction: visible drop  Bearing down: visible drop  Perineal descent: absent      INTERNAL ASSESSMENT  Pain: tender areas noted as follows: right levator ani and obturator internus with referral into right iliopsoas   Sensation: able to localized pressure appropriately   Vaginal vault: WNL   Muscle Bulk: hypertonus   Muscle Power: 2/5  Muscle Endurance: 7 sec  Quality of contraction: slow relaxation   Specificity: patient contracts: adductors, gluteals and flexor hallucis longus with increased hold time   Coordination: WNL   Comments: anterior wall laxity with fecal matter palpated through posterior vaginal wall      Limitation/Restriction for FOTO CMS Impairment/Limitation/Restriction for Urinary Problem Survey    Therapist reviewed FOTO scores for Esther Segura on 12/15/2022.   FOTO documents entered into [employer] - see Media section.    Limitation Score: 50%       TREATMENT     Treatment Time In: 11:53  Treatment Time Out: 12:23  Total Treatment time (time-based codes) separate from Evaluation: 30  minutes      Therapeutic Activity Patient participated in dynamic functional therapeutic activities to improve functional performance for 30 minutes. Including: Education as described below.     Patient Education provided:   general anatomy/physiology of urinary/ bowel  system and benefits of treatment were discussed with the pt. Additionally, anatomy/physiology of pelvic floor, posture/body mechanices, diaphragmatic breathing, double voiding techniques, behavior modifications, and Coordination of kegels with functional activities such as cough, laugh, sneeze, lift, etc.  were reviewed.     Home Exercises provided:  Written Home Exercises provided: yes.  Exercises were reviewed and Esther was able to demonstrate them prior to the end of the session.    Esther demonstrated good  understanding of the education provided.     See EMR under Patient Instructions for exercises provided 12/15/2022.    Assessment     Esther is a 72 y.o. female referred to outpatient Physical Therapy with a medical diagnosis of mixed urinary incontinence. Pt presents with pelvic asymmetry, pelvic floor tenderness, decreased pelvic muscle strength, decreased endurance of the pelvic muscles, decreased phasic ability of the pelvic muscles, increased tension of the pelvic muscles, increased frequency of urination, poor coordination of pelvic floor muscles during ADL's leading to urinary or fecal leakage, incomplete urination, dysfunctional voiding, and dysfunctional defecation.      Pt prognosis is Excellent.   Pt will benefit from skilled outpatient Physical Therapy to address the deficits stated above and in the chart below, provide pt/family education, and to maximize pt's level of independence.     Plan of care discussed with patient: Yes  Pt's spiritual, cultural and educational needs considered and patient is agreeable to the plan of care and goals as stated below:     Anticipated Barriers for therapy: progressive disease.  Parkinson's disease affecting right side of body mainly with correlation to increased muscle tone in right side of pelvic floor.     Medical Necessity is demonstrated by the following:    History  Co-morbidities and personal factors that may impact the plan of care Co-morbidities    has a past medical history of Fever blister, Hyperlipidemia, Osteopenia, Parkinson's disease, and Squamous cell carcinoma (01/18/2019).   has a past surgical history that includes achiles tendon ; broken hand (Right); Colonoscopy; Esophagogastroduodenoscopy (N/A, 7/9/2020); Colonoscopy (N/A, 7/9/2020); and Liver biopsy (N/A, 11/19/2020).      Personal Factors  no deficits     moderate   Examination  Body structures and functions, activity limitations and participation restrictions that may impact the plan of care Body Regions/Systems/Functions:      pelvic asymmetry, pelvic floor tenderness, decreased pelvic muscle strength, decreased endurance of the pelvic muscles, decreased phasic ability of the pelvic muscles, increased tension of the pelvic muscles, increased frequency of urination, poor coordination of pelvic floor muscles during ADL's leading to urinary or fecal leakage, incomplete urination, dysfunctional voiding, and dysfunctional defecation     Activity Limitations:  delaying urge to urinate, bearing down for BM, full bladder emptying, incontinence with ADLs, and Participating in social activities and ADLs due to pelvic pressure    Participation Restrictions:  ADLs affected by inability to fully empty bladder , regularly having a comfortable BM, and difficulty starting urine stream or fully emptying bladder     Activity limitations:   Learning and applying knowledge  no deficits    General Tasks and Commands  no deficits    Communication  no deficits    Mobility  lifting and carrying objects    Self care  toileting    Domestic Life  no deficits    Interactions/Relationships  complex interpersonal interactions    Life Areas  no  "deficits    Community and Social Life  recreation and leisure       moderate   Clinical Presentation stable and uncomplicated moderate   Decision Making/ Complexity Score: moderate     Goals:  Short Term Goals: 6 weeks   - Pt will demonstrate excellent knowledge and adherence to HEP to facilitate optimal recovery.  - Pt will demonstrate proper PFM contraction, relaxation, and lengthening coordinated with TA and breath for improved muscle coordination needed for functional activity.    Long Term Goals: 12 weeks   - Pt will demonstrate excellent knowledge and adherence to HEP for continued self-maintenance of symptoms.  - Pt will report FOTO score of 40% limited or less indicating clinically relevant increase in function.  - Pt will report voiding interval of 2-3 hours for improved ADL tolerance.  - Pt will report ability to delay urinary urge for at least 15 minutes with good urge suppression technique to maintain continence with ADL/IADLs.   - Pt will report minimal to no incidence of urinary incontinence 6/7 days for improved hygiene and ADL/IADL tolerance.   - Pt will report needing </= 1 pad/day during "leakage days" indicating improved PFM function needed to maintain continence  - Pt will demonstrate PFM strength of at least 3/5 MMT for improved strength needed to maintain continence.   - Pt will report bearing down appropriately 100% of the time for improved bowel function and decreased stress on adjacent pelvic structures.   - Pt will report improved ease with passing bowel movement 9/10 days      Plan     Plan of care Certification: 12/15/2022 to 03/16/2023.    Outpatient Physical Therapy 1 times weekly for 12 weeks to include the following interventions: therapeutic exercises, therapeutic activity, neuromuscular re-education, manual therapy, modalities PRN, patient/family education, dry needling, and self care/home management    Sudha Burns, SPT     "

## 2022-12-22 ENCOUNTER — DOCUMENTATION ONLY (OUTPATIENT)
Dept: REHABILITATION | Facility: HOSPITAL | Age: 72
End: 2022-12-22

## 2022-12-22 ENCOUNTER — CLINICAL SUPPORT (OUTPATIENT)
Dept: REHABILITATION | Facility: HOSPITAL | Age: 72
End: 2022-12-22
Payer: MEDICARE

## 2022-12-22 DIAGNOSIS — M62.89 PELVIC FLOOR DYSFUNCTION: Primary | ICD-10-CM

## 2022-12-22 PROCEDURE — 97110 THERAPEUTIC EXERCISES: CPT | Mod: PN,CQ

## 2022-12-22 NOTE — PROGRESS NOTES
Pelvic Health Physical Therapy   Treatment Note     Name: Esther Kearns  Clinic Number: [id number]    Therapy Diagnosis:   Encounter Diagnosis   Name Primary?    Pelvic floor dysfunction Yes     Physician: Rocio Cruz    Visit Date: 12/22/2022  Physician Orders: PT Eval and Treat   Medical Diagnosis from Referral: N39.3 (ICD-10-CM) - TIMOTHY (stress urinary incontinence, female)  Evaluation Date: 12/15/2022  Authorization Period Expiration:306/30/2023  Plan of Care Expiration: 03/16/2023  Visit # / Visits authorized: 1/ 11   PTA visit: 1/5    Time In: 3:00  Time Out: 3:45   Total Appointment Time (timed & untimed codes): 45 minutes     Precautions: universal  Subjective     Pt reports: No pain or soreness following initial eval. She reports chronic issues with iliopsoas on right  side. She reports compliance with home exercise program and does feels an improvement since starting to perform the kegels regularly.      She was compliant with home exercise program.  Response to previous treatment: good  Functional change: improvement of symptoms     Pain: 0/10  Location: right hip     Objective     Esther received therapeutic exercises to develop  strength, endurance, ROM, flexibility, posture, and core stabilization for 0 minutes including:  the following exercises       Esther received the following manual therapy techniques: to develop flexibility, extensibility, and desensitization for 45 minutes including: soft tissue mobilization of Pelvic Floor musculature     Time included education today    Esther participated in neuromuscular re-education activities to develop Coordination, Control, Down training, Posture, Proprioception, Kinesthetic, Balance, and Sense for 0 minutes including:  the following exercises       Esther participated in dynamic functional therapeutic activities to improve functional performance for 0  minutes, including:         Home Exercises Provided and Patient  Education Provided     Education provided:   - anatomy/physiology of pelvic floor and mary  Discussed progression of plan of care with patient; educated pt in activity modification; reviewed HEP with pt. Pt demonstrated and verbalized understanding of all instruction and was provided with a handout of HEP (see Patient Instructions).  -    Written Home Exercises Provided: Patient instructed to cont prior HEP.  Exercises were reviewed and Esther was able to demonstrate them prior to the end of the session.  Esther demonstrated good  understanding of the education provided.     See EMR under Patient Instructions for exercises provided prior visit.    Assessment     Pt presented to clinic engaged and willing to participate with therapy. She did have several questions about what to expect from Pelvic Floor therapy. Pt presented with excellent understanding of her home exercise program as well excellent self awareness of problematic body mechanics. 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   Manual therapy did reveal asymmetry of Pelvic Floor musculature with very healthy tone noted on left side and moderate hypertonicity noted on right side. She responded well to manual therapy with reduced tension and release of trigger points on left side following therapy.        Esther Is progressing well towards her goals.   Pt prognosis is Excellent.     Pt will continue to benefit from skilled outpatient physical therapy to address the deficits listed in the problem list box on initial evaluation, provide pt/family education and to maximize pt's level of independence in the home and community environment.     Pt's spiritual, cultural and educational needs considered and pt agreeable to plan of care and goals.     Anticipated barriers to physical therapy: progressive disease.  "Parkinson's disease affecting right side of body mainly with correlation to increased muscle tone in right side of pelvic floor.     Goals: Goals:  Short Term Goals: 6 weeks   - Pt will demonstrate excellent knowledge and adherence to HEP to facilitate optimal recovery.  - Pt will demonstrate proper PFM contraction, relaxation, and lengthening coordinated with TA and breath for improved muscle coordination needed for functional activity.     Long Term Goals: 12 weeks   - Pt will demonstrate excellent knowledge and adherence to HEP for continued self-maintenance of symptoms.  - Pt will report FOTO score of 40% limited or less indicating clinically relevant increase in function.  - Pt will report voiding interval of 2-3 hours for improved ADL tolerance.  - Pt will report ability to delay urinary urge for at least 15 minutes with good urge suppression technique to maintain continence with ADL/IADLs.   - Pt will report minimal to no incidence of urinary incontinence 6/7 days for improved hygiene and ADL/IADL tolerance.   - Pt will report needing </= 1 pad/day during "leakage days" indicating improved PFM function needed to maintain continence  - Pt will demonstrate PFM strength of at least 3/5 MMT for improved strength needed to maintain continence.   - Pt will report bearing down appropriately 100% of the time for improved bowel function and decreased stress on adjacent pelvic structures.   - Pt will report improved ease with passing bowel movement 9/10 days      Plan   Plan of care Certification: 12/15/2022 to 03/16/2023.     Outpatient Physical Therapy 1 times weekly for 12 weeks       Zully Allison PTA    "

## 2022-12-29 ENCOUNTER — PATIENT MESSAGE (OUTPATIENT)
Dept: FAMILY MEDICINE | Facility: CLINIC | Age: 72
End: 2022-12-29
Payer: MEDICARE

## 2022-12-29 DIAGNOSIS — E78.5 HYPERLIPIDEMIA, UNSPECIFIED HYPERLIPIDEMIA TYPE: Primary | ICD-10-CM

## 2023-01-03 ENCOUNTER — CLINICAL SUPPORT (OUTPATIENT)
Dept: REHABILITATION | Facility: HOSPITAL | Age: 73
End: 2023-01-03
Payer: MEDICARE

## 2023-01-03 ENCOUNTER — PATIENT MESSAGE (OUTPATIENT)
Dept: NEUROLOGY | Facility: CLINIC | Age: 73
End: 2023-01-03
Payer: MEDICARE

## 2023-01-03 ENCOUNTER — PATIENT OUTREACH (OUTPATIENT)
Dept: ADMINISTRATIVE | Facility: HOSPITAL | Age: 73
End: 2023-01-03
Payer: MEDICARE

## 2023-01-03 DIAGNOSIS — M62.89 PELVIC FLOOR DYSFUNCTION: Primary | ICD-10-CM

## 2023-01-03 PROCEDURE — 97140 MANUAL THERAPY 1/> REGIONS: CPT | Mod: PN,CQ

## 2023-01-03 NOTE — PROGRESS NOTES
2023 Care Everywhere updates requested and reviewed.  Immunizations reconciled. Media reports reviewed.  Duplicate HM overrides and  orders removed.  Overdue HM topic chart audit and/or requested.  Overdue lab testing linked to upcoming lab appointments if applies.    There are no preventive care reminders to display for this patient.

## 2023-01-03 NOTE — PROGRESS NOTES
"    Pelvic Health Physical Therapy   Treatment Note     Name: Esther Segura  Clinic Number: [id number]    Therapy Diagnosis:   Encounter Diagnosis   Name Primary?    Pelvic floor dysfunction Yes     Physician: oRcio Cruz    Visit Date: 1/3/2023  Physician Orders: PT Eval and Treat   Medical Diagnosis from Referral: N39.3 (ICD-10-CM) - TIMOTHY (stress urinary incontinence, female)  Evaluation Date: 12/15/2022  Authorization Period Expiration:306/30/2023  Plan of Care Expiration: 03/16/2023  Visit # / Visits authorized: 2/ 11   PTA visit: 2/5    Time In: 3:00  Time Out: 3:45   Total Appointment Time (timed & untimed codes): 45 minutes     Precautions: universal  Subjective     Pt reports: "Some days are good some days are not as good."     She was compliant with home exercise program.  Response to previous treatment: good  Functional change: improvement of symptoms     Pain: 0/10  Location: right hip     Objective     Esther received therapeutic exercises to develop  strength, endurance, ROM, flexibility, posture, and core stabilization for 0 minutes including:  the following exercises       Esther received the following manual therapy techniques: to develop flexibility, extensibility, and desensitization for 45 minutes including: soft tissue mobilization of Pelvic Floor musculature     Time included education today    Esther participated in neuromuscular re-education activities to develop Coordination, Control, Down training, Posture, Proprioception, Kinesthetic, Balance, and Sense for 0 minutes including:  the following exercises       Esther participated in dynamic functional therapeutic activities to improve functional performance for 0  minutes, including:         Home Exercises Provided and Patient Education Provided     Education provided:   - anatomy/physiology of pelvic floor and kegels  Discussed progression of plan of care with patient; educated pt in activity modification; " "reviewed HEP with pt. Pt demonstrated and verbalized understanding of all instruction and was provided with a handout of HEP (see Patient Instructions).    HOW DIET MAY AFFECT YOUR BLADDER    Although there is no particular "diet" that can cure bladder control, there are certain dietary suggestions you can use to help control the problem.  Many people with bladder control problems decrease their intake of liquids in hope that they will need to urinate less frequently or have less urinary leakage.  While a decrease in liquid intake does result in a decrease in the volume of urine, the smaller amount of urine may be more highly concentrated.  Highly concentrated, dark yellow urine is irritating to the bladder surface and may actually cause you to go to the bathroom more frequently.  It also encourages the growth of bacteria, which may lead to infections resulting in incontinence.  You should not restrict fluids to control your bladder.    Some foods and beverages are thought to contribute to bladder leakage and irritability. However their effect on the bladder is not completely understood and you may want to see if eliminating one or all of these items improves your bladder control.  If you are unable to give them up completely, it is recommended that you use the following items in moderation:    Foods with acidic properties:  Alcoholic beverages  Tomato based products  Vinegar  Coffee (regular and decaf)  Tea (regular and decaf)  Beach  Citrus fruits and juices  Spicy foods  Caffeinated beverages  Cola  Milk  Food colorings and flavorings  Artificial sweeteners  Chocolate    Cigarette smoking is also irritating to the bladder surface and is associated with bladder cancer.  In addition, the coughing associated with smoking may lead to increased incontinent episodes.    Substitutions for Bladder Irritants  Although water is always the best beverage choice, grape and apple juice are thirst quenchers and are not as " irritating to the bladder.    Low acid fruits:  pears, apricots, papaya, watermelon    For coffee drinkers: KAVA®  Postum®  Christiano®  Kaffree [place]®    For tea drinkers: Non-citrus herbal    Sun brewed tea    Vitamin C substitute:  Calcium carbonate co-buffered with calcium ascorbate    Good Habits  Maintain a good fluid intake. Depending on your body size and environment, drink 4-8 cups (8 oz each) of fluid per day unless otherwise advised by your doctor. Not enough fluid creates a foul odor and dark color of the urine.  Typical dietary recommendations for fiber are between 25-35 grams per day. You should discuss your fiber needs with your physician, pharmacist or nutritionist.     Written Home Exercises Provided: Patient instructed to cont prior HEP.  Exercises were reviewed and Esther was able to demonstrate them prior to the end of the session.  Esther demonstrated good  understanding of the education provided.     See EMR under Patient Instructions for exercises provided prior visit.    Assessment     Pt presented to clinic engaged and willing to participate with therapy. Significant improvement of Pelvic Floor tone noted today. Minimal trigger points noted with only mild hypertonicity throughout. Small tremor noted in right Obturator Internus during manual therapy that presents similar to trigger point release however palpable difference noted to true trigger point release. Pt is clearly compliant with home exercise program and has a very good understanding of what will be required to maintain therapeutic gains.        Esther Is progressing well towards her goals.   Pt prognosis is Excellent.     Pt will continue to benefit from skilled outpatient physical therapy to address the deficits listed in the problem list box on initial evaluation, provide pt/family education and to maximize pt's level of independence in the home and community environment.     Pt's spiritual, cultural and educational needs  "considered and pt agreeable to plan of care and goals.     Anticipated barriers to physical therapy: progressive disease. Parkinson's disease affecting right side of body mainly with correlation to increased muscle tone in right side of pelvic floor.     Goals: Goals:  Short Term Goals: 6 weeks   - Pt will demonstrate excellent knowledge and adherence to HEP to facilitate optimal recovery.  - Pt will demonstrate proper PFM contraction, relaxation, and lengthening coordinated with TA and breath for improved muscle coordination needed for functional activity.     Long Term Goals: 12 weeks   - Pt will demonstrate excellent knowledge and adherence to HEP for continued self-maintenance of symptoms.  - Pt will report FOTO score of 40% limited or less indicating clinically relevant increase in function.  - Pt will report voiding interval of 2-3 hours for improved ADL tolerance.  - Pt will report ability to delay urinary urge for at least 15 minutes with good urge suppression technique to maintain continence with ADL/IADLs.   - Pt will report minimal to no incidence of urinary incontinence 6/7 days for improved hygiene and ADL/IADL tolerance.   - Pt will report needing </= 1 pad/day during "leakage days" indicating improved PFM function needed to maintain continence  - Pt will demonstrate PFM strength of at least 3/5 MMT for improved strength needed to maintain continence.   - Pt will report bearing down appropriately 100% of the time for improved bowel function and decreased stress on adjacent pelvic structures.   - Pt will report improved ease with passing bowel movement 9/10 days      Plan   Plan of care Certification: 12/15/2022 to 03/16/2023.     Outpatient Physical Therapy 1 times weekly for 12 weeks       Zully Allison PTA      "

## 2023-01-03 NOTE — PATIENT INSTRUCTIONS
"HOW DIET MAY AFFECT YOUR BLADDER    Although there is no particular "diet" that can cure bladder control, there are certain dietary suggestions you can use to help control the problem.  Many people with bladder control problems decrease their intake of liquids in hope that they will need to urinate less frequently or have less urinary leakage.  While a decrease in liquid intake does result in a decrease in the volume of urine, the smaller amount of urine may be more highly concentrated.  Highly concentrated, dark yellow urine is irritating to the bladder surface and may actually cause you to go to the bathroom more frequently.  It also encourages the growth of bacteria, which may lead to infections resulting in incontinence.  You should not restrict fluids to control your bladder.    Some foods and beverages are thought to contribute to bladder leakage and irritability. However their effect on the bladder is not completely understood and you may want to see if eliminating one or all of these items improves your bladder control.  If you are unable to give them up completely, it is recommended that you use the following items in moderation:    Foods with acidic properties:  Alcoholic beverages  Tomato based products  Vinegar  Coffee (regular and decaf)  Tea (regular and decaf)  Beach  Citrus fruits and juices  Spicy foods  Caffeinated beverages  Cola  Milk  Food colorings and flavorings  Artificial sweeteners  Chocolate    Cigarette smoking is also irritating to the bladder surface and is associated with bladder cancer.  In addition, the coughing associated with smoking may lead to increased incontinent episodes.          Substitutions for Bladder Irritants  Although water is always the best beverage choice, grape and apple juice are thirst quenchers and are not as irritating to the bladder.    Low acid fruits:  pears, apricots, papaya, watermelon    For coffee drinkers: KAVA®  Postum®  Christiano®  Kaffree [place]®    For tea " drinkers: Non-citrus herbal    Sun brewed tea    Vitamin C substitute:  Calcium carbonate co-buffered with calcium ascorbate    Good Habits  Maintain a good fluid intake. Depending on your body size and environment, drink 4-8 cups (8 oz each) of fluid per day unless otherwise advised by your doctor. Not enough fluid creates a foul odor and dark color of the urine.  Typical dietary recommendations for fiber are between 25-35 grams per day. You should discuss your fiber needs with your physician, pharmacist or nutritionist.

## 2023-01-10 ENCOUNTER — LAB VISIT (OUTPATIENT)
Dept: LAB | Facility: HOSPITAL | Age: 73
End: 2023-01-10
Attending: FAMILY MEDICINE
Payer: MEDICARE

## 2023-01-10 DIAGNOSIS — E78.5 HYPERLIPIDEMIA, UNSPECIFIED HYPERLIPIDEMIA TYPE: ICD-10-CM

## 2023-01-10 LAB
ALBUMIN SERPL BCP-MCNC: 4.2 G/DL (ref 3.5–5.2)
ALP SERPL-CCNC: 65 U/L (ref 55–135)
ALT SERPL W/O P-5'-P-CCNC: <5 U/L (ref 10–44)
ANION GAP SERPL CALC-SCNC: 6 MMOL/L (ref 8–16)
AST SERPL-CCNC: 21 U/L (ref 10–40)
BILIRUB SERPL-MCNC: 0.3 MG/DL (ref 0.1–1)
BUN SERPL-MCNC: 24 MG/DL (ref 8–23)
CALCIUM SERPL-MCNC: 10 MG/DL (ref 8.7–10.5)
CHLORIDE SERPL-SCNC: 99 MMOL/L (ref 95–110)
CHOLEST SERPL-MCNC: 255 MG/DL (ref 120–199)
CHOLEST/HDLC SERPL: 2.7 {RATIO} (ref 2–5)
CO2 SERPL-SCNC: 31 MMOL/L (ref 23–29)
CREAT SERPL-MCNC: 0.8 MG/DL (ref 0.5–1.4)
EST. GFR  (NO RACE VARIABLE): >60 ML/MIN/1.73 M^2
GLUCOSE SERPL-MCNC: 102 MG/DL (ref 70–110)
HDLC SERPL-MCNC: 93 MG/DL (ref 40–75)
HDLC SERPL: 36.5 % (ref 20–50)
LDLC SERPL CALC-MCNC: 145 MG/DL (ref 63–159)
NONHDLC SERPL-MCNC: 162 MG/DL
POTASSIUM SERPL-SCNC: 4.2 MMOL/L (ref 3.5–5.1)
PROT SERPL-MCNC: 6.8 G/DL (ref 6–8.4)
SODIUM SERPL-SCNC: 136 MMOL/L (ref 136–145)
TRIGL SERPL-MCNC: 85 MG/DL (ref 30–150)

## 2023-01-10 PROCEDURE — 36415 COLL VENOUS BLD VENIPUNCTURE: CPT | Mod: PO | Performed by: FAMILY MEDICINE

## 2023-01-10 PROCEDURE — 85025 COMPLETE CBC W/AUTO DIFF WBC: CPT | Performed by: FAMILY MEDICINE

## 2023-01-10 PROCEDURE — 80053 COMPREHEN METABOLIC PANEL: CPT | Performed by: FAMILY MEDICINE

## 2023-01-10 PROCEDURE — 80061 LIPID PANEL: CPT | Performed by: FAMILY MEDICINE

## 2023-01-10 PROCEDURE — 84443 ASSAY THYROID STIM HORMONE: CPT | Performed by: FAMILY MEDICINE

## 2023-01-11 LAB
BASOPHILS # BLD AUTO: 0.05 K/UL (ref 0–0.2)
BASOPHILS NFR BLD: 0.8 % (ref 0–1.9)
DIFFERENTIAL METHOD: ABNORMAL
EOSINOPHIL # BLD AUTO: 0.1 K/UL (ref 0–0.5)
EOSINOPHIL NFR BLD: 1.2 % (ref 0–8)
ERYTHROCYTE [DISTWIDTH] IN BLOOD BY AUTOMATED COUNT: 12.9 % (ref 11.5–14.5)
HCT VFR BLD AUTO: 37 % (ref 37–48.5)
HGB BLD-MCNC: 11.7 G/DL (ref 12–16)
IMM GRANULOCYTES # BLD AUTO: 0.01 K/UL (ref 0–0.04)
IMM GRANULOCYTES NFR BLD AUTO: 0.2 % (ref 0–0.5)
LYMPHOCYTES # BLD AUTO: 1.7 K/UL (ref 1–4.8)
LYMPHOCYTES NFR BLD: 26.4 % (ref 18–48)
MCH RBC QN AUTO: 33.1 PG (ref 27–31)
MCHC RBC AUTO-ENTMCNC: 31.6 G/DL (ref 32–36)
MCV RBC AUTO: 105 FL (ref 82–98)
MONOCYTES # BLD AUTO: 0.6 K/UL (ref 0.3–1)
MONOCYTES NFR BLD: 9.5 % (ref 4–15)
NEUTROPHILS # BLD AUTO: 4.1 K/UL (ref 1.8–7.7)
NEUTROPHILS NFR BLD: 61.9 % (ref 38–73)
NRBC BLD-RTO: 0 /100 WBC
PLATELET # BLD AUTO: 301 K/UL (ref 150–450)
PMV BLD AUTO: 10.6 FL (ref 9.2–12.9)
RBC # BLD AUTO: 3.53 M/UL (ref 4–5.4)
TSH SERPL DL<=0.005 MIU/L-ACNC: 1.54 UIU/ML (ref 0.4–4)
WBC # BLD AUTO: 6.55 K/UL (ref 3.9–12.7)

## 2023-01-18 ENCOUNTER — OFFICE VISIT (OUTPATIENT)
Dept: FAMILY MEDICINE | Facility: CLINIC | Age: 73
End: 2023-01-18
Payer: MEDICARE

## 2023-01-18 VITALS
HEIGHT: 62 IN | OXYGEN SATURATION: 97 % | TEMPERATURE: 98 F | DIASTOLIC BLOOD PRESSURE: 70 MMHG | HEART RATE: 93 BPM | RESPIRATION RATE: 18 BRPM | SYSTOLIC BLOOD PRESSURE: 120 MMHG | WEIGHT: 102.06 LBS | BODY MASS INDEX: 18.78 KG/M2

## 2023-01-18 DIAGNOSIS — E78.5 HYPERLIPIDEMIA, UNSPECIFIED HYPERLIPIDEMIA TYPE: ICD-10-CM

## 2023-01-18 DIAGNOSIS — F41.9 ANXIETY: ICD-10-CM

## 2023-01-18 DIAGNOSIS — G20.A1 PARKINSON'S DISEASE: ICD-10-CM

## 2023-01-18 DIAGNOSIS — Z00.00 ROUTINE MEDICAL EXAM: Primary | ICD-10-CM

## 2023-01-18 DIAGNOSIS — L25.5 CONTACT DERMATITIS DUE TO PLANT: ICD-10-CM

## 2023-01-18 PROCEDURE — 3074F SYST BP LT 130 MM HG: CPT | Mod: CPTII,S$GLB,, | Performed by: FAMILY MEDICINE

## 2023-01-18 PROCEDURE — 3288F FALL RISK ASSESSMENT DOCD: CPT | Mod: CPTII,S$GLB,, | Performed by: FAMILY MEDICINE

## 2023-01-18 PROCEDURE — 1126F PR PAIN SEVERITY QUANTIFIED, NO PAIN PRESENT: ICD-10-PCS | Mod: CPTII,S$GLB,, | Performed by: FAMILY MEDICINE

## 2023-01-18 PROCEDURE — 3074F PR MOST RECENT SYSTOLIC BLOOD PRESSURE < 130 MM HG: ICD-10-PCS | Mod: CPTII,S$GLB,, | Performed by: FAMILY MEDICINE

## 2023-01-18 PROCEDURE — 3078F DIAST BP <80 MM HG: CPT | Mod: CPTII,S$GLB,, | Performed by: FAMILY MEDICINE

## 2023-01-18 PROCEDURE — 1159F MED LIST DOCD IN RCRD: CPT | Mod: CPTII,S$GLB,, | Performed by: FAMILY MEDICINE

## 2023-01-18 PROCEDURE — 99397 PER PM REEVAL EST PAT 65+ YR: CPT | Mod: GZ,S$GLB,, | Performed by: FAMILY MEDICINE

## 2023-01-18 PROCEDURE — 1159F PR MEDICATION LIST DOCUMENTED IN MEDICAL RECORD: ICD-10-PCS | Mod: CPTII,S$GLB,, | Performed by: FAMILY MEDICINE

## 2023-01-18 PROCEDURE — 99999 PR PBB SHADOW E&M-EST. PATIENT-LVL III: ICD-10-PCS | Mod: PBBFAC,,, | Performed by: FAMILY MEDICINE

## 2023-01-18 PROCEDURE — 3008F PR BODY MASS INDEX (BMI) DOCUMENTED: ICD-10-PCS | Mod: CPTII,S$GLB,, | Performed by: FAMILY MEDICINE

## 2023-01-18 PROCEDURE — 1101F PR PT FALLS ASSESS DOC 0-1 FALLS W/OUT INJ PAST YR: ICD-10-PCS | Mod: CPTII,S$GLB,, | Performed by: FAMILY MEDICINE

## 2023-01-18 PROCEDURE — 3078F PR MOST RECENT DIASTOLIC BLOOD PRESSURE < 80 MM HG: ICD-10-PCS | Mod: CPTII,S$GLB,, | Performed by: FAMILY MEDICINE

## 2023-01-18 PROCEDURE — 1101F PT FALLS ASSESS-DOCD LE1/YR: CPT | Mod: CPTII,S$GLB,, | Performed by: FAMILY MEDICINE

## 2023-01-18 PROCEDURE — 1126F AMNT PAIN NOTED NONE PRSNT: CPT | Mod: CPTII,S$GLB,, | Performed by: FAMILY MEDICINE

## 2023-01-18 PROCEDURE — 99999 PR PBB SHADOW E&M-EST. PATIENT-LVL III: CPT | Mod: PBBFAC,,, | Performed by: FAMILY MEDICINE

## 2023-01-18 PROCEDURE — 99397 PR PREVENTIVE VISIT,EST,65 & OVER: ICD-10-PCS | Mod: GZ,S$GLB,, | Performed by: FAMILY MEDICINE

## 2023-01-18 PROCEDURE — 3288F PR FALLS RISK ASSESSMENT DOCUMENTED: ICD-10-PCS | Mod: CPTII,S$GLB,, | Performed by: FAMILY MEDICINE

## 2023-01-18 PROCEDURE — 3008F BODY MASS INDEX DOCD: CPT | Mod: CPTII,S$GLB,, | Performed by: FAMILY MEDICINE

## 2023-01-18 RX ORDER — BETAMETHASONE DIPROPIONATE 0.5 MG/G
CREAM TOPICAL 2 TIMES DAILY
Qty: 50 G | Refills: 2 | Status: ON HOLD | OUTPATIENT
Start: 2023-01-18 | End: 2023-08-22 | Stop reason: HOSPADM

## 2023-01-18 RX ORDER — LORAZEPAM 0.5 MG/1
0.5 TABLET ORAL EVERY OTHER DAY
Qty: 30 TABLET | Refills: 2 | Status: SHIPPED | OUTPATIENT
Start: 2023-01-18 | End: 2023-05-23 | Stop reason: SDUPTHER

## 2023-01-18 NOTE — PROGRESS NOTES
Subjective:       Patient ID: Esther Segura is a 72 y.o. female.    Chief Complaint: Annual Exam (States she has had a rash that comes and goes from time to time)    HPI    Here for a check up.      Labs reviewed. Elevation in total cholesterol     Seeing neuro for management of parkinsons. Monitored and stable      Anxiety stable while on ativan.     Reports rash on wrists recently. Diagnosed with contact dermatitis from plant exposure.     Review of Systems      Review of Systems   Constitutional: Negative for fever and chills.   HENT: Negative for hearing loss and neck stiffness.    Eyes: Negative for redness and itching.   Respiratory: Negative for cough and choking.    Cardiovascular: Negative for chest pain and leg swelling.  Abdomen: Negative for abdominal pain and blood in stool.   Genitourinary: Negative for dysuria and flank pain.   Musculoskeletal: Negative for back pain and gait problem.   Neurological: Negative for light-headedness and headaches.   Hematological: Negative for adenopathy.   Psychiatric/Behavioral: Negative for behavioral problems.     Objective:      Physical Exam  Constitutional:       Appearance: She is well-developed.   HENT:      Head: Normocephalic and atraumatic.   Eyes:      Conjunctiva/sclera: Conjunctivae normal.      Pupils: Pupils are equal, round, and reactive to light.   Cardiovascular:      Rate and Rhythm: Normal rate and regular rhythm.      Heart sounds: No murmur heard.  Pulmonary:      Effort: Pulmonary effort is normal.      Breath sounds: Normal breath sounds.   Musculoskeletal:      Cervical back: Normal range of motion.   Lymphadenopathy:      Cervical: No cervical adenopathy.       Assessment:       1. Routine medical exam    2. Parkinson's disease    3. Contact dermatitis due to plant    4. Anxiety    5. Hyperlipidemia, unspecified hyperlipidemia type        Plan:       Routine medical exam    Parkinson's disease    Contact dermatitis due to  plant    Anxiety    Hyperlipidemia, unspecified hyperlipidemia type    Other orders  -     LORazepam (ATIVAN) 0.5 MG tablet; Take 1 tablet (0.5 mg total) by mouth every other day.  Dispense: 30 tablet; Refill: 2  -     augmented betamethasone dipropionate (DIPROLENE-AF) 0.05 % cream; Apply topically 2 (two) times daily.  Dispense: 50 g; Refill: 2

## 2023-01-24 ENCOUNTER — OFFICE VISIT (OUTPATIENT)
Dept: UROLOGY | Facility: CLINIC | Age: 73
End: 2023-01-24
Payer: MEDICARE

## 2023-01-24 VITALS
HEIGHT: 62 IN | SYSTOLIC BLOOD PRESSURE: 119 MMHG | HEART RATE: 79 BPM | BODY MASS INDEX: 19.09 KG/M2 | DIASTOLIC BLOOD PRESSURE: 75 MMHG | WEIGHT: 103.75 LBS

## 2023-01-24 DIAGNOSIS — N39.3 SUI (STRESS URINARY INCONTINENCE, FEMALE): Primary | ICD-10-CM

## 2023-01-24 DIAGNOSIS — N39.46 MIXED INCONTINENCE: ICD-10-CM

## 2023-01-24 LAB
BILIRUBIN, UA POC OHS: NEGATIVE
BLOOD, UA POC OHS: NEGATIVE
CLARITY, UA POC OHS: CLEAR
COLOR, UA POC OHS: YELLOW
GLUCOSE, UA POC OHS: NEGATIVE
KETONES, UA POC OHS: ABNORMAL
LEUKOCYTES, UA POC OHS: ABNORMAL
NITRITE, UA POC OHS: NEGATIVE
PH, UA POC OHS: 5.5
PROTEIN, UA POC OHS: NEGATIVE
SPECIFIC GRAVITY, UA POC OHS: 1.02
UROBILINOGEN, UA POC OHS: 0.2

## 2023-01-24 PROCEDURE — 1159F PR MEDICATION LIST DOCUMENTED IN MEDICAL RECORD: ICD-10-PCS | Mod: CPTII,S$GLB,, | Performed by: UROLOGY

## 2023-01-24 PROCEDURE — 1160F PR REVIEW ALL MEDS BY PRESCRIBER/CLIN PHARMACIST DOCUMENTED: ICD-10-PCS | Mod: CPTII,S$GLB,, | Performed by: UROLOGY

## 2023-01-24 PROCEDURE — 1160F RVW MEDS BY RX/DR IN RCRD: CPT | Mod: CPTII,S$GLB,, | Performed by: UROLOGY

## 2023-01-24 PROCEDURE — 99999 PR PBB SHADOW E&M-EST. PATIENT-LVL III: ICD-10-PCS | Mod: PBBFAC,,, | Performed by: UROLOGY

## 2023-01-24 PROCEDURE — 1101F PT FALLS ASSESS-DOCD LE1/YR: CPT | Mod: CPTII,S$GLB,, | Performed by: UROLOGY

## 2023-01-24 PROCEDURE — 3078F PR MOST RECENT DIASTOLIC BLOOD PRESSURE < 80 MM HG: ICD-10-PCS | Mod: CPTII,S$GLB,, | Performed by: UROLOGY

## 2023-01-24 PROCEDURE — 3074F SYST BP LT 130 MM HG: CPT | Mod: CPTII,S$GLB,, | Performed by: UROLOGY

## 2023-01-24 PROCEDURE — 3288F FALL RISK ASSESSMENT DOCD: CPT | Mod: CPTII,S$GLB,, | Performed by: UROLOGY

## 2023-01-24 PROCEDURE — 99213 PR OFFICE/OUTPT VISIT, EST, LEVL III, 20-29 MIN: ICD-10-PCS | Mod: S$GLB,,, | Performed by: UROLOGY

## 2023-01-24 PROCEDURE — 81003 POCT URINALYSIS(INSTRUMENT): ICD-10-PCS | Mod: QW,S$GLB,, | Performed by: UROLOGY

## 2023-01-24 PROCEDURE — 3008F BODY MASS INDEX DOCD: CPT | Mod: CPTII,S$GLB,, | Performed by: UROLOGY

## 2023-01-24 PROCEDURE — 3288F PR FALLS RISK ASSESSMENT DOCUMENTED: ICD-10-PCS | Mod: CPTII,S$GLB,, | Performed by: UROLOGY

## 2023-01-24 PROCEDURE — 99999 PR PBB SHADOW E&M-EST. PATIENT-LVL III: CPT | Mod: PBBFAC,,, | Performed by: UROLOGY

## 2023-01-24 PROCEDURE — 1101F PR PT FALLS ASSESS DOC 0-1 FALLS W/OUT INJ PAST YR: ICD-10-PCS | Mod: CPTII,S$GLB,, | Performed by: UROLOGY

## 2023-01-24 PROCEDURE — 3074F PR MOST RECENT SYSTOLIC BLOOD PRESSURE < 130 MM HG: ICD-10-PCS | Mod: CPTII,S$GLB,, | Performed by: UROLOGY

## 2023-01-24 PROCEDURE — 99213 OFFICE O/P EST LOW 20 MIN: CPT | Mod: S$GLB,,, | Performed by: UROLOGY

## 2023-01-24 PROCEDURE — 81003 URINALYSIS AUTO W/O SCOPE: CPT | Mod: QW,S$GLB,, | Performed by: UROLOGY

## 2023-01-24 PROCEDURE — 3008F PR BODY MASS INDEX (BMI) DOCUMENTED: ICD-10-PCS | Mod: CPTII,S$GLB,, | Performed by: UROLOGY

## 2023-01-24 PROCEDURE — 1159F MED LIST DOCD IN RCRD: CPT | Mod: CPTII,S$GLB,, | Performed by: UROLOGY

## 2023-01-24 PROCEDURE — 3078F DIAST BP <80 MM HG: CPT | Mod: CPTII,S$GLB,, | Performed by: UROLOGY

## 2023-01-24 NOTE — PROGRESS NOTES
[facility] Department of Urology  Pcp: MD Rocio Mendez  DOS: 1/24/2023  Referred by: No ref. provider found    Initial consult by me in clinic on 10/18/22:    Esther Segura is a very pleasant 72 y.o. female who is a new patient to our department referred for incontinence.  The patient reports urinary incontinence of several years duration. Sounds mostly like TIMOTHY. Worsening now leaking when goiing from sitting to standing.   The patient has not seen a urologist in the past. Previous oab meds tried: No.   Symptoms: DTF: q 2 hours. Daytime urgency: yes, especially in am. Daytime pad/depends usage: 1. Pads: thin. Wears a thin pad but irritates skin. However changes underwear 2x a night.   NTF:0-1x per night.Night-time pad/depends usage: 0  Overactive bladder risk factors/associations:  Dietary - Caffeine intake: No. Neurogenic - diagnosed with parkinson's in 2016. Some incontinence worse with this. Back surgery: No. Stroke: No. Diabetes: No. Bowel - stool incontinence: No. constipaton now.   The patient also has what sounds like symptoms of Stress incontinence G2, P2.The patient reports (stress) incontinence associated with coughing lifting laughing sneezing sitting to standing  or other exertional activities. She has tried kegels. . Says she hasn't been faithful to it.   Symptoms/complaints of prolapse: No. Hysterectomy: No. Prior bladder or vaginal surgery: No. Bladder scan PVR today was 0mL.  The patient is not prone to recurrent utis.  Previous abdominal imaging: ctap 7/16/20: L2.3cm cyst.  Smoking history: none. Family history of kidney cancer, bladder cancer: none. Anticoagulation:  No  Ua today: neg    Her most bothersome complaint today is: timothy now occurring when she goes from sitting to standing    Interval history 1/24/23:  At her last visit I referred her to pelvic floor physical therapy.  Although she had mixed incontinence it sounded like it was more stress  than urge.  She is a  and was able to also uses to her advantage help strengthen her pelvic floor muscles with the help of pelvic floor physical therapy.  She started 1 month ago and goes 2 times a week.  She is had a noticeable improvement in her incontinence, no longer wears a pad during the day or night.  Still has some leaking when she goes from sitting to standing in the evening.  Denies any overactive bladder or urgency.  Still has some urge incontinence if she holds too long, about 3 hours.  She was also given a list of foods and drinks to avoid and more specifically for her liquids like red wine.  She also notices that constipation makes her incontinence worse.  PVR 0, ua with tr wbc.     Review of Systems:   As above     Urine history: family history of kidney, bladder or prostate cancer:No, personal or family history of kidney stones: No,tobacco use: No, anticoagulation: No  1/24/23 Tr wbc  10/18/22 neg   12/8/16 Ng, void: 250 bld/wbc    Past Medical History:   Diagnosis Date    Fever blister     Hyperlipidemia     Osteopenia     Parkinson's disease     Squamous cell carcinoma 01/18/2019    right forearm       Exam Findings:  Vitals:    01/24/23 0919   BP: 119/75   Pulse: 79   Pulse: 71. Not temp      Assessment:   Esther Segura is a 72 y.o. female     1. TIMOTHY (stress urinary incontinence, female)    2. Mixed incontinence         Plan:    Continue pelvic floor PT for stress incontinence.  Appears to be doing well with this.  Really no complaints of overactive bladder or urge incontinence that are bothersome enough to warrant any medication.  Follow-up in 6 months or 12 months if doing well

## 2023-01-26 ENCOUNTER — CLINICAL SUPPORT (OUTPATIENT)
Dept: REHABILITATION | Facility: HOSPITAL | Age: 73
End: 2023-01-26
Payer: MEDICARE

## 2023-01-26 DIAGNOSIS — M62.89 PELVIC FLOOR DYSFUNCTION: Primary | ICD-10-CM

## 2023-01-26 PROCEDURE — 97140 MANUAL THERAPY 1/> REGIONS: CPT | Mod: PN,CQ

## 2023-01-26 PROCEDURE — 97112 NEUROMUSCULAR REEDUCATION: CPT | Mod: PN,CQ

## 2023-01-26 NOTE — PROGRESS NOTES
Pelvic Health Physical Therapy   Treatment Note     Name: Esther Macario [facility] Number: [id number]    Therapy Diagnosis:   Encounter Diagnosis   Name Primary?    Pelvic floor dysfunction Yes     Physician: Rocio Cruz    Visit Date: 1/26/2023  Physician Orders: PT Eval and Treat   Medical Diagnosis from Referral: N39.3 (ICD-10-CM) - TIMOTHY (stress urinary incontinence, female)  Evaluation Date: 12/15/2022  Authorization Period Expiration:306/30/2023  Plan of Care Expiration: 03/16/2023  Visit # / Visits authorized: 3/ 11 4 total    PTA visit: 3/5    Time In: 3:00  Time Out: 4:00  Total Appointment Time (timed & untimed codes): 60 minutes     Precautions: universal  Subjective     Pt reports: great improvement of her bladder control. She states that she has not had to use pads for the last few days. She did however complained of continued right hip pain.      She was compliant with home exercise program.  Response to previous treatment: good  Functional change: improvement of symptoms     Pain: 0/10  Location: right hip     Objective     Esther received therapeutic exercises to develop  strength, endurance, ROM, flexibility, posture, and core stabilization for 0 minutes including:  the following exercises       Esther received the following manual therapy techniques: to develop flexibility, extensibility, and desensitization for 45 minutes including: trigger point/myofascial release of   and soft tissue mobilization of Pelvic Floor musculature    Additional soft tissue manipulation performed on right hip musculature including piriformis and iliopsoas     Right hip joint mobs performed including caudal glides, lateral and long axis distraction       Esther participated in neuromuscular re-education activities to develop Coordination, Control, Down training, Posture, Proprioception, Kinesthetic, Balance, and Sense for 15 minutes including:  the following exercises     Segmental rolling  "performed today prone to supine lower extremity 10 x each side. Right side with greater limitation of power than left.     Esther participated in dynamic functional therapeutic activities to improve functional performance for 0  minutes, including:         Home Exercises Provided and Patient Education Provided     Education provided:   - anatomy/physiology of pelvic floor and kegels  Discussed progression of plan of care with patient; educated pt in activity modification; reviewed HEP with pt. Pt demonstrated and verbalized understanding of all instruction and was provided with a handout of HEP (see Patient Instructions).    HOW DIET MAY AFFECT YOUR BLADDER    Although there is no particular "diet" that can cure bladder control, there are certain dietary suggestions you can use to help control the problem.  Many people with bladder control problems decrease their intake of liquids in hope that they will need to urinate less frequently or have less urinary leakage.  While a decrease in liquid intake does result in a decrease in the volume of urine, the smaller amount of urine may be more highly concentrated.  Highly concentrated, dark yellow urine is irritating to the bladder surface and may actually cause you to go to the bathroom more frequently.  It also encourages the growth of bacteria, which may lead to infections resulting in incontinence.  You should not restrict fluids to control your bladder.    Some foods and beverages are thought to contribute to bladder leakage and irritability. However their effect on the bladder is not completely understood and you may want to see if eliminating one or all of these items improves your bladder control.  If you are unable to give them up completely, it is recommended that you use the following items in moderation:    Foods with acidic properties:  Alcoholic beverages  Tomato based products  Vinegar  Coffee (regular and decaf)  Tea (regular and decaf)  Beach  Citrus " fruits and juices  Spicy foods  Caffeinated beverages  Cola  Milk  Food colorings and flavorings  Artificial sweeteners  Chocolate    Cigarette smoking is also irritating to the bladder surface and is associated with bladder cancer.  In addition, the coughing associated with smoking may lead to increased incontinent episodes.    Substitutions for Bladder Irritants  Although water is always the best beverage choice, grape and apple juice are thirst quenchers and are not as irritating to the bladder.    Low acid fruits:  pears, apricots, papaya, watermelon    For coffee drinkers: KAVA®  Postum®  Christiano®  Kaffree [place]®    For tea drinkers: Non-citrus herbal    Sun brewed tea    Vitamin C substitute:  Calcium carbonate co-buffered with calcium ascorbate    Good Habits  Maintain a good fluid intake. Depending on your body size and environment, drink 4-8 cups (8 oz each) of fluid per day unless otherwise advised by your doctor. Not enough fluid creates a foul odor and dark color of the urine.  Typical dietary recommendations for fiber are between 25-35 grams per day. You should discuss your fiber needs with your physician, pharmacist or nutritionist.     Written Home Exercises Provided: Patient instructed to cont prior HEP.  Exercises were reviewed and Esther was able to demonstrate them prior to the end of the session.  Esther demonstrated good  understanding of the education provided.     See EMR under Patient Instructions for exercises provided prior visit.    Assessment     Pt presented to clinic engaged and willing to participate with therapy. Significant improvement of Pelvic Floor reported today. Treatment today focused on right hip mobility and improving soft tissue dysfunction in this region. Mild trigger points noted in piriformis however severe trigger points noted in iliopsoas. She reported great relief following soft tissue manipulation in this region. She will continue to benefit from therapy to help  "manage soft tissue dysfunction and disrupted neuromuscular function of right hip associated with Parkinson's disease.      Esther Is progressing well towards her goals.   Pt prognosis is Excellent.     Pt will continue to benefit from skilled outpatient physical therapy to address the deficits listed in the problem list box on initial evaluation, provide pt/family education and to maximize pt's level of independence in the home and community environment.     Pt's spiritual, cultural and educational needs considered and pt agreeable to plan of care and goals.     Anticipated barriers to physical therapy: progressive disease. Parkinson's disease affecting right side of body mainly with correlation to increased muscle tone in right side of pelvic floor.     Goals: Goals:  Short Term Goals: 6 weeks   - Pt will demonstrate excellent knowledge and adherence to HEP to facilitate optimal recovery.  - Pt will demonstrate proper PFM contraction, relaxation, and lengthening coordinated with TA and breath for improved muscle coordination needed for functional activity.     Long Term Goals: 12 weeks   - Pt will demonstrate excellent knowledge and adherence to HEP for continued self-maintenance of symptoms.  - Pt will report FOTO score of 40% limited or less indicating clinically relevant increase in function.  - Pt will report voiding interval of 2-3 hours for improved ADL tolerance.  - Pt will report ability to delay urinary urge for at least 15 minutes with good urge suppression technique to maintain continence with ADL/IADLs.   - Pt will report minimal to no incidence of urinary incontinence 6/7 days for improved hygiene and ADL/IADL tolerance.   - Pt will report needing </= 1 pad/day during "leakage days" indicating improved PFM function needed to maintain continence  - Pt will demonstrate PFM strength of at least 3/5 MMT for improved strength needed to maintain continence.   - Pt will report bearing down appropriately " 100% of the time for improved bowel function and decreased stress on adjacent pelvic structures.   - Pt will report improved ease with passing bowel movement 9/10 days      Plan   Plan of care Certification: 12/15/2022 to 03/16/2023.     Outpatient Physical Therapy 1 times weekly for 12 weeks       Zully Allison, PTA

## 2023-02-09 ENCOUNTER — CLINICAL SUPPORT (OUTPATIENT)
Dept: REHABILITATION | Facility: HOSPITAL | Age: 73
End: 2023-02-09
Payer: MEDICARE

## 2023-02-09 DIAGNOSIS — M62.89 PELVIC FLOOR DYSFUNCTION: Primary | ICD-10-CM

## 2023-02-09 PROCEDURE — 97530 THERAPEUTIC ACTIVITIES: CPT | Mod: PN

## 2023-02-09 PROCEDURE — 97140 MANUAL THERAPY 1/> REGIONS: CPT | Mod: PN

## 2023-02-09 NOTE — PATIENT INSTRUCTIONS
"    KEGELS IN SITTING  1. Sit comfortably with legs and buttocks relaxed.  2. Contract and LIFT the pelvic floor muscles as if you're trying to stop the stream of urine and passage of gas.  3. Hold LIFT for 5 seconds without holding breath. You should be able to talk out loud the entire time.  4. Release the pelvic muscles right away for 10 seconds rest - take a couple slow deep breaths.  5. Repeat 10 times, 2-3 sets per day. Spread throughout the day.     No Kegels while urinating!            DIAPHRAGMATIC BREATHING   The diaphragm is a dome shaped muscle that forms the floor of the rib cage. It is the most efficient muscle for breathing and relaxation, although most people are not used to using the diaphragm. Diaphragmatic or belly breathing is an important technique to learn because it helps settle down or relax the autonomic nervous system. The correct use of diaphragmatic breathing can help to quiet brain activity resulting in the relaxation of all the muscles and organs of the body. This is accomplished by slow rhythmic breathing concentrated in the diaphragm muscle rather than the chest.     How to do proper relaxation breathing:       Start by lying on your back or reclining in a chair in a relaxed position. Place one hand on your chest and the other on your abdomen.  Relax your jaw by placing your tongue on the floor of your mouth and keeping your teeth slightly apart.   Take a deep breath in, letting the abdomen expand and rise while you keep your upper chest, neck and shoulders relaxed.   As you breathe out, allow your abdomen and chest to fall. Exhale completely.  It doesn't matter if you breathe in/out through your nose and/or mouth. Do whichever feels comfortable.  Remember to breathe slowly.  Do not force your breathing. Do not hold your breath.  Repeat while doing stretches listed below:           "single knee to chest" - lay on your back, use your hands to pull your left knee to your chest, keeping " "the right leg straight on the bed. Hold this pose while you incorporate your diaphragmatic breathing. Repeat on the opposite side. Complete 3 sets of 10 breaths on both legs.      "Child's Pose" - first start by getting onto your hands and knees, then move your feet so they are touching and your knees are wider than your hips. Sit back so that you are sitting on your heels and reach your arms forward. Think about resting in this position. Complete 3 sets of 10 breaths.          Prone Figure 4 (not pictured):   Lay on your stomach. Bring your right knee out so that your foot is against the side of your left knee and you're making a "4" with your legs. Complete 3 sets of 10 breaths.      "

## 2023-02-09 NOTE — PROGRESS NOTES
"    Pelvic Health Physical Therapy   Treatment Note     Name: Esther Macario [facility] Number: [id number]    Therapy Diagnosis:   Encounter Diagnosis   Name Primary?    Pelvic floor dysfunction Yes     Physician: Rocio Cruz    Visit Date: 2/9/2023  Physician Orders: PT Eval and Treat   Medical Diagnosis from Referral: N39.3 (ICD-10-CM) - TIMOTHY (stress urinary incontinence, female)  Evaluation Date: 12/15/2022  Authorization Period Expiration:306/30/2023  Plan of Care Expiration: 03/16/2023  Visit # / Visits authorized: 3/ 11 4 total    PTA visit: 3/5    Time In: 8:30  Time Out: 9:30  Total Appointment Time (timed & untimed codes): 60 minutes     Precautions: universal  Subjective     Pt reports: she's doing a lot better with bladder control. Her hip is still bothering her but Zully did some stuff that helped; she believes that it's "chronic"        She was compliant with home exercise program.  Response to previous treatment: good  Functional change: improvement of symptoms     Pain: 0/10  Location: right hip     Objective     Esther received therapeutic exercises to develop  strength, endurance, ROM, flexibility, posture, and core stabilization for 0 minutes including:  the following exercises       Esther received the following manual therapy techniques: to develop flexibility, extensibility, and desensitization for 45 minutes including: trigger point/myofascial release of   and soft tissue mobilization of Pelvic Floor musculature    Additional soft tissue manipulation performed on right hip musculature including piriformis and iliopsoas     NOT TODAY - Right hip joint mobs performed including caudal glides, lateral and long axis distraction       Esther participated in neuromuscular re-education activities to develop Coordination, Control, Down training, Posture, Proprioception, Kinesthetic, Balance, and Sense for 00 minutes including:  the following exercises     Segmental rolling " performed today prone to supine lower extremity 10 x each side. Right side with greater limitation of power than left.     Esther participated in dynamic functional therapeutic activities to improve functional performance for 15  minutes, including:   - progressing into strengthening, updated home exercise program, review of progress towards goals.       Home Exercises Provided and Patient Education Provided     Education provided:   - anatomy/physiology of pelvic floor and kegels      Written Home Exercises Provided: yes.  Exercises were reviewed and Esther was able to demonstrate them prior to the end of the session.  Esther demonstrated good  understanding of the education provided.     See EMR under Patient Instructions for exercises provided  2/9/2023 .    Assessment     Assessment of progress made today. Pt demonstrates significant progress towards goals. No significant hypertonicity of pelvic floor muscles noted, mild tenderness to palpation with right obturator internus but likely related to chronic hip pain. Patient wants to continue to address her hip with physical therapist sessions. Added Kegels to home exercise program and discussed progressing to more hip strengthening next session.        Esther Is progressing well towards her goals.   Pt prognosis is Excellent.     Pt will continue to benefit from skilled outpatient physical therapy to address the deficits listed in the problem list box on initial evaluation, provide pt/family education and to maximize pt's level of independence in the home and community environment.     Pt's spiritual, cultural and educational needs considered and pt agreeable to plan of care and goals.     Anticipated barriers to physical therapy: progressive disease. Parkinson's disease affecting right side of body mainly with correlation to increased muscle tone in right side of pelvic floor.     Goals: Goals:  Short Term Goals: 6 weeks   - Pt will demonstrate  "excellent knowledge and adherence to HEP to facilitate optimal recovery.  - Pt will demonstrate proper PFM contraction, relaxation, and lengthening coordinated with TA and breath for improved muscle coordination needed for functional activity.     Long Term Goals: 12 weeks   - Pt will demonstrate excellent knowledge and adherence to HEP for continued self-maintenance of symptoms.  - Pt will report FOTO score of 40% limited or less indicating clinically relevant increase in function.  - Pt will report voiding interval of 2-3 hours for improved ADL tolerance. (ACHIEVED)   - Pt will report ability to delay urinary urge for at least 15 minutes with good urge suppression technique to maintain continence with ADL/IADLs. (ACHIEVED)   - Pt will report minimal to no incidence of urinary incontinence 6/7 days for improved hygiene and ADL/IADL tolerance. (PROGRESSING - she has days with no leakage; when she does, she has it more later in the day (muscle fatigue) and it's only a few drops with cough/sneeze, bending down on the floor. Does not have stress urinary incontinence with yoga)   - Pt will report needing </= 1 pad/day during "leakage days" indicating improved PFM function needed to maintain continence (ACHIEVED)  - Pt will demonstrate PFM strength of at least 3/5 MMT for improved strength needed to maintain continence.   - Pt will report bearing down appropriately 100% of the time for improved bowel function and decreased stress on adjacent pelvic structures.   - Pt will report improved ease with passing bowel movement 9/10 days  (ACHIEVED)     Plan   Plan of care Certification: 12/15/2022 to 03/16/2023.     Outpatient Physical Therapy 1 times weekly for 12 weeks       Kami Ocasio, PT          "

## 2023-02-28 ENCOUNTER — DOCUMENTATION ONLY (OUTPATIENT)
Dept: REHABILITATION | Facility: HOSPITAL | Age: 73
End: 2023-02-28

## 2023-02-28 ENCOUNTER — CLINICAL SUPPORT (OUTPATIENT)
Dept: REHABILITATION | Facility: HOSPITAL | Age: 73
End: 2023-02-28
Payer: MEDICARE

## 2023-02-28 DIAGNOSIS — M62.89 PELVIC FLOOR DYSFUNCTION: Primary | ICD-10-CM

## 2023-02-28 PROCEDURE — 97140 MANUAL THERAPY 1/> REGIONS: CPT | Mod: PN,CQ

## 2023-02-28 PROCEDURE — 97112 NEUROMUSCULAR REEDUCATION: CPT | Mod: PN,CQ

## 2023-02-28 NOTE — PROGRESS NOTES
Pelvic Health Physical Therapy   Treatment Note     Name: Esther Macario [facility]  Clinic Number: [id number]    Therapy Diagnosis:   Encounter Diagnosis   Name Primary?    Pelvic floor dysfunction Yes     Physician: Rocio Cruz    Visit Date: 2/28/2023  Physician Orders: PT Eval and Treat   Medical Diagnosis from Referral: N39.3 (ICD-10-CM) - TIMOTHY (stress urinary incontinence, female)  Evaluation Date: 12/15/2022  Authorization Period Expiration:306/30/2023  Plan of Care Expiration: 03/16/2023  Visit # / Visits authorized: 4/ 11 4 total    PTA visit: 4/5    Time In: 3:00  Time Out: 4:00  Total Appointment Time (timed & untimed codes): 60 minutes     Precautions: universal  Subjective     Pt reports: she's doing a lot better with bladder control. She continues to complain of anterior hip pain on right side.        She was compliant with home exercise program.  Response to previous treatment: good  Functional change: improvement of symptoms     Pain: 0/10  Location: right hip     Objective     Esther received therapeutic exercises to develop  strength, endurance, ROM, flexibility, posture, and core stabilization for 0 minutes including:  the following exercises       Esther received the following manual therapy techniques: to develop flexibility, extensibility, and desensitization for 45 minutes including: trigger point/myofascial release of   and soft tissue mobilization of      right hip musculature including piriformis and iliopsoas      Right hip joint mobs performed including caudal glides, lateral and long axis distraction       Esther participated in neuromuscular re-education activities to develop Coordination, Control, Down training, Posture, Proprioception, Kinesthetic, Balance, and Sense for 15 minutes including:       Gait and posture analysis performed with pt presenting with left lateral shift of pelvis and significant height difference between right and left lilliac crest. This  differential may be contributing to chronic right hip pain.     Right side glides 10 x   Assessment for potential use of heel lift on right lower extremity     Segmental rolling performed today prone to supine lower extremity 10 x each side. Right side with greater limitation of power than left.     Esther participated in dynamic functional therapeutic activities to improve functional performance for  minutes, including:   - progressing into strengthening, updated home exercise program, review of progress towards goals.       Home Exercises Provided and Patient Education Provided     Education provided:   - anatomy/physiology of pelvic floor and kegels      Written Home Exercises Provided: yes.  Exercises were reviewed and Esther was able to demonstrate them prior to the end of the session.  Esther demonstrated good  understanding of the education provided.     See EMR under Patient Instructions for exercises provided  2/9/2023 .    Assessment     Today's treatment focused on right hip dysfunction both causation and symptoms. She reported complete pain relief following manual therapy to right hip. Right side glides and right hip flexor stretch in nonweightbearing position were added to home exercise program. Pt was also advised to try implementing a heel lift on right side with a very moderate wear schedule to assess if this will help prevent over compensation of right hip flexors while walking and decrease subsequent pain.        Esther Is progressing well towards her goals.   Pt prognosis is Excellent.     Pt will continue to benefit from skilled outpatient physical therapy to address the deficits listed in the problem list box on initial evaluation, provide pt/family education and to maximize pt's level of independence in the home and community environment.     Pt's spiritual, cultural and educational needs considered and pt agreeable to plan of care and goals.     Anticipated barriers to physical  "therapy: progressive disease. Parkinson's disease affecting right side of body mainly with correlation to increased muscle tone in right side of pelvic floor.     Goals: Goals:  Short Term Goals: 6 weeks   - Pt will demonstrate excellent knowledge and adherence to HEP to facilitate optimal recovery.  - Pt will demonstrate proper PFM contraction, relaxation, and lengthening coordinated with TA and breath for improved muscle coordination needed for functional activity.     Long Term Goals: 12 weeks   - Pt will demonstrate excellent knowledge and adherence to HEP for continued self-maintenance of symptoms.  - Pt will report FOTO score of 40% limited or less indicating clinically relevant increase in function.  - Pt will report voiding interval of 2-3 hours for improved ADL tolerance. (ACHIEVED)   - Pt will report ability to delay urinary urge for at least 15 minutes with good urge suppression technique to maintain continence with ADL/IADLs. (ACHIEVED)   - Pt will report minimal to no incidence of urinary incontinence 6/7 days for improved hygiene and ADL/IADL tolerance. (PROGRESSING - she has days with no leakage; when she does, she has it more later in the day (muscle fatigue) and it's only a few drops with cough/sneeze, bending down on the floor. Does not have stress urinary incontinence with yoga)   - Pt will report needing </= 1 pad/day during "leakage days" indicating improved PFM function needed to maintain continence (ACHIEVED)  - Pt will demonstrate PFM strength of at least 3/5 MMT for improved strength needed to maintain continence.   - Pt will report bearing down appropriately 100% of the time for improved bowel function and decreased stress on adjacent pelvic structures.   - Pt will report improved ease with passing bowel movement 9/10 days  (ACHIEVED)     Plan   Plan of care Certification: 12/15/2022 to 03/16/2023.     Outpatient Physical Therapy 1 times weekly for 12 weeks       Zully Allison PTA  "

## 2023-03-07 ENCOUNTER — CLINICAL SUPPORT (OUTPATIENT)
Dept: REHABILITATION | Facility: HOSPITAL | Age: 73
End: 2023-03-07
Payer: MEDICARE

## 2023-03-07 DIAGNOSIS — M62.89 PELVIC FLOOR DYSFUNCTION: Primary | ICD-10-CM

## 2023-03-07 PROCEDURE — 97140 MANUAL THERAPY 1/> REGIONS: CPT | Mod: PN,CQ

## 2023-03-07 PROCEDURE — 97112 NEUROMUSCULAR REEDUCATION: CPT | Mod: PN,CQ

## 2023-03-07 NOTE — PROGRESS NOTES
Pelvic Health Physical Therapy   Treatment Note     Name: Esther Macario [facility]  Clinic Number: [id number]    Therapy Diagnosis:   Encounter Diagnosis   Name Primary?    Pelvic floor dysfunction Yes     Physician: Rocio Cruz    Visit Date: 3/7/2023  Physician Orders: PT Eval and Treat   Medical Diagnosis from Referral: N39.3 (ICD-10-CM) - TIMOTHY (stress urinary incontinence, female)  Evaluation Date: 12/15/2022  Authorization Period Expiration:306/30/2023  Plan of Care Expiration: 03/16/2023  Visit # / Visits authorized: 5/ 12 5total    PTA visit: 2/5    Time In: 2:00  Time Out: 3:00  Total Appointment Time (timed & untimed codes): 60 minutes     Precautions: universal  Subjective     Pt reports: she's reports decreased right hip pain since starting to use the heel lift.        She was compliant with home exercise program.  Response to previous treatment: good  Functional change: improvement of symptoms     Pain: 0/10  Location: right hip     Objective     Esther received therapeutic exercises to develop  strength, endurance, ROM, flexibility, posture, and core stabilization for 0 minutes including:  the following exercises       Esther received the following manual therapy techniques: to develop flexibility, extensibility, and desensitization for 30 minutes including: trigger point/myofascial release of   and soft tissue mobilization of      right hip musculature including piriformis and iliopsoas      Right hip joint mobs performed including caudal glides, lateral and long axis distraction   Educated on self mobilization techniques for right hip     Esther participated in neuromuscular re-education activities to develop Coordination, Control, Down training, Posture, Proprioception, Kinesthetic, Balance, and Sense for 25 minutes including:         Right side glides 10 x   Segmental rolling performed today prone <> supine lower and upper extremity 10 x each side     Esther participated in  dynamic functional therapeutic activities to improve functional performance for  minutes, including:   - progressing into strengthening, updated home exercise program, review of progress towards goals.       Home Exercises Provided and Patient Education Provided     Education provided:   - anatomy/physiology of pelvic floor and kegels      Written Home Exercises Provided: yes.  Exercises were reviewed and Esther was able to demonstrate them prior to the end of the session.  Esther demonstrated good  understanding of the education provided.     See EMR under Patient Instructions for exercises provided  2/9/2023 .    Assessment     Today's treatment focused on right hip dysfunction both causation and symptoms. She reported complete pain relief following manual therapy to right hip. Right side glides and right hip flexor stretch in nonweightbearing position were added to home exercise program. Pt was also advised to try implementing a heel lift on right side with a very moderate wear schedule to assess if this will help prevent over compensation of right hip flexors while walking and decrease subsequent pain.        Esther Is progressing well towards her goals.   Pt prognosis is Excellent.     Pt will continue to benefit from skilled outpatient physical therapy to address the deficits listed in the problem list box on initial evaluation, provide pt/family education and to maximize pt's level of independence in the home and community environment.     Pt's spiritual, cultural and educational needs considered and pt agreeable to plan of care and goals.     Anticipated barriers to physical therapy: progressive disease. Parkinson's disease affecting right side of body mainly with correlation to increased muscle tone in right side of pelvic floor.     Goals: Goals:  Short Term Goals: 6 weeks   - Pt will demonstrate excellent knowledge and adherence to HEP to facilitate optimal recovery.  - Pt will demonstrate proper  "PFM contraction, relaxation, and lengthening coordinated with TA and breath for improved muscle coordination needed for functional activity.     Long Term Goals: 12 weeks   - Pt will demonstrate excellent knowledge and adherence to HEP for continued self-maintenance of symptoms.  - Pt will report FOTO score of 40% limited or less indicating clinically relevant increase in function.  - Pt will report voiding interval of 2-3 hours for improved ADL tolerance. (ACHIEVED)   - Pt will report ability to delay urinary urge for at least 15 minutes with good urge suppression technique to maintain continence with ADL/IADLs. (ACHIEVED)   - Pt will report minimal to no incidence of urinary incontinence 6/7 days for improved hygiene and ADL/IADL tolerance. (PROGRESSING - she has days with no leakage; when she does, she has it more later in the day (muscle fatigue) and it's only a few drops with cough/sneeze, bending down on the floor. Does not have stress urinary incontinence with yoga)   - Pt will report needing </= 1 pad/day during "leakage days" indicating improved PFM function needed to maintain continence (ACHIEVED)  - Pt will demonstrate PFM strength of at least 3/5 MMT for improved strength needed to maintain continence.   - Pt will report bearing down appropriately 100% of the time for improved bowel function and decreased stress on adjacent pelvic structures.   - Pt will report improved ease with passing bowel movement 9/10 days  (ACHIEVED)     Plan   Plan of care Certification: 12/15/2022 to 03/16/2023.     Outpatient Physical Therapy 1 times weekly for 12 weeks       Zully Allison PTA              "

## 2023-03-08 ENCOUNTER — PATIENT MESSAGE (OUTPATIENT)
Dept: FAMILY MEDICINE | Facility: CLINIC | Age: 73
End: 2023-03-08
Payer: MEDICARE

## 2023-03-08 ENCOUNTER — CLINICAL SUPPORT (OUTPATIENT)
Dept: REHABILITATION | Facility: HOSPITAL | Age: 73
End: 2023-03-08
Payer: MEDICARE

## 2023-03-08 DIAGNOSIS — M25.559 HIP PAIN: Primary | ICD-10-CM

## 2023-03-08 DIAGNOSIS — M62.89 PELVIC FLOOR DYSFUNCTION: Primary | ICD-10-CM

## 2023-03-08 PROCEDURE — 97530 THERAPEUTIC ACTIVITIES: CPT | Mod: PN

## 2023-03-08 NOTE — PROGRESS NOTES
Pelvic Health Physical Therapy Discharge Note     Name: Esther Macario [facility] Number: [id number]    Therapy Diagnosis:   Encounter Diagnosis   Name Primary?    Pelvic floor dysfunction Yes       Physician: Rocio Cruz    Visit Date: 3/8/2023  Physician Orders: PT Eval and Treat   Medical Diagnosis from Referral: N39.3 (ICD-10-CM) - TIMOTHY (stress urinary incontinence, female)  Evaluation Date: 12/15/2022  Authorization Period Expiration:306/30/2023  Plan of Care Expiration: 03/16/2023  Visit # / Visits authorized: 6/ 12; 7 total    PTA visit: 2/5    Time In: 01:35PM  Time Out: 2:05PM  Total Appointment Time (timed & untimed codes): 30 minutes     Precautions: universal  Subjective     Pt reports: she has made significant progress with therapy. She feels the urinary incontinence is pretty much resolved, however she would like to receive therapy for chronic right hip pain. Esther states she is comfortable with home exercise program to maintain bladder control.        She was compliant with home exercise program.  Response to previous treatment: good  Functional change: improvement of symptoms     Pain: 0/10  Location: right hip     Objective   Discharge FOTO Score: 38%    Esther participated in dynamic functional therapeutic activities to improve functional performance for 30  minutes, including: Discharge planning, home exercise program review.     Written Home Exercises Provided: yes.  Exercises were reviewed and Esther was able to demonstrate them prior to the end of the session.  Esther demonstrated good  understanding of the education provided.     See EMR under Patient Instructions for exercises provided  2/9/2023 .    Assessment           Since start of care, patient has received manual therapy techniques, neuromuscular reeducation, therapeutic exercise and therapeutic activities to address chief complaints of urinary incontinence. Interventions has resulted in 90% resolve of urinary  "incontinence and patient is independent with home exercise program to maintain gains made with skilled physical therapy services at this time.    Goals:  Short Term Goals: 6 weeks   - Pt will demonstrate excellent knowledge and adherence to HEP to facilitate optimal recovery. (ACHIEVED)  - Pt will demonstrate proper PFM contraction, relaxation, and lengthening coordinated with TA and breath for improved muscle coordination needed for functional activity. (ACHIEVED)     Long Term Goals: 12 weeks   - Pt will demonstrate excellent knowledge and adherence to HEP for continued self-maintenance of symptoms. (ACHIEVED)  - Pt will report FOTO score of 40% limited or less indicating clinically relevant increase in function. (ACHIEVED)  - Pt will report voiding interval of 2-3 hours for improved ADL tolerance. (ACHIEVED)   - Pt will report ability to delay urinary urge for at least 15 minutes with good urge suppression technique to maintain continence with ADL/IADLs. (ACHIEVED)   - Pt will report minimal to no incidence of urinary incontinence 6/7 days for improved hygiene and ADL/IADL tolerance. (90% ACHIEVED - she has days with no leakage; when she does, she has it more later in the day (muscle fatigue) and it's only a few drops with cough/sneeze, bending down on the floor. Does not have stress urinary incontinence with yoga)   - Pt will report needing </= 1 pad/day during "leakage days" indicating improved PFM function needed to maintain continence (ACHIEVED)  - Pt will demonstrate PFM strength of at least 3/5 MMT for improved strength needed to maintain continence.   - Pt will report bearing down appropriately 100% of the time for improved bowel function and decreased stress on adjacent pelvic structures. (ACHIEVED)  - Pt will report improved ease with passing bowel movement 9/10 days  (ACHIEVED)     Plan   Discharge.    Reason for Discharge: Chief complaints of stress urinary incontinence resolved at >90% and patient has " achieved ~90% of established goals. Patient has been educated in an home exercise program to maintain progress and continue strengthening beyond discharge.       Gabby Stacy, PT

## 2023-03-13 ENCOUNTER — HOSPITAL ENCOUNTER (OUTPATIENT)
Dept: RADIOLOGY | Facility: HOSPITAL | Age: 73
Discharge: HOME | End: 2023-03-13
Attending: ORTHOPAEDIC SURGERY
Payer: MEDICARE

## 2023-03-13 ENCOUNTER — OFFICE VISIT (OUTPATIENT)
Dept: ORTHOPEDICS | Facility: CLINIC | Age: 73
End: 2023-03-13
Payer: MEDICARE

## 2023-03-13 VITALS — BODY MASS INDEX: 19.09 KG/M2 | HEIGHT: 62 IN | WEIGHT: 103.75 LBS | RESPIRATION RATE: 18 BRPM

## 2023-03-13 DIAGNOSIS — M25.561 ACUTE PAIN OF RIGHT KNEE: Primary | ICD-10-CM

## 2023-03-13 DIAGNOSIS — M16.11 UNILATERAL PRIMARY OSTEOARTHRITIS, RIGHT HIP: Primary | ICD-10-CM

## 2023-03-13 DIAGNOSIS — M16.11 UNILATERAL PRIMARY OSTEOARTHRITIS, RIGHT HIP: ICD-10-CM

## 2023-03-13 DIAGNOSIS — M25.551 RIGHT HIP PAIN: ICD-10-CM

## 2023-03-13 PROCEDURE — 1101F PR PT FALLS ASSESS DOC 0-1 FALLS W/OUT INJ PAST YR: ICD-10-PCS | Mod: CPTII,S$GLB,, | Performed by: ORTHOPAEDIC SURGERY

## 2023-03-13 PROCEDURE — 3008F PR BODY MASS INDEX (BMI) DOCUMENTED: ICD-10-PCS | Mod: CPTII,S$GLB,, | Performed by: ORTHOPAEDIC SURGERY

## 2023-03-13 PROCEDURE — 1101F PT FALLS ASSESS-DOCD LE1/YR: CPT | Mod: CPTII,S$GLB,, | Performed by: ORTHOPAEDIC SURGERY

## 2023-03-13 PROCEDURE — 1160F RVW MEDS BY RX/DR IN RCRD: CPT | Mod: CPTII,S$GLB,, | Performed by: ORTHOPAEDIC SURGERY

## 2023-03-13 PROCEDURE — 3288F PR FALLS RISK ASSESSMENT DOCUMENTED: ICD-10-PCS | Mod: CPTII,S$GLB,, | Performed by: ORTHOPAEDIC SURGERY

## 2023-03-13 PROCEDURE — 73502 XR HIP WITH PELVIS WHEN PERFORMED, 2 OR 3  VIEWS RIGHT: ICD-10-PCS | Mod: 26,RT,, | Performed by: RADIOLOGY

## 2023-03-13 PROCEDURE — 1160F PR REVIEW ALL MEDS BY PRESCRIBER/CLIN PHARMACIST DOCUMENTED: ICD-10-PCS | Mod: CPTII,S$GLB,, | Performed by: ORTHOPAEDIC SURGERY

## 2023-03-13 PROCEDURE — 3288F FALL RISK ASSESSMENT DOCD: CPT | Mod: CPTII,S$GLB,, | Performed by: ORTHOPAEDIC SURGERY

## 2023-03-13 PROCEDURE — 73502 X-RAY EXAM HIP UNI 2-3 VIEWS: CPT | Mod: 26,RT,, | Performed by: RADIOLOGY

## 2023-03-13 PROCEDURE — 99999 PR PBB SHADOW E&M-EST. PATIENT-LVL III: ICD-10-PCS | Mod: PBBFAC,,, | Performed by: ORTHOPAEDIC SURGERY

## 2023-03-13 PROCEDURE — 1125F AMNT PAIN NOTED PAIN PRSNT: CPT | Mod: CPTII,S$GLB,, | Performed by: ORTHOPAEDIC SURGERY

## 2023-03-13 PROCEDURE — 99204 OFFICE O/P NEW MOD 45 MIN: CPT | Mod: S$GLB,,, | Performed by: ORTHOPAEDIC SURGERY

## 2023-03-13 PROCEDURE — 73502 X-RAY EXAM HIP UNI 2-3 VIEWS: CPT | Mod: TC,PN,RT

## 2023-03-13 PROCEDURE — 1125F PR PAIN SEVERITY QUANTIFIED, PAIN PRESENT: ICD-10-PCS | Mod: CPTII,S$GLB,, | Performed by: ORTHOPAEDIC SURGERY

## 2023-03-13 PROCEDURE — 1159F PR MEDICATION LIST DOCUMENTED IN MEDICAL RECORD: ICD-10-PCS | Mod: CPTII,S$GLB,, | Performed by: ORTHOPAEDIC SURGERY

## 2023-03-13 PROCEDURE — 1159F MED LIST DOCD IN RCRD: CPT | Mod: CPTII,S$GLB,, | Performed by: ORTHOPAEDIC SURGERY

## 2023-03-13 PROCEDURE — 99204 PR OFFICE/OUTPT VISIT, NEW, LEVL IV, 45-59 MIN: ICD-10-PCS | Mod: S$GLB,,, | Performed by: ORTHOPAEDIC SURGERY

## 2023-03-13 PROCEDURE — 99999 PR PBB SHADOW E&M-EST. PATIENT-LVL III: CPT | Mod: PBBFAC,,, | Performed by: ORTHOPAEDIC SURGERY

## 2023-03-13 PROCEDURE — 3008F BODY MASS INDEX DOCD: CPT | Mod: CPTII,S$GLB,, | Performed by: ORTHOPAEDIC SURGERY

## 2023-03-13 RX ORDER — MELOXICAM 15 MG/1
15 TABLET ORAL DAILY
Qty: 30 TABLET | Refills: 11 | Status: SHIPPED | OUTPATIENT
Start: 2023-03-13 | End: 2023-07-26

## 2023-03-13 NOTE — PROGRESS NOTES
CC:  72-year-old female presents for evaluation of right hip pain.  The patient has had pain off and on in the right hip for years.  Over the last 3 months it is gotten progressively worse.  She is tried a 10s unit and acupuncture but that has not helped.  She is also tried naproxen without much relief.  She does not report any falls.  She is had no previous surgery of the hip.  She currently rates her pain as a 5/10.  It is worse with activity and at the end of the day.  She is not really having any night pain.  When she gets off the hip at the end of the day her pain resolves.    Past Medical History:   Diagnosis Date    Fever blister     Hyperlipidemia     Osteopenia     Parkinson's disease     Squamous cell carcinoma 01/18/2019    right forearm       Past Surgical History:   Procedure Laterality Date    achiles tendon       broken hand Right     COLONOSCOPY      COLONOSCOPY N/A 7/9/2020    Procedure: COLONOSCOPY;  Surgeon: Markus Piña MD;  Location: [facility];  Service: Endoscopy;  Laterality: N/A;    ESOPHAGOGASTRODUODENOSCOPY N/A 7/9/2020    Procedure: EGD (ESOPHAGOGASTRODUODENOSCOPY);  Surgeon: Markus Piña MD;  Location: [facility];  Service: Endoscopy;  Laterality: N/A;    LIVER BIOPSY N/A 11/19/2020    Procedure: BIOPSY, LIVER;  Surgeon: Yovany Diagnostic Provider;  Location: [facility] OR;  Service: General;  Laterality: N/A;       Current Outpatient Medications on File Prior to Visit   Medication Sig Dispense Refill    augmented betamethasone dipropionate (DIPROLENE-AF) 0.05 % cream Apply topically 2 (two) times daily. 50 g 2    calcium carbonate-vitamin D3 500 mg(1,250mg) -400 unit Tab       carbidopa (LODOSYN) 25 mg tablet Take 1 tablet (25 mg total) by mouth 3 (three) times daily. 90 tablet 0    carbidopa-levodopa  mg (SINEMET)  mg per tablet TAKE 1 AND 1/2 TABLETS BY MOUTH THREE TIMES DAILY 405 tablet 0    ibuprofen (ADVIL,MOTRIN) 600 MG tablet Take 1 tablet (600 mg total) by mouth  every 6 (six) hours as needed for Pain (knee pain). (Patient not taking: Reported on 1/24/2023) 30 tablet 0    Lactobacillus rhamnosus GG (CULTURELLE) 10 billion cell capsule Take 1 capsule by mouth once daily. 15 billion count      LORazepam (ATIVAN) 0.5 MG tablet Take 1 tablet (0.5 mg total) by mouth every other day. (Patient not taking: Reported on 1/24/2023) 30 tablet 2    MAGNESIUM CITRATE ORAL Take by mouth once. Taking 448mg OTC capsule      multivit 33-mtfolate-nac-chrom 2.5-200-1 mg-mg-mg Cap       rasagiline (AZILECT) 1 mg Tab TAKE 1 TABLET BY MOUTH ONCE DAILY IN THE EVENING 90 tablet 0     No current facility-administered medications on file prior to visit.       ROS:    Constitution: Denies chills, fever, and sweats.  HENT: Denies headaches or blurry vision.  Cardiovascular: Denies chest pain or irregular heart beat.  Respiratory: Denies cough or shortness of breath.  Gastrointestinal: Denies abdominal pain, nausea, or vomiting.  Genitourinary:  Denies urinary incontinence, bladder and kidney issues  Musculoskeletal:  Denies muscle cramps.  Neurological: Denies dizziness or focal weakness.  Psychiatric/Behavioral: Normal mental status.  Hematologic/Lymphatic: Denies bleeding problem or easy bruising/bleeding.  Skin: Denies rash or suspicious lesions.    Physical examination     Gen - No acute distress, well nourished, well groomed   Eyes - Extraoccular motions intact, pupils equally round and reactive to light and accommodation   ENT - normocephalic, atruamtic, oropharynx clear   Neck - Supple, no abnormal masses   Cardiovascular - regular rate and rhythm   Pulmonary - clear to auscultation bilaterally, no wheezes, ronchi, or rales   Abdomen - soft, non-tender, non-distended, positive bowel sounds   Psych - The patient is alert and oriented x3 with normal mood and affect    Examination of the Right Lower Extremity    Skin is intact throughout  Motor in intact EHL,FHL,TA,yoanna  +2 DP/PT  Sensation LT  intact D/P/1st    Examination of the Right Hip    C-Sign positive  Logroll negative  Stenchfield positive    Pain with ROM negative    ROM:    Flexion   120  Extension   30  Abduction   45  Adduction   20  External Rotation 45  Internal Rotation 35    Flexion contracture negative    FADIR positive  FADER negative    Tenderness to palpation over lateral and posterolateral greater tochanter negative    X-ray images were examined and personally interpreted by me.  Three views the right hip dated 03/13/2023 show osteoarthritis of the right hip with loss of joint space, subchondral sclerosis, and subchondral cystic changes especially notable in the acetabulum.    Dx:  Osteoarthritis right hip    Plan:  The patient is currently minimally symptomatic and extremely active.  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  I think the best place to start with her is with a once a day arthritis medication, especially since she tolerates naproxen with no GI side effects.  I sent in a prescription for meloxicam to her pharmacy.  I will have her follow up in 1 month for re-evaluation.

## 2023-03-22 ENCOUNTER — PATIENT MESSAGE (OUTPATIENT)
Dept: NEUROLOGY | Facility: CLINIC | Age: 73
End: 2023-03-22
Payer: MEDICARE

## 2023-03-22 DIAGNOSIS — G20.A1 PARKINSON'S DISEASE: ICD-10-CM

## 2023-03-22 RX ORDER — CARBIDOPA AND LEVODOPA 25; 100 MG/1; MG/1
TABLET ORAL
Qty: 405 TABLET | Refills: 0 | Status: CANCELLED | OUTPATIENT
Start: 2023-03-22

## 2023-03-23 RX ORDER — CARBIDOPA AND LEVODOPA 25; 100 MG/1; MG/1
TABLET ORAL
Qty: 405 TABLET | Refills: 3 | Status: SHIPPED | OUTPATIENT
Start: 2023-03-23 | End: 2023-08-07 | Stop reason: SDUPTHER

## 2023-04-03 ENCOUNTER — CLINICAL SUPPORT (OUTPATIENT)
Dept: REHABILITATION | Facility: HOSPITAL | Age: 73
End: 2023-04-03
Attending: ORTHOPAEDIC SURGERY
Payer: MEDICARE

## 2023-04-03 ENCOUNTER — OFFICE VISIT (OUTPATIENT)
Dept: DERMATOLOGY | Facility: CLINIC | Age: 73
End: 2023-04-03
Payer: MEDICARE

## 2023-04-03 VITALS — HEIGHT: 62 IN | BODY MASS INDEX: 18.95 KG/M2 | WEIGHT: 103 LBS

## 2023-04-03 DIAGNOSIS — D18.01 CHERRY ANGIOMA: ICD-10-CM

## 2023-04-03 DIAGNOSIS — Z85.828 HISTORY OF NONMELANOMA SKIN CANCER: ICD-10-CM

## 2023-04-03 DIAGNOSIS — M16.11 UNILATERAL PRIMARY OSTEOARTHRITIS, RIGHT HIP: ICD-10-CM

## 2023-04-03 DIAGNOSIS — R26.89 DECREASED FUNCTIONAL MOBILITY: ICD-10-CM

## 2023-04-03 DIAGNOSIS — L81.4 SOLAR LENTIGO: ICD-10-CM

## 2023-04-03 DIAGNOSIS — D22.9 MULTIPLE BENIGN NEVI: ICD-10-CM

## 2023-04-03 DIAGNOSIS — L82.1 SEBORRHEIC KERATOSES: Primary | ICD-10-CM

## 2023-04-03 DIAGNOSIS — R53.1 DECREASED STRENGTH: ICD-10-CM

## 2023-04-03 PROCEDURE — 1159F MED LIST DOCD IN RCRD: CPT | Mod: CPTII,S$GLB,, | Performed by: DERMATOLOGY

## 2023-04-03 PROCEDURE — 3288F PR FALLS RISK ASSESSMENT DOCUMENTED: ICD-10-PCS | Mod: CPTII,S$GLB,, | Performed by: DERMATOLOGY

## 2023-04-03 PROCEDURE — 3288F FALL RISK ASSESSMENT DOCD: CPT | Mod: CPTII,S$GLB,, | Performed by: DERMATOLOGY

## 2023-04-03 PROCEDURE — 1160F PR REVIEW ALL MEDS BY PRESCRIBER/CLIN PHARMACIST DOCUMENTED: ICD-10-PCS | Mod: CPTII,S$GLB,, | Performed by: DERMATOLOGY

## 2023-04-03 PROCEDURE — 1101F PR PT FALLS ASSESS DOC 0-1 FALLS W/OUT INJ PAST YR: ICD-10-PCS | Mod: CPTII,S$GLB,, | Performed by: DERMATOLOGY

## 2023-04-03 PROCEDURE — 99999 PR PBB SHADOW E&M-EST. PATIENT-LVL III: CPT | Mod: PBBFAC,,, | Performed by: DERMATOLOGY

## 2023-04-03 PROCEDURE — 1101F PT FALLS ASSESS-DOCD LE1/YR: CPT | Mod: CPTII,S$GLB,, | Performed by: DERMATOLOGY

## 2023-04-03 PROCEDURE — 97110 THERAPEUTIC EXERCISES: CPT | Mod: PN

## 2023-04-03 PROCEDURE — 1160F RVW MEDS BY RX/DR IN RCRD: CPT | Mod: CPTII,S$GLB,, | Performed by: DERMATOLOGY

## 2023-04-03 PROCEDURE — 99213 OFFICE O/P EST LOW 20 MIN: CPT | Mod: S$GLB,,, | Performed by: DERMATOLOGY

## 2023-04-03 PROCEDURE — 97161 PT EVAL LOW COMPLEX 20 MIN: CPT | Mod: PN

## 2023-04-03 PROCEDURE — 1126F AMNT PAIN NOTED NONE PRSNT: CPT | Mod: CPTII,S$GLB,, | Performed by: DERMATOLOGY

## 2023-04-03 PROCEDURE — 1159F PR MEDICATION LIST DOCUMENTED IN MEDICAL RECORD: ICD-10-PCS | Mod: CPTII,S$GLB,, | Performed by: DERMATOLOGY

## 2023-04-03 PROCEDURE — 1126F PR PAIN SEVERITY QUANTIFIED, NO PAIN PRESENT: ICD-10-PCS | Mod: CPTII,S$GLB,, | Performed by: DERMATOLOGY

## 2023-04-03 PROCEDURE — 99999 PR PBB SHADOW E&M-EST. PATIENT-LVL III: ICD-10-PCS | Mod: PBBFAC,,, | Performed by: DERMATOLOGY

## 2023-04-03 PROCEDURE — 99213 PR OFFICE/OUTPT VISIT, EST, LEVL III, 20-29 MIN: ICD-10-PCS | Mod: S$GLB,,, | Performed by: DERMATOLOGY

## 2023-04-03 NOTE — PLAN OF CARE
[facility]  Physical Therapy Initial Evaluation    Name: Esther Macario [facility] Number: [id number]    Therapy Diagnosis:   Encounter Diagnoses   Name Primary?    Unilateral primary osteoarthritis, right hip     Decreased strength     Decreased functional mobility      Physician: Romero Astudillo II, MD   Physician Orders: PT Eval and Treat  Medical Diagnosis from Referral: M16.11 (ICD-10-CM) - Unilateral primary osteoarthritis, right hip   Evaluation Date: 4/3/2023  Authorization Period Expiration: 07/30/2023   Plan of Care Expiration: 7/30/2023    Progress Update: 5/3/2023  FOTO: 1 / 3   Visit # / Visits authorized: 1 / 1       PRECAUTIONS: Standard Precautions     Time In: 1530  Time Out: 1625  Total Appointment Time (timed & untimed codes): 55 minutes    SUBJECTIVE     Chief complaint:  R anterior hip pain    History of current condition:  Pain started about 4 months ago.  Denies specific incident.  States that pain has been about the same since onset.  States that she only has pain with rotational movements.  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  States that she gets relief as soon as she stops any painful movement.    Previous episode:  none    Aggravating Factors:  twisting  Easing Factors:  rest, meloxicam    Imaging:     FINDINGS:  No acute fracture or AVN.  Degenerative change lowest 3 lumbar levels.  Mild degenerative change pubic symphysis and minimal degenerative change of both hip joints.        Electronically signed by: Nikolay Rice  Date:                                            03/13/2023  Time:                                           15:54    Prior Therapy: Yes  Social History: Pt lives with their spouse  Occupation: Pt is a yoga instuctor.  Prior Level of Function: Independent with all ADLs  Current Level of Function: 65% of PLOF    Pain:  Current 0 /10, worst 8 /10, best 0 /10   Description: Throbbing and Sharp    Pts  goals: Pt reported goal is to perform yoga with no pain.      _______________________________________________________  Medical History:   Past Medical History:   Diagnosis Date    Fever blister     Hyperlipidemia     Osteopenia     Parkinson's disease     Squamous cell carcinoma 01/18/2019    right forearm       Surgical History:   Esther Segura  has a past surgical history that includes achiles tendon ; broken hand (Right); Colonoscopy; Esophagogastroduodenoscopy (N/A, 7/9/2020); Colonoscopy (N/A, 7/9/2020); and Liver biopsy (N/A, 11/19/2020).    Medications:   Esther has a current medication list which includes the following prescription(s): augmented betamethasone dipropionate, calcium carbonate-vitamin d3, carbidopa, carbidopa-levodopa  mg, ibuprofen, lactobacillus rhamnosus gg, lorazepam, magnesium citrate, meloxicam, multivit 33-mtfolate-nac-chrom, and rasagiline.    Allergies:   Review of patient's allergies indicates:   Allergen Reactions    Bee sting [allergen ext-venom-honey bee] Swelling    Fosamax [alendronate] Other (See Comments)     Jaw pain        OBJECTIVE   (x = not tested due to pain and/or inability to obtain test position)    RANGE OF MOTION:      Hip AROM/PROM Right  4/3/2023 Left  4/3/2023 Goal   Hip Flexion (120) wfl wfl 115     Hip IR (45) Wfl c pain wfl 40     Hip ER (45) Wfl c pain wfl 40         Knee AROM/PROM Right  4/3/2023 Left  4/3/2023 Goal   Hyper - Zero - Flexion wfl wfl 0-0-135         STRENGTH:    L/E MMT Right  4/3/2023 Goal   Hip Flexion  4-/5 c pain 5/5 B    Hip Extension  4-/5 5/5 B   Hip Abduction  4-/5 c pain 5/5 B   Hip IR 4-/5 c pain 5/5 B   Hip ER 4-/5 c pain 5/5 B   Knee Flexion 4-/5 5/5 B   Knee Extension 4-/5 5/5 B   Ankle DF 4/5 5/5 B   Ankle PF 4/5 5/5 B   Ankle Inversion 4/5 5/5 B   Ankle Eversion 4/5 5/5 B   Big Toe Extension 4/5 5/5 B     L/E MMT Left  4/3/2023 Goal   Hip Flexion  4/5 5/5 B    Hip Extension  4-/5 5/5 B   Hip Abduction  4-/5 5/5 B  "  Hip IR 4-/5 5/5 B   Hip ER 4-/5 5/5 B   Knee Flexion 4/5 5/5 B   Knee Extension 4/5 5/5 B   Ankle DF 4/5 5/5 B   Ankle PF 4/5 5/5 B   Ankle Inversion 4/5 5/5 B   Ankle Eversion 4/5 5/5 B   Big Toe Extension 4/5 5/5 B       MUSCLE LENGTH:     Muscle Tested  Right  4/3/2023 Left   4/3/2023 Goal   Hip Flexors  normal normal Normal B   Quadriceps normal normal Normal B   Hamstrings  normal normal Normal B   Piriformis  normal normal Normal B   Gastrocnemius  normal normal Normal B   Soleus  normal normal Normal B       SPECIAL TESTS:     Right  (spine)  4/3/2023 Left     4/3/2023 Goal   CLAY Positive Negative Negative B    SCOUR Negative Negative Negative B    FADDIR Positive Negative Negative B    90/90 Negative Negative Negative B   Hemanth Negative Negative Negative B   Obers Negative Negative Negative B       Observation:  No ecchysmosis, edema noted.    Sensation:  Sensation is intact to light touch    Palpation:  No TTP.    Posture:  Pt presents with postural abnormalities which include: forward head and rounded shoulders    Gait Analysis: The patient ambulated with the following assistive device: none;   Movement Analysis:   Functional Test  Outcome   Double Leg Squat Full squat with pain at end range   Single Leg Step Down NT           TREATMENT   Total Treatment time separate from Evaluation: (10) minutes    Hong received therapeutic exercises to develop strength, endurance, ROM, flexibility, and posture for (10) minutes including: x = exercises performed     TherEx 4/3/2023    Bridges x 3x10   Clams with red tb x 3x10   LTRs x 3x10          Plan for Next Visit:     Recumbent bike x 5 mins  LTRs  3x10  Bridges on PB  3x10  Clams with red tb  3x10 B  TrA sets with PB  2x10  DKTC with PB  2x10  Sidelying hip adduction 2x10 B  Lateral step ups on 4" box 2x10  Standing hip 4 way  2#  2x10 B  Lateral band walks in rodriguez  2 laps  Monster walks with red tb  2 laps  Shuttle leg press  50#  3x10  Shuttle kickbacks 12# " " 3x10 B  Donkey kicks 1#  2x10 B  Hip hikes on 4" box  2x10 B    Possible hip distraction 3x30          Home Exercises and Patient Education Provided    Education/Self-Care provided:   Patient educated on the impairments noted above and the effects of physical therapy intervention to improve overall condition and quality of life.   Patient was educated on all the above exercise prior/during/after for proper posture, positioning, and execution for safe performance with home exercise program.     Written Home Exercises Provided: yes. Prefers: Electronic Copies  Exercises were reviewed and Hong was able to demonstrate them prior to the end of the session.  Hong demonstrated good understanding of the education provided.     See EMR under Patient Instructions for exercises provided during therapy sessions.    ASSESSMENT   Esther is a 73 y.o. female referred to outpatient Physical Therapy with a medical diagnosis of M16.11 (ICD-10-CM) - Unilateral primary osteoarthritis, right hip . Esther presents with clinical signs and symptoms that support this diagnosis with decreased hip girdle, quad, and hamstring Strength, patellar joint hypomobility, increased joint and soft tissue edema, and impaired functional mobility.    The above impairments will be addressed through manual therapy techniques, therapeutic exercises, functional training, and modalities as necessary. Patient was treated and educated on exercises for home program, progression of therapy, and benefits of therapy to achieve full functional mobility. Patient will benefit from skilled physical therapy to meet short and long term goals and return to prior level of function.    Pt prognosis is Good.   Pt will benefit from skilled outpatient Physical Therapy to address the deficits stated above and in the chart below, provide pt/family education, and to maximize pt's level of independence.     Plan of care discussed with patient: Yes  Pt's spiritual, cultural " "and educational needs considered and patient is agreeable to the plan of care and goals as stated below:     Anticipated Barriers for therapy: none    Medical Necessity is demonstrated by the following:   History  Co-morbidities and personal factors that may impact the plan of care Co-morbidities:   advanced age    Personal Factors:   no deficits     low   Examination  Body Structures and Functions, activity limitations and participation restrictions that may impact the plan of care Body Regions:   lower extremities    Body Systems:    gross symmetry  strength  gross coordinated movement  balance  gait  motor control  motor learning    Participation Restrictions:   See above in "Current Level of Function"     Activity limitations:   Learning and applying knowledge  no deficits    General Tasks and Commands  no deficits    Communication  no deficits    Mobility  no deficits    Self care  no deficits    Domestic Life  no deficits    Interactions/Relationships  no deficits    Life Areas  no deficits    Community and Social Life  community life  recreation and leisure  no deficits         low   Clinical Presentation stable and uncomplicated low   Decision Making/ Complexity Score: low       GOALS:  SHORT TERM GOALS: 4 weeks 4/3/2023   Recent signs and systems trend is improving in order to progress towards LTG's.    Patient will be independent with HEP in order to further progress and return to maximal function.    Pain rating at Worst: 5/10 in order to progress towards increased independence with activity.    Patient will be able to correct postural deviations in sitting and standing, to decrease pain and promote postural awareness for injury prevention.       LONG TERM GOALS: 8 weeks 4/3/2023   Patient will return to normal ADL, recreational, and work related activities with less pain and limitation.     Patient will improve AROM to stated goals in order to return to maximal functional potential.     Patient will " improve Strength to stated goals of appropriate musculature in order to improve functional independence.     Pain Rating at Best: 1/10 to improve Quality of Life.     Patient will meet predicted functional outcome (FOTO) score: 80% to increase self-worth & perceived functional ability.    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          PLAN   Plan of care Certification: 4/3/2023 to 7/30/2023    Outpatient Physical Therapy 2 times weekly for 8 weeks to include any combination of the following interventions: virtual visits, dry needling, modalities, electrical stimulation (IFC, Pre-Mod, Attended with Functional Dry Needling), Manual Therapy, Moist Heat/ Ice, Neuromuscular Re-ed, Patient Education, Self Care, Therapeutic Exercise, Functional Training, and Therapeutic Activites     Thank you for this referral.    These services are reasonable and necessary for the conditions set forth above while under my care.    Brad Burris, PT, DPT

## 2023-04-03 NOTE — PROGRESS NOTES
Subjective:      Patient ID:  Esther Segura is a 73 y.o. female who presents for   Chief Complaint   Patient presents with    Skin Check     TBSE     LOV: 7/8/22 - LOPEZ, SK, h/o NMSC    Skin Check - TBSE    C/o spot on middle back    Appointment was made for reoccurring rash, no longer present  Eczema like, wrists, elbow crease   Dr Isa lavender soap  Antiinflammatory cream usually helps if caught early    Derm HX:   2/11/2019 for excision of SCC/KA R forearm, complicated by suture granuloma  Has no fhx of MM    Current Outpatient Medications:   ·  augmented betamethasone dipropionate (DIPROLENE-AF) 0.05 % cream, Apply topically 2 (two) times daily., Disp: 50 g, Rfl: 2  ·  calcium carbonate-vitamin D3 500 mg(1,250mg) -400 unit Tab, , Disp: , Rfl:   ·  carbidopa (LODOSYN) 25 mg tablet, TAKE 1 TABLET BY MOUTH THREE TIMES DAILY, Disp: 270 tablet, Rfl: 0  ·  carbidopa-levodopa  mg (SINEMET)  mg per tablet, TAKE 1 AND 1/2 TABLETS BY MOUTH THREE TIMES DAILY, Disp: 405 tablet, Rfl: 03  ·  ibuprofen (ADVIL,MOTRIN) 600 MG tablet, Take 1 tablet (600 mg total) by mouth every 6 (six) hours as needed for Pain (knee pain)., Disp: 30 tablet, Rfl: 0  ·  Lactobacillus rhamnosus GG (CULTURELLE) 10 billion cell capsule, Take 1 capsule by mouth once daily. 15 billion count, Disp: , Rfl:   ·  LORazepam (ATIVAN) 0.5 MG tablet, Take 1 tablet (0.5 mg total) by mouth every other day., Disp: 30 tablet, Rfl: 2  ·  MAGNESIUM CITRATE ORAL, Take by mouth once. Taking 448mg OTC capsule, Disp: , Rfl:   ·  meloxicam (MOBIC) 15 MG tablet, Take 1 tablet (15 mg total) by mouth once daily., Disp: 30 tablet, Rfl: 11  ·  multivit 33-mtfolate-nac-chrom 2.5-200-1 mg-mg-mg Cap, , Disp: , Rfl:   ·  rasagiline (AZILECT) 1 mg Tab, TAKE 1 TABLET BY MOUTH ONCE DAILY IN THE EVENING, Disp: 90 tablet, Rfl: 0      Review of Systems   Constitutional:  Negative for fever, chills and fatigue.   Respiratory:  Negative for cough and shortness of  breath.    Skin:  Positive for daily sunscreen use, activity-related sunscreen use and wears hat. Negative for itching, rash and dry skin.   Hematologic/Lymphatic: Does not bruise/bleed easily.     Objective:   Physical Exam   Constitutional: She appears well-developed and well-nourished. No distress.   HENT:   Mouth/Throat: Lips normal.    Eyes: Lids are normal.    Neurological: She is alert and oriented to person, place, and time. She is not disoriented.   Psychiatric: She has a normal mood and affect. She is not agitated.   Skin:   Areas Examined (abnormalities noted in diagram):   Scalp / Hair Palpated and Inspected  Head / Face Inspection Performed  Neck Inspection Performed  Chest / Axilla Inspection Performed  Abdomen Inspection Performed  Genitals / Buttocks / Groin Inspection Performed  Back Inspection Performed  RUE Inspected  LUE Inspection Performed  RLE Inspected  LLE Inspection Performed  Nails and Digits Inspection Performed                   Diagram Legend     Erythematous scaling macule/papule c/w actinic keratosis       Vascular papule c/w angioma      Pigmented verrucoid papule/plaque c/w seborrheic keratosis      Yellow umbilicated papule c/w sebaceous hyperplasia      Irregularly shaped tan macule c/w lentigo     1-2 mm smooth white papules consistent with Milia      Movable subcutaneous cyst with punctum c/w epidermal inclusion cyst      Subcutaneous movable cyst c/w pilar cyst      Firm pink to brown papule c/w dermatofibroma      Pedunculated fleshy papule(s) c/w skin tag(s)      Evenly pigmented macule c/w junctional nevus     Mildly variegated pigmented, slightly irregular-bordered macule c/w mildly atypical nevus      Flesh colored to evenly pigmented papule c/w intradermal nevus       Pink pearly papule/plaque c/w basal cell carcinoma      Erythematous hyperkeratotic cursted plaque c/w SCC      Surgical scar with no sign of skin cancer recurrence      Open and closed comedones       Inflammatory papules and pustules      Verrucoid papule consistent consistent with wart     Erythematous eczematous patches and plaques     Dystrophic onycholytic nail with subungual debris c/w onychomycosis     Umbilicated papule    Erythematous-base heme-crusted tan verrucoid plaque consistent with inflamed seborrheic keratosis     Erythematous Silvery Scaling Plaque c/w Psoriasis     See annotation      Assessment / Plan:        Seborrheic keratoses  These are benign inherited growths without a malignant potential. Reassurance given to patient. No treatment is necessary.     History of nonmelanoma skin cancer  Area of previous KA examined. Site well healed with no signs of recurrence.    Total body skin examination performed today including at least 12 points as noted in physical examination. No lesions suspicious for malignancy noted.    Cherry angioma  This is a benign vascular lesion. Reassurance given. No treatment required.     Multiple benign nevi  Careful dermoscopy evaluation of nevi performed with none identified as needing biopsy today  Monitor for new mole or moles that are becoming bigger, darker, irritated, or developing irregular borders.     Solar lentigo  This is a benign hyperpigmented sun induced lesion. Daily sun protection will reduce the number of new lesions. Treatment of these benign lesions are considered cosmetic.    Patient instructed in importance in daily broad spectrum sun protection of at least spf 30. Mineral sunscreen ingredients preferred (Zinc +/- Titanium) and can be found OTC.   Recommend Elta MD for daily use on face and neck.  Patient encouraged to wear hat for all outdoor exposure.   Also discussed sun avoidance and use of protective clothing.             Follow up in about 1 year (around 4/3/2024).

## 2023-04-10 ENCOUNTER — CLINICAL SUPPORT (OUTPATIENT)
Dept: REHABILITATION | Facility: HOSPITAL | Age: 73
End: 2023-04-10
Attending: ORTHOPAEDIC SURGERY
Payer: MEDICARE

## 2023-04-10 DIAGNOSIS — R53.1 DECREASED STRENGTH: Primary | ICD-10-CM

## 2023-04-10 DIAGNOSIS — R26.89 DECREASED FUNCTIONAL MOBILITY: ICD-10-CM

## 2023-04-10 PROCEDURE — 97112 NEUROMUSCULAR REEDUCATION: CPT | Mod: PN

## 2023-04-10 PROCEDURE — 97110 THERAPEUTIC EXERCISES: CPT | Mod: PN

## 2023-04-10 NOTE — PROGRESS NOTES
[facility]   Physical Therapy Treatment Note     Name: Esther Macario [facility] Number: [id number]    Therapy Diagnosis:   Encounter Diagnoses   Name Primary?    Decreased strength Yes    Decreased functional mobility      Physician: Romero Astudillo II, MD    Visit Date: 4/10/2023  Physician: Romero Astudillo II, MD     Physician Orders: PT Eval and Treat  Medical Diagnosis from Referral: M16.11 (ICD-10-CM) - Unilateral primary osteoarthritis, right hip   Evaluation Date: 4/3/2023  Authorization Period Expiration: 5/1/2023   Plan of Care Expiration: 7/30/2023                Progress Update: 5/3/2023               FOTO: 1 / 3   Visit # / Visits authorized: 1 / 11                           PTA Visit #: 0/5     Time In: 1532  Time Out: 1622  Total Billable Time: 50 minutes    SUBJECTIVE     Pt reports: history of R hip pain, recent onset of 304 months ago. She has had acupuncture and TENS unit without improvement. Reports she is currently doing yoga and she has pain with rotational movements. She makes a C sign for pain. Reports most of the pain seems to be around the hip joint.     She was compliant with home exercise program.  Response to previous treatment: first tx sesion  Functional change:  none     Pain: 4/10  Location: right hip      OBJECTIVE     Objective Measures updated at progress report unless specified.     C sign: +   FADDIR: +  CLAY: + anterior hip   SLS: neg for pain   Hip abduction and adduction: non painful  Hip extension painful anterior hip   Hip ROM    Flexion: pain end range    Extension: pain , anterior hip    IR: pain end range    ER: pain end range   Positive scour test   Standing lumbar extension, SB and rotation painful in anterior hip  One incident of pop/click with forward lumbar flexion     Treatment     Hong received the treatments listed below:      therapeutic exercises to develop strength, endurance, ROM, flexibility, posture, and core stabilization  for 30 minutes including:    Assessment above   SL hip abduction 3 x 10   Prone hip extension 3 x 10   SL hip adduction 10x reps ceased 2/2 pain   SL ER clams red TB 3 x 10   Supine bridges with abduction red TB 20x     manual therapy techniques: Joint mobilizations and Manual traction were applied to the: R hip for 5 minutes, including:    Long axis distraction   Inferior hip mob MWM   Lateral joint mob     neuromuscular re-education activities to improve: Balance, Coordination, Kinesthetic, Sense, Proprioception, and Posture for 10 minutes. The following activities were included:    Lateral stepping red TB 2 set of 3 rounds x 8 ft   PPT with alt marches 10x each side   Bird/dog 10x each LE 5 sec hold       Patient Education and Home Exercises     Home Exercises Provided and Patient Education Provided     Education provided:   - HEP  - hip impingement    Written Home Exercises Provided: yes. Exercises were reviewed and Hong was able to demonstrate them prior to the end of the session.  Hong demonstrated good  understanding of the education provided. See EMR under Patient Instructions for exercises provided during therapy sessions    ASSESSMENT     Patient transferring from [facility] today. She presents with complaints of anterior and lateral hip pain. She presents with making the C sign. She demonstrates positive CLAY, FADDIR, scour and pain with end range ER and IR. She has tenderness to R hip flexor as well and has pain with standing lumbar extension, SB and rotation as well as prone lumbar extensions. She responded well to long axis distraction with relief of pain and improvement in pain with CLAY and FADDIR positioning. Physical Therapist educated pt to not perform activities which bother her hip and make the pain worse. She reported improvement in pain post tx session and improvement in hip mobility/ROM and symptom in different positions. She presents with likely hip impingement.     Hong Is  progressing well towards her goals.   Pt prognosis is Good.     Pt will continue to benefit from skilled outpatient physical therapy to address the deficits listed in the problem list box on initial evaluation, provide pt/family education and to maximize pt's level of independence in the home and community environment.     Pt's spiritual, cultural and educational needs considered and pt agreeable to plan of care and goals.     Anticipated barriers to physical therapy: none     Goals:     SHORT TERM GOALS: 4 weeks 4/3/2023   Recent signs and systems trend is improving in order to progress towards LTG's.     Patient will be independent with HEP in order to further progress and return to maximal function.     Pain rating at Worst: 5/10 in order to progress towards increased independence with activity.     Patient will be able to correct postural deviations in sitting and standing, to decrease pain and promote postural awareness for injury prevention.         LONG TERM GOALS: 8 weeks 4/3/2023   Patient will return to normal ADL, recreational, and work related activities with less pain and limitation.      Patient will improve AROM to stated goals in order to return to maximal functional potential.      Patient will improve Strength to stated goals of appropriate musculature in order to improve functional independence.      Pain Rating at Best: 1/10 to improve Quality of Life.      Patient will meet predicted functional outcome (FOTO) score: 80% to increase self-worth & perceived functional ability.     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          PLAN     Continue POC as indicated. Progress as tolerated.     Adeola Sawyer, PT

## 2023-04-13 ENCOUNTER — PATIENT MESSAGE (OUTPATIENT)
Dept: ORTHOPEDICS | Facility: CLINIC | Age: 73
End: 2023-04-13

## 2023-04-13 ENCOUNTER — OFFICE VISIT (OUTPATIENT)
Dept: ORTHOPEDICS | Facility: CLINIC | Age: 73
End: 2023-04-13
Payer: MEDICARE

## 2023-04-13 VITALS — WEIGHT: 103 LBS | RESPIRATION RATE: 18 BRPM | BODY MASS INDEX: 18.95 KG/M2 | HEIGHT: 62 IN

## 2023-04-13 DIAGNOSIS — M16.11 UNILATERAL PRIMARY OSTEOARTHRITIS, RIGHT HIP: Primary | ICD-10-CM

## 2023-04-13 PROCEDURE — 1125F PR PAIN SEVERITY QUANTIFIED, PAIN PRESENT: ICD-10-PCS | Mod: CPTII,S$GLB,, | Performed by: ORTHOPAEDIC SURGERY

## 2023-04-13 PROCEDURE — 99213 OFFICE O/P EST LOW 20 MIN: CPT | Mod: S$GLB,,, | Performed by: ORTHOPAEDIC SURGERY

## 2023-04-13 PROCEDURE — 3288F FALL RISK ASSESSMENT DOCD: CPT | Mod: CPTII,S$GLB,, | Performed by: ORTHOPAEDIC SURGERY

## 2023-04-13 PROCEDURE — 99213 PR OFFICE/OUTPT VISIT, EST, LEVL III, 20-29 MIN: ICD-10-PCS | Mod: S$GLB,,, | Performed by: ORTHOPAEDIC SURGERY

## 2023-04-13 PROCEDURE — 99999 PR PBB SHADOW E&M-EST. PATIENT-LVL III: CPT | Mod: PBBFAC,,, | Performed by: ORTHOPAEDIC SURGERY

## 2023-04-13 PROCEDURE — 1159F MED LIST DOCD IN RCRD: CPT | Mod: CPTII,S$GLB,, | Performed by: ORTHOPAEDIC SURGERY

## 2023-04-13 PROCEDURE — 1101F PT FALLS ASSESS-DOCD LE1/YR: CPT | Mod: CPTII,S$GLB,, | Performed by: ORTHOPAEDIC SURGERY

## 2023-04-13 PROCEDURE — 3288F PR FALLS RISK ASSESSMENT DOCUMENTED: ICD-10-PCS | Mod: CPTII,S$GLB,, | Performed by: ORTHOPAEDIC SURGERY

## 2023-04-13 PROCEDURE — 99999 PR PBB SHADOW E&M-EST. PATIENT-LVL III: ICD-10-PCS | Mod: PBBFAC,,, | Performed by: ORTHOPAEDIC SURGERY

## 2023-04-13 PROCEDURE — 1159F PR MEDICATION LIST DOCUMENTED IN MEDICAL RECORD: ICD-10-PCS | Mod: CPTII,S$GLB,, | Performed by: ORTHOPAEDIC SURGERY

## 2023-04-13 PROCEDURE — 1125F AMNT PAIN NOTED PAIN PRSNT: CPT | Mod: CPTII,S$GLB,, | Performed by: ORTHOPAEDIC SURGERY

## 2023-04-13 PROCEDURE — 1101F PR PT FALLS ASSESS DOC 0-1 FALLS W/OUT INJ PAST YR: ICD-10-PCS | Mod: CPTII,S$GLB,, | Performed by: ORTHOPAEDIC SURGERY

## 2023-04-13 NOTE — PROGRESS NOTES
CC:  73-year-old female follows up with osteoarthritis of her right hip.  On her last visit we would prescribed her meloxicam.  She is also attending physical therapy and feels like that is helped a lot.  She states that most of her pain at this point when she has rotation movements of the hip.  Overall she feels like she is doing better.    Examination of the Right Lower Extremity    Skin is intact throughout  Motor in intact EHL,FHL,TA,yoanna  +2 DP/PT  Sensation LT intact D/P/1st    Examination of the Right Hip    C-Sign positive  Logroll negative  Stenchfield negative    Pain with ROM negative    ROM:    Flexion   120  Extension   30  Abduction   45  Adduction   20  External Rotation 45  Internal Rotation 35    Flexion contracture negative    FADIR positive  FADER negative    Tenderness to palpation over lateral and posterolateral greater tochanter negative    Dx:  Osteoarthritis right hip, improved with physical therapy and p.o. meloxicam.    Plan:  The patient is going to continue the physical therapy and the meloxicam.  She is gone on a trip next month and we talked about possibly doing an injection before she leaves the country.  I will have her follow up in a few weeks for re-evaluation and see how she would like to proceed.

## 2023-04-14 ENCOUNTER — CLINICAL SUPPORT (OUTPATIENT)
Dept: REHABILITATION | Facility: HOSPITAL | Age: 73
End: 2023-04-14
Payer: MEDICARE

## 2023-04-14 DIAGNOSIS — R26.89 DECREASED FUNCTIONAL MOBILITY: ICD-10-CM

## 2023-04-14 DIAGNOSIS — R53.1 DECREASED STRENGTH: Primary | ICD-10-CM

## 2023-04-14 PROCEDURE — 97112 NEUROMUSCULAR REEDUCATION: CPT | Mod: PN,CQ

## 2023-04-14 PROCEDURE — 97110 THERAPEUTIC EXERCISES: CPT | Mod: PN,CQ

## 2023-04-14 PROCEDURE — 97140 MANUAL THERAPY 1/> REGIONS: CPT | Mod: PN,CQ

## 2023-04-14 NOTE — PROGRESS NOTES
[facility]   Physical Therapy Treatment Note     Name: Esther Macario [facility] Number: [id number]    Therapy Diagnosis:   No diagnosis found.    Physician: Romero Astudillo II, MD    Visit Date: 4/14/2023  Physician: Romero Astudillo II, MD     Physician Orders: PT Eval and Treat  Medical Diagnosis from Referral: M16.11 (ICD-10-CM) - Unilateral primary osteoarthritis, right hip   Evaluation Date: 4/3/2023  Authorization Period Expiration: 5/1/2023   Plan of Care Expiration: 7/30/2023                Progress Update: 5/3/2023               FOTO: 1 / 3   Visit # / Visits authorized: 2 / 11                           PTA Visit #: 1/5     Time In: 1000  Time Out: 1100  Total Billable Time: 55 minutes    SUBJECTIVE     Pt reports: Feels like she is making some progress. Pain isn't as bad.     She was compliant with home exercise program.  Response to previous treatment: as noted  Functional change:  none     Pain: 4/10  Location: right hip      OBJECTIVE     Objective Measures updated at progress report unless specified.     C sign: +   FADDIR: +  CLAY: + anterior hip   SLS: neg for pain   Hip abduction and adduction: non painful  Hip extension painful anterior hip   Hip ROM    Flexion: pain end range    Extension: pain , anterior hip    IR: pain end range    ER: pain end range   Positive scour test   Standing lumbar extension, SB and rotation painful in anterior hip  One incident of pop/click with forward lumbar flexion     Treatment     Hong received the treatments listed below:      therapeutic exercises to develop strength, endurance, ROM, flexibility, posture, and core stabilization for 30 minutes including:    Assessment above   SL hip abduction 3 x 10   Prone hip extension 3 x 10   SL hip adduction 10x reps ceased 2/2 pain   SL ER clams red TB 3 x 10   Supine bridges with abduction red TB 20x   Donkey kicks on shuttle 12 pounds 3 x 10 repetitions   Double leg on shuttle 62 pounds 3  x 10 repetitions   Single leg on shuttle 25 pounds 3 x 10 repetitions     manual therapy techniques: Joint mobilizations and Manual traction were applied to the: R hip for 5 minutes, including:    Long axis distraction   Inferior hip mob MWM   Lateral joint mob     neuromuscular re-education activities to improve: Balance, Coordination, Kinesthetic, Sense, Proprioception, and Posture for 10 minutes. The following activities were included:    Lateral stepping red TB 2 set of 3 rounds x 8 ft   PPT with alt marches 10x each side   Bird/dog 10x each LE 5 sec hold       Patient Education and Home Exercises     Home Exercises Provided and Patient Education Provided     Education provided:   - HEP  - hip impingement    Written Home Exercises Provided: yes. Exercises were reviewed and Hong was able to demonstrate them prior to the end of the session.  Hong demonstrated good  understanding of the education provided. See EMR under Patient Instructions for exercises provided during therapy sessions    ASSESSMENT     Hong responds very well to manual inferior mobilization with movement as she is able to flex her hip with less pain. Attempted to perform herself but unable to achieve same result. Provided education to avoid positions that put her right hip flexor on a stretch to prevent excessive anterior translation of femoral head. Hong will continue to benefit from skilled Physical Therapy to maximize strength gains to allow her to return to prior level of function without pain or limitations.     Hong Is progressing well towards her goals.   Pt prognosis is Good.     Pt will continue to benefit from skilled outpatient physical therapy to address the deficits listed in the problem list box on initial evaluation, provide pt/family education and to maximize pt's level of independence in the home and community environment.     Pt's spiritual, cultural and educational needs considered and pt agreeable to plan of care and  goals.     Anticipated barriers to physical therapy: none     Goals:     SHORT TERM GOALS: 4 weeks 4/3/2023   Recent signs and systems trend is improving in order to progress towards LTG's.     Patient will be independent with HEP in order to further progress and return to maximal function.     Pain rating at Worst: 5/10 in order to progress towards increased independence with activity.     Patient will be able to correct postural deviations in sitting and standing, to decrease pain and promote postural awareness for injury prevention.         LONG TERM GOALS: 8 weeks 4/3/2023   Patient will return to normal ADL, recreational, and work related activities with less pain and limitation.      Patient will improve AROM to stated goals in order to return to maximal functional potential.      Patient will improve Strength to stated goals of appropriate musculature in order to improve functional independence.      Pain Rating at Best: 1/10 to improve Quality of Life.      Patient will meet predicted functional outcome (FOTO) score: 80% to increase self-worth & perceived functional ability.     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          PLAN     Continue POC as indicated. Progress as tolerated.     Rocio Phelps, PTA

## 2023-04-16 ENCOUNTER — PATIENT MESSAGE (OUTPATIENT)
Dept: ORTHOPEDICS | Facility: CLINIC | Age: 73
End: 2023-04-16
Payer: MEDICARE

## 2023-04-18 ENCOUNTER — CLINICAL SUPPORT (OUTPATIENT)
Dept: REHABILITATION | Facility: HOSPITAL | Age: 73
End: 2023-04-18
Payer: MEDICARE

## 2023-04-18 DIAGNOSIS — R53.1 DECREASED STRENGTH: Primary | ICD-10-CM

## 2023-04-18 DIAGNOSIS — R26.89 DECREASED FUNCTIONAL MOBILITY: ICD-10-CM

## 2023-04-18 PROCEDURE — 97112 NEUROMUSCULAR REEDUCATION: CPT | Mod: PN,CQ

## 2023-04-18 PROCEDURE — 97110 THERAPEUTIC EXERCISES: CPT | Mod: PN,CQ

## 2023-04-18 NOTE — PROGRESS NOTES
"[facility]   Physical Therapy Treatment Note     Name: Esther Macario [facility] Number: [id number]    Therapy Diagnosis:   Encounter Diagnoses   Name Primary?    Decreased strength Yes    Decreased functional mobility        Physician: Romero Astudillo II, MD    Visit Date: 4/18/2023  Physician: Romero Astudillo II, MD     Physician Orders: PT Eval and Treat  Medical Diagnosis from Referral: M16.11 (ICD-10-CM) - Unilateral primary osteoarthritis, right hip   Evaluation Date: 4/3/2023  Authorization Period Expiration: 5/1/2023   Plan of Care Expiration: 7/30/2023                Progress Update: 5/3/2023               FOTO: 1 / 3   Visit # / Visits authorized: 3 / 11                           PTA Visit #: 2/5     Time In: 1400  Time Out: 1500  Total Billable Time: 55 minutes    SUBJECTIVE     Pt reports: "I feel like I have a little bit more range of motion in this hip than I did last week." 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    She was compliant with home exercise program.  Response to previous treatment: as noted  Functional change:  none     Pain: 4/10  Location: right hip      OBJECTIVE     Objective Measures updated at progress report unless specified.     C sign: +   FADDIR: +  CLAY: + anterior hip   SLS: neg for pain   Hip abduction and adduction: non painful  Hip extension painful anterior hip   Hip ROM    Flexion: pain end range    Extension: pain , anterior hip    IR: pain end range    ER: pain end range   Positive scour test   Standing lumbar extension, SB and rotation painful in anterior hip  One incident of pop/click with forward lumbar flexion     Treatment     Physical Therapy technician assisted with treatment under direct supervision of treating therapist. Patient received 1:1 treatments for 30 minutes     Hong received the treatments listed below:      therapeutic exercises to develop strength, " endurance, ROM, flexibility, posture, and core stabilization for 30 minutes including:    Assessment above   SL hip abduction 3 x 10   Prone hip extension 3 x 10   SL hip adduction 10x reps ceased 2/2 pain   SL ER clams red TB 3 x 10   Supine bridges with abduction red TB 20x   Donkey kicks on shuttle 12 pounds 3 x 10 repetitions   Double leg on shuttle 62 pounds 3 x 10 repetitions   Single leg on shuttle 25 pounds 3 x 10 repetitions   Lateral stepping red TB 2 set of 3 rounds x 8 ft     manual therapy techniques: Joint mobilizations and Manual traction were applied to the: R hip for 5 minutes, including:    Long axis distraction   Inferior hip mob MWM   Lateral joint mob     neuromuscular re-education activities to improve: Balance, Coordination, Kinesthetic, Sense, Proprioception, and Posture for 10 minutes. The following activities were included:    Lateral stepping red TB 2 set of 3 rounds x 8 ft   PPT with alt marches 10x each side   Bird/dog 10x each LE 5 sec hold   Quadruped fire hydrants 3 x 10 repetitions       Patient Education and Home Exercises     Home Exercises Provided and Patient Education Provided     Education provided:   - HEP  - hip impingement    Written Home Exercises Provided: yes. Exercises were reviewed and Hong was able to demonstrate them prior to the end of the session.  Hong demonstrated good  understanding of the education provided. See EMR under Patient Instructions for exercises provided during therapy sessions    ASSESSMENT     Hong is progressing well in therapy with reports of improvement in pain and activity since initial evaluation. Doing well with glute med and glute max strengthening. Will continue to benefit from skilled Physical Therapy to maximize strength gains to allow her to return to recreational activities without pain or limitations.     Hong Is progressing well towards her goals.   Pt prognosis is Good.     Pt will continue to benefit from skilled outpatient  physical therapy to address the deficits listed in the problem list box on initial evaluation, provide pt/family education and to maximize pt's level of independence in the home and community environment.     Pt's spiritual, cultural and educational needs considered and pt agreeable to plan of care and goals.     Anticipated barriers to physical therapy: none     Goals:     SHORT TERM GOALS: 4 weeks 4/3/2023   Recent signs and systems trend is improving in order to progress towards LTG's.  In progress not met   Patient will be independent with HEP in order to further progress and return to maximal function.  In progress not met   Pain rating at Worst: 5/10 in order to progress towards increased independence with activity.  In progress not met   Patient will be able to correct postural deviations in sitting and standing, to decrease pain and promote postural awareness for injury prevention.   In progress not met      LONG TERM GOALS: 8 weeks 4/3/2023   Patient will return to normal ADL, recreational, and work related activities with less pain and limitation.      Patient will improve AROM to stated goals in order to return to maximal functional potential.      Patient will improve Strength to stated goals of appropriate musculature in order to improve functional independence.      Pain Rating at Best: 1/10 to improve Quality of Life.      Patient will meet predicted functional outcome (FOTO) score: 80% to increase self-worth & perceived functional ability.     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          PLAN     Continue POC as indicated. Progress as tolerated.     Rocio Phelps, PTA

## 2023-04-20 ENCOUNTER — OFFICE VISIT (OUTPATIENT)
Dept: ORTHOPEDICS | Facility: CLINIC | Age: 73
End: 2023-04-20
Payer: MEDICARE

## 2023-04-20 VITALS — BODY MASS INDEX: 18.94 KG/M2 | HEIGHT: 62 IN | WEIGHT: 102.94 LBS

## 2023-04-20 DIAGNOSIS — M16.11 UNILATERAL PRIMARY OSTEOARTHRITIS, RIGHT HIP: Primary | ICD-10-CM

## 2023-04-20 PROCEDURE — 3288F FALL RISK ASSESSMENT DOCD: CPT | Mod: CPTII,S$GLB,, | Performed by: ORTHOPAEDIC SURGERY

## 2023-04-20 PROCEDURE — 1101F PR PT FALLS ASSESS DOC 0-1 FALLS W/OUT INJ PAST YR: ICD-10-PCS | Mod: CPTII,S$GLB,, | Performed by: ORTHOPAEDIC SURGERY

## 2023-04-20 PROCEDURE — 1159F MED LIST DOCD IN RCRD: CPT | Mod: CPTII,S$GLB,, | Performed by: ORTHOPAEDIC SURGERY

## 2023-04-20 PROCEDURE — 1160F PR REVIEW ALL MEDS BY PRESCRIBER/CLIN PHARMACIST DOCUMENTED: ICD-10-PCS | Mod: CPTII,S$GLB,, | Performed by: ORTHOPAEDIC SURGERY

## 2023-04-20 PROCEDURE — 20611 LARGE JOINT ASPIRATION/INJECTION: R HIP JOINT: ICD-10-PCS | Mod: RT,S$GLB,, | Performed by: ORTHOPAEDIC SURGERY

## 2023-04-20 PROCEDURE — 99214 PR OFFICE/OUTPT VISIT, EST, LEVL IV, 30-39 MIN: ICD-10-PCS | Mod: 25,S$GLB,, | Performed by: ORTHOPAEDIC SURGERY

## 2023-04-20 PROCEDURE — 1159F PR MEDICATION LIST DOCUMENTED IN MEDICAL RECORD: ICD-10-PCS | Mod: CPTII,S$GLB,, | Performed by: ORTHOPAEDIC SURGERY

## 2023-04-20 PROCEDURE — 1125F PR PAIN SEVERITY QUANTIFIED, PAIN PRESENT: ICD-10-PCS | Mod: CPTII,S$GLB,, | Performed by: ORTHOPAEDIC SURGERY

## 2023-04-20 PROCEDURE — 3288F PR FALLS RISK ASSESSMENT DOCUMENTED: ICD-10-PCS | Mod: CPTII,S$GLB,, | Performed by: ORTHOPAEDIC SURGERY

## 2023-04-20 PROCEDURE — 99999 PR PBB SHADOW E&M-EST. PATIENT-LVL III: CPT | Mod: PBBFAC,,, | Performed by: ORTHOPAEDIC SURGERY

## 2023-04-20 PROCEDURE — 1101F PT FALLS ASSESS-DOCD LE1/YR: CPT | Mod: CPTII,S$GLB,, | Performed by: ORTHOPAEDIC SURGERY

## 2023-04-20 PROCEDURE — 99214 OFFICE O/P EST MOD 30 MIN: CPT | Mod: 25,S$GLB,, | Performed by: ORTHOPAEDIC SURGERY

## 2023-04-20 PROCEDURE — 20611 DRAIN/INJ JOINT/BURSA W/US: CPT | Mod: RT,S$GLB,, | Performed by: ORTHOPAEDIC SURGERY

## 2023-04-20 PROCEDURE — 99999 PR PBB SHADOW E&M-EST. PATIENT-LVL III: ICD-10-PCS | Mod: PBBFAC,,, | Performed by: ORTHOPAEDIC SURGERY

## 2023-04-20 PROCEDURE — 1125F AMNT PAIN NOTED PAIN PRSNT: CPT | Mod: CPTII,S$GLB,, | Performed by: ORTHOPAEDIC SURGERY

## 2023-04-20 PROCEDURE — 1160F RVW MEDS BY RX/DR IN RCRD: CPT | Mod: CPTII,S$GLB,, | Performed by: ORTHOPAEDIC SURGERY

## 2023-04-20 RX ORDER — METHYLPREDNISOLONE ACETATE 80 MG/ML
80 INJECTION, SUSPENSION INTRA-ARTICULAR; INTRALESIONAL; INTRAMUSCULAR; SOFT TISSUE
Status: DISCONTINUED | OUTPATIENT
Start: 2023-04-20 | End: 2023-04-20 | Stop reason: HOSPADM

## 2023-04-20 RX ADMIN — METHYLPREDNISOLONE ACETATE 80 MG: 80 INJECTION, SUSPENSION INTRA-ARTICULAR; INTRALESIONAL; INTRAMUSCULAR; SOFT TISSUE at 03:04

## 2023-04-20 NOTE — PROGRESS NOTES
CC:  73-year-old female follows up with osteoarthritis of her right hip.  The patient is on meloxicam and is getting some but not complete relief with that.  [place] about [place] go [place] the [place] and wanted to discuss possible injection today.  She currently rates her pain as a 3/10.    Past Medical History:   Diagnosis Date    Fever blister     Hyperlipidemia     Osteopenia     Parkinson's disease     Squamous cell carcinoma 01/18/2019    right forearm       Past Surgical History:   Procedure Laterality Date    achiles tendon       broken hand Right     COLONOSCOPY      COLONOSCOPY N/A 7/9/2020    Procedure: COLONOSCOPY;  Surgeon: Markus Piña MD;  Location: [facility] ENDO;  Service: Endoscopy;  Laterality: N/A;    ESOPHAGOGASTRODUODENOSCOPY N/A 7/9/2020    Procedure: EGD (ESOPHAGOGASTRODUODENOSCOPY);  Surgeon: Markus Piña MD;  Location: [facility] ENDO;  Service: Endoscopy;  Laterality: N/A;    LIVER BIOPSY N/A 11/19/2020    Procedure: BIOPSY, LIVER;  Surgeon: Yovany Diagnostic Provider;  Location: [facility] OR;  Service: General;  Laterality: N/A;       Current Outpatient Medications on File Prior to Visit   Medication Sig Dispense Refill    augmented betamethasone dipropionate (DIPROLENE-AF) 0.05 % cream Apply topically 2 (two) times daily. 50 g 2    calcium carbonate-vitamin D3 500 mg(1,250mg) -400 unit Tab       carbidopa (LODOSYN) 25 mg tablet TAKE 1 TABLET BY MOUTH THREE TIMES DAILY 270 tablet 0    carbidopa-levodopa  mg (SINEMET)  mg per tablet TAKE 1 AND 1/2 TABLETS BY MOUTH THREE TIMES DAILY 405 tablet 03    ibuprofen (ADVIL,MOTRIN) 600 MG tablet Take 1 tablet (600 mg total) by mouth every 6 (six) hours as needed for Pain (knee pain). 30 tablet 0    Lactobacillus rhamnosus GG (CULTURELLE) 10 billion cell capsule Take 1 capsule by mouth once daily. 15 billion count      LORazepam (ATIVAN) 0.5 MG tablet Take 1 tablet (0.5 mg total) by mouth every other day. 30 tablet 2    MAGNESIUM CITRATE ORAL Take by  mouth once. Taking 448mg OTC capsule      meloxicam (MOBIC) 15 MG tablet Take 1 tablet (15 mg total) by mouth once daily. 30 tablet 11    multivit 33-mtfolate-nac-chrom 2.5-200-1 mg-mg-mg Cap       rasagiline (AZILECT) 1 mg Tab TAKE 1 TABLET BY MOUTH ONCE DAILY IN THE EVENING 90 tablet 0     No current facility-administered medications on file prior to visit.       ROS:    Constitution: Denies chills, fever, and sweats.  HENT: Denies headaches or blurry vision.  Cardiovascular: Denies chest pain or irregular heart beat.  Respiratory: Denies cough or shortness of breath.  Gastrointestinal: Denies abdominal pain, nausea, or vomiting.  Genitourinary:  Denies urinary incontinence, bladder and kidney issues  Musculoskeletal:  Denies muscle cramps.  Neurological: Denies dizziness or focal weakness.  Psychiatric/Behavioral: Normal mental status.  Hematologic/Lymphatic: Denies bleeding problem or easy bruising/bleeding.  Skin: Denies rash or suspicious lesions.    Physical examination     Gen - No acute distress, well nourished, well groomed   Eyes - Extraoccular motions intact, pupils equally round and reactive to light and accommodation   ENT - normocephalic, atruamtic, oropharynx clear   Neck - Supple, no abnormal masses   Cardiovascular - regular rate and rhythm   Pulmonary - clear to auscultation bilaterally, no wheezes, ronchi, or rales   Abdomen - soft, non-tender, non-distended, positive bowel sounds   Psych - The patient is alert and oriented x3 with normal mood and affect    Examination of the Right Lower Extremity     Skin is intact throughout  Motor in intact EHL,FHL,TA,yoanan  +2 DP/PT  Sensation LT intact D/P/1st     Examination of the Right Hip     C-Sign positive  Logroll negative  Stenchfield negative     Pain with ROM negative     ROM:     Flexion                        120  Extension                    30  Abduction                    45  Adduction                    20  External Rotation         45  Internal Rotation         35     Flexion contracture negative     FADIR positive  FADER negative     Tenderness to palpation over lateral and posterolateral greater tochanter negative     Dx:  Osteoarthritis right hip, improved with physical therapy and p.o. meloxicam but still symptomatic.    Plan:  Recommendation is for an ultrasound guided steroid injection into the Right hip. Ultrasound guidance is needed due to the fact that the hip joint is deep in the body and not palpable. Risk and benefits of the procedure were explained to the patient. They verbalized understanding and did wish to proceed. Informed consent was obtained.  The Right hip was visualized under ultrasound. The femoral head, femoral neck, head-neck junction, and acetabulum were all visualized. The femoral neurovascular bundle was identified and avoided. Under direct ultrasound visualization, an 18 gauge spinal needle was advanced to an appropriate spot at the femoral head neck junction and the Right hip was injected with a mixture of 2/2/1 lidocaine, marcaine, and depomedrol. The hip capsule could be seen to distend as the fluid was injected. The patient tolerated the procedure well.  Static ultrasound images were saved to the patients chart.

## 2023-04-20 NOTE — PROCEDURES
Large Joint Aspiration/Injection: R hip joint    Date/Time: 4/20/2023 3:15 PM  Performed by: Romero Astudillo II, MD  Authorized by: Romero Astudillo II, MD     Consent Done?:  Yes (Verbal)  Indications:  Arthritis and pain  Timeout: prior to procedure the correct patient, procedure, and site was verified    Local anesthesia used?: No      Details:  Needle Size:  22 G  Ultrasonic Guidance for needle placement?: Yes    Images are saved and documented.  Approach:  Anterolateral  Location:  Hip  Site:  R hip joint  Medications:  80 mg methylPREDNISolone acetate 80 mg/mL  Patient tolerance:  Patient tolerated the procedure well with no immediate complications

## 2023-04-24 ENCOUNTER — PATIENT MESSAGE (OUTPATIENT)
Dept: ORTHOPEDICS | Facility: CLINIC | Age: 73
End: 2023-04-24
Payer: MEDICARE

## 2023-04-27 ENCOUNTER — PATIENT MESSAGE (OUTPATIENT)
Dept: ORTHOPEDICS | Facility: CLINIC | Age: 73
End: 2023-04-27
Payer: MEDICARE

## 2023-05-01 ENCOUNTER — OFFICE VISIT (OUTPATIENT)
Dept: PAIN MEDICINE | Facility: CLINIC | Age: 73
End: 2023-05-01
Payer: MEDICARE

## 2023-05-01 ENCOUNTER — PATIENT MESSAGE (OUTPATIENT)
Dept: PAIN MEDICINE | Facility: CLINIC | Age: 73
End: 2023-05-01

## 2023-05-01 ENCOUNTER — HOSPITAL ENCOUNTER (OUTPATIENT)
Dept: RADIOLOGY | Facility: HOSPITAL | Age: 73
Discharge: HOME | End: 2023-05-01
Attending: PHYSICIAN ASSISTANT
Payer: MEDICARE

## 2023-05-01 VITALS
HEIGHT: 62 IN | HEART RATE: 73 BPM | WEIGHT: 100.19 LBS | DIASTOLIC BLOOD PRESSURE: 73 MMHG | BODY MASS INDEX: 18.44 KG/M2 | SYSTOLIC BLOOD PRESSURE: 129 MMHG

## 2023-05-01 DIAGNOSIS — M54.16 LUMBAR RADICULOPATHY, CHRONIC: ICD-10-CM

## 2023-05-01 DIAGNOSIS — M54.16 LUMBAR RADICULOPATHY, CHRONIC: Primary | ICD-10-CM

## 2023-05-01 PROCEDURE — 1160F RVW MEDS BY RX/DR IN RCRD: CPT | Mod: CPTII,S$GLB,, | Performed by: PHYSICIAN ASSISTANT

## 2023-05-01 PROCEDURE — 72114 X-RAY EXAM L-S SPINE BENDING: CPT | Mod: 26,,, | Performed by: RADIOLOGY

## 2023-05-01 PROCEDURE — 1160F PR REVIEW ALL MEDS BY PRESCRIBER/CLIN PHARMACIST DOCUMENTED: ICD-10-PCS | Mod: CPTII,S$GLB,, | Performed by: PHYSICIAN ASSISTANT

## 2023-05-01 PROCEDURE — 72114 X-RAY EXAM L-S SPINE BENDING: CPT | Mod: TC,PO

## 2023-05-01 PROCEDURE — 1101F PR PT FALLS ASSESS DOC 0-1 FALLS W/OUT INJ PAST YR: ICD-10-PCS | Mod: CPTII,S$GLB,, | Performed by: PHYSICIAN ASSISTANT

## 2023-05-01 PROCEDURE — 3074F PR MOST RECENT SYSTOLIC BLOOD PRESSURE < 130 MM HG: ICD-10-PCS | Mod: CPTII,S$GLB,, | Performed by: PHYSICIAN ASSISTANT

## 2023-05-01 PROCEDURE — 3078F PR MOST RECENT DIASTOLIC BLOOD PRESSURE < 80 MM HG: ICD-10-PCS | Mod: CPTII,S$GLB,, | Performed by: PHYSICIAN ASSISTANT

## 2023-05-01 PROCEDURE — 3288F PR FALLS RISK ASSESSMENT DOCUMENTED: ICD-10-PCS | Mod: CPTII,S$GLB,, | Performed by: PHYSICIAN ASSISTANT

## 2023-05-01 PROCEDURE — 3074F SYST BP LT 130 MM HG: CPT | Mod: CPTII,S$GLB,, | Performed by: PHYSICIAN ASSISTANT

## 2023-05-01 PROCEDURE — 1101F PT FALLS ASSESS-DOCD LE1/YR: CPT | Mod: CPTII,S$GLB,, | Performed by: PHYSICIAN ASSISTANT

## 2023-05-01 PROCEDURE — 3078F DIAST BP <80 MM HG: CPT | Mod: CPTII,S$GLB,, | Performed by: PHYSICIAN ASSISTANT

## 2023-05-01 PROCEDURE — 1125F AMNT PAIN NOTED PAIN PRSNT: CPT | Mod: CPTII,S$GLB,, | Performed by: PHYSICIAN ASSISTANT

## 2023-05-01 PROCEDURE — 99203 OFFICE O/P NEW LOW 30 MIN: CPT | Mod: S$GLB,,, | Performed by: PHYSICIAN ASSISTANT

## 2023-05-01 PROCEDURE — 99203 PR OFFICE/OUTPT VISIT, NEW, LEVL III, 30-44 MIN: ICD-10-PCS | Mod: S$GLB,,, | Performed by: PHYSICIAN ASSISTANT

## 2023-05-01 PROCEDURE — 1159F MED LIST DOCD IN RCRD: CPT | Mod: CPTII,S$GLB,, | Performed by: PHYSICIAN ASSISTANT

## 2023-05-01 PROCEDURE — 72114 XR LUMBAR SPINE 5 VIEW WITH FLEX AND EXT: ICD-10-PCS | Mod: 26,,, | Performed by: RADIOLOGY

## 2023-05-01 PROCEDURE — 99999 PR PBB SHADOW E&M-EST. PATIENT-LVL IV: ICD-10-PCS | Mod: PBBFAC,,, | Performed by: PHYSICIAN ASSISTANT

## 2023-05-01 PROCEDURE — 1159F PR MEDICATION LIST DOCUMENTED IN MEDICAL RECORD: ICD-10-PCS | Mod: CPTII,S$GLB,, | Performed by: PHYSICIAN ASSISTANT

## 2023-05-01 PROCEDURE — 1125F PR PAIN SEVERITY QUANTIFIED, PAIN PRESENT: ICD-10-PCS | Mod: CPTII,S$GLB,, | Performed by: PHYSICIAN ASSISTANT

## 2023-05-01 PROCEDURE — 3288F FALL RISK ASSESSMENT DOCD: CPT | Mod: CPTII,S$GLB,, | Performed by: PHYSICIAN ASSISTANT

## 2023-05-01 PROCEDURE — 99999 PR PBB SHADOW E&M-EST. PATIENT-LVL IV: CPT | Mod: PBBFAC,,, | Performed by: PHYSICIAN ASSISTANT

## 2023-05-01 RX ORDER — TIZANIDINE 4 MG/1
4 TABLET ORAL NIGHTLY PRN
Qty: 40 TABLET | Refills: 0 | Status: ON HOLD | OUTPATIENT
Start: 2023-05-01 | End: 2023-08-22 | Stop reason: HOSPADM

## 2023-05-01 RX ORDER — METHYLPREDNISOLONE 4 MG/1
TABLET ORAL
Qty: 1 EACH | Refills: 0 | Status: SHIPPED | OUTPATIENT
Start: 2023-05-01 | End: 2023-05-22

## 2023-05-01 NOTE — PROGRESS NOTES
[facility] New Patient Evaluation      Referred by: Dr. Marcus Zheng    PCP:   Marcus Zheng MD    CC:   Chief Complaint   Patient presents with    Low-back Pain     Pain radiates down right leg.          HPI:   Esther Segura is a 73 y.o. year old female patient who has a past medical history of Fever blister, Hyperlipidemia, Osteopenia, Parkinson's disease, and Squamous cell carcinoma. She presents in referral from Dr. Marcus Zheng for lower back and right leg pain.  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  She has been seeing orthopedics for right hip region pain in setting of hip arthritis.  She has right hip intra-articular steroid injection 4-20-23. She describes pain in the right lower back to th eright buttock, groin, to the inner thigh and knee.  She denies numbness and tingling.  She feels a clicking in the lower back at times.  Pain in the back and leg is only present with walking and resolves with sitting.  She has tried ibuprofe, meloxicam and tylenol.  She did complete hip PT, but no lasting benefit.  She has tried tens unit and accupuncutre as well.      Denies bowel/ bladder incontinence.    Past and current medications:  Antineuropathics:  NSAIDs:  meloxicam (ibuporfen in the past)  Antidepressants:  Muscle relaxers:  Opioids:  Antiplatelets/Anticoagulants:  Others:  tylenol.    Physical Therapy/ Chiropractic care:  PT - past for hip pain.  Accupuncture - regularly goes over the alst 8 years.     Pain Intervention History:  right hip intra-articular steroid injection 4-20-23    Past Spine Surgical History:  none        History:    Current Outpatient Medications:     augmented betamethasone dipropionate (DIPROLENE-AF) 0.05 % cream, Apply topically 2 (two) times daily., Disp: 50 g, Rfl: 2    calcium carbonate-vitamin D3 500 mg(1,250mg) -400 unit Tab, , Disp: , Rfl:     carbidopa (LODOSYN) 25 mg tablet, TAKE 1  TABLET BY MOUTH THREE TIMES DAILY, Disp: 270 tablet, Rfl: 0    carbidopa-levodopa  mg (SINEMET)  mg per tablet, TAKE 1 AND 1/2 TABLETS BY MOUTH THREE TIMES DAILY, Disp: 405 tablet, Rfl: 03    ibuprofen (ADVIL,MOTRIN) 600 MG tablet, Take 1 tablet (600 mg total) by mouth every 6 (six) hours as needed for Pain (knee pain)., Disp: 30 tablet, Rfl: 0    Lactobacillus rhamnosus GG (CULTURELLE) 10 billion cell capsule, Take 1 capsule by mouth once daily. 15 billion count, Disp: , Rfl:     LORazepam (ATIVAN) 0.5 MG tablet, Take 1 tablet (0.5 mg total) by mouth every other day., Disp: 30 tablet, Rfl: 2    MAGNESIUM CITRATE ORAL, Take by mouth once. Taking 448mg OTC capsule, Disp: , Rfl:     meloxicam (MOBIC) 15 MG tablet, Take 1 tablet (15 mg total) by mouth once daily., Disp: 30 tablet, Rfl: 11    multivit 33-mtfolate-nac-chrom 2.5-200-1 mg-mg-mg Cap, , Disp: , Rfl:     rasagiline (AZILECT) 1 mg Tab, TAKE 1 TABLET BY MOUTH ONCE DAILY IN THE EVENING, Disp: 90 tablet, Rfl: 0    Past Medical History:   Diagnosis Date    Fever blister     Hyperlipidemia     Osteopenia     Parkinson's disease     Squamous cell carcinoma 01/18/2019    right forearm       Past Surgical History:   Procedure Laterality Date    achiles tendon       broken hand Right     COLONOSCOPY      COLONOSCOPY N/A 7/9/2020    Procedure: COLONOSCOPY;  Surgeon: Markus Piña MD;  Location: [facility];  Service: Endoscopy;  Laterality: N/A;    ESOPHAGOGASTRODUODENOSCOPY N/A 7/9/2020    Procedure: EGD (ESOPHAGOGASTRODUODENOSCOPY);  Surgeon: Markus Piña MD;  Location: [facility] ENDO;  Service: Endoscopy;  Laterality: N/A;    LIVER BIOPSY N/A 11/19/2020    Procedure: BIOPSY, LIVER;  Surgeon: [facility] Diagnostic Provider;  Location: [facility];  Service: General;  Laterality: N/A;       Family History   Problem Relation Age of Onset    Cancer Mother         lymphoma    Osteoporosis Father     Breast cancer Maternal Aunt     Ovarian cancer Neg Hx     Melanoma  "Neg Hx     Multiple sclerosis Neg Hx     Psoriasis Neg Hx     Eczema Neg Hx     Lupus Neg Hx     Acne Neg Hx     Depression Neg Hx     Suicidality Neg Hx        Social History     Socioeconomic History    Marital status:    Occupational History     Employer: [employer]   Tobacco Use    Smoking status: Never    Smokeless tobacco: Never   Substance and Sexual Activity    Alcohol use: Yes     Alcohol/week: 1.0 standard drink     Types: 1 Glasses of wine per week     Comment: daily     Drug use: No    Sexual activity: Never       Review of patient's allergies indicates:   Allergen Reactions    Bee sting [allergen ext-venom-honey bee] Swelling    Fosamax [alendronate] Other (See Comments)     Jaw pain       Labs:  No results found for: LABA1C, HGBA1C    Lab Results   Component Value Date    WBC 6.55 01/10/2023    HGB 11.7 (L) 01/10/2023    HCT 37.0 01/10/2023     (H) 01/10/2023     01/10/2023           Review of Systems:  Low back pain.  Right leg pain..  Balance of review of systems is negative.    Physical Exam:  Vitals:    05/01/23 0910   BP: 129/73   Pulse: 73   Weight: 45.5 kg (100 lb 3.2 oz)   Height: 5' 2" (1.575 m)   PainSc:   8   PainLoc: Back     Body mass index is 18.33 kg/m².    Gen: NAD  Psych: mood appropriate for given condition  HEENT: eyes anicteric   CV: RRR, 2+ radial pulse  HEENT: anicteric   Respiratory: non-labored, no signs of respiratory distress  Abd: non-distended  Skin: warm, dry and intact.  Gait: Able to heel walk, toe walk. No antalgic gait.     Coordination:   Romberg: negative  Finger to nose coordination: normal  Heel to shin coordination: normal  Tandem walking coordination: normal    Cervical spine: ROM is full in flexion, extension and lateral rotation without increased pain.  Spurling's maneuver causes no neck pain to either side.  Myofascial exam: No Tenderness to palpation across cervical paraspinous region bilaterally.    Lumbar spine:  Lumbar " spine: ROM is full with flexion extension and oblique extension with no increased pain.    Danial's test causes no increased pain on either side.    Supine straight leg raise is negative bilaterally.    Internal and external rotation of the hip causes no increased pain on either side.  Myofascial exam: No tenderness to palpation across lumbar paraspinous muscles. No tenderness to palpation over the bilateral greater trochanters and bilateral SI joint    Sensory:  Intact and symmetrical to light touch in C4-T1 dermatomes bilaterally. Intact and symmetrical to light touch in L1-S1 dermatomes bilaterally.    Motor:    Right Left   C4 Shoulder Abduction  5  5   C5 Elbow Flexion    5  5   C6 Wrist Extension  5  5   C7 Elbow Extension   5  5   C8/T1 Hand Intrinsics   5  5        Right Left   L2/3 Iliacus Hip flexion  5  5   L3/4 Qudratus Femoris Knee Extension  5  5   L4/5 Tib Anterior Ankle Dorsiflexion   5  5   L5/S1 Extensor Hallicus Longus Great toe extension  5  5   S1/S2 Gastroc/Soleus Plantar Flexion  5  5      Right Left   Triceps DTR 2+ 2+   Biceps DTR 2+ 2+   Brachioradialis DTR 2+ 2+   Patellar DTR 2+ 2+   Achilles DTR 2+ 2+   Kenney Absent  Absent   Clonus Absent Absent   Babinski Absent Absent       Imaging:  No spine imaging.     Xray hip 3-13-23:  No acute fracture or AVN.  Degenerative change lowest 3 lumbar levels.  Mild degenerative change pubic symphysis and minimal degenerative change of both hip joints.      Assessment:   Esther Segura is a 73 y.o. year old female patient who has a past medical history of Fever blister, Hyperlipidemia, Osteopenia, Parkinson's disease, and Squamous cell carcinoma. She presents in referral from Dr. Marcus Zheng for right lower back and leg pain - neurogenic claudication likely.      Problem List Items Addressed This Visit    None      Plan:  - xray lumbar spine to assess stability and alignment; we will notirfy with results  - MRI lumbar spine to determine  if she is a candicate for YASMINE  - medrol taper and zanflex to help with pain as she leaves in 8 days for a trip to [place]  - follow up after MRI.      Follow Up: RTC after imaging.       : Not applicable    Thank you for referring this interesting patient, and I look forward to continuing to collaborate in her care.        Nini Tierney PA-C  [facility]

## 2023-05-04 ENCOUNTER — PATIENT MESSAGE (OUTPATIENT)
Dept: PAIN MEDICINE | Facility: CLINIC | Age: 73
End: 2023-05-04
Payer: MEDICARE

## 2023-05-08 ENCOUNTER — PES CALL (OUTPATIENT)
Dept: ADMINISTRATIVE | Facility: CLINIC | Age: 73
End: 2023-05-08
Payer: MEDICARE

## 2023-05-11 ENCOUNTER — PATIENT MESSAGE (OUTPATIENT)
Dept: PAIN MEDICINE | Facility: CLINIC | Age: 73
End: 2023-05-11
Payer: MEDICARE

## 2023-05-11 ENCOUNTER — PATIENT MESSAGE (OUTPATIENT)
Dept: ORTHOPEDICS | Facility: CLINIC | Age: 73
End: 2023-05-11
Payer: MEDICARE

## 2023-05-15 ENCOUNTER — PATIENT MESSAGE (OUTPATIENT)
Dept: ORTHOPEDICS | Facility: CLINIC | Age: 73
End: 2023-05-15
Payer: MEDICARE

## 2023-05-15 ENCOUNTER — TELEPHONE (OUTPATIENT)
Dept: ORTHOPEDICS | Facility: CLINIC | Age: 73
End: 2023-05-15
Payer: MEDICARE

## 2023-05-15 DIAGNOSIS — M25.552 PAIN OF LEFT HIP: Primary | ICD-10-CM

## 2023-05-15 DIAGNOSIS — M25.551 PAIN OF RIGHT HIP: ICD-10-CM

## 2023-05-15 NOTE — TELEPHONE ENCOUNTER
----- Message from Kathi Rojo MA sent at 5/15/2023  1:32 PM CDT -----  Contact: pt  MRI questions   Call back

## 2023-05-22 ENCOUNTER — HOSPITAL ENCOUNTER (OUTPATIENT)
Dept: RADIOLOGY | Facility: HOSPITAL | Age: 73
Discharge: HOME | End: 2023-05-22
Attending: ORTHOPAEDIC SURGERY
Payer: MEDICARE

## 2023-05-22 ENCOUNTER — HOSPITAL ENCOUNTER (OUTPATIENT)
Dept: RADIOLOGY | Facility: HOSPITAL | Age: 73
Discharge: HOME | End: 2023-05-22
Attending: PHYSICIAN ASSISTANT
Payer: MEDICARE

## 2023-05-22 DIAGNOSIS — M25.552 PAIN OF LEFT HIP: ICD-10-CM

## 2023-05-22 DIAGNOSIS — M25.551 PAIN OF RIGHT HIP: ICD-10-CM

## 2023-05-22 DIAGNOSIS — M54.16 LUMBAR RADICULOPATHY, CHRONIC: ICD-10-CM

## 2023-05-22 PROCEDURE — 72148 MRI LUMBAR SPINE W/O DYE: CPT | Mod: 26,,, | Performed by: RADIOLOGY

## 2023-05-22 PROCEDURE — 73721 MRI JNT OF LWR EXTRE W/O DYE: CPT | Mod: TC,RT

## 2023-05-22 PROCEDURE — 73721 MRI JNT OF LWR EXTRE W/O DYE: CPT | Mod: 26,76,LT, | Performed by: RADIOLOGY

## 2023-05-22 PROCEDURE — 72148 MRI LUMBAR SPINE WITHOUT CONTRAST: ICD-10-PCS | Mod: 26,,, | Performed by: RADIOLOGY

## 2023-05-22 PROCEDURE — 73721 MRI JNT OF LWR EXTRE W/O DYE: CPT | Mod: 26,RT,, | Performed by: RADIOLOGY

## 2023-05-22 PROCEDURE — 73721 MRI JNT OF LWR EXTRE W/O DYE: CPT | Mod: TC,LT

## 2023-05-22 PROCEDURE — 73721 MRI HIP WITHOUT CONTRAST LEFT: ICD-10-PCS | Mod: 26,76,LT, | Performed by: RADIOLOGY

## 2023-05-22 PROCEDURE — 72148 MRI LUMBAR SPINE W/O DYE: CPT | Mod: TC

## 2023-05-22 PROCEDURE — 73721 MRI HIP WITHOUT CONTRAST RIGHT: ICD-10-PCS | Mod: 26,RT,, | Performed by: RADIOLOGY

## 2023-05-23 ENCOUNTER — OFFICE VISIT (OUTPATIENT)
Dept: PAIN MEDICINE | Facility: CLINIC | Age: 73
End: 2023-05-23
Payer: MEDICARE

## 2023-05-23 VITALS
BODY MASS INDEX: 17.99 KG/M2 | SYSTOLIC BLOOD PRESSURE: 94 MMHG | DIASTOLIC BLOOD PRESSURE: 55 MMHG | WEIGHT: 97.75 LBS | HEART RATE: 84 BPM | HEIGHT: 62 IN

## 2023-05-23 DIAGNOSIS — M54.16 LUMBAR RADICULOPATHY, CHRONIC: ICD-10-CM

## 2023-05-23 DIAGNOSIS — M47.816 LUMBAR SPONDYLOSIS: Primary | ICD-10-CM

## 2023-05-23 DIAGNOSIS — M41.9 SCOLIOSIS, UNSPECIFIED SCOLIOSIS TYPE, UNSPECIFIED SPINAL REGION: ICD-10-CM

## 2023-05-23 PROCEDURE — 1159F MED LIST DOCD IN RCRD: CPT | Mod: CPTII,S$GLB,, | Performed by: PHYSICIAN ASSISTANT

## 2023-05-23 PROCEDURE — 3074F PR MOST RECENT SYSTOLIC BLOOD PRESSURE < 130 MM HG: ICD-10-PCS | Mod: CPTII,S$GLB,, | Performed by: PHYSICIAN ASSISTANT

## 2023-05-23 PROCEDURE — 3074F SYST BP LT 130 MM HG: CPT | Mod: CPTII,S$GLB,, | Performed by: PHYSICIAN ASSISTANT

## 2023-05-23 PROCEDURE — 3078F PR MOST RECENT DIASTOLIC BLOOD PRESSURE < 80 MM HG: ICD-10-PCS | Mod: CPTII,S$GLB,, | Performed by: PHYSICIAN ASSISTANT

## 2023-05-23 PROCEDURE — 1125F PR PAIN SEVERITY QUANTIFIED, PAIN PRESENT: ICD-10-PCS | Mod: CPTII,S$GLB,, | Performed by: PHYSICIAN ASSISTANT

## 2023-05-23 PROCEDURE — 3288F FALL RISK ASSESSMENT DOCD: CPT | Mod: CPTII,S$GLB,, | Performed by: PHYSICIAN ASSISTANT

## 2023-05-23 PROCEDURE — 1159F PR MEDICATION LIST DOCUMENTED IN MEDICAL RECORD: ICD-10-PCS | Mod: CPTII,S$GLB,, | Performed by: PHYSICIAN ASSISTANT

## 2023-05-23 PROCEDURE — 1101F PR PT FALLS ASSESS DOC 0-1 FALLS W/OUT INJ PAST YR: ICD-10-PCS | Mod: CPTII,S$GLB,, | Performed by: PHYSICIAN ASSISTANT

## 2023-05-23 PROCEDURE — 1125F AMNT PAIN NOTED PAIN PRSNT: CPT | Mod: CPTII,S$GLB,, | Performed by: PHYSICIAN ASSISTANT

## 2023-05-23 PROCEDURE — 99214 PR OFFICE/OUTPT VISIT, EST, LEVL IV, 30-39 MIN: ICD-10-PCS | Mod: S$GLB,,, | Performed by: PHYSICIAN ASSISTANT

## 2023-05-23 PROCEDURE — 3078F DIAST BP <80 MM HG: CPT | Mod: CPTII,S$GLB,, | Performed by: PHYSICIAN ASSISTANT

## 2023-05-23 PROCEDURE — 1160F RVW MEDS BY RX/DR IN RCRD: CPT | Mod: CPTII,S$GLB,, | Performed by: PHYSICIAN ASSISTANT

## 2023-05-23 PROCEDURE — 1101F PT FALLS ASSESS-DOCD LE1/YR: CPT | Mod: CPTII,S$GLB,, | Performed by: PHYSICIAN ASSISTANT

## 2023-05-23 PROCEDURE — 1160F PR REVIEW ALL MEDS BY PRESCRIBER/CLIN PHARMACIST DOCUMENTED: ICD-10-PCS | Mod: CPTII,S$GLB,, | Performed by: PHYSICIAN ASSISTANT

## 2023-05-23 PROCEDURE — 3288F PR FALLS RISK ASSESSMENT DOCUMENTED: ICD-10-PCS | Mod: CPTII,S$GLB,, | Performed by: PHYSICIAN ASSISTANT

## 2023-05-23 PROCEDURE — 99999 PR PBB SHADOW E&M-EST. PATIENT-LVL IV: ICD-10-PCS | Mod: PBBFAC,,, | Performed by: PHYSICIAN ASSISTANT

## 2023-05-23 PROCEDURE — 99214 OFFICE O/P EST MOD 30 MIN: CPT | Mod: S$GLB,,, | Performed by: PHYSICIAN ASSISTANT

## 2023-05-23 PROCEDURE — 99999 PR PBB SHADOW E&M-EST. PATIENT-LVL IV: CPT | Mod: PBBFAC,,, | Performed by: PHYSICIAN ASSISTANT

## 2023-05-23 RX ORDER — COVID-19 ANTIGEN TEST
KIT MISCELLANEOUS
COMMUNITY
Start: 2023-04-26 | End: 2023-09-21

## 2023-05-23 NOTE — PROGRESS NOTES
[facility] Follow Up        PCP:   Marcus Zheng MD    CC:   Chief Complaint   Patient presents with    mri results          HPI:     Ms. Pitts returns for follow of lower back and right leg pain after undergoing imaging.  She describes pain in the right lower back to th eright buttock, groin, to the inner thigh and knee.  She denies numbness and tingling.  She feels a clicking in the lower back at times.  Pain in the back and leg is only present with walking and resolves with sitting.  She has tried ibuprofe, meloxicam and tylenol, tizanidine .  She did complete hip PT, but no lasting benefit.  She has tried tens unit and accupuncutre as well.  Since last visit overall level of pain has improved.  She is currently doing restorative yoga.      Initial HPI:  Esther Segura is a 73 y.o. year old female patient who has a past medical history of Fever blister, Hyperlipidemia, Osteopenia, Parkinson's disease, and Squamous cell carcinoma. She presents in referral from No ref. provider found for lower back and right leg pain.  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  She has been seeing orthopedics for right hip region pain in setting of hip arthritis.  She has right hip intra-articular steroid injection 4-20-23. She describes pain in the right lower back to th eright buttock, groin, to the inner thigh and knee.  She denies numbness and tingling.  She feels a clicking in the lower back at times.  Pain in the back and leg is only present with walking and resolves with sitting.  She has tried ibuprofe, meloxicam and tylenol.  She did complete hip PT, but no lasting benefit.  She has tried tens unit and accupuncutre as well.      Denies bowel/ bladder incontinence.    Past and current medications:  Antineuropathics:  NSAIDs:  (meloxicam and ibuporfen in the past)  Antidepressants:  Muscle relaxers:  tizanidine at night    Opioids:  Antiplatelets/Anticoagulants:  Others:  tylenol.    Physical Therapy/ Chiropractic care:  PT - past for hip pain.  Accupuncture - regularly goes over the alst 8 years.     Pain Intervention History:  right hip intra-articular steroid injection 4-20-23    Past Spine Surgical History:  none        History:    Current Outpatient Medications:     augmented betamethasone dipropionate (DIPROLENE-AF) 0.05 % cream, Apply topically 2 (two) times daily., Disp: 50 g, Rfl: 2    calcium carbonate-vitamin D3 500 mg(1,250mg) -400 unit Tab, , Disp: , Rfl:     carbidopa (LODOSYN) 25 mg tablet, TAKE 1 TABLET BY MOUTH THREE TIMES DAILY, Disp: 270 tablet, Rfl: 0    carbidopa-levodopa  mg (SINEMET)  mg per tablet, TAKE 1 AND 1/2 TABLETS BY MOUTH THREE TIMES DAILY, Disp: 405 tablet, Rfl: 03    COVID-19 AT-HOME TEST Kit, use as directed, Disp: , Rfl:     Lactobacillus rhamnosus GG (CULTURELLE) 10 billion cell capsule, Take 1 capsule by mouth once daily. 15 billion count, Disp: , Rfl:     LORazepam (ATIVAN) 0.5 MG tablet, Take 1 tablet (0.5 mg total) by mouth every other day. (Patient taking differently: Take 0.5 mg by mouth as needed.), Disp: 30 tablet, Rfl: 2    MAGNESIUM CITRATE ORAL, Take by mouth once. Taking 448mg OTC capsule, Disp: , Rfl:     meloxicam (MOBIC) 15 MG tablet, Take 1 tablet (15 mg total) by mouth once daily., Disp: 30 tablet, Rfl: 11    multivit 33-mtfolate-nac-chrom 2.5-200-1 mg-mg-mg Cap, , Disp: , Rfl:     rasagiline (AZILECT) 1 mg Tab, TAKE 1 TABLET BY MOUTH ONCE DAILY IN THE EVENING, Disp: 90 tablet, Rfl: 0    tiZANidine (ZANAFLEX) 4 MG tablet, Take 1 tablet (4 mg total) by mouth nightly as needed (muscle spasms/ pain)., Disp: 40 tablet, Rfl: 0    ibuprofen (ADVIL,MOTRIN) 600 MG tablet, Take 1 tablet (600 mg total) by mouth every 6 (six) hours as needed for Pain (knee pain). (Patient not taking: Reported on 5/1/2023), Disp: 30 tablet, Rfl: 0    Past Medical History:   Diagnosis Date     Fever blister     Hyperlipidemia     Osteopenia     Parkinson's disease     Squamous cell carcinoma 01/18/2019    right forearm       Past Surgical History:   Procedure Laterality Date    achiles tendon       broken hand Right     COLONOSCOPY      COLONOSCOPY N/A 7/9/2020    Procedure: COLONOSCOPY;  Surgeon: Markus Piña MD;  Location: [facility] ENDO;  Service: Endoscopy;  Laterality: N/A;    ESOPHAGOGASTRODUODENOSCOPY N/A 7/9/2020    Procedure: EGD (ESOPHAGOGASTRODUODENOSCOPY);  Surgeon: Markus Piña MD;  Location: [facility] ENDO;  Service: Endoscopy;  Laterality: N/A;    LIVER BIOPSY N/A 11/19/2020    Procedure: BIOPSY, LIVER;  Surgeon: [facility] Provider;  Location: [facility] OR;  Service: General;  Laterality: N/A;       Family History   Problem Relation Age of Onset    Cancer Mother         lymphoma    Osteoporosis Father     Breast cancer Maternal Aunt     Ovarian cancer Neg Hx     Melanoma Neg Hx     Multiple sclerosis Neg Hx     Psoriasis Neg Hx     Eczema Neg Hx     Lupus Neg Hx     Acne Neg Hx     Depression Neg Hx     Suicidality Neg Hx        Social History     Socioeconomic History    Marital status:    Occupational History     Employer: [employer]   Tobacco Use    Smoking status: Never    Smokeless tobacco: Never   Substance and Sexual Activity    Alcohol use: Yes     Alcohol/week: 1.0 standard drink     Types: 1 Glasses of wine per week     Comment: daily     Drug use: No    Sexual activity: Never       Review of patient's allergies indicates:   Allergen Reactions    Bee sting [allergen ext-venom-honey bee] Swelling    Fosamax [alendronate] Other (See Comments)     Jaw pain       Labs:  No results found for: LABA1C, HGBA1C    Lab Results   Component Value Date    WBC 6.55 01/10/2023    HGB 11.7 (L) 01/10/2023    HCT 37.0 01/10/2023     (H) 01/10/2023     01/10/2023           Review of Systems:  Low back pain.  Right leg pain..  Balance of review of systems is  "negative.    Physical Exam:  Vitals:    05/23/23 1349   BP: (!) 94/55   Pulse: 84   Weight: 44.3 kg (97 lb 12.4 oz)   Height: 5' 2" (1.575 m)   PainSc:   2   PainLoc: Back     Body mass index is 17.88 kg/m².    Gen: NAD  Psych: mood appropriate for given condition  HEENT: eyes anicteric   CV: RRR, 2+ radial pulse  HEENT: anicteric   Respiratory: non-labored, no signs of respiratory distress  Abd: non-distended  Skin: warm, dry and intact.  Gait: Able to heel walk, toe walk. No antalgic gait.     Coordination:   Romberg: negative  Finger to nose coordination: normal  Heel to shin coordination: normal  Tandem walking coordination: normal    Cervical spine: ROM is full in flexion, extension and lateral rotation without increased pain.  Spurling's maneuver causes no neck pain to either side.  Myofascial exam: No Tenderness to palpation across cervical paraspinous region bilaterally.    Lumbar spine:  Lumbar spine: ROM is full with flexion extension and oblique extension with no increased pain.    Danial's test causes no increased pain on either side.    Supine straight leg raise is negative bilaterally.    Internal and external rotation of the hip causes no increased pain on either side.  Myofascial exam: No tenderness to palpation across lumbar paraspinous muscles. No tenderness to palpation over the bilateral greater trochanters and bilateral SI joint    Sensory:  Intact and symmetrical to light touch in C4-T1 dermatomes bilaterally. Intact and symmetrical to light touch in L1-S1 dermatomes bilaterally.    Motor:    Right Left   C4 Shoulder Abduction  5  5   C5 Elbow Flexion    5  5   C6 Wrist Extension  5  5   C7 Elbow Extension   5  5   C8/T1 Hand Intrinsics   5  5        Right Left   L2/3 Iliacus Hip flexion  5  5   L3/4 Qudratus Femoris Knee Extension  5  5   L4/5 Tib Anterior Ankle Dorsiflexion   5  5   L5/S1 Extensor Hallicus Longus Great toe extension  5  5   S1/S2 Gastroc/Soleus Plantar Flexion  5  5      " Right Left   Triceps DTR 2+ 2+   Biceps DTR 2+ 2+   Brachioradialis DTR 2+ 2+   Patellar DTR 2+ 2+   Achilles DTR 2+ 2+   Kenney Absent  Absent   Clonus Absent Absent   Babinski Absent Absent       Imaging:  No spine imaging.     Xray hip 3-13-23:  No acute fracture or AVN.  Degenerative change lowest 3 lumbar levels.  Mild degenerative change pubic symphysis and minimal degenerative change of both hip joints.    MRI lumbar spine 5-22-23:  6 lumbar type vertebra.  In keeping with radiology numbering system - L5 anterolisthesis on L6.  L5/6 right disk hernia and facet arthropathy with some right foraminal narrowing.    MRI right him 5-22-23:  advanced arthritic changes.    MRI left hip 5-22-23:  mild left hip arthritis.       Assessment:   Esther Segura is a 73 y.o. year old female patient who has a past medical history of Fever blister, Hyperlipidemia, Osteopenia, Parkinson's disease, and Squamous cell carcinoma. She presents for right lower back and leg pain contributed to by lumbar spondylosis at L5/6 with foraminal narrowing and right hip arthritis.  Overall pain level improved today than in the past.     Problem List Items Addressed This Visit    None  Visit Diagnoses       Lumbar spondylosis    -  Primary    Relevant Orders    Ambulatory referral/consult to Physical/Occupational Therapy    Lumbar radiculopathy, chronic        Relevant Orders    Ambulatory referral/consult to Physical/Occupational Therapy    Scoliosis, unspecified scoliosis type, unspecified spinal region                Plan:  - discussed options of PT for hip and lower back; YASMINE at L5/6 to help with radicular pain, right hip IA injection again  - could dicussed surgical interventions with neurosurgery and orthopedics for the lower back and hip respectively.  - with pain improving, she would like to proceed with home exercise and further PT at this time.   - follow up as needed.       Follow Up: RTC as needed.       : Not  applicable    Thank you for referring this interesting patient, and I look forward to continuing to collaborate in her care.        Nini Tierney PA-C  [facility]

## 2023-05-24 NOTE — TELEPHONE ENCOUNTER
No care due was identified.  [facility] Embedded Care Due Messages. Reference number: [id number].   5/23/2023 9:45:55 PM CDT

## 2023-05-25 ENCOUNTER — OFFICE VISIT (OUTPATIENT)
Dept: ORTHOPEDICS | Facility: CLINIC | Age: 73
End: 2023-05-25
Payer: MEDICARE

## 2023-05-25 ENCOUNTER — LAB VISIT (OUTPATIENT)
Dept: LAB | Facility: HOSPITAL | Age: 73
End: 2023-05-25
Attending: ORTHOPAEDIC SURGERY
Payer: MEDICARE

## 2023-05-25 VITALS — HEIGHT: 62 IN | WEIGHT: 97.69 LBS | BODY MASS INDEX: 17.98 KG/M2

## 2023-05-25 DIAGNOSIS — M16.11 UNILATERAL PRIMARY OSTEOARTHRITIS, RIGHT HIP: Primary | ICD-10-CM

## 2023-05-25 DIAGNOSIS — M25.552 PAIN OF LEFT HIP: Primary | ICD-10-CM

## 2023-05-25 DIAGNOSIS — M25.551 PAIN OF RIGHT HIP: ICD-10-CM

## 2023-05-25 DIAGNOSIS — M25.552 PAIN OF LEFT HIP: ICD-10-CM

## 2023-05-25 LAB
ALBUMIN SERPL BCP-MCNC: 4 G/DL (ref 3.5–5.2)
ALP SERPL-CCNC: 76 U/L (ref 55–135)
ALT SERPL W/O P-5'-P-CCNC: <5 U/L (ref 10–44)
ANION GAP SERPL CALC-SCNC: 11 MMOL/L (ref 8–16)
AST SERPL-CCNC: 17 U/L (ref 10–40)
BASOPHILS # BLD AUTO: 0.06 K/UL (ref 0–0.2)
BASOPHILS NFR BLD: 1.1 % (ref 0–1.9)
BILIRUB SERPL-MCNC: 0.4 MG/DL (ref 0.1–1)
BUN SERPL-MCNC: 25 MG/DL (ref 8–23)
CALCIUM SERPL-MCNC: 10.1 MG/DL (ref 8.7–10.5)
CHLORIDE SERPL-SCNC: 103 MMOL/L (ref 95–110)
CO2 SERPL-SCNC: 27 MMOL/L (ref 23–29)
CREAT SERPL-MCNC: 0.9 MG/DL (ref 0.5–1.4)
DIFFERENTIAL METHOD: ABNORMAL
EOSINOPHIL # BLD AUTO: 0.1 K/UL (ref 0–0.5)
EOSINOPHIL NFR BLD: 1.1 % (ref 0–8)
ERYTHROCYTE [DISTWIDTH] IN BLOOD BY AUTOMATED COUNT: 12.5 % (ref 11.5–14.5)
EST. GFR  (NO RACE VARIABLE): >60 ML/MIN/1.73 M^2
GLUCOSE SERPL-MCNC: 67 MG/DL (ref 70–110)
HCT VFR BLD AUTO: 37.2 % (ref 37–48.5)
HGB BLD-MCNC: 12 G/DL (ref 12–16)
IMM GRANULOCYTES # BLD AUTO: 0.02 K/UL (ref 0–0.04)
IMM GRANULOCYTES NFR BLD AUTO: 0.4 % (ref 0–0.5)
LYMPHOCYTES # BLD AUTO: 1.1 K/UL (ref 1–4.8)
LYMPHOCYTES NFR BLD: 20 % (ref 18–48)
MCH RBC QN AUTO: 33.6 PG (ref 27–31)
MCHC RBC AUTO-ENTMCNC: 32.3 G/DL (ref 32–36)
MCV RBC AUTO: 104 FL (ref 82–98)
MONOCYTES # BLD AUTO: 0.5 K/UL (ref 0.3–1)
MONOCYTES NFR BLD: 9.5 % (ref 4–15)
NEUTROPHILS # BLD AUTO: 3.8 K/UL (ref 1.8–7.7)
NEUTROPHILS NFR BLD: 67.9 % (ref 38–73)
NRBC BLD-RTO: 0 /100 WBC
PLATELET # BLD AUTO: 339 K/UL (ref 150–450)
PMV BLD AUTO: 10.3 FL (ref 9.2–12.9)
POTASSIUM SERPL-SCNC: 4.5 MMOL/L (ref 3.5–5.1)
PROT SERPL-MCNC: 7.3 G/DL (ref 6–8.4)
RBC # BLD AUTO: 3.57 M/UL (ref 4–5.4)
SODIUM SERPL-SCNC: 141 MMOL/L (ref 136–145)
WBC # BLD AUTO: 5.6 K/UL (ref 3.9–12.7)

## 2023-05-25 PROCEDURE — 1126F AMNT PAIN NOTED NONE PRSNT: CPT | Mod: CPTII,S$GLB,, | Performed by: ORTHOPAEDIC SURGERY

## 2023-05-25 PROCEDURE — 1159F MED LIST DOCD IN RCRD: CPT | Mod: CPTII,S$GLB,, | Performed by: ORTHOPAEDIC SURGERY

## 2023-05-25 PROCEDURE — 99999 PR PBB SHADOW E&M-EST. PATIENT-LVL III: CPT | Mod: PBBFAC,,, | Performed by: ORTHOPAEDIC SURGERY

## 2023-05-25 PROCEDURE — 3288F FALL RISK ASSESSMENT DOCD: CPT | Mod: CPTII,S$GLB,, | Performed by: ORTHOPAEDIC SURGERY

## 2023-05-25 PROCEDURE — 85025 COMPLETE CBC W/AUTO DIFF WBC: CPT | Performed by: ORTHOPAEDIC SURGERY

## 2023-05-25 PROCEDURE — 36415 COLL VENOUS BLD VENIPUNCTURE: CPT | Mod: PO | Performed by: ORTHOPAEDIC SURGERY

## 2023-05-25 PROCEDURE — 1101F PT FALLS ASSESS-DOCD LE1/YR: CPT | Mod: CPTII,S$GLB,, | Performed by: ORTHOPAEDIC SURGERY

## 2023-05-25 PROCEDURE — 80053 COMPREHEN METABOLIC PANEL: CPT | Performed by: ORTHOPAEDIC SURGERY

## 2023-05-25 PROCEDURE — 1101F PR PT FALLS ASSESS DOC 0-1 FALLS W/OUT INJ PAST YR: ICD-10-PCS | Mod: CPTII,S$GLB,, | Performed by: ORTHOPAEDIC SURGERY

## 2023-05-25 PROCEDURE — 99214 OFFICE O/P EST MOD 30 MIN: CPT | Mod: S$GLB,,, | Performed by: ORTHOPAEDIC SURGERY

## 2023-05-25 PROCEDURE — 1126F PR PAIN SEVERITY QUANTIFIED, NO PAIN PRESENT: ICD-10-PCS | Mod: CPTII,S$GLB,, | Performed by: ORTHOPAEDIC SURGERY

## 2023-05-25 PROCEDURE — 1159F PR MEDICATION LIST DOCUMENTED IN MEDICAL RECORD: ICD-10-PCS | Mod: CPTII,S$GLB,, | Performed by: ORTHOPAEDIC SURGERY

## 2023-05-25 PROCEDURE — 99214 PR OFFICE/OUTPT VISIT, EST, LEVL IV, 30-39 MIN: ICD-10-PCS | Mod: S$GLB,,, | Performed by: ORTHOPAEDIC SURGERY

## 2023-05-25 PROCEDURE — 3288F PR FALLS RISK ASSESSMENT DOCUMENTED: ICD-10-PCS | Mod: CPTII,S$GLB,, | Performed by: ORTHOPAEDIC SURGERY

## 2023-05-25 PROCEDURE — 99999 PR PBB SHADOW E&M-EST. PATIENT-LVL III: ICD-10-PCS | Mod: PBBFAC,,, | Performed by: ORTHOPAEDIC SURGERY

## 2023-05-25 NOTE — PROGRESS NOTES
CC:  73-year-old female follows up with right hip pain.  The patient has a history of osteoarthritis of the right hip.  We have previously placed her on meloxicam.  She also recently had an ultrasound-guided injection into the right hip.  She states she only got relief with the injection for about a week.  She rates her right hip pain as a 3/10.  She states that she is adjusted her yoga routine in his backed off on a lot of the extreme exercises she is been doing her pain is actually gotten much better.  She also intermittently has some left hip pain but it is not as bad as the right side.  She recently had MRIs of both of her hips done and wants to discuss those findings today.    Past Medical History:   Diagnosis Date    Fever blister     Hyperlipidemia     Osteopenia     Parkinson's disease     Squamous cell carcinoma 01/18/2019    right forearm       Past Surgical History:   Procedure Laterality Date    achiles tendon       broken hand Right     COLONOSCOPY      COLONOSCOPY N/A 7/9/2020    Procedure: COLONOSCOPY;  Surgeon: Markus Piña MD;  Location: [facility] ENDO;  Service: Endoscopy;  Laterality: N/A;    ESOPHAGOGASTRODUODENOSCOPY N/A 7/9/2020    Procedure: EGD (ESOPHAGOGASTRODUODENOSCOPY);  Surgeon: Markus Piña MD;  Location: [facility] ENDO;  Service: Endoscopy;  Laterality: N/A;    LIVER BIOPSY N/A 11/19/2020    Procedure: BIOPSY, LIVER;  Surgeon: Yovany Diagnostic Provider;  Location: [facility] OR;  Service: General;  Laterality: N/A;       Current Outpatient Medications on File Prior to Visit   Medication Sig Dispense Refill    augmented betamethasone dipropionate (DIPROLENE-AF) 0.05 % cream Apply topically 2 (two) times daily. 50 g 2    calcium carbonate-vitamin D3 500 mg(1,250mg) -400 unit Tab       carbidopa (LODOSYN) 25 mg tablet TAKE 1 TABLET BY MOUTH THREE TIMES DAILY 270 tablet 0    carbidopa-levodopa  mg (SINEMET)  mg per tablet TAKE 1 AND 1/2 TABLETS BY MOUTH THREE TIMES DAILY 405 tablet 03     COVID-19 AT-HOME TEST Kit use as directed      ibuprofen (ADVIL,MOTRIN) 600 MG tablet Take 1 tablet (600 mg total) by mouth every 6 (six) hours as needed for Pain (knee pain). 30 tablet 0    Lactobacillus rhamnosus GG (CULTURELLE) 10 billion cell capsule Take 1 capsule by mouth once daily. 15 billion count      LORazepam (ATIVAN) 0.5 MG tablet Take 1 tablet (0.5 mg total) by mouth every other day. (Patient taking differently: Take 0.5 mg by mouth as needed.) 30 tablet 2    MAGNESIUM CITRATE ORAL Take by mouth once. Taking 448mg OTC capsule      meloxicam (MOBIC) 15 MG tablet Take 1 tablet (15 mg total) by mouth once daily. 30 tablet 11    multivit 33-mtfolate-nac-chrom 2.5-200-1 mg-mg-mg Cap       rasagiline (AZILECT) 1 mg Tab TAKE 1 TABLET BY MOUTH ONCE DAILY IN THE EVENING 90 tablet 0    tiZANidine (ZANAFLEX) 4 MG tablet Take 1 tablet (4 mg total) by mouth nightly as needed (muscle spasms/ pain). 40 tablet 0     No current facility-administered medications on file prior to visit.       ROS:    Constitution: Denies chills, fever, and sweats.  HENT: Denies headaches or blurry vision.  Cardiovascular: Denies chest pain or irregular heart beat.  Respiratory: Denies cough or shortness of breath.  Gastrointestinal: Denies abdominal pain, nausea, or vomiting.  Genitourinary:  Denies urinary incontinence, bladder and kidney issues  Musculoskeletal:  Denies muscle cramps.  Positive for bilateral hip pain right worse than left  Neurological: Denies dizziness or focal weakness.  Psychiatric/Behavioral: Normal mental status.  Hematologic/Lymphatic: Denies bleeding problem or easy bruising/bleeding.  Skin: Denies rash or suspicious lesions.    Physical examination     Gen - No acute distress, well nourished, well groomed   Eyes - Extraoccular motions intact, pupils equally round and reactive to light and accommodation   ENT - normocephalic, atruamtic, oropharynx clear   Neck - Supple, no abnormal masses   Cardiovascular -  regular rate and rhythm   Pulmonary - clear to auscultation bilaterally, no wheezes, ronchi, or rales   Abdomen - soft, non-tender, non-distended, positive bowel sounds   Psych - The patient is alert and oriented x3 with normal mood and affect    Examination of the Right Lower Extremity    Skin is intact throughout  Motor in intact EHL,FHL,TA,yoanna  +2 DP/PT  Sensation LT intact D/P/1st    Examination of the Right Hip    C-Sign positive  Logroll negative  Stenchfield positive    Pain with ROM positive    ROM:    Flexion   120  Extension   30  Abduction   45  Adduction   20  External Rotation 45  Internal Rotation 35    Flexion contracture negative    FADIR positive  FADER negative    Tenderness to palpation over lateral and posterolateral greater tochanter negative    Examination of the Left Lower Extremity    Skin is intact throughout  Motor in intact EHL,FHL,TA,yoanna  +2 DP/PT  Sensation LT intact D/P/1st    Examination of the Left Hip    C-Sign positive  Logroll negative  Stenchfield negative    Pain with ROM negative    ROM:    Flexion   120  Extension   30  Abduction   45  Adduction   20  External Rotation 45  Internal Rotation 35    Flexion contracture negative    FADIR negative  FADER negative    Tenderness to palpation over lateral and posterolateral greater tochanter negative    MRI images were examined and personally interpreted by me.  MRI of the right hip dated 05/22/2023 show severe arthritic changes with loss of joint space and periarticular osteophytes in addition to edema of the femoral head and acetabulum.  MRI of the left hip dated 05/22/2023 show some mild arthritic changes with narrowing of the joint space and early periarticular osteophyte formation.    Dx:  Severe osteoarthritis of the right hip.  Mild osteoarthritis of the left hip.    Plan:  I would a long discussion with the patient about treatment options.  We discussed the fact that she is tried p.o. NSAIDs and intra-articular injections and  has still had some residual pain.  In her activity modification she is actually gotten some relief and currently satisfied with her treatment regimen.  We discussed that the next step for her if the pain becomes either unbearable to the point that it is interfering with her daily activity and lifestyle would be to talk about hip replacement surgery.  We discussed the risks of hip replacement in the face of Parkinson's disease.  We also discussed her current activity level.  At this point she is going to continue with her current regimen and follow up in 3 months for re-evaluation.

## 2023-05-26 RX ORDER — LORAZEPAM 0.5 MG/1
0.5 TABLET ORAL EVERY OTHER DAY
Qty: 30 TABLET | Refills: 5 | Status: SHIPPED | OUTPATIENT
Start: 2023-05-26

## 2023-05-31 ENCOUNTER — TELEPHONE (OUTPATIENT)
Dept: FAMILY MEDICINE | Facility: CLINIC | Age: 73
End: 2023-05-31
Payer: MEDICARE

## 2023-05-31 NOTE — TELEPHONE ENCOUNTER
Spoke with pt. Informed pt we do not have any availability with Dr. Cole until February 2024. Pt VU. Pt & pt  scheduled appts for 2024 to continue care with Dr. Cole. Advised pt & pt  they will be on the wait list if a sooner appt becomes available. Pt & pt  DAYANA.

## 2023-05-31 NOTE — TELEPHONE ENCOUNTER
----- Message from Cortney Floyd sent at 5/31/2023  1:47 PM CDT -----  Regarding: New Patient  Contact: Patient  Patient is requesting a call back to schedule a new patient appt for her and spouse   Patient is currently under the care of Dr. Zheng who is leaving [facility] and was referred to Dr. Cole to continue care   Attempted to schedule no appts generated   Please Assist     Patient can be reached at [phone]

## 2023-06-05 ENCOUNTER — CLINICAL SUPPORT (OUTPATIENT)
Dept: REHABILITATION | Facility: HOSPITAL | Age: 73
End: 2023-06-05
Payer: MEDICARE

## 2023-06-05 DIAGNOSIS — M47.816 LUMBAR SPONDYLOSIS: ICD-10-CM

## 2023-06-05 DIAGNOSIS — M54.16 LUMBAR RADICULOPATHY, CHRONIC: ICD-10-CM

## 2023-06-05 DIAGNOSIS — R68.89 DECREASED FUNCTIONAL ACTIVITY TOLERANCE: ICD-10-CM

## 2023-06-05 DIAGNOSIS — Z55.9 SPECIAL EDUCATIONAL NEEDS: ICD-10-CM

## 2023-06-05 DIAGNOSIS — M79.604 LUMBAR PAIN WITH RADIATION DOWN RIGHT LEG: Primary | ICD-10-CM

## 2023-06-05 DIAGNOSIS — M62.89 ABNORMAL INCREASED MUSCLE TONE: ICD-10-CM

## 2023-06-05 DIAGNOSIS — M54.50 LUMBAR PAIN WITH RADIATION DOWN RIGHT LEG: Primary | ICD-10-CM

## 2023-06-05 DIAGNOSIS — R53.1 WEAKNESS: ICD-10-CM

## 2023-06-05 PROCEDURE — 97162 PT EVAL MOD COMPLEX 30 MIN: CPT | Mod: PN

## 2023-06-05 SDOH — SOCIAL DETERMINANTS OF HEALTH (SDOH): PROBLEMS RELATED TO EDUCATION AND LITERACY, UNSPECIFIED: Z55.9

## 2023-06-07 ENCOUNTER — CLINICAL SUPPORT (OUTPATIENT)
Dept: REHABILITATION | Facility: HOSPITAL | Age: 73
End: 2023-06-07
Payer: MEDICARE

## 2023-06-07 DIAGNOSIS — R53.1 WEAKNESS: ICD-10-CM

## 2023-06-07 DIAGNOSIS — M79.604 LUMBAR PAIN WITH RADIATION DOWN RIGHT LEG: Primary | ICD-10-CM

## 2023-06-07 DIAGNOSIS — R68.89 DECREASED FUNCTIONAL ACTIVITY TOLERANCE: ICD-10-CM

## 2023-06-07 DIAGNOSIS — Z55.9 SPECIAL EDUCATIONAL NEEDS: ICD-10-CM

## 2023-06-07 DIAGNOSIS — M54.50 LUMBAR PAIN WITH RADIATION DOWN RIGHT LEG: Primary | ICD-10-CM

## 2023-06-07 DIAGNOSIS — M62.89 ABNORMAL INCREASED MUSCLE TONE: ICD-10-CM

## 2023-06-07 PROCEDURE — 97140 MANUAL THERAPY 1/> REGIONS: CPT | Mod: PN,CQ

## 2023-06-07 PROCEDURE — 97110 THERAPEUTIC EXERCISES: CPT | Mod: PN,CQ

## 2023-06-07 PROCEDURE — 97112 NEUROMUSCULAR REEDUCATION: CPT | Mod: PN,CQ

## 2023-06-07 SDOH — SOCIAL DETERMINANTS OF HEALTH (SDOH): PROBLEMS RELATED TO EDUCATION AND LITERACY, UNSPECIFIED: Z55.9

## 2023-06-07 NOTE — PLAN OF CARE
[facility]   Physical Therapy Initial Evaluation     Date: 6/5/2023   Name: Esther Macario [facility] Number: [id number]    Therapy Diagnosis:   Encounter Diagnoses   Name Primary?    Lumbar radiculopathy, chronic     Lumbar spondylosis     Lumbar pain with radiation down right leg Yes    Weakness     Abnormal increased muscle tone     Decreased functional activity tolerance     Special educational needs      Physician: Nini Tierney PA-C    Physician Orders: PT Eval and Treat   Medical Diagnosis from Referral: M54.16 (ICD-10-CM) - Lumbar radiculopathy, chronic M47.816 (ICD-10-CM) - Lumbar spondylosis   Evaluation Date: 6/5/2023  Authorization Period Expiration: 07/03/2023  Plan of Care Expiration: 07/28/2023 at 2x/wk x 7 weeks   Progress Note Due: 07/05/2023  Visit # / Visits authorized: 1/ 1   FOTO: 1/ 3     Time In: 1:50 pm  Time Out: 2:51 pm  Total Appointment Time (timed & untimed codes): 59 minutes    Precautions: Standard and Parkinson's  SUBJECTIVE   Date of onset: The patient reports having had these complaints within the past year.    History of current condition - Hong reports: that she attended PT recently this year for right hip pain. She reports having had to stop that plan of care as she was going on a long vacation. However, she does report that she had no lasting positive effects. Today, she enters with lumbar pain that radiates into her right hip and down her right leg. She also reports continued right hip pain. She reports the Dr believes that the back may be what is causing the right hip and leg pain. He offered her epidural injections in the lumbar spine and another steroid injection in the right hip. However, the patient wanted to try conservative PT first. Other pertinent history: The patient was a ballet dancer for a number of years. She reports having to stand with her pelvis in external rotation was common for ballet. Also, she continues to teach Yoga. She  "has cut down on her classes and class size, but she does report the pigeon pose and other positions cause too much pain now for her to perform. She also reports an old right iliopsoas problem that has since resolved. She reports "clicking" at times. PT is unsure if it is her lumbar spine or right hip, but right hip click was audible during part of PT exam.     Falls: No falls are reported.    Imaging, Imaging: No spine imaging.  Xray hip 3-13-23:  No acute fracture or AVN.  Degenerative change lowest 3 lumbar levels.  Mild degenerative change pubic symphysis and minimal degenerative change of both hip joints. MRI lumbar spine 5-22-23:  6 lumbar type vertebra.  In keeping with radiology numbering system - L5 anterolisthesis on L6.  L5/6 right disk hernia and facet arthropathy with some right foraminal narrowing. MRI right hip 5-22-23:  advanced arthritic changes. MRI left hip 5-22-23:  mild left hip arthritis.      Prior Therapy: The patient had PT earlier this year in an attempt to relieve her right hip pain.   Social History: The patient lives with her .  Occupation: She is a retired Speech Therapist, and she teaches yoga two days per week.  Prior Level of Function: She could perform her desired activities, including yoga.  Current Level of Function: She reports that she now has to avoid certain yoga positions due to pain and also that her endurance to activity including yoga is limited.    Pain:  Current 0/10, worst 5/10, best 0/10   Location: She has lumbar spine pain that radiates into her right hip and down her right leg to the knee.  Description: Aching, Grabbing, Deep, Sharp, and Variable  Aggravating Factors: Prolonged walking. Prolonged standing and especially when she shifts weight onto the right side. She also reports pain with desired activities such as yoga.  Easing Factors: She reports no real relief to date. She does acupuncture and has had injections and has tried TENs units and pain " "medication and various other methods of pain relief without success.     Patients goals: "I want to avoid more injections if possible. I'd like to not have any pain in the low back or right hip, so that I can do the things that I want to do and not be limited.     Medical History:   Past Medical History:   Diagnosis Date    Fever blister     Hyperlipidemia     Osteopenia     Parkinson's disease     Squamous cell carcinoma 01/18/2019    right forearm     Surgical History:   Esther Segura  has a past surgical history that includes achiles tendon ; broken hand (Right); Colonoscopy; Esophagogastroduodenoscopy (N/A, 7/9/2020); Colonoscopy (N/A, 7/9/2020); and Liver biopsy (N/A, 11/19/2020).    Medications:   Esther has a current medication list which includes the following prescription(s): augmented betamethasone dipropionate, calcium carbonate-vitamin d3, carbidopa, carbidopa-levodopa  mg, covid-19 at-home test, ibuprofen, lactobacillus rhamnosus gg, lorazepam, magnesium citrate, meloxicam, multivit 33-mtfolate-nac-chrom, rasagiline, and tizanidine.    Allergies:   Review of patient's allergies indicates:   Allergen Reactions    Bee sting [allergen ext-venom-honey bee] Swelling    Fosamax [alendronate] Other (See Comments)     Jaw pain      OBJECTIVE     Lumbar:     Structural/Postural Inspection/Palpation:  At the evaluation, the patient's right ASIS appears to be rotated outward. Her iliac crests are even. There does not appear to be a leg length discrepancy. There does appear to be a slight right scoliotic lumbar curve. She appears with decreased lumbar lordosis. She did experience lumbar and anterior right hip pain when her right foot/leg was placed forward into a lung position and the pain increased with trunk rotation to the left. There was no tenderness to palpation.    Flexibility    Muscle Left Right Comment         Hamstrings Normal Normal    Quadriceps Normal Normal    Illipsoas Normal Normal "    TFL Normal Normal    Pirifromis Normal Minimal Increase              ROM     L-Spine AROM AROM Comment    Left Right          Flexion 80*  * No pain with testing   Extension 10*  *    Sidebending 45* 50*              MMT      L-spine   Comment    Left Right          Flexion 4/5 -    Extension 4+/5 -    Side Bending 4+/5 4+/5          Abdominals 4/5 -    Lower Abdominals 4/5 -    Hip ABduction 5/5 4+/5    Hip ADduction 5/5 4+/5    Hip Internal Rotation 4/5 4/5    Hip External Rotation 4/5 4-/5               SPECIAL TESTS: The patient is right handed. Her right  average was 55 pounds. Her left  average was 50 pounds.       Test   Status Comment    Left Right     SLR Negative. Negative.     Slump Test Negative. Negative.     Colleen's Negative Positive            Repetitive Flexion (standing) Negative. Negative.     Repetitive Extension (standing) Negative. Negative.     Ely's test Negative Negative         FUNCTIONAL MOBILITY        Balance: No gross abnormalities.     Gait: No gross abnormalities.      Limitation/Restriction for FOTO Lumbar Survey    Therapist reviewed FOTO scores for Esther Segura on 6/5/2023.   FOTO documents entered into [employer] - see Media section.    Limitation Score: 60% Limitation at the evaluation       TREATMENT     Total Treatment time (time-based codes) separate from Evaluation: 0 minutes      Hong received the treatments listed below:      N/A  PATIENT EDUCATION AND HOME EXERCISES     Education provided:   -The patient was educated on the evaluations findings and on what her PT plan of care will consist of including future home program exercises.     Written Home Exercises Provided: The patient was educated on the PT evaluation's findings and on what her PT plan of care will consist of including future home program exercises. Hong demonstrated good  understanding of the education provided. See EMR under Patient Instructions for exercises provided during therapy  sessions.  ASSESSMENT     Esther is a 73 y.o. female referred to outpatient Physical Therapy with a medical diagnosis of M54.16 (ICD-10-CM) - Lumbar radiculopathy, chronic M47.816 (ICD-10-CM) - Lumbar spondylosis. Patient presents with chronic lumbar and right hip pain. There is muscle weakness present. She reports decreased functional activity ability, and she is in need of patient education and a custom written home exercise program.     Patient prognosis is Good.   Patientt will benefit from skilled outpatient Physical Therapy to address the deficits stated above and in the chart below, provide patient /family education, and to maximize patientt's level of independence.     Plan of care discussed with patient: Yes  Patient's spiritual, cultural and educational needs considered and patient is agreeable to the plan of care and goals as stated below:     Anticipated Barriers for therapy: None    Medical Necessity is demonstrated by the following  History  Co-morbidities and personal factors that may impact the plan of care Co-morbidities:   advanced age, coping style/mechanism, history of cancer, and fever blister, hyperlipidemia, osteopenia, and Parkinson's Disease.    Personal Factors:   coping style  social background  lifestyle  character     high   Examination  Body Structures and Functions, activity limitations and participation restrictions that may impact the plan of care Body Regions:   back  lower extremities  trunk  And right hip    Body Systems:    strength  gross coordinated movement  motor control  And pain control.    Participation Restrictions:   none    Activity limitations:   Learning and applying knowledge  no deficits    General Tasks and Commands  undertaking multiple tasks    Communication  no deficits    Mobility  walking    Self care  looking after one's health    Domestic Life  shopping  cooking  doing house work (cleaning house, washing dishes, laundry)  assisting  others    Interactions/Relationships  no deficits    Life Areas  employment  Yoga    Community and Social Life  community life  recreation and leisure         moderate   Clinical Presentation stable and uncomplicated moderate   Decision Making/ Complexity Score: moderate     Goals:      STG  Weeks/Visits Date Established  Date Met   1.Decrease max lumbar and right hip pain to 3/10 to improve the patient's quality of life. 3 weeks. 6/5/2023   In Progress   2. Increase general core strength 1/2 grade to improve standing activity endurance. 3 weeks. 6/5/2023   In Progress   3.Increase right hip strength 1/2 grade to improve standing activity endurance. 3 weeks. 6/5/2023   In Progress   4.Decrease FOTO limitation score to <=55% to improve desired activity ability such as Yoga. 3 weeks. 6/5/2023   In Progress   5.Fair initial written home exercise program knowledge and be without questions to have carryover after discharge from PT. 3 weeks. 6/5/2023   In Progress     LTG Weeks/Visits Date Established  Date Met   1.Decrease max lumbar and right hip pain to 1/10 to improve the patient's quality of life. 7 weeks. 6/5/2023   In Progress   2. Increase general core strength one grade where able from the evaluation date to improve standing activity endurance. 7 weeks. 6/5/2023     In Progress   3.Increase right hip strength one grade where able from the evaluation date to improve standing activity endurance. 7 weeks. 6/5/2023   In Progress   4.Decrease FOTO limitation score to <=50% to improve desired activity ability such as Yoga. 7 weeks. 6/5/2023   In Progress   5. Good Progressed written home exercise program knowledge and be without questions to have carryover after discharge from PT. 7 weeks. 6/5/2023   In Progress     PLAN   Plan of care Certification: 6/5/2023 to 07/28/2023.    Outpatient Physical Therapy 2 times weekly for 7 weeks to include the following interventions: Manual Therapy, Moist Heat/ Ice, Neuromuscular  Re-ed, Patient Education, Self Care, Therapeutic Activities, Therapeutic Exercise, and dry needling and care by a PTA.     Sean Juárez, PT      I CERTIFY THE NEED FOR THESE SERVICES FURNISHED UNDER THIS PLAN OF TREATMENT AND WHILE UNDER MY CARE   Physician's comments:     Physician's Signature: ___________________________________________________

## 2023-06-07 NOTE — PROGRESS NOTES
[facility]  Outpatient Physical Therapy Daily Treatment Note      Name: Esther Kearns  Clinic Number: [id number]  Visit Date: 6/7/2023    Therapy Diagnosis:   Encounter Diagnoses   Name Primary?    Lumbar pain with radiation down right leg Yes    Weakness     Abnormal increased muscle tone     Decreased functional activity tolerance     Special educational needs        Physician: Nini Tierney PA-C  Physician Orders: PT Eval and Treat   Medical Diagnosis from Referral: M54.16 (ICD-10-CM) - Lumbar radiculopathy, chronic M47.816 (ICD-10-CM) - Lumbar spondylosis   Evaluation Date: 6/5/2023  Authorization Period Expiration: 07/03/2023  Plan of Care Expiration: 07/28/2023 at 2x/wk x 7 weeks   Progress Note Due: 07/05/2023  Visit # / Visits authorized: 1/ 11  PTA visit: 1 /5   FOTO: 1/ 3      Time In: 1:45 pm  Time Out: 2:30  pm  Total Appointment Time (timed & untimed codes): 45 minutes     Precautions: Standard and Parkinson's    Subjective     The patient presents to therapy reporting getting a MRI that revealed arthritic changes to her hip. Mild pain in right hip reported at start of treatment.     Response to previous treatment: first treatment following eval   Functional change: first treatment following eval     Pain: 4/10  Location: right hip       Objective     Hong received therapeutic exercises to develop strength, endurance, ROM, flexibility, posture, and core stabilization for 20  minutes including:    Sidelying hip abduction 2 x 15   Clams 2 x 15   Rev clams 2 x 15   Bridges with add squeeze 3 x 10     Hong received the following manual therapy techniques: Joint mobilizations, Myofacial release, and Soft tissue Mobilization were applied to the: right hip  for 15  minutes, including:    Caudal glides   Long axis traction   Psoas release     Hong participated in neuromuscular re-education activities to improve: Balance, Coordination, Kinesthetic,  Sense, Proprioception, and Posture for 10  minutes. The following activities were included:    Isometric hip abduction into ball with single leg stance 5 sec hold 5 x each side     Hong participated in dynamic functional therapeutic activities to improve functional performance for 0  minutes, including:      Patient Education and HEP     She was compliant with home exercise program.    Education provided:   - home exercise program     Written Home Exercises Provided: Patient instructed to cont prior HEP.  Exercises were reviewed and Hong was able to demonstrate them prior to the end of the session.  Hong demonstrated good  understanding of the education provided.     See EMR under Patient Instructions for exercises provided prior visit.    Assessment     The patient is starting therapy with some advantages as she has been performing some hip strengthening due to previous therapy on pelvis. She also has high levels of flexibility given her career as a . She presented to therapy with notable gait deficit lacking active hip flexion on right side. She tended to advance her right leg with rotational thrusting of pelvis. This gait deviation was much improved upon leaving the clinic following therapeutic intervention. Pt will benefit from advanced hip strengthening and stability exercises as she progresses through treatment.     Pt will continue to benefit from skilled outpatient physical therapy to address the deficits listed in the problem list box on initial evaluation, provide pt/family education and to maximize pt's level of independence in the home and community environment.     Hong Is progressing well towards her goals.   Pt prognosis is Good.     Pt's spiritual, cultural and educational needs considered and pt agreeable to plan of care and goals.    Anticipated barriers to physical therapy: stiffness and dysfunction associated with Parkinson and OA     Goals: Goals:        STG  Weeks/Visits Date  Established  Date Met   1.Decrease max lumbar and right hip pain to 3/10 to improve the patient's quality of life. 3 weeks. 6/5/2023    In Progress  6/7/2023     2. Increase general core strength 1/2 grade to improve standing activity endurance. 3 weeks. 6/5/2023    In Progress  6/7/2023     3.Increase right hip strength 1/2 grade to improve standing activity endurance. 3 weeks. 6/5/2023    In Progress  6/7/2023     4.Decrease FOTO limitation score to <=55% to improve desired activity ability such as Yoga. 3 weeks. 6/5/2023    In Progress  6/7/2023     5.Fair initial written home exercise program knowledge and be without questions to have carryover after discharge from PT. 3 weeks. 6/5/2023    In Progress  6/7/2023        LTG Weeks/Visits Date Established  Date Met   1.Decrease max lumbar and right hip pain to 1/10 to improve the patient's quality of life. 7 weeks. 6/5/2023    In Progress  6/7/2023     2. Increase general core strength one grade where able from the evaluation date to improve standing activity endurance. 7 weeks. 6/5/2023       In Progress  6/7/2023     3.Increase right hip strength one grade where able from the evaluation date to improve standing activity endurance. 7 weeks. 6/5/2023    In Progress  6/7/2023     4.Decrease FOTO limitation score to <=50% to improve desired activity ability such as Yoga. 7 weeks. 6/5/2023    In Progress  6/7/2023     5. Good Progressed written home exercise program knowledge and be without questions to have carryover after discharge from PT. 7 weeks. 6/5/2023    In Progress  6/7/2023         Plan     Continue with the plan of care established per initial evaluation    Plan of care Certification: 6/5/2023 to 07/28/2023.     Outpatient Physical Therapy 2 times weekly for 7 weeks     Zully Allison PTA

## 2023-06-08 ENCOUNTER — DOCUMENTATION ONLY (OUTPATIENT)
Dept: REHABILITATION | Facility: HOSPITAL | Age: 73
End: 2023-06-08
Payer: MEDICARE

## 2023-06-12 ENCOUNTER — CLINICAL SUPPORT (OUTPATIENT)
Dept: REHABILITATION | Facility: HOSPITAL | Age: 73
End: 2023-06-12
Payer: MEDICARE

## 2023-06-12 DIAGNOSIS — Z55.9 SPECIAL EDUCATIONAL NEEDS: ICD-10-CM

## 2023-06-12 DIAGNOSIS — R68.89 DECREASED FUNCTIONAL ACTIVITY TOLERANCE: ICD-10-CM

## 2023-06-12 DIAGNOSIS — M79.604 LUMBAR PAIN WITH RADIATION DOWN RIGHT LEG: Primary | ICD-10-CM

## 2023-06-12 DIAGNOSIS — M62.89 ABNORMAL INCREASED MUSCLE TONE: ICD-10-CM

## 2023-06-12 DIAGNOSIS — M54.50 LUMBAR PAIN WITH RADIATION DOWN RIGHT LEG: Primary | ICD-10-CM

## 2023-06-12 DIAGNOSIS — R53.1 WEAKNESS: ICD-10-CM

## 2023-06-12 PROCEDURE — 97140 MANUAL THERAPY 1/> REGIONS: CPT | Mod: PN,CQ

## 2023-06-12 PROCEDURE — 97110 THERAPEUTIC EXERCISES: CPT | Mod: PN,CQ

## 2023-06-12 PROCEDURE — 97112 NEUROMUSCULAR REEDUCATION: CPT | Mod: PN,CQ

## 2023-06-12 SDOH — SOCIAL DETERMINANTS OF HEALTH (SDOH): PROBLEMS RELATED TO EDUCATION AND LITERACY, UNSPECIFIED: Z55.9

## 2023-06-12 NOTE — PROGRESS NOTES
[facility]  Outpatient Physical Therapy Daily Treatment Note      Name: Esther Kearns  Clinic Number: [id number]  Visit Date: 6/12/2023    Therapy Diagnosis:   Encounter Diagnoses   Name Primary?    Lumbar pain with radiation down right leg Yes    Weakness     Abnormal increased muscle tone     Decreased functional activity tolerance     Special educational needs        Physician: Nini Tierney PA-C  Physician Orders: PT Eval and Treat   Medical Diagnosis from Referral: M54.16 (ICD-10-CM) - Lumbar radiculopathy, chronic M47.816 (ICD-10-CM) - Lumbar spondylosis   Evaluation Date: 6/5/2023  Authorization Period Expiration: 07/03/2023  Plan of Care Expiration: 07/28/2023 at 2x/wk x 7 weeks   Progress Note Due: 07/05/2023  Visit # / Visits authorized: 2/ 11  PTA visit: 2/5   FOTO: 1/ 3      Time In: 2:00 pm  Time Out: 2:45  pm  Total Appointment Time (timed & untimed codes): 45 minutes     Precautions: Standard and Parkinson's    Subjective     The patient reports walking in the pool which seemed to help but did leave her sore.     Response to previous treatment: first treatment following eval   Functional change: first treatment following eval     Pain: 4/10  Location: right hip       Objective     Hong received therapeutic exercises to develop strength, endurance, ROM, flexibility, posture, and core stabilization for 15  minutes including:    Donkey kicks 12.5 2 x 10   Sidelying hip abduction 2 x 15   Clams 2 x 15 RTB   Rev clams 2 x 15   Bridges with add squeeze 3 x 10     Hong received the following manual therapy techniques: Joint mobilizations, Myofacial release, and Soft tissue Mobilization were applied to the: right hip  for 10  minutes, including:    Caudal glides   Long axis traction   Psoas release     Hong participated in neuromuscular re-education activities to improve: Balance, Coordination, Kinesthetic, Sense, Proprioception, and Posture for  20  minutes. The following activities were included:    Isometric hip abduction into ball with single leg stance 5 sec hold 5 x each side   Half kneel on BOSU water ball shakes 20 sec x 2 each leg   Single leg Kiswahili dead lift 10 x each side   Hip flexion circuit over cones from supine position 15 x       Hong participated in dynamic functional therapeutic activities to improve functional performance for 0  minutes, including:      Patient Education and HEP     She was compliant with home exercise program.    Education provided:   - home exercise program     Written Home Exercises Provided: yes.  Exercises were reviewed and Hong was able to demonstrate them prior to the end of the session.  Hong demonstrated good  understanding of the education provided.     See EMR under Patient Instructions for exercises provided  6/12/23 .    Assessment     The patient presented to clinic with improved gait mechanics as compared to last visit despite reports of soreness following walking in pool. Treatment today focused on advancing strength and hip stability exercises. She was able to return good demonstration on all new exercises. She is expected to continually improve with these exercises and progress towards therapy goals.      Pt will continue to benefit from skilled outpatient physical therapy to address the deficits listed in the problem list box on initial evaluation, provide pt/family education and to maximize pt's level of independence in the home and community environment.     Hong Is progressing well towards her goals.   Pt prognosis is Good.     Pt's spiritual, cultural and educational needs considered and pt agreeable to plan of care and goals.    Anticipated barriers to physical therapy: stiffness and dysfunction associated with Parkinson and OA     Goals:        STG  Weeks/Visits Date Established  Date Met   1.Decrease max lumbar and right hip pain to 3/10 to improve the patient's quality of life. 3 weeks.  6/5/2023    In Progress  6/12/2023     2. Increase general core strength 1/2 grade to improve standing activity endurance. 3 weeks. 6/5/2023    In Progress  6/12/2023     3.Increase right hip strength 1/2 grade to improve standing activity endurance. 3 weeks. 6/5/2023    In Progress  6/12/2023     4.Decrease FOTO limitation score to <=55% to improve desired activity ability such as Yoga. 3 weeks. 6/5/2023    In Progress  6/12/2023     5.Fair initial written home exercise program knowledge and be without questions to have carryover after discharge from PT. 3 weeks. 6/5/2023    In Progress  6/12/2023        LTG Weeks/Visits Date Established  Date Met   1.Decrease max lumbar and right hip pain to 1/10 to improve the patient's quality of life. 7 weeks. 6/5/2023    In Progress  6/12/2023     2. Increase general core strength one grade where able from the evaluation date to improve standing activity endurance. 7 weeks. 6/5/2023       In Progress  6/12/2023     3.Increase right hip strength one grade where able from the evaluation date to improve standing activity endurance. 7 weeks. 6/5/2023    In Progress  6/12/2023     4.Decrease FOTO limitation score to <=50% to improve desired activity ability such as Yoga. 7 weeks. 6/5/2023    In Progress  6/12/2023     5. Good Progressed written home exercise program knowledge and be without questions to have carryover after discharge from PT. 7 weeks. 6/5/2023    In Progress  6/12/2023         Plan     Continue with the plan of care established per initial evaluation    Plan of care Certification: 6/5/2023 to 07/28/2023.     Outpatient Physical Therapy 2 times weekly for 7 weeks     Zully Allison, MAGGIE

## 2023-06-14 ENCOUNTER — CLINICAL SUPPORT (OUTPATIENT)
Dept: REHABILITATION | Facility: HOSPITAL | Age: 73
End: 2023-06-14
Payer: MEDICARE

## 2023-06-14 DIAGNOSIS — R68.89 DECREASED FUNCTIONAL ACTIVITY TOLERANCE: ICD-10-CM

## 2023-06-14 DIAGNOSIS — Z55.9 SPECIAL EDUCATIONAL NEEDS: ICD-10-CM

## 2023-06-14 DIAGNOSIS — M54.50 LUMBAR PAIN WITH RADIATION DOWN RIGHT LEG: Primary | ICD-10-CM

## 2023-06-14 DIAGNOSIS — M62.89 ABNORMAL INCREASED MUSCLE TONE: ICD-10-CM

## 2023-06-14 DIAGNOSIS — R53.1 WEAKNESS: ICD-10-CM

## 2023-06-14 DIAGNOSIS — M79.604 LUMBAR PAIN WITH RADIATION DOWN RIGHT LEG: Primary | ICD-10-CM

## 2023-06-14 PROCEDURE — 97112 NEUROMUSCULAR REEDUCATION: CPT | Mod: PN,CQ

## 2023-06-14 PROCEDURE — 97530 THERAPEUTIC ACTIVITIES: CPT | Mod: PN,CQ

## 2023-06-14 PROCEDURE — 97110 THERAPEUTIC EXERCISES: CPT | Mod: PN,CQ

## 2023-06-14 SDOH — SOCIAL DETERMINANTS OF HEALTH (SDOH): PROBLEMS RELATED TO EDUCATION AND LITERACY, UNSPECIFIED: Z55.9

## 2023-06-14 NOTE — PROGRESS NOTES
[facility]  Outpatient Physical Therapy Daily Treatment Note      Name: Esther Kearns  Clinic Number: [id number]  Visit Date: 6/14/2023    Therapy Diagnosis:   Encounter Diagnoses   Name Primary?    Lumbar pain with radiation down right leg Yes    Weakness     Abnormal increased muscle tone     Decreased functional activity tolerance     Special educational needs        Physician: Nini Tierney PA-C  Physician Orders: PT Eval and Treat   Medical Diagnosis from Referral: M54.16 (ICD-10-CM) - Lumbar radiculopathy, chronic M47.816 (ICD-10-CM) - Lumbar spondylosis   Evaluation Date: 6/5/2023  Authorization Period Expiration: 07/03/2023  Plan of Care Expiration: 07/28/2023 at 2x/wk x 7 weeks   Progress Note Due: 07/05/2023  Visit # / Visits authorized: 3/ 11  PTA visit: 3/5   FOTO: 1/ 3      Time In: 3:15 pm  Time Out: 4:00  pm  Total Appointment Time (timed & untimed codes): 45 minutes     Precautions: Standard and Parkinson's    Subjective     The patient reports continued walking in the pool which seemed to help but did leave her sore.     Response to previous treatment: mild soreness  Functional change: improved gait mechanics     Pain: 4/10  Location: right hip       Objective     Hong received therapeutic exercises to develop strength, endurance, ROM, flexibility, posture, and core stabilization for 15  minutes including:    Donkey kicks 12.5 2 x 10   Sidelying hip abduction 2 x 15   Clams 2 x 15 RTB   Rev clams 2 x 15   Bridges with add squeeze 3 x 10     Hong received the following manual therapy techniques: Joint mobilizations, Myofacial release, and Soft tissue Mobilization were applied to the: right hip  for 10  minutes, including:    Caudal glides   Long axis traction   Psoas release     Hong participated in neuromuscular re-education activities to improve: Balance, Coordination, Kinesthetic, Sense, Proprioception, and Posture for 10  minutes.  The following activities were included:    Isometric hip abduction into ball with single leg stance 5 sec hold 5 x each side   Half kneel on BOSU water ball shakes 20 sec x 2 each leg   Single leg toe taps with cones  10 x       Hong participated in dynamic functional therapeutic activities to improve functional performance for 10  minutes, including:    Functional squats from 18 in box with 6 # 3 x 10   Kosovan dead lift 20 x 6#   Single leg Kosovan dead lift 2 x 10 8# bar     Patient Education and HEP     She was compliant with home exercise program.    Education provided:   - home exercise program     Written Home Exercises Provided: yes.  Exercises were reviewed and Hong was able to demonstrate them prior to the end of the session.  Hong demonstrated good  understanding of the education provided.     See EMR under Patient Instructions for exercises provided  6/12/23 .    Assessment     Training for proper lifting techniques was included in treatment today.  She was able to return good demonstration on all new exercises. She is expected to continually improve with these exercises and progress towards therapy goals.      Pt will continue to benefit from skilled outpatient physical therapy to address the deficits listed in the problem list box on initial evaluation, provide pt/family education and to maximize pt's level of independence in the home and community environment.     Hong Is progressing well towards her goals.   Pt prognosis is Good.     Pt's spiritual, cultural and educational needs considered and pt agreeable to plan of care and goals.    Anticipated barriers to physical therapy: stiffness and dysfunction associated with Parkinson and OA     Goals:        STG  Weeks/Visits Date Established  Date Met   1.Decrease max lumbar and right hip pain to 3/10 to improve the patient's quality of life. 3 weeks. 6/5/2023    In Progress  6/14/2023     2. Increase general core strength 1/2 grade to improve  standing activity endurance. 3 weeks. 6/5/2023    In Progress  6/14/2023     3.Increase right hip strength 1/2 grade to improve standing activity endurance. 3 weeks. 6/5/2023    In Progress  6/14/2023     4.Decrease FOTO limitation score to <=55% to improve desired activity ability such as Yoga. 3 weeks. 6/5/2023    In Progress  6/14/2023     5.Fair initial written home exercise program knowledge and be without questions to have carryover after discharge from PT. 3 weeks. 6/5/2023    In Progress  6/14/2023        LTG Weeks/Visits Date Established  Date Met   1.Decrease max lumbar and right hip pain to 1/10 to improve the patient's quality of life. 7 weeks. 6/5/2023    In Progress  6/14/2023     2. Increase general core strength one grade where able from the evaluation date to improve standing activity endurance. 7 weeks. 6/5/2023       In Progress  6/14/2023     3.Increase right hip strength one grade where able from the evaluation date to improve standing activity endurance. 7 weeks. 6/5/2023    In Progress  6/14/2023     4.Decrease FOTO limitation score to <=50% to improve desired activity ability such as Yoga. 7 weeks. 6/5/2023    In Progress  6/14/2023     5. Good Progressed written home exercise program knowledge and be without questions to have carryover after discharge from PT. 7 weeks. 6/5/2023    In Progress  6/14/2023         Plan     Continue with the plan of care established per initial evaluation    Plan of care Certification: 6/5/2023 to 07/28/2023.     Outpatient Physical Therapy 2 times weekly for 7 weeks     Zully Allison PTA      `

## 2023-06-19 ENCOUNTER — CLINICAL SUPPORT (OUTPATIENT)
Dept: REHABILITATION | Facility: HOSPITAL | Age: 73
End: 2023-06-19
Payer: MEDICARE

## 2023-06-19 DIAGNOSIS — M79.604 LUMBAR PAIN WITH RADIATION DOWN RIGHT LEG: Primary | ICD-10-CM

## 2023-06-19 DIAGNOSIS — M62.89 ABNORMAL INCREASED MUSCLE TONE: ICD-10-CM

## 2023-06-19 DIAGNOSIS — Z55.9 SPECIAL EDUCATIONAL NEEDS: ICD-10-CM

## 2023-06-19 DIAGNOSIS — R53.1 WEAKNESS: ICD-10-CM

## 2023-06-19 DIAGNOSIS — M54.50 LUMBAR PAIN WITH RADIATION DOWN RIGHT LEG: Primary | ICD-10-CM

## 2023-06-19 DIAGNOSIS — R68.89 DECREASED FUNCTIONAL ACTIVITY TOLERANCE: ICD-10-CM

## 2023-06-19 PROCEDURE — 97530 THERAPEUTIC ACTIVITIES: CPT | Mod: PN,CQ

## 2023-06-19 PROCEDURE — 97112 NEUROMUSCULAR REEDUCATION: CPT | Mod: PN,CQ

## 2023-06-19 PROCEDURE — 97110 THERAPEUTIC EXERCISES: CPT | Mod: PN,CQ

## 2023-06-19 SDOH — SOCIAL DETERMINANTS OF HEALTH (SDOH): PROBLEMS RELATED TO EDUCATION AND LITERACY, UNSPECIFIED: Z55.9

## 2023-06-19 NOTE — PROGRESS NOTES
[facility]  Outpatient Physical Therapy Daily Treatment Note      Name: Esther Kearns  Clinic Number: [id number]  Visit Date: 6/19/2023    Therapy Diagnosis:   Encounter Diagnoses   Name Primary?    Lumbar pain with radiation down right leg Yes    Weakness     Abnormal increased muscle tone     Decreased functional activity tolerance     Special educational needs        Physician: Nini Tierney PA-C  Physician Orders: PT Eval and Treat   Medical Diagnosis from Referral: M54.16 (ICD-10-CM) - Lumbar radiculopathy, chronic M47.816 (ICD-10-CM) - Lumbar spondylosis   Evaluation Date: 6/5/2023  Authorization Period Expiration: 07/03/2023  Plan of Care Expiration: 07/28/2023 at 2x/wk x 7 weeks   Progress Note Due: 07/05/2023  Visit # / Visits authorized: 4/ 11  PTA visit: 4/5   FOTO: 1/ 3      Time In: 3:30 pm  Time Out: 4:20  pm  Total Appointment Time (timed & untimed codes): 45 minutes + 5 minutes cold pack      Precautions: Standard and Parkinson's    Subjective     The patient reports continued walking in the pool which seemed to help but did leave her sore.     Response to previous treatment: mild soreness  Functional change: improved gait mechanics     Pain: 4/10  Location: right hip       Objective     Hong received therapeutic exercises to develop strength, endurance, ROM, flexibility, posture, and core stabilization for 15  minutes including:    Donkey kicks 12.5 2 x 10   Sidelying hip abduction 2 x 15 1#  Clams 2 x 15 GTB   Rev clams 2 x 15 1#  Bridges single leg 15 x each     Hong received the following manual therapy techniques: Joint mobilizations, Myofacial release, and Soft tissue Mobilization were applied to the: right hip  for 10  minutes, including:    Caudal glides   Long axis traction   Psoas release     Hong participated in neuromuscular re-education activities to improve: Balance, Coordination, Kinesthetic, Sense, Proprioception, and  Posture for 10  minutes. The following activities were included:    Self mob caudal glides with Black theraband 10 x   Isometric hip abduction into ball with single leg stance 5 sec hold 5 x each side   Half kneel on BOSU water ball shakes 20 sec x 2 each leg   Single leg toe taps with cones  10 x       Hong participated in dynamic functional therapeutic activities to improve functional performance for 10  minutes, including:    Functional squats from 18 in box with 6 # 3 x 10   Portuguese dead lift 20 x 6#   Single leg Portuguese dead lift 2 x 10 8# bar     Self mob of right hip using black theraband to simulate caudal glides.     Patient Education and HEP     She was compliant with home exercise program.    Education provided:   - home exercise program     Written Home Exercises Provided: yes.  Exercises were reviewed and Hong was able to demonstrate them prior to the end of the session.  Hong demonstrated good  understanding of the education provided.     See EMR under Patient Instructions for exercises provided  6/12/23 .    Assessment     Pt presented to clinic today with return presentation of gait deviation. She tends to forcefully rotate/thrust right side of pelvis forward in order to advance right lower extremity. Following manual therapy and caudal glides she is able to initiate advancement of right leg with proper hip flexion. Self mob for caudal glides were taught to pt so she will be able to make this adjustment independently as needed.  She is expected to continually improve with these exercises and progress towards therapy goals.      Pt will continue to benefit from skilled outpatient physical therapy to address the deficits listed in the problem list box on initial evaluation, provide pt/family education and to maximize pt's level of independence in the home and community environment.     Hong Is progressing well towards her goals.   Pt prognosis is Good.     Pt's spiritual, cultural and educational  needs considered and pt agreeable to plan of care and goals.    Anticipated barriers to physical therapy: stiffness and dysfunction associated with Parkinson and OA     Goals:        STG  Weeks/Visits Date Established  Date Met   1.Decrease max lumbar and right hip pain to 3/10 to improve the patient's quality of life. 3 weeks. 6/5/2023    In Progress  6/19/2023     2. Increase general core strength 1/2 grade to improve standing activity endurance. 3 weeks. 6/5/2023    In Progress  6/19/2023     3.Increase right hip strength 1/2 grade to improve standing activity endurance. 3 weeks. 6/5/2023    In Progress  6/19/2023     4.Decrease FOTO limitation score to <=55% to improve desired activity ability such as Yoga. 3 weeks. 6/5/2023    In Progress  6/19/2023     5.Fair initial written home exercise program knowledge and be without questions to have carryover after discharge from PT. 3 weeks. 6/5/2023    In Progress  6/19/2023        LTG Weeks/Visits Date Established  Date Met   1.Decrease max lumbar and right hip pain to 1/10 to improve the patient's quality of life. 7 weeks. 6/5/2023    In Progress  6/19/2023     2. Increase general core strength one grade where able from the evaluation date to improve standing activity endurance. 7 weeks. 6/5/2023       In Progress  6/19/2023     3.Increase right hip strength one grade where able from the evaluation date to improve standing activity endurance. 7 weeks. 6/5/2023    In Progress  6/19/2023     4.Decrease FOTO limitation score to <=50% to improve desired activity ability such as Yoga. 7 weeks. 6/5/2023    In Progress  6/19/2023     5. Good Progressed written home exercise program knowledge and be without questions to have carryover after discharge from PT. 7 weeks. 6/5/2023    In Progress  6/19/2023         Plan     Continue with the plan of care established per initial evaluation    Plan of care Certification: 6/5/2023 to 07/28/2023.     Outpatient Physical Therapy 2  times weekly for 7 weeks     Zully Allison, PTA      `

## 2023-06-21 ENCOUNTER — CLINICAL SUPPORT (OUTPATIENT)
Dept: REHABILITATION | Facility: HOSPITAL | Age: 73
End: 2023-06-21
Payer: MEDICARE

## 2023-06-21 DIAGNOSIS — M79.604 LUMBAR PAIN WITH RADIATION DOWN RIGHT LEG: Primary | ICD-10-CM

## 2023-06-21 DIAGNOSIS — M54.50 LUMBAR PAIN WITH RADIATION DOWN RIGHT LEG: Primary | ICD-10-CM

## 2023-06-21 DIAGNOSIS — M62.89 ABNORMAL INCREASED MUSCLE TONE: ICD-10-CM

## 2023-06-21 DIAGNOSIS — R53.1 WEAKNESS: ICD-10-CM

## 2023-06-21 DIAGNOSIS — R68.89 DECREASED FUNCTIONAL ACTIVITY TOLERANCE: ICD-10-CM

## 2023-06-21 DIAGNOSIS — Z55.9 SPECIAL EDUCATIONAL NEEDS: ICD-10-CM

## 2023-06-21 PROCEDURE — 97530 THERAPEUTIC ACTIVITIES: CPT | Mod: PN,CQ

## 2023-06-21 PROCEDURE — 97112 NEUROMUSCULAR REEDUCATION: CPT | Mod: PN,CQ

## 2023-06-21 PROCEDURE — 97110 THERAPEUTIC EXERCISES: CPT | Mod: PN,CQ

## 2023-06-21 SDOH — SOCIAL DETERMINANTS OF HEALTH (SDOH): PROBLEMS RELATED TO EDUCATION AND LITERACY, UNSPECIFIED: Z55.9

## 2023-06-21 NOTE — PROGRESS NOTES
[facility]  Outpatient Physical Therapy Daily Treatment Note      Name: Esther Kearns  Clinic Number: [id number]  Visit Date: 6/21/2023    Therapy Diagnosis:   Encounter Diagnoses   Name Primary?    Lumbar pain with radiation down right leg Yes    Weakness     Abnormal increased muscle tone     Decreased functional activity tolerance     Special educational needs        Physician: Nini Tierney PA-C  Physician Orders: PT Eval and Treat   Medical Diagnosis from Referral: M54.16 (ICD-10-CM) - Lumbar radiculopathy, chronic M47.816 (ICD-10-CM) - Lumbar spondylosis   Evaluation Date: 6/5/2023  Authorization Period Expiration: 07/03/2023  Plan of Care Expiration: 07/28/2023 at 2x/wk x 7 weeks   Progress Note Due: 07/05/2023  Visit # / Visits authorized: 5/ 11  PTA visit: 5/5   FOTO: 2/ 3 ( 2nd FOTO done 6/21/23)       Time In: 3:15 pm  Time Out: 4:05  pm  Total Appointment Time (timed & untimed codes): 45 minutes + 5 minutes cold pack      Precautions: Standard and Parkinson's    Subjective     The patient reports doing well with her therapy. She continues to walk in the pool which she feels is very beneficial..    Response to previous treatment: mild soreness  Functional change: improved gait mechanics     Pain: 4/10  Location: right hip       Objective     Hong received therapeutic exercises to develop strength, endurance, ROM, flexibility, posture, and core stabilization for 15  minutes including:    Treadmill 5 minutes 2.5 mph  Donkey kicks 12.5 2 x 10   Sidelying hip abduction 2 x 15 1#  Clams 2 x 15 GTB   Rev clams 2 x 15 1#  Bridges single leg 15 x each     Hong received the following manual therapy techniques: Joint mobilizations, Myofacial release, and Soft tissue Mobilization were applied to the: right hip  for 0  minutes, including:    Caudal glides   Long axis traction   Psoas release     Hong participated in neuromuscular re-education activities  to improve: Balance, Coordination, Kinesthetic, Sense, Proprioception, and Posture for 30 minutes. The following activities were included:    Self mob caudal glides with Black theraband 10 x ( not needed 6/21/23)   Isometric hip abduction into ball with single leg stance 5 sec hold 5 x 2 each side   Half kneel on BOSU water ball shakes 20 sec x 2 each leg   Single leg toe taps with cones  10 x       Hong participated in dynamic functional therapeutic activities to improve functional performance for 15  minutes, including:    Functional squats from 18 in box with 6 # 3 x 10   Bhutanese dead lift 30 x 8# bar   Single leg Bhutanese dead lift 2 x 10 6# handweight       Patient Education and HEP     She was compliant with home exercise program.    Education provided:   - home exercise program     Written Home Exercises Provided: yes.  Exercises were reviewed and Hong was able to demonstrate them prior to the end of the session.  Hong demonstrated good  understanding of the education provided.     See EMR under Patient Instructions for exercises provided  6/12/23 .    Assessment     Pt presented to clinic today without pain or gait deviations so increased workload performed with functional lifting and neuromuscular re-education activities. She will benefit from additional pelvic and hip stabilizing exercises with an emphasis on single leg stance activities. She is expected to continually improve with these exercises and progress towards therapy goals.      Pt will continue to benefit from skilled outpatient physical therapy to address the deficits listed in the problem list box on initial evaluation, provide pt/family education and to maximize pt's level of independence in the home and community environment.     Hong Is progressing well towards her goals.   Pt prognosis is Good.     Pt's spiritual, cultural and educational needs considered and pt agreeable to plan of care and goals.    Anticipated barriers to physical  therapy: stiffness and dysfunction associated with Parkinson and OA     Goals:        STG  Weeks/Visits Date Established  Date Met   1.Decrease max lumbar and right hip pain to 3/10 to improve the patient's quality of life. 3 weeks. 6/5/2023    In Progress  6/21/2023     2. Increase general core strength 1/2 grade to improve standing activity endurance. 3 weeks. 6/5/2023    In Progress  6/21/2023     3.Increase right hip strength 1/2 grade to improve standing activity endurance. 3 weeks. 6/5/2023    In Progress  6/21/2023     4.Decrease FOTO limitation score to <=55% to improve desired activity ability such as Yoga. 3 weeks. 6/5/2023    In Progress  6/21/2023     5.Fair initial written home exercise program knowledge and be without questions to have carryover after discharge from PT. 3 weeks. 6/5/2023    In Progress  6/21/2023        LTG Weeks/Visits Date Established  Date Met   1.Decrease max lumbar and right hip pain to 1/10 to improve the patient's quality of life. 7 weeks. 6/5/2023    In Progress  6/21/2023     2. Increase general core strength one grade where able from the evaluation date to improve standing activity endurance. 7 weeks. 6/5/2023       In Progress  6/21/2023     3.Increase right hip strength one grade where able from the evaluation date to improve standing activity endurance. 7 weeks. 6/5/2023    In Progress  6/21/2023     4.Decrease FOTO limitation score to <=50% to improve desired activity ability such as Yoga. 7 weeks. 6/5/2023    In Progress  6/21/2023     5. Good Progressed written home exercise program knowledge and be without questions to have carryover after discharge from PT. 7 weeks. 6/5/2023    In Progress  6/21/2023         Plan     Continue with the plan of care established per initial evaluation    Plan of care Certification: 6/5/2023 to 07/28/2023.     Outpatient Physical Therapy 2 times weekly for 7 weeks     Zully Allison PTA      `

## 2023-06-25 NOTE — PROGRESS NOTES
"[facility]  Physical Therapy Daily Treatment Note/Re-Assessment      Name: Esther Kearns  Clinic Number: [id number]  Visit Date: 6/26/2023    Therapy Diagnosis:   Encounter Diagnoses   Name Primary?    Lumbar pain with radiation down right leg     Weakness Yes    Abnormal increased muscle tone     Decreased functional activity tolerance     Special educational needs        Physician: Nini Tierney PA-C  Physician Orders: PT Eval and Treat   Medical Diagnosis from Referral: M54.16 (ICD-10-CM) - Lumbar radiculopathy, chronic M47.816 (ICD-10-CM) - Lumbar spondylosis   Evaluation Date: 6/5/2023  Authorization Period Expiration: 07/03/2023  Plan of Care Expiration: 07/28/2023 at 2x/wk x 7 weeks   Progress Note Due: 06/26/2023  Visit # / Visits authorized: 6/11     (7/14 on plan of care)  PTA visit: 0/5   FOTO: 2/ 3 ( 2nd FOTO done 6/21/23)       Time In: 1:53 pm PRECHARTED Emelyn Andrea next  Time Out: 2:45 pm  Total Appointment Time (timed & untimed codes): 40 minutes      Precautions: Standard and Parkinson's  Subjective      The patient reports: "I didn't really have any hip pain walking back here. I'd say the highest episode that I had in the past 3-4 days was a temporary 8/10. I was walking fast at the time. I am really enjoying walking in the pool like you suggested." PT acknowledges.     Response to previous treatment: She enters with no current pain, but she does report periodic high pain remains.   Functional change: She is exercising in the pool with comfort.     Pain: 0/10 upon entering the clinic today. Max of 8/10 within the past few days. (Walking fast in right hip)  Location: The right hip and at time the lumbar area  Objective     Hong received therapeutic exercises to develop strength, endurance, ROM, flexibility, posture, and core stabilization for 30 minutes including:  -+Precor outer thigh with 5 pounds x 20  -+Precor inner thigh with 20 pounds x 20  -+Precor back " machine with 30 pounds x 20  -Treadmill 4 minutes 2.5 mph  -Bridges x 10  -Side lying right leg 45* abduction with extension x 10  -bilateral single leg bridges x 4 each  -+Re-assessment testing.     Below not performed today:  -Donkey kicks 12.5 2 x 10   -Side lying hip abduction 2 x 15 1#  -Clams 2 x 15 Green band   -Rev clams 2 x 15 1#  -Bridges single leg 15 x each     Hong received the following manual therapy techniques: Joint mobilizations, Myofacial release, and Soft tissue Mobilization were applied to the: right hip for 0 minutes, including:  Caudal glides   Long axis traction   Psoas release     Hong participated in neuromuscular re-education activities to improve: Balance, Coordination, Kinesthetic, Sense, Proprioception, and Posture for 10 minutes. The following activities were included:  -+single let stance rotation left and right with ball x 4  -+seated bilateral hip internal rotation with bolster and green band x 15  -+right hip flexor stretch with leg off table and knee bent with 20 second hold x 2    Below not performed today:  -Self mob caudal glides with Black theraband 10 x ( not needed 6/21/23)   -Isometric hip abduction into ball with single leg stance 5 sec hold 5 x 2 each side   -Half kneel on BOSU water ball shakes 20 sec x 2 each leg   -Single leg toe taps with cones  10 x     Hong participated in dynamic functional therapeutic activities to improve functional performance for 0  minutes, including:  -Functional squats from 18 in box with 6 # 3 x 10   -Croatian dead lift 30 x 8# bar   -Single leg Croatian dead lift 2 x 10 6# handweight with hip thruset    MMT      L-spine     Comment     Left Right               Flexion 5/5 >4/5 -     Extension 5-/5 >4+/5 -     Side Bending 5-/5 >4+/5 5-/5 >4+/5               Abdominals 4+/5 >4/5 -     Lower Abdominals 4+/5 >4/5 -     Hip ABduction 5/5 5-/5 >4+/5     Hip ADduction 5/5 5/5 >4+/5     Hip Internal Rotation 4/5 4/5     Hip External Rotation  4/5 4/5 >4-/5 Pain with motion    Hip Flexion 5/5  3+/5  Due to pain anteriorly          FUNCTIONAL MOBILITY                             Limitation/Restriction for FOTO Lumbar Survey     Therapist reviewed FOTO scores for Esther Segura on 6/26/2023.   FOTO documents entered into [employer] - see Media section.     Limitation Score:   52%  Limitation 06/26/2023 >  58% Limitation 06/21/2023 >60% Limitation at the evaluation         Patient Education and HEP     She was compliant with home exercise program.    Education provided:   - home exercise program     Written Home Exercises Provided: Patient instructed to cont prior HEP.  Exercises were reviewed and Hong was able to demonstrate them prior to the end of the session.  Hong demonstrated good  understanding of the education provided.   Re-Assessment     Hong was re-assessed today. She continues with periodic intense right hip pain. She has characteristics of an old labral tear or labral involvement at the anterior right hip. Hip rotation noted during bridges would indicate the need for more strength. Overall complaints and positions that cause pain are reflective of significant arthritis in the right hip. PT discussed the possible need of a hip replacement. PT believes the patient would benefit from continued strengthening so that she can go into any possible surgery as strong as possible to help speed up recovery. PT recommended an anterior approach if she decides her pain is high enough to ask her M.D about surgery. She has a high pain tolerance, but arthritis will only get worse with time. She is a pleasure to work with. She will also continue to benefit from additional pelvic and hip stabilizing exercises with an emphasis on single leg stance activities. She tolerated today's PT session well.     Pt will continue to benefit from skilled outpatient physical therapy to address the deficits listed in the problem list box on initial evaluation, provide  pt/family education and to maximize pt's level of independence in the home and community environment.     Hong Is progressing well towards her goals.   Pt prognosis is Good.     Pt's spiritual, cultural and educational needs considered and pt agreeable to plan of care and goals.    Anticipated barriers to physical therapy: stiffness and dysfunction associated with Parkinson and OA     Goals:      STG  Weeks/Visits Date Established  Date Met   1.Decrease max lumbar and right hip pain to 3/10 to improve the patient's quality of life. 3 weeks. 6/5/2023    In Progress  6/26/2023     2. Increase general core strength 1/2 grade to improve standing activity endurance. 3 weeks. 6/5/2023    Goal Met 6/26/2023     3.Increase right hip strength 1/2 grade to improve standing activity endurance. 3 weeks. 6/5/2023    In Progress  6/26/2023     4.Decrease FOTO limitation score to <=55% to improve desired activity ability such as Yoga. 3 weeks. 6/5/2023    In Progress  6/26/2023  52%   5.Fair initial written home exercise program knowledge and be without questions to have carryover after discharge from PT. 3 weeks. 6/5/2023    Goal Met 6/26/2023        LTG Weeks/Visits Date Established  Date Met   1.Decrease max lumbar and right hip pain to 1/10 to improve the patient's quality of life. 7 weeks. 6/5/2023    In Progress  6/26/2023     2. Increase general core strength one grade where able from the evaluation date to improve standing activity endurance. 7 weeks. 6/5/2023       In Progress  6/26/2023     3.Increase right hip strength one grade where able from the evaluation date to improve standing activity endurance. 7 weeks. 6/5/2023    In Progress  6/26/2023  3 of 5 tests showed improvement   4.Decrease FOTO limitation score to <=50% to improve desired activity ability such as Yoga. 7 weeks. 6/5/2023    In Progress  6/26/2023     5. Good Progressed written home exercise program knowledge and be without questions to have carryover  after discharge from PT. 7 weeks. 6/5/2023    In Progress  6/26/2023       Plan     Plan of care Certification: 6/5/2023 to 07/28/2023. Outpatient Physical Therapy 2 times weekly for 7 weeks. Plan to improve her lumbar and hip stability to help provide long term pain relief and improved functional ability. Plan to maximize her right hip and core strength.     Sean Juárez, PT      `

## 2023-06-26 ENCOUNTER — CLINICAL SUPPORT (OUTPATIENT)
Dept: REHABILITATION | Facility: HOSPITAL | Age: 73
End: 2023-06-26
Payer: MEDICARE

## 2023-06-26 DIAGNOSIS — R53.1 WEAKNESS: Primary | ICD-10-CM

## 2023-06-26 DIAGNOSIS — Z55.9 SPECIAL EDUCATIONAL NEEDS: ICD-10-CM

## 2023-06-26 DIAGNOSIS — M79.604 LUMBAR PAIN WITH RADIATION DOWN RIGHT LEG: ICD-10-CM

## 2023-06-26 DIAGNOSIS — R68.89 DECREASED FUNCTIONAL ACTIVITY TOLERANCE: ICD-10-CM

## 2023-06-26 DIAGNOSIS — M62.89 ABNORMAL INCREASED MUSCLE TONE: ICD-10-CM

## 2023-06-26 DIAGNOSIS — M54.50 LUMBAR PAIN WITH RADIATION DOWN RIGHT LEG: ICD-10-CM

## 2023-06-26 PROCEDURE — 97112 NEUROMUSCULAR REEDUCATION: CPT | Mod: KX,PN

## 2023-06-26 PROCEDURE — 97110 THERAPEUTIC EXERCISES: CPT | Mod: KX,PN

## 2023-06-26 SDOH — SOCIAL DETERMINANTS OF HEALTH (SDOH): PROBLEMS RELATED TO EDUCATION AND LITERACY, UNSPECIFIED: Z55.9

## 2023-07-01 ENCOUNTER — PATIENT MESSAGE (OUTPATIENT)
Dept: ORTHOPEDICS | Facility: CLINIC | Age: 73
End: 2023-07-01
Payer: MEDICARE

## 2023-07-07 ENCOUNTER — OFFICE VISIT (OUTPATIENT)
Dept: NEUROLOGY | Facility: CLINIC | Age: 73
End: 2023-07-07
Payer: MEDICARE

## 2023-07-07 VITALS
BODY MASS INDEX: 17.99 KG/M2 | SYSTOLIC BLOOD PRESSURE: 131 MMHG | WEIGHT: 97.75 LBS | DIASTOLIC BLOOD PRESSURE: 67 MMHG | HEART RATE: 79 BPM | HEIGHT: 62 IN

## 2023-07-07 DIAGNOSIS — G20.A1 PARKINSON'S DISEASE: ICD-10-CM

## 2023-07-07 DIAGNOSIS — M16.10 HIP ARTHRITIS: Primary | ICD-10-CM

## 2023-07-07 PROCEDURE — 99999 PR PBB SHADOW E&M-EST. PATIENT-LVL III: CPT | Mod: PBBFAC,,, | Performed by: PSYCHIATRY & NEUROLOGY

## 2023-07-07 PROCEDURE — 1101F PR PT FALLS ASSESS DOC 0-1 FALLS W/OUT INJ PAST YR: ICD-10-PCS | Mod: CPTII,S$GLB,, | Performed by: PSYCHIATRY & NEUROLOGY

## 2023-07-07 PROCEDURE — 3075F SYST BP GE 130 - 139MM HG: CPT | Mod: CPTII,S$GLB,, | Performed by: PSYCHIATRY & NEUROLOGY

## 2023-07-07 PROCEDURE — 99999 PR PBB SHADOW E&M-EST. PATIENT-LVL III: ICD-10-PCS | Mod: PBBFAC,,, | Performed by: PSYCHIATRY & NEUROLOGY

## 2023-07-07 PROCEDURE — 3078F PR MOST RECENT DIASTOLIC BLOOD PRESSURE < 80 MM HG: ICD-10-PCS | Mod: CPTII,S$GLB,, | Performed by: PSYCHIATRY & NEUROLOGY

## 2023-07-07 PROCEDURE — 1101F PT FALLS ASSESS-DOCD LE1/YR: CPT | Mod: CPTII,S$GLB,, | Performed by: PSYCHIATRY & NEUROLOGY

## 2023-07-07 PROCEDURE — 3078F DIAST BP <80 MM HG: CPT | Mod: CPTII,S$GLB,, | Performed by: PSYCHIATRY & NEUROLOGY

## 2023-07-07 PROCEDURE — 3288F FALL RISK ASSESSMENT DOCD: CPT | Mod: CPTII,S$GLB,, | Performed by: PSYCHIATRY & NEUROLOGY

## 2023-07-07 PROCEDURE — 3288F PR FALLS RISK ASSESSMENT DOCUMENTED: ICD-10-PCS | Mod: CPTII,S$GLB,, | Performed by: PSYCHIATRY & NEUROLOGY

## 2023-07-07 PROCEDURE — 1159F PR MEDICATION LIST DOCUMENTED IN MEDICAL RECORD: ICD-10-PCS | Mod: CPTII,S$GLB,, | Performed by: PSYCHIATRY & NEUROLOGY

## 2023-07-07 PROCEDURE — 99214 PR OFFICE/OUTPT VISIT, EST, LEVL IV, 30-39 MIN: ICD-10-PCS | Mod: S$GLB,,, | Performed by: PSYCHIATRY & NEUROLOGY

## 2023-07-07 PROCEDURE — 99214 OFFICE O/P EST MOD 30 MIN: CPT | Mod: S$GLB,,, | Performed by: PSYCHIATRY & NEUROLOGY

## 2023-07-07 PROCEDURE — 3075F PR MOST RECENT SYSTOLIC BLOOD PRESS GE 130-139MM HG: ICD-10-PCS | Mod: CPTII,S$GLB,, | Performed by: PSYCHIATRY & NEUROLOGY

## 2023-07-07 PROCEDURE — 1125F PR PAIN SEVERITY QUANTIFIED, PAIN PRESENT: ICD-10-PCS | Mod: CPTII,S$GLB,, | Performed by: PSYCHIATRY & NEUROLOGY

## 2023-07-07 PROCEDURE — 1125F AMNT PAIN NOTED PAIN PRSNT: CPT | Mod: CPTII,S$GLB,, | Performed by: PSYCHIATRY & NEUROLOGY

## 2023-07-07 PROCEDURE — 1159F MED LIST DOCD IN RCRD: CPT | Mod: CPTII,S$GLB,, | Performed by: PSYCHIATRY & NEUROLOGY

## 2023-07-07 NOTE — ASSESSMENT & PLAN NOTE
Extremely mild but progressive PD. Consistent with iPD due to slow onset unilateral. She is a  and likely will do well with good fitness habits.     Suggested continue rasagiline 1mg QHS for neuroprotection followed by carbidopa/levodopa 25/100mg as above    Suggested rescue dosing dystonic spells R foot cramping

## 2023-07-07 NOTE — PROGRESS NOTES
"[facility]     PCP/Referring Provider: Eber Self  No address on file  Date of Service: 7/7/2023    Chief Complaint: PDism    Interval Hx  Since last visit,  Considering hip surgery which is affecting her Yoga  No falls    Continues rasagiline 1mg QHS  Added carbidopa BID and no nausea  Changing timing of carbidopa/levodopa 25/100mg 1.5/1/1/1.5mg  Ontime near constant  At times R leg cramping  Notices fatigue after 2pm - 4pm    No falls  Rides mile bike rides a week  She teaches yoga and riding bike  Ceruloplasmin NL    Med hx  Stopped propranolol - levodopa helped tremor more    PD Review of Symptoms:  Anosmia: Decreased since 2 years  Dysarthria/Hypophonia: None  Dysphagia/Sialorrhea: None  Hallucinations: None  Depression: None  Cognitive slowing: None  Impulsivity: None  Obsessions/Compulsions:   -None  Urinary changes: None  Constipation: at times  Orthostasis: None  Falls: None  Micrographia: None but she writes more slowly  Sleep issues:  -RBD: minor  -Sleep Quality: sleeps fine  RLS Symptoms: None    "PriorHPI: Esther Segura is a R HANDED 73 y.o. female with a medical issues significant for PDism sent for patient establishment. She noticed a resting tremor R hand at age 67. Tremor has increased and involved the R foot. 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 She easily walks 3 miles. She does a 10 mile bike ride twice a week. She has not been able to use as heavy weight as she's used to at the gym.  She exercises every day.    No family history of PD  Grandmother had a tremor  General lightheadedness in the afternoons.    Med hx  She tried carbidopa/levodopa 25/100 1 tab BID. She felt light-headed. - never felt that it helped her tremor  Tried amantadine 50mg and made her feel anxious"      Review of Systems:   Review of Systems   Constitutional:  Negative for fever.   HENT:  Negative for congestion.    Eyes:  Negative for double " vision.   Respiratory:  Negative for cough and shortness of breath.    Cardiovascular:  Negative for chest pain and leg swelling.   Gastrointestinal:  Negative for nausea.   Genitourinary:  Negative for dysuria.   Musculoskeletal:  Negative for falls.   Skin:  Negative for rash.   Neurological:  Positive for tremors. Negative for speech change and headaches.   Psychiatric/Behavioral:  Negative for depression.      Neuroleptic exposure:  None    Current Medications:  Outpatient Encounter Medications as of 7/7/2023   Medication Sig Dispense Refill    augmented betamethasone dipropionate (DIPROLENE-AF) 0.05 % cream Apply topically 2 (two) times daily. 50 g 2    calcium carbonate-vitamin D3 500 mg(1,250mg) -400 unit Tab       carbidopa (LODOSYN) 25 mg tablet TAKE 1 TABLET BY MOUTH THREE TIMES DAILY 270 tablet 0    carbidopa-levodopa  mg (SINEMET)  mg per tablet TAKE 1 AND 1/2 TABLETS BY MOUTH THREE TIMES DAILY 405 tablet 03    Lactobacillus rhamnosus GG (CULTURELLE) 10 billion cell capsule Take 1 capsule by mouth once daily. 15 billion count      LORazepam (ATIVAN) 0.5 MG tablet Take 1 tablet (0.5 mg total) by mouth every other day. 30 tablet 5    MAGNESIUM CITRATE ORAL Take by mouth once. Taking 448mg OTC capsule      meloxicam (MOBIC) 15 MG tablet Take 1 tablet (15 mg total) by mouth once daily. 30 tablet 11    multivit 33-mtfolate-nac-chrom 2.5-200-1 mg-mg-mg Cap       rasagiline (AZILECT) 1 mg Tab TAKE 1 TABLET BY MOUTH ONCE DAILY IN THE EVENING 90 tablet 0    COVID-19 AT-HOME TEST Kit use as directed      ibuprofen (ADVIL,MOTRIN) 600 MG tablet Take 1 tablet (600 mg total) by mouth every 6 (six) hours as needed for Pain (knee pain). (Patient not taking: Reported on 7/7/2023) 30 tablet 0    tiZANidine (ZANAFLEX) 4 MG tablet Take 1 tablet (4 mg total) by mouth nightly as needed (muscle spasms/ pain). (Patient not taking: Reported on 7/7/2023) 40 tablet 0     No facility-administered encounter medications  on file as of 7/7/2023.       Past Medical History:  Patient Active Problem List   Diagnosis    Hyperlipidemia    Hand fracture    Encounter for well woman exam with routine gynecological exam    Vulvar vestibulitis    Parkinson's disease    Epigastric pain    Liver lesion    Pelvic floor dysfunction    Decreased strength    Decreased functional mobility    Lumbar pain with radiation down right leg    Weakness    Abnormal increased muscle tone    Decreased functional activity tolerance    Special educational needs       Past Surgical History:  Past Surgical History:   Procedure Laterality Date    achiles tendon       broken hand Right     COLONOSCOPY      COLONOSCOPY N/A 7/9/2020    Procedure: COLONOSCOPY;  Surgeon: Markus Piña MD;  Location: [facility] ENDO;  Service: Endoscopy;  Laterality: N/A;    ESOPHAGOGASTRODUODENOSCOPY N/A 7/9/2020    Procedure: EGD (ESOPHAGOGASTRODUODENOSCOPY);  Surgeon: Markus Piña MD;  Location: [facility] ENDO;  Service: Endoscopy;  Laterality: N/A;    LIVER BIOPSY N/A 11/19/2020    Procedure: BIOPSY, LIVER;  Surgeon: Yovany Diagnostic Provider;  Location: [facility] OR;  Service: General;  Laterality: N/A;       Current Living Situation:  and kids    Social:  Social History     Socioeconomic History    Marital status:    Occupational History     Employer: [employer]   Tobacco Use    Smoking status: Never    Smokeless tobacco: Never   Substance and Sexual Activity    Alcohol use: Yes     Alcohol/week: 1.0 standard drink     Types: 1 Glasses of wine per week     Comment: daily     Drug use: No    Sexual activity: Never       Family History:  Family History   Problem Relation Age of Onset    Cancer Mother         lymphoma    Osteoporosis Father     Breast cancer Maternal Aunt     Ovarian cancer Neg Hx     Melanoma Neg Hx     Multiple sclerosis Neg Hx     Psoriasis Neg Hx     Eczema Neg Hx     Lupus Neg Hx     Acne Neg Hx     Depression Neg Hx     Suicidality Neg Hx   "      PHYSICAL:  /67   Pulse 79   Ht 5' 2" (1.575 m)   Wt 44.3 kg (97 lb 12.4 oz)   BMI 17.88 kg/m²     General Medical Examination:  General: Good hygiene, appropriate appearance.   HEENT: Normocephalic, atraumatic.   Neck: Supple.   Chest: Unlabored breathing.   CV: Symmetric pulses.   Ext: No clubbing, cyanosis, or edema.     Mental Status:  Mood/Affect: Appropriate/congruent.  Level of consciousness: Awake, alert.  Orientation: Oriented to person, place, time and situation.  Language: No Dysarthria    Cranial nerves:  I: Not tested  II: PERRL, VFF to counting  III, IV, VI: EOMI with conjugate gaze and no nystagmus on end gaze  V: Facial sensation intact and symmetric over the bilateral V1-V3  VII: Facial muscle activation intact and symmetric over the bilateral upper and lower face  VIII: Hearing intact in the b/l ears and symmetrical to finger rub  IX, X, XII: TUP midline - no atrophy or fasiculations  X: SCMs and shoulder shrug full strength b/l and symmetric    Motor:   -UE: 5/5 deltoids; 5/5 biceps, triceps; 5/5 wrist flexors, extensors; 5/5 interosseous; 5/5   -LEs: 5/5 hip flexion, extension; 5/5 knee flexion, extension; 5/5 ankle flexion, extension  No hypomimia or hypophonia    No resting tremor  DTRs:  ? Biceps Triceps Brachioradialis Knee Ankle   Left 2+ 2+ 2+ 2+    Right 2+ 2+ 2+ 2+        ? Finger taps Finger flicks NASEEM Heel taps   Left 0 0 0 0   Right 1+ 1+ 0 0   Neck tone: 0  ? Arm Leg   Left 0 0   Right 0 0     Sensation:   -Light touch: Intact and symmetric in the bilateral upper and lower extremities.    Coordination:   -Finger to nose: nl    Gait:  -Arises from chair without use of hands.  -Casual gait is: nl based  -Stride length: nl  -Arm Swing: decreased R    Laboratory Data:  Results for ALESSIA SHARMA (MRN [id number]) as of 11/18/2019 11:10   Ref. Range 8/19/2019 13:41   CERULOPLASMIN Latest Ref Range: 15.0 - 45.0 mg/dL 32.0       Imaging:  Brain MRI normal per my " read    Assessment//Plan:   Problem List Items Addressed This Visit          Neuro    Parkinson's disease    Current Assessment & Plan     Extremely mild but progressive PD. Consistent with iPD due to slow onset unilateral. She is a  and likely will do well with good fitness habits.     Suggested continue rasagiline 1mg QHS for neuroprotection followed by carbidopa/levodopa 25/100mg as above    Suggested rescue dosing dystonic spells R foot cramping            Other Visit Diagnoses       Hip arthritis    -  Primary    Relevant Orders    Ambulatory referral/consult to Orthopedics          Gabby Parsons MD, MS [facility]  Department of Neurology  Movement Disorders

## 2023-07-10 ENCOUNTER — CLINICAL SUPPORT (OUTPATIENT)
Dept: REHABILITATION | Facility: HOSPITAL | Age: 73
End: 2023-07-10
Payer: MEDICARE

## 2023-07-10 DIAGNOSIS — Z55.9 SPECIAL EDUCATIONAL NEEDS: ICD-10-CM

## 2023-07-10 DIAGNOSIS — M54.50 LUMBAR PAIN WITH RADIATION DOWN RIGHT LEG: Primary | ICD-10-CM

## 2023-07-10 DIAGNOSIS — M62.89 ABNORMAL INCREASED MUSCLE TONE: ICD-10-CM

## 2023-07-10 DIAGNOSIS — R53.1 WEAKNESS: ICD-10-CM

## 2023-07-10 DIAGNOSIS — M79.604 LUMBAR PAIN WITH RADIATION DOWN RIGHT LEG: Primary | ICD-10-CM

## 2023-07-10 DIAGNOSIS — R68.89 DECREASED FUNCTIONAL ACTIVITY TOLERANCE: ICD-10-CM

## 2023-07-10 PROCEDURE — 97110 THERAPEUTIC EXERCISES: CPT | Mod: KX,PN,CQ

## 2023-07-10 PROCEDURE — 97112 NEUROMUSCULAR REEDUCATION: CPT | Mod: KX,PN,CQ

## 2023-07-10 SDOH — SOCIAL DETERMINANTS OF HEALTH (SDOH): PROBLEMS RELATED TO EDUCATION AND LITERACY, UNSPECIFIED: Z55.9

## 2023-07-10 NOTE — PROGRESS NOTES
[facility]  Physical Therapy Daily Treatment Note      Name: Esther Macario [facility]  Clinic Number: [id number]  Visit Date: 7/10/2023    Therapy Diagnosis:   Encounter Diagnoses   Name Primary?    Lumbar pain with radiation down right leg Yes    Weakness     Abnormal increased muscle tone     Decreased functional activity tolerance     Special educational needs        Physician: Nini Tierney PA-C  Physician Orders: PT Eval and Treat   Medical Diagnosis from Referral: M54.16 (ICD-10-CM) - Lumbar radiculopathy, chronic M47.816 (ICD-10-CM) - Lumbar spondylosis   Evaluation Date: 6/5/2023  Authorization Period Expiration: 07/03/2023  Plan of Care Expiration: 07/28/2023 at 2x/wk x 7 weeks   Progress Note Due: 06/26/2023  Visit # / Visits authorized: 7/11     (8/14 on plan of care)  PTA visit: 0/5   FOTO: 3/ 3 ( 3rd FOTO done 7/10/23)       Time In: 11:30 pm   Time Out: 12:15 pm  Total Appointment Time (timed & untimed codes): 45 minutes      Precautions: Standard and Parkinson's  Subjective      The patient reports: that she has decided to do the hip replacement surgery. This will be her last day so she can save some visits for after the surgery if she needs it.      Response to previous treatment: She enters with no current pain, but she does report periodic high pain remains.   Functional change: She is exercising in the pool with comfort.     Pain: 0/10 upon entering the clinic today. Max of 8/10 within the past few days. (Walking fast in right hip)  Location: The right hip and at time the lumbar area  Objective     Hong received therapeutic exercises to develop strength, endurance, ROM, flexibility, posture, and core stabilization for 30 minutes including:  -+Precor outer thigh with 5 pounds x 20  -+Precor inner thigh with 20 pounds x 20  -+Precor back machine with 30 pounds x 20  -Treadmill 4 minutes 2.5 mph  -Bridges x 10  -Side lying right leg 45* abduction with extension x  10  -bilateral single leg bridges x 4 each      Below not performed today:  -Donkey kicks 12.5 2 x 10   -Side lying hip abduction 2 x 15 1#  -Clams 2 x 15 Green band   -Rev clams 2 x 15 1#  -Bridges single leg 15 x each     Hong received the following manual therapy techniques: Joint mobilizations, Myofacial release, and Soft tissue Mobilization were applied to the: right hip for 0 minutes, including:  Caudal glides   Long axis traction   Psoas release     Hong participated in neuromuscular re-education activities to improve: Balance, Coordination, Kinesthetic, Sense, Proprioception, and Posture for 10 minutes. The following activities were included:  -+single let stance rotation left and right with ball x 4  -+seated bilateral hip internal rotation with bolster and green band x 15  -+right hip flexor stretch with leg off table and knee bent with 20 second hold x 2    Below not performed today:  -Self mob caudal glides with Black theraband 10 x ( not needed 6/21/23)   -Isometric hip abduction into ball with single leg stance 5 sec hold 5 x 2 each side   -Half kneel on [employer] water ball shakes 20 sec x 2 each leg   -Single leg toe taps with cones  10 x     Hong participated in dynamic functional therapeutic activities to improve functional performance for 0  minutes, including:  -Functional squats from 18 in box with 6 # 3 x 10   -Wolof dead lift 30 x 8# bar   -Single leg Wolof dead lift 2 x 10 6# handweight with hip thruset    MMT      L-spine     Comment     Left Right               Flexion 5/5 >4/5 -     Extension 5-/5 >4+/5 -     Side Bending 5-/5 >4+/5 5-/5 >4+/5               Abdominals 4+/5 >4/5 -     Lower Abdominals 4+/5 >4/5 -     Hip ABduction 5/5 5-/5 >4+/5     Hip ADduction 5/5 5/5 >4+/5     Hip Internal Rotation 4/5 4/5     Hip External Rotation 4/5 4/5 >4-/5 Pain with motion    Hip Flexion 5/5  3+/5  Due to pain anteriorly          FUNCTIONAL MOBILITY                              Limitation/Restriction for FOTO Lumbar Survey     Therapist reviewed FOTO scores for Esther Segura on 6/26/2023.   FOTO documents entered into [employer] App - see Media section.     Limitation Score:   52%  Limitation 06/26/2023 >  58% Limitation 06/21/2023 >60% Limitation at the evaluation         Patient Education and HEP     She was compliant with home exercise program.    Education provided:   - home exercise program     Written Home Exercises Provided: Patient instructed to cont prior HEP.  Exercises were reviewed and Hong was able to demonstrate them prior to the end of the session.  Hong demonstrated good  understanding of the education provided.   Assessment     Hong was  able to verbalize understanding of proper home exercise program as she prepares for her hip replacement surgery. She is very self aware and continues to faithfully practice yoga. With the inclusion of hip strengthening and pelvic stabilizing exercises pt will be in best possible position to start rehabilitating from orthopedic surgery. Mrs. Segura is a pleasure to work with and is expected to tolerate upcoming surgery very well.     Hong Is progressing well towards her goals.   Pt prognosis is Good.     Pt's spiritual, cultural and educational needs considered and pt agreeable to plan of care and goals.    Anticipated barriers to physical therapy: stiffness and dysfunction associated with Parkinson and OA     Goals:      STG  Weeks/Visits Date Established  Date Met   1.Decrease max lumbar and right hip pain to 3/10 to improve the patient's quality of life. 3 weeks. 6/5/2023    In Progress  7/10/2023     2. Increase general core strength 1/2 grade to improve standing activity endurance. 3 weeks. 6/5/2023    Goal Met 7/10/2023     3.Increase right hip strength 1/2 grade to improve standing activity endurance. 3 weeks. 6/5/2023    In Progress  7/10/2023     4.Decrease FOTO limitation score to <=55% to improve desired activity ability such as  Yoga. 3 weeks. 6/5/2023    In Progress  7/10/2023  52%   5.Fair initial written home exercise program knowledge and be without questions to have carryover after discharge from PT. 3 weeks. 6/5/2023    Goal Met 7/10/2023        LTG Weeks/Visits Date Established  Date Met   1.Decrease max lumbar and right hip pain to 1/10 to improve the patient's quality of life. 7 weeks. 6/5/2023    In Progress  7/10/2023     2. Increase general core strength one grade where able from the evaluation date to improve standing activity endurance. 7 weeks. 6/5/2023       In Progress  7/10/2023     3.Increase right hip strength one grade where able from the evaluation date to improve standing activity endurance. 7 weeks. 6/5/2023    In Progress  7/10/2023  3 of 5 tests showed improvement   4.Decrease FOTO limitation score to <=50% to improve desired activity ability such as Yoga. 7 weeks. 6/5/2023    In Progress  7/10/2023     5. Good Progressed written home exercise program knowledge and be without questions to have carryover after discharge from PT. 7 weeks. 6/5/2023    In Progress  7/10/2023       Plan     Plan of care Certification: 6/5/2023 to 07/28/2023. Outpatient Physical Therapy 2 times weekly for 7 weeks.     Discuss pt's request for discharge and decision to commit to hip replacement surgery with primary PT.     Zully Allison, PTA      `

## 2023-07-24 ENCOUNTER — OFFICE VISIT (OUTPATIENT)
Dept: ORTHOPEDICS | Facility: CLINIC | Age: 73
End: 2023-07-24
Payer: MEDICARE

## 2023-07-24 VITALS — HEIGHT: 62 IN | BODY MASS INDEX: 17.85 KG/M2 | WEIGHT: 97 LBS

## 2023-07-24 DIAGNOSIS — M16.10 HIP ARTHRITIS: ICD-10-CM

## 2023-07-24 PROCEDURE — 1159F PR MEDICATION LIST DOCUMENTED IN MEDICAL RECORD: ICD-10-PCS | Mod: CPTII,S$GLB,, | Performed by: ORTHOPAEDIC SURGERY

## 2023-07-24 PROCEDURE — 99214 OFFICE O/P EST MOD 30 MIN: CPT | Mod: S$GLB,,, | Performed by: ORTHOPAEDIC SURGERY

## 2023-07-24 PROCEDURE — 99999 PR PBB SHADOW E&M-EST. PATIENT-LVL III: CPT | Mod: PBBFAC,,, | Performed by: ORTHOPAEDIC SURGERY

## 2023-07-24 PROCEDURE — 99999 PR PBB SHADOW E&M-EST. PATIENT-LVL III: ICD-10-PCS | Mod: PBBFAC,,, | Performed by: ORTHOPAEDIC SURGERY

## 2023-07-24 PROCEDURE — 99214 PR OFFICE/OUTPT VISIT, EST, LEVL IV, 30-39 MIN: ICD-10-PCS | Mod: S$GLB,,, | Performed by: ORTHOPAEDIC SURGERY

## 2023-07-24 PROCEDURE — 1159F MED LIST DOCD IN RCRD: CPT | Mod: CPTII,S$GLB,, | Performed by: ORTHOPAEDIC SURGERY

## 2023-07-24 PROCEDURE — 3288F PR FALLS RISK ASSESSMENT DOCUMENTED: ICD-10-PCS | Mod: CPTII,S$GLB,, | Performed by: ORTHOPAEDIC SURGERY

## 2023-07-24 PROCEDURE — 1125F PR PAIN SEVERITY QUANTIFIED, PAIN PRESENT: ICD-10-PCS | Mod: CPTII,S$GLB,, | Performed by: ORTHOPAEDIC SURGERY

## 2023-07-24 PROCEDURE — 1101F PT FALLS ASSESS-DOCD LE1/YR: CPT | Mod: CPTII,S$GLB,, | Performed by: ORTHOPAEDIC SURGERY

## 2023-07-24 PROCEDURE — 1125F AMNT PAIN NOTED PAIN PRSNT: CPT | Mod: CPTII,S$GLB,, | Performed by: ORTHOPAEDIC SURGERY

## 2023-07-24 PROCEDURE — 1160F RVW MEDS BY RX/DR IN RCRD: CPT | Mod: CPTII,S$GLB,, | Performed by: ORTHOPAEDIC SURGERY

## 2023-07-24 PROCEDURE — 3288F FALL RISK ASSESSMENT DOCD: CPT | Mod: CPTII,S$GLB,, | Performed by: ORTHOPAEDIC SURGERY

## 2023-07-24 PROCEDURE — 1101F PR PT FALLS ASSESS DOC 0-1 FALLS W/OUT INJ PAST YR: ICD-10-PCS | Mod: CPTII,S$GLB,, | Performed by: ORTHOPAEDIC SURGERY

## 2023-07-24 PROCEDURE — 1160F PR REVIEW ALL MEDS BY PRESCRIBER/CLIN PHARMACIST DOCUMENTED: ICD-10-PCS | Mod: CPTII,S$GLB,, | Performed by: ORTHOPAEDIC SURGERY

## 2023-07-24 NOTE — PROGRESS NOTES
73 years old debilitating pain of the right hip.  She is failed a longstanding nonoperative course including physical therapy acupuncture as well as injections injection gave relief for week.  Pain is 4 on good days 9 on bad days.  Points the groin as location for pain.  Patient stays active with exercise.  She is having inability to perform normal daily activities without pain interested in hip replacement surgery     Exam shows internal and external rotation of the right hip reproduce her symptoms skin is intact compartments soft well-perfused distally     X-rays show arthritic changes    Assessment: Right hip arthrosis    Plan:  We will schedule the patient for hip replacement surgery, she is aware of the risks and limitations of surgery and still wants to proceed

## 2023-07-26 ENCOUNTER — HOSPITAL ENCOUNTER (OUTPATIENT)
Dept: RADIOLOGY | Facility: CLINIC | Age: 73
Discharge: HOME | End: 2023-07-26
Payer: MEDICARE

## 2023-07-26 ENCOUNTER — OFFICE VISIT (OUTPATIENT)
Dept: FAMILY MEDICINE | Facility: CLINIC | Age: 73
End: 2023-07-26
Payer: MEDICARE

## 2023-07-26 VITALS
OXYGEN SATURATION: 98 % | SYSTOLIC BLOOD PRESSURE: 110 MMHG | BODY MASS INDEX: 18.01 KG/M2 | TEMPERATURE: 98 F | DIASTOLIC BLOOD PRESSURE: 68 MMHG | HEART RATE: 89 BPM | RESPIRATION RATE: 16 BRPM | WEIGHT: 97.88 LBS | HEIGHT: 62 IN

## 2023-07-26 DIAGNOSIS — G20.A1 PARKINSON'S DISEASE: ICD-10-CM

## 2023-07-26 DIAGNOSIS — Z01.818 PREOP EXAMINATION: ICD-10-CM

## 2023-07-26 DIAGNOSIS — Z01.818 PREOP EXAMINATION: Primary | ICD-10-CM

## 2023-07-26 DIAGNOSIS — E78.5 HYPERLIPIDEMIA, UNSPECIFIED HYPERLIPIDEMIA TYPE: ICD-10-CM

## 2023-07-26 PROCEDURE — 99214 PR OFFICE/OUTPT VISIT, EST, LEVL IV, 30-39 MIN: ICD-10-PCS | Mod: S$GLB,,,

## 2023-07-26 PROCEDURE — 3288F FALL RISK ASSESSMENT DOCD: CPT | Mod: CPTII,S$GLB,,

## 2023-07-26 PROCEDURE — 1159F MED LIST DOCD IN RCRD: CPT | Mod: CPTII,S$GLB,,

## 2023-07-26 PROCEDURE — 3078F DIAST BP <80 MM HG: CPT | Mod: CPTII,S$GLB,,

## 2023-07-26 PROCEDURE — 71046 X-RAY EXAM CHEST 2 VIEWS: CPT | Mod: TC,FY,PO

## 2023-07-26 PROCEDURE — 99214 OFFICE O/P EST MOD 30 MIN: CPT | Mod: S$GLB,,,

## 2023-07-26 PROCEDURE — 93010 EKG 12-LEAD: ICD-10-PCS | Mod: S$GLB,,, | Performed by: INTERNAL MEDICINE

## 2023-07-26 PROCEDURE — 99999 PR PBB SHADOW E&M-EST. PATIENT-LVL IV: CPT | Mod: PBBFAC,,,

## 2023-07-26 PROCEDURE — 93005 ELECTROCARDIOGRAM TRACING: CPT | Mod: S$GLB,,,

## 2023-07-26 PROCEDURE — 93005 EKG 12-LEAD: ICD-10-PCS | Mod: S$GLB,,,

## 2023-07-26 PROCEDURE — 1101F PT FALLS ASSESS-DOCD LE1/YR: CPT | Mod: CPTII,S$GLB,,

## 2023-07-26 PROCEDURE — 1160F RVW MEDS BY RX/DR IN RCRD: CPT | Mod: CPTII,S$GLB,,

## 2023-07-26 PROCEDURE — 1101F PR PT FALLS ASSESS DOC 0-1 FALLS W/OUT INJ PAST YR: ICD-10-PCS | Mod: CPTII,S$GLB,,

## 2023-07-26 PROCEDURE — 93010 ELECTROCARDIOGRAM REPORT: CPT | Mod: S$GLB,,, | Performed by: INTERNAL MEDICINE

## 2023-07-26 PROCEDURE — 3078F PR MOST RECENT DIASTOLIC BLOOD PRESSURE < 80 MM HG: ICD-10-PCS | Mod: CPTII,S$GLB,,

## 2023-07-26 PROCEDURE — 1160F PR REVIEW ALL MEDS BY PRESCRIBER/CLIN PHARMACIST DOCUMENTED: ICD-10-PCS | Mod: CPTII,S$GLB,,

## 2023-07-26 PROCEDURE — 1125F PR PAIN SEVERITY QUANTIFIED, PAIN PRESENT: ICD-10-PCS | Mod: CPTII,S$GLB,,

## 2023-07-26 PROCEDURE — 1159F PR MEDICATION LIST DOCUMENTED IN MEDICAL RECORD: ICD-10-PCS | Mod: CPTII,S$GLB,,

## 2023-07-26 PROCEDURE — 1125F AMNT PAIN NOTED PAIN PRSNT: CPT | Mod: CPTII,S$GLB,,

## 2023-07-26 PROCEDURE — 3288F PR FALLS RISK ASSESSMENT DOCUMENTED: ICD-10-PCS | Mod: CPTII,S$GLB,,

## 2023-07-26 PROCEDURE — 99999 PR PBB SHADOW E&M-EST. PATIENT-LVL IV: ICD-10-PCS | Mod: PBBFAC,,,

## 2023-07-26 PROCEDURE — 3074F PR MOST RECENT SYSTOLIC BLOOD PRESSURE < 130 MM HG: ICD-10-PCS | Mod: CPTII,S$GLB,,

## 2023-07-26 PROCEDURE — 71046 X-RAY EXAM CHEST 2 VIEWS: CPT | Mod: 26,,, | Performed by: RADIOLOGY

## 2023-07-26 PROCEDURE — 71046 XR CHEST PA AND LATERAL: ICD-10-PCS | Mod: 26,,, | Performed by: RADIOLOGY

## 2023-07-26 PROCEDURE — 3074F SYST BP LT 130 MM HG: CPT | Mod: CPTII,S$GLB,,

## 2023-07-26 NOTE — PATIENT INSTRUCTIONS
Darrell Santana,     If you are due for any health screening(s) below please notify me so we can arrange them to be ordered and scheduled to maintain your health. Most healthy patients complete it. Don't lose out on improving your health.     All of your core healthy metrics are met.

## 2023-07-26 NOTE — PROGRESS NOTES
Patient ID: Esther Segura is a 73 y.o. female.    Chief Complaint: Pre-op Exam (Hip replacement 08/22/23)        Denies adverse reaction to general anesthesia previously. She did have a spinal tap previously caused complications requiring blood patch many years ago.  No excessive bleeding or bruising. Discussed meds. Instructed to hold all NSAIDs besides Tylenol 7 days prior to procedure. No experienced CP or SOB. She could walk city blocks or flights of stairs without experiencing CP or SOB. Denies issues with ABI in the past.            Past Medical History:   Diagnosis Date    Fever blister     Hyperlipidemia     Osteopenia     Parkinson's disease     Squamous cell carcinoma 01/18/2019    right forearm                Current Outpatient Medications:     calcium carbonate-vitamin D3 500 mg(1,250mg) -400 unit Tab, , Disp: , Rfl:     carbidopa (LODOSYN) 25 mg tablet, TAKE 1 TABLET BY MOUTH THREE TIMES DAILY, Disp: 270 tablet, Rfl: 0    carbidopa-levodopa  mg (SINEMET)  mg per tablet, TAKE 1 AND 1/2 TABLETS BY MOUTH THREE TIMES DAILY, Disp: 405 tablet, Rfl: 03    ibuprofen (ADVIL,MOTRIN) 600 MG tablet, Take 1 tablet (600 mg total) by mouth every 6 (six) hours as needed for Pain (knee pain)., Disp: 30 tablet, Rfl: 0    Lactobacillus rhamnosus GG (CULTURELLE) 10 billion cell capsule, Take 1 capsule by mouth once daily. 15 billion count, Disp: , Rfl:     LORazepam (ATIVAN) 0.5 MG tablet, Take 1 tablet (0.5 mg total) by mouth every other day., Disp: 30 tablet, Rfl: 5    MAGNESIUM CITRATE ORAL, Take by mouth once. Taking 448mg OTC capsule, Disp: , Rfl:     multivit 33-mtfolate-nac-chrom 2.5-200-1 mg-mg-mg Cap, , Disp: , Rfl:     rasagiline (AZILECT) 1 mg Tab, TAKE 1 TABLET BY MOUTH ONCE DAILY IN THE EVENING, Disp: 90 tablet, Rfl: 0    augmented betamethasone dipropionate (DIPROLENE-AF) 0.05 % cream, Apply topically 2 (two) times daily. (Patient not taking: Reported on 7/26/2023), Disp: 50 g, Rfl: 2     COVID-19 AT-HOME TEST Kit, use as directed, Disp: , Rfl:     meloxicam (MOBIC) 15 MG tablet, Take 1 tablet (15 mg total) by mouth once daily. (Patient not taking: Reported on 7/24/2023), Disp: 30 tablet, Rfl: 11    tiZANidine (ZANAFLEX) 4 MG tablet, Take 1 tablet (4 mg total) by mouth nightly as needed (muscle spasms/ pain). (Patient not taking: Reported on 7/26/2023), Disp: 40 tablet, Rfl: 0    The 10-year ASCVD risk score (Chidi PATEL, et al., 2019) is: 12.6%    Values used to calculate the score:      Age: 73 years      Sex: Female      Is Non- : No      Diabetic: No      Tobacco smoker: No      Systolic Blood Pressure: 110 mmHg      Is BP treated: No      HDL Cholesterol: 93 mg/dL      Total Cholesterol: 255 mg/dL     Wt Readings from Last 3 Encounters:   07/26/23 44.4 kg (97 lb 14.2 oz)   07/24/23 44 kg (97 lb)   07/07/23 44.3 kg (97 lb 12.4 oz)     Temp Readings from Last 3 Encounters:   07/26/23 97.7 °F (36.5 °C) (Oral)   01/18/23 98.1 °F (36.7 °C)   10/18/22 (!) 71 °F (21.7 °C)     BP Readings from Last 3 Encounters:   07/26/23 110/68   07/07/23 131/67   05/23/23 (!) 94/55     Pulse Readings from Last 3 Encounters:   07/26/23 89   07/07/23 79   05/23/23 84     Resp Readings from Last 3 Encounters:   07/26/23 16   04/13/23 18   03/13/23 18     PF Readings from Last 3 Encounters:   No data found for PF     SpO2 Readings from Last 3 Encounters:   07/26/23 98%   01/18/23 97%   08/24/22 97%        No results found for: HGBA1C  Lab Results   Component Value Date    LDLCALC 145.0 01/10/2023    CREATININE 0.9 05/25/2023       Review of Systems   Constitutional:  Negative for activity change, appetite change, chills, diaphoresis, fatigue, fever and unexpected weight change.   HENT:  Negative for congestion, ear pain, postnasal drip, rhinorrhea, sinus pressure, sneezing, sore throat and trouble swallowing.    Eyes:  Negative for pain, itching and visual disturbance.   Respiratory:  Negative for  cough, chest tightness, shortness of breath and wheezing.    Cardiovascular:  Negative for chest pain, palpitations and leg swelling.   Gastrointestinal:  Negative for abdominal distention, abdominal pain, blood in stool, constipation, diarrhea, nausea and vomiting.   Endocrine: Negative for cold intolerance and heat intolerance.   Genitourinary:  Negative for difficulty urinating, dysuria, frequency, hematuria and urgency.   Musculoskeletal:  Positive for arthralgias. Negative for back pain, myalgias and neck pain.   Skin:  Negative for color change, pallor, rash and wound.   Neurological:  Negative for dizziness, syncope, weakness, numbness and headaches.   Hematological:  Negative for adenopathy.   Psychiatric/Behavioral:  Negative for behavioral problems. The patient is not nervous/anxious.          Objective:      Physical Exam  Vitals reviewed.   Constitutional:       General: She is not in acute distress.     Appearance: Normal appearance. She is not ill-appearing, toxic-appearing or diaphoretic.   HENT:      Head: Normocephalic.      Right Ear: External ear normal.      Left Ear: External ear normal.      Nose: Nose normal. No congestion or rhinorrhea.      Mouth/Throat:      Mouth: Mucous membranes are moist.      Pharynx: Oropharynx is clear.   Eyes:      General: No scleral icterus.        Right eye: No discharge.         Left eye: No discharge.      Extraocular Movements: Extraocular movements intact.      Conjunctiva/sclera: Conjunctivae normal.   Cardiovascular:      Rate and Rhythm: Normal rate and regular rhythm.      Pulses: Normal pulses.      Heart sounds: Normal heart sounds. No murmur heard.    No friction rub. No gallop.   Pulmonary:      Effort: Pulmonary effort is normal. No respiratory distress.      Breath sounds: Normal breath sounds. No wheezing, rhonchi or rales.   Chest:      Chest wall: No tenderness.   Musculoskeletal:         General: No swelling, tenderness or deformity. Normal  range of motion.      Cervical back: Normal range of motion.      Right lower leg: No edema.      Left lower leg: No edema.   Skin:     General: Skin is warm and dry.      Capillary Refill: Capillary refill takes less than 2 seconds.      Coloration: Skin is not jaundiced.      Findings: No bruising, erythema, lesion or rash.   Neurological:      Mental Status: She is alert and oriented to person, place, and time.      Gait: Gait normal.   Psychiatric:         Mood and Affect: Mood normal.         Behavior: Behavior normal.         Thought Content: Thought content normal.         Judgment: Judgment normal.           Screening recommendations appropriate to age and health status were reviewed.    STOP BAN points  Low    Preop examination  -     IN OFFICE EKG 12-LEAD (to Muse)  -     BASIC METABOLIC PANEL; Future; Expected date: 2023  -     CBC W/ AUTO DIFFERENTIAL; Future; Expected date: 2023  -     X-Ray Chest PA And Lateral; Future; Expected date: 2023    Parkinson's disease        -    Continue meds. Will continue to monitor.     Hyperlipidemia, unspecified hyperlipidemia type        -    Stable. Will continue to monitor.          RCRI risk factors include: no known RCRI risk factors. As such, per RCRI the risk of cardiac death, nonfatal myocardial infarction, or nonfatal cardiac arrest is 0.4% and the risk of myocardial infarction, pulmonary edema, ventricular fibrillation, primary cardiac arrest, or complete heart block is 0.5%.  Overall this patient can be considered low risk for this intermediate risk procedure. No further cardiac testing is recommended at this time.     Patient denies any symptoms (as per HPI) concerning for undiagnosed lung disease including ABI. Would not recommend obtaining chest X-ray, sleep study, or PFTs at this time. Patient is a non-smoker. We discussed the benefits of early mobilization and deep breathing after surgery.      Screened patient for alcohol  misuse, use of illicit drugs, and personal or family history of anesthetic complications or bleeding diathesis and no substantial concerns were identified.     All current medications were reviewed and at this time no changes to medications are recommended prior to surgery. Instructed to hold NSAIDs 7 days prior to procedure     I recommend use of standard pre-op and post-op precautions for this patient. In my opinion, she is medically optimized for this procedure, and can proceed without further evaluation.

## 2023-07-27 DIAGNOSIS — M16.11 OSTEOARTHRITIS OF RIGHT HIP: ICD-10-CM

## 2023-07-27 DIAGNOSIS — M25.551 PAIN OF RIGHT HIP: Primary | ICD-10-CM

## 2023-07-27 DIAGNOSIS — M16.11 PRIMARY OSTEOARTHRITIS OF RIGHT HIP: Primary | ICD-10-CM

## 2023-07-27 DIAGNOSIS — M16.11 PRIMARY OSTEOARTHRITIS OF RIGHT HIP: ICD-10-CM

## 2023-07-27 DIAGNOSIS — Z96.641 S/P TOTAL RIGHT HIP ARTHROPLASTY: ICD-10-CM

## 2023-07-27 RX ORDER — MUPIROCIN 20 MG/G
OINTMENT TOPICAL
Status: CANCELLED | OUTPATIENT
Start: 2023-07-27

## 2023-08-04 ENCOUNTER — PATIENT MESSAGE (OUTPATIENT)
Dept: NEUROLOGY | Facility: CLINIC | Age: 73
End: 2023-08-04
Payer: MEDICARE

## 2023-08-04 DIAGNOSIS — G20.A1 PARKINSON'S DISEASE: ICD-10-CM

## 2023-08-07 ENCOUNTER — LAB VISIT (OUTPATIENT)
Dept: LAB | Facility: HOSPITAL | Age: 73
End: 2023-08-07
Attending: FAMILY MEDICINE
Payer: MEDICARE

## 2023-08-07 DIAGNOSIS — M16.11 PRIMARY OSTEOARTHRITIS OF RIGHT HIP: ICD-10-CM

## 2023-08-07 PROCEDURE — 87081 CULTURE SCREEN ONLY: CPT | Performed by: ORTHOPAEDIC SURGERY

## 2023-08-07 RX ORDER — CARBIDOPA 25 MG/1
1 TABLET ORAL 3 TIMES DAILY
Qty: 270 TABLET | Refills: 3 | Status: SHIPPED | OUTPATIENT
Start: 2023-08-07

## 2023-08-07 RX ORDER — RASAGILINE 1 MG/1
TABLET ORAL
Qty: 90 TABLET | Refills: 0 | Status: SHIPPED | OUTPATIENT
Start: 2023-08-07 | End: 2023-12-22 | Stop reason: SDUPTHER

## 2023-08-07 RX ORDER — CARBIDOPA AND LEVODOPA 25; 100 MG/1; MG/1
TABLET ORAL
Qty: 405 TABLET | Refills: 3 | Status: SHIPPED | OUTPATIENT
Start: 2023-08-07 | End: 2023-08-18 | Stop reason: SDUPTHER

## 2023-08-07 NOTE — OR NURSING
Joint camp in preparation for right THELMA on 8/22 complete. Power point and chg bath instructions reviewed with pt. And her , who is also her . MRSA swab collected. PT met with pt., adjusted her walker and reviewed walker training for after surgery. Pt. Says she prefers to not take Narcotic pain medicine because she feels they don't work well on her. I suggested non-narcotic pain relievers, along with muscle relaxer. I have notified office with her concerns. She also has an INR machine at home, as her  is on Coumadin. She inquired if it would be ok for her to use that. Will message office re: INR machine vs. Lab draw, as well. Pt. Seems to have everything in place for successful surgery and recovery at home.

## 2023-08-08 ENCOUNTER — PATIENT MESSAGE (OUTPATIENT)
Dept: FAMILY MEDICINE | Facility: CLINIC | Age: 73
End: 2023-08-08
Payer: MEDICARE

## 2023-08-09 ENCOUNTER — PATIENT MESSAGE (OUTPATIENT)
Dept: ADMINISTRATIVE | Facility: HOSPITAL | Age: 73
End: 2023-08-09
Payer: MEDICARE

## 2023-08-09 LAB — MRSA SPEC QL CULT: NORMAL

## 2023-08-11 NOTE — TELEPHONE ENCOUNTER
Was she fasting for the labs that we checked on 07/26?     Symptoms related to hypoglycemia include racing heart, sweating, feelings of anxiety, irritability, confusion, dizziness. If she was fasting at the time of the labs and as long as she is not experiencing any of these symptoms consistently I doubt that this lab finding is anything of concern.

## 2023-08-11 NOTE — TELEPHONE ENCOUNTER
That's fine. As long as she is not experiencing the symptoms of racing heart, sweating, feelings of anxiety, irritability, confusion, dizziness it should be okay. To help with hypoglycemia she can eat more carbohydrates with her meals and have a diet high in protein and fiber. I can have her see the nutritionist if she is open to this.

## 2023-08-13 ENCOUNTER — PATIENT MESSAGE (OUTPATIENT)
Dept: NEUROLOGY | Facility: CLINIC | Age: 73
End: 2023-08-13
Payer: MEDICARE

## 2023-08-13 DIAGNOSIS — G20.A1 PARKINSON'S DISEASE: ICD-10-CM

## 2023-08-15 NOTE — OR NURSING
Patient Name: Esther Segura  YOB: 1950   MRN: [id number]     [facility]   Basic Mobility Inpatient Short Form 6 Clicks         How much difficulty does the patient currently have  Unable  A Lot  A Little  None      1. Turning over in bed (including adjusting bedclothes, sheets and blankets)?     1 []    2 []    3 []    4 [x]        2. Sitting down on and standing up from a chair with arms (e.g., wheelchair, bedside commode, etc.)     1 []  2 []  3 []     4 [x]      3. Moving from lying on back to sitting on the side of the bed?     1 []  2 []  3 []    4 [x]    How much help from another person does the patient currently need  Total  A Lot  A Little  None      4. Moving to and from a bed to a chair (including a wheelchair)?    1 []  2 []  3 []    4 [x]      5. Need to walk in hospital room?    1 []  2 []  3 []    4 [x]      6. Climbing 3-5 steps with a railing?    1 []  2 []  3 []    4 [x]       Raw Score:     24             CMS 0-100% Score:     0.0       %   Standardized Score:     6.94          CMS Modifier:        [facility]   Basic Mobility Inpatient Short Form 6 Clicks Score Conversion Table*         *Use this form to convert AM-PAC Basic Mobility Inpatient Raw Scores.   [facility] Inpatient Basic Mobility Short Form Scoring Example   1. Add the number values associated with the response to each item. For example, items totals yield a Raw Score of 21.   2. Match the raw score to the t-Scale scores (t-Scale score = 50.25, SE = 4.69).   3. Find the associated CMS % (CMS % = 28.97%).   4. Locate the correct CMS Functional Modifier Code, or G Code (G code = CJ)     NOTE: Each AM-PAC Short Form has a separate conversion table. Make sure that you use the correct conversion table.       Instruction Manual - page 45 contains conversion table                       [facility] Risk Assessment Scale    Patient Name: Esther Segura  Date  "of Birth: 1950  MRN: [id number]            RIsk Factor Measure Recommendation Patient Data Scale/Score   BMI >40 Reconsider surgery, weight loss   Estimated body mass index is 18.29 kg/m² as calculated from the following:    Height as of this encounter: 5' 2" (1.575 m).    Weight as of this encounter: 45.4 kg (100 lb).   [x] 0 = 1 - 24.9  [] 1 = 25-29.9  [] 2 = 30-34.9  [] 3 = 35-39.9  [] 4 = 40-44.9  [] 5 = 45-99.9   Hemoglobin AIC (if applicable) >9 Delay surgery until DM under control  Refer for:  Nutrition Therapy  Exercise   Medication    No results found for: "LABA1C", "HGBA1C"    Lab Results   Component Value Date    GLU 59 (L) 07/26/2023      [] 0 = 4.0-5.6  [] 1 = 5.7-6.4  [] 2 = 6.5-6.9  [] 3 = 7.0-7.9  [] 4 = 8.0-8.9  [] 5 = 9.0-12   Hemoglobin (Anemia) <9 Delay surgery   Correct anemia Lab Results   Component Value Date    HGB 11.9 (L) 07/26/2023    [] 20 - <9.0                    Albumin <3 Delay surgery &Workup Lab Results   Component Value Date    ALBUMIN 4.0 05/25/2023    [] 20 - <3.0   Smoking Cessation >4 Weeks Delay Surgery  Refer to OP Cessation Class     [] 20 - current smoker                                _____ PPD                    Hx of MI, PE, Arrhythmia, CVA, DVT <30 Days Delay Surgery     [] 20      Infection Variable Delay surgery and re-evaluate    [] 20 - recent/current infection     Depression (PHQ) >10 out of 27 Delay Surgery and re-evaluate  Medication  Counseling              [x] 0     []1     []2     []3      []4      [] 5                    (1-4)      (5-9)  (10-14)  (15-19)   (20-27)     Memory Impairment & Memory loss (Mini-Cog Screening Tool) Advanced dementia and/or Parkinson's Reconsider surgery     [x] 0     []1     []2     []3     []4     [] 5     Physical Conditioning (Modified AM-PAC Per Physical Therapy at [facility]) Unable to ambulate on day of surgery Delay surgery and re-evaluate  Pre-Rehabilitation   (PT evaluation)       [x]  0   []4       []8     []12        " []16     []20       (<20%)   (<40%)   (<60%)   (<80% )    (>80%)     Home Environment/Caregiver support  (Per /Navigator Interview)    Availability of basic services and/or approprate assistance during post-operative period Delay surgery and re-evaluate  Safe home environment  Health   1 week post-surgery  Transportation  availability  Ability to obtain DME/Medications post-op    [x] 0     []1     []2     []3     []4     [] 5  [x] 0     []1     []2     []3     []4     [] 5  [x] 0     []1     []2     []3     []4     [] 5  [x] 0     []1     []2     []3     []4     [] 5         MD Contact:  Dr. Tolbert  Comments:  Total Score:  0                 JOINT CAMP ASSESSMENT    Name Esther Segura   MRN [id number]    Age/Sex 73 y.o. female    Surgeon Dr. Tolbert    Joint Camp Date 8/15/2023   Surgery Date 8/22/2023   Procedure Right Hip Arthroplasty   Insurance Payor: [employer]ARE / Plan: [employer] 65 / Product Type: Medicare Advantage /    Care Team Patient Care Team:  Luis Dick MD as Obstetrician (Obstetrics)    Pharmacy   [facility] [address] [facility] [address] [address] [address] [facility] [address]  Phone: [phone] Fax: [phone]    [id number] - [place], [place] - [address] [facility] [address]  [address] [place] [address]  Phone: [phone] Fax: [phone]     AM-PAC Score  0   Risk Assessment Score 24     Past Medical History:   Diagnosis Date    Fever blister     Hyperlipidemia     Osteopenia     Parkinson's disease     Squamous cell carcinoma 01/18/2019    right forearm       Past Surgical History:   Procedure Laterality Date    achiles tendon       broken hand Right     COLONOSCOPY      COLONOSCOPY N/A 7/9/2020    Procedure: COLONOSCOPY;  Surgeon: Markus Piña MD;  Location: [facility];  Service: Endoscopy;  Laterality: N/A;    ESOPHAGOGASTRODUODENOSCOPY N/A  7/9/2020    Procedure: EGD (ESOPHAGOGASTRODUODENOSCOPY);  Surgeon: Markus Piña MD;  Location: [facility];  Service: Endoscopy;  Laterality: N/A;    LIVER BIOPSY N/A 11/19/2020    Procedure: BIOPSY, LIVER;  Surgeon: Yovany Diagnostic Provider;  Location: [facility] OR;  Service: General;  Laterality: N/A;         Home Enviroment     Living Arrangement: Lives with spouse, Lives in home  Home Environment: 1-story house/ trailer, number of outside stairs: 0, number of inside stairs: 0, can live on one level, bedroom on 1st floor, bathroom on 1st floor  Home Safety Concerns: Pets in the home: cats (1).    DISCHARGE CAREGIVER/SUPPORT SYSTEM     Identified post-op caregiver: Patient has spouse / significant other, children / family / friends to help, patient, spouse, and RN family member, caregivers available 24 hours per day, and level of assistance caregiver capable of all care .  Patient's caregiver(s) will be able to provide physical assistance. Patient will have someone to assist overnight.      Caregiver present at pre-op interview:  No      PRE-OPERATIVE FUNCTIONAL STATUS     Employment: Retired    Pre-op Functional Status: Patient is independent with mobility/ambulation, transfers, ADL's, IADL's.    Use of assistive device for ambulation: none  ADL: self care  ADL Limitations: none  Medical Restrictions: Post surgery monitoring    POTENTIAL BARRIERS TO DISCHARGE/POTENTIAL POST-OP COMPLICATIONS     No specific barriers identified at this time. Patient has a hx of Parkinson's disease but denies any problems with memory/forgetfulness. Per neurology note patient has slight tremor to R foot and disease is mild at this time. Patient is physically active at baseline without any deficits. No assistive equipment.       DISCHARGE PLAN     Expected LOS of 1 days or less for joint replacement discussed with patient. We also discussed a discharge path of  for approximately two weeks with a transition to outpatient PT on the  third week given no post-op complications.      Patient in agreement with discharge plan: Yes    Discharge to: Discharge home with home health (PT/OT) x2 weeks with transition to outpatient PT     HH:  [facility]      OP PT: TBD     Home DME: rolling walker and bedside commode received pre-op    Needed DME at D/C: none     Rx: Per Dr. Tolbert at discharge     Meds to Beds: Yes  Patient expected to discharge on Coumadin (Warfarin) for DVT prophylaxis. Coumadin Clinic Needed: Yes  Location: [facility]

## 2023-08-16 ENCOUNTER — PATIENT MESSAGE (OUTPATIENT)
Dept: NEUROLOGY | Facility: CLINIC | Age: 73
End: 2023-08-16
Payer: MEDICARE

## 2023-08-17 ENCOUNTER — TELEPHONE (OUTPATIENT)
Dept: NEUROLOGY | Facility: CLINIC | Age: 73
End: 2023-08-17
Payer: MEDICARE

## 2023-08-17 NOTE — TELEPHONE ENCOUNTER
----- Message from Melvina Almanzar sent at 8/17/2023  8:08 AM CDT -----  Regarding: medication  Contact: @ [phone]  Pt called in regards to carbidopa-levodopa  mg (SINEMET)  mg per tablet per the patient their is a problem with the dosage  .....Please call and adv @ [phone]

## 2023-08-18 ENCOUNTER — TELEPHONE (OUTPATIENT)
Dept: NEUROLOGY | Facility: CLINIC | Age: 73
End: 2023-08-18
Payer: MEDICARE

## 2023-08-18 DIAGNOSIS — M25.551 PAIN OF RIGHT HIP: ICD-10-CM

## 2023-08-18 DIAGNOSIS — M16.11 PRIMARY OSTEOARTHRITIS OF RIGHT HIP: ICD-10-CM

## 2023-08-18 DIAGNOSIS — Z01.812 ENCOUNTER FOR PRE-OPERATIVE LABORATORY TESTING: Primary | ICD-10-CM

## 2023-08-18 RX ORDER — CARBIDOPA AND LEVODOPA 25; 100 MG/1; MG/1
TABLET ORAL
Qty: 450 TABLET | Refills: 3 | Status: SHIPPED | OUTPATIENT
Start: 2023-08-18

## 2023-08-18 NOTE — TELEPHONE ENCOUNTER
----- Message from John Cisco sent at 8/18/2023  8:12 AM CDT -----  Regarding: PA needed + clarification  Contact: pt [phone]  Rx CALL    Pt called regarding her #carbidopa-levodopa  mg (SINEMET)  mg per tablet# and #carbidopa (LODOSYN) 25 mg tablet# prescriptions. The pharmacy is requesting Rx Clarification on the dosages and Prior Authorization. Please call pt back at  [phone], she reports having called already with no response.     Preferred Pharmacy:  [facility] [address] [address]  Phone: [phone] Fax: [phone]

## 2023-08-21 ENCOUNTER — ANESTHESIA EVENT (OUTPATIENT)
Dept: SURGERY | Facility: HOSPITAL | Age: 73
End: 2023-08-21
Payer: MEDICARE

## 2023-08-21 ENCOUNTER — PATIENT MESSAGE (OUTPATIENT)
Dept: SURGERY | Facility: HOSPITAL | Age: 73
End: 2023-08-21
Payer: MEDICARE

## 2023-08-21 DIAGNOSIS — M16.11 PRIMARY OSTEOARTHRITIS OF RIGHT HIP: Primary | ICD-10-CM

## 2023-08-21 RX ORDER — WARFARIN SODIUM 5 MG/1
5 TABLET ORAL DAILY
Qty: 30 TABLET | Refills: 11 | Status: SHIPPED | OUTPATIENT
Start: 2023-08-21 | End: 2023-09-21

## 2023-08-21 RX ORDER — CEPHALEXIN 500 MG/1
500 CAPSULE ORAL EVERY 6 HOURS
Qty: 20 CAPSULE | Refills: 0 | Status: SHIPPED | OUTPATIENT
Start: 2023-08-21 | End: 2023-09-21

## 2023-08-21 RX ORDER — OXYCODONE AND ACETAMINOPHEN 5; 325 MG/1; MG/1
1 TABLET ORAL
Qty: 42 TABLET | Refills: 0 | Status: SHIPPED | OUTPATIENT
Start: 2023-08-21 | End: 2023-09-21

## 2023-08-21 NOTE — OR NURSING
Spoke with [facility] john Llanos, states everything is in place for admit date of 8/23 for Ms. Segura.

## 2023-08-22 ENCOUNTER — HOSPITAL ENCOUNTER (OUTPATIENT)
Dept: RADIOLOGY | Facility: HOSPITAL | Age: 73
Discharge: HOME | End: 2023-08-22
Attending: ORTHOPAEDIC SURGERY | Admitting: ORTHOPAEDIC SURGERY
Payer: MEDICARE

## 2023-08-22 ENCOUNTER — ANESTHESIA (OUTPATIENT)
Dept: SURGERY | Facility: HOSPITAL | Age: 73
End: 2023-08-22
Payer: MEDICARE

## 2023-08-22 ENCOUNTER — CLINICAL SUPPORT (OUTPATIENT)
Dept: REHABILITATION | Facility: HOSPITAL | Age: 73
End: 2023-08-22
Attending: ORTHOPAEDIC SURGERY
Payer: MEDICARE

## 2023-08-22 ENCOUNTER — HOSPITAL ENCOUNTER (OUTPATIENT)
Facility: HOSPITAL | Age: 73
Discharge: HOME | End: 2023-08-22
Attending: ORTHOPAEDIC SURGERY | Admitting: ORTHOPAEDIC SURGERY
Payer: MEDICARE

## 2023-08-22 DIAGNOSIS — M16.11 PRIMARY OSTEOARTHRITIS OF RIGHT HIP: ICD-10-CM

## 2023-08-22 DIAGNOSIS — M25.551 RIGHT HIP PAIN: ICD-10-CM

## 2023-08-22 DIAGNOSIS — M25.551 PAIN OF RIGHT HIP: ICD-10-CM

## 2023-08-22 DIAGNOSIS — M16.11 OSTEOARTHRITIS OF RIGHT HIP: ICD-10-CM

## 2023-08-22 DIAGNOSIS — R29.898 DECREASED STRENGTH OF LOWER EXTREMITY: ICD-10-CM

## 2023-08-22 PROCEDURE — 71000033 HC RECOVERY, INTIAL HOUR: Mod: PO | Performed by: ORTHOPAEDIC SURGERY

## 2023-08-22 PROCEDURE — 36000710: Mod: PO | Performed by: ORTHOPAEDIC SURGERY

## 2023-08-22 PROCEDURE — 63600175 PHARM REV CODE 636 W HCPCS: Mod: PO | Performed by: ANESTHESIOLOGY

## 2023-08-22 PROCEDURE — 97112 NEUROMUSCULAR REEDUCATION: CPT | Mod: KX,PO

## 2023-08-22 PROCEDURE — 25000003 PHARM REV CODE 250: Mod: PO | Performed by: NURSE ANESTHETIST, CERTIFIED REGISTERED

## 2023-08-22 PROCEDURE — 25000003 PHARM REV CODE 250: Mod: PO | Performed by: ANESTHESIOLOGY

## 2023-08-22 PROCEDURE — 63600175 PHARM REV CODE 636 W HCPCS: Mod: PO | Performed by: ORTHOPAEDIC SURGERY

## 2023-08-22 PROCEDURE — 97161 PT EVAL LOW COMPLEX 20 MIN: CPT | Mod: KX,PO

## 2023-08-22 PROCEDURE — 25000003 PHARM REV CODE 250: Mod: PO | Performed by: ORTHOPAEDIC SURGERY

## 2023-08-22 PROCEDURE — 73502 XR ORTHO PELVIS_HIP POST OP RIGHT: ICD-10-PCS | Mod: 26,RT,, | Performed by: RADIOLOGY

## 2023-08-22 PROCEDURE — 76000 FLUOROSCOPY <1 HR PHYS/QHP: CPT | Mod: TC,PO

## 2023-08-22 PROCEDURE — D9220A PRA ANESTHESIA: Mod: CRNA,,, | Performed by: NURSE ANESTHETIST, CERTIFIED REGISTERED

## 2023-08-22 PROCEDURE — 27130 TOTAL HIP ARTHROPLASTY: CPT | Mod: RT,,, | Performed by: ORTHOPAEDIC SURGERY

## 2023-08-22 PROCEDURE — 63600175 PHARM REV CODE 636 W HCPCS: Mod: PO | Performed by: NURSE ANESTHETIST, CERTIFIED REGISTERED

## 2023-08-22 PROCEDURE — 37000008 HC ANESTHESIA 1ST 15 MINUTES: Mod: PO | Performed by: ORTHOPAEDIC SURGERY

## 2023-08-22 PROCEDURE — D9220A PRA ANESTHESIA: ICD-10-PCS | Mod: ANES,,, | Performed by: ANESTHESIOLOGY

## 2023-08-22 PROCEDURE — 27130 PR TOTAL HIP ARTHROPLASTY: ICD-10-PCS | Mod: RT,,, | Performed by: ORTHOPAEDIC SURGERY

## 2023-08-22 PROCEDURE — 73502 X-RAY EXAM HIP UNI 2-3 VIEWS: CPT | Mod: TC,FY,PO,RT

## 2023-08-22 PROCEDURE — 27200688 HC TRAY, SPINAL-HYPER/ ISOBARIC: Mod: PO | Performed by: ANESTHESIOLOGY

## 2023-08-22 PROCEDURE — 71000016 HC POSTOP RECOV ADDL HR: Mod: PO | Performed by: ORTHOPAEDIC SURGERY

## 2023-08-22 PROCEDURE — 27201423 OPTIME MED/SURG SUP & DEVICES STERILE SUPPLY: Mod: PO | Performed by: ORTHOPAEDIC SURGERY

## 2023-08-22 PROCEDURE — D9220A PRA ANESTHESIA: ICD-10-PCS | Mod: CRNA,,, | Performed by: NURSE ANESTHETIST, CERTIFIED REGISTERED

## 2023-08-22 PROCEDURE — C1713 ANCHOR/SCREW BN/BN,TIS/BN: HCPCS | Mod: PO | Performed by: ORTHOPAEDIC SURGERY

## 2023-08-22 PROCEDURE — 73502 X-RAY EXAM HIP UNI 2-3 VIEWS: CPT | Mod: 26,RT,, | Performed by: RADIOLOGY

## 2023-08-22 PROCEDURE — 37000009 HC ANESTHESIA EA ADD 15 MINS: Mod: PO | Performed by: ORTHOPAEDIC SURGERY

## 2023-08-22 PROCEDURE — D9220A PRA ANESTHESIA: Mod: ANES,,, | Performed by: ANESTHESIOLOGY

## 2023-08-22 PROCEDURE — C1776 JOINT DEVICE (IMPLANTABLE): HCPCS | Mod: PO | Performed by: ORTHOPAEDIC SURGERY

## 2023-08-22 PROCEDURE — 36000711: Mod: PO | Performed by: ORTHOPAEDIC SURGERY

## 2023-08-22 PROCEDURE — 71000015 HC POSTOP RECOV 1ST HR: Mod: PO | Performed by: ORTHOPAEDIC SURGERY

## 2023-08-22 DEVICE — IMPLANTABLE DEVICE: Type: IMPLANTABLE DEVICE | Site: HIP | Status: FUNCTIONAL

## 2023-08-22 DEVICE — SCREW BONE CANC 6.5X25MM: Type: IMPLANTABLE DEVICE | Site: HIP | Status: FUNCTIONAL

## 2023-08-22 DEVICE — SCREW SCHANZ SELF DRILL 5MM: Type: IMPLANTABLE DEVICE | Site: HIP | Status: FUNCTIONAL

## 2023-08-22 RX ORDER — MEPERIDINE HYDROCHLORIDE 50 MG/ML
12.5 INJECTION INTRAMUSCULAR; INTRAVENOUS; SUBCUTANEOUS ONCE AS NEEDED
Status: ACTIVE | OUTPATIENT
Start: 2023-08-22 | End: 2023-08-23

## 2023-08-22 RX ORDER — DIPHENHYDRAMINE HYDROCHLORIDE 50 MG/ML
12.5 INJECTION INTRAMUSCULAR; INTRAVENOUS EVERY 6 HOURS PRN
Status: DISCONTINUED | OUTPATIENT
Start: 2023-08-22 | End: 2023-08-22 | Stop reason: HOSPADM

## 2023-08-22 RX ORDER — MEPERIDINE HYDROCHLORIDE 50 MG/ML
12.5 INJECTION INTRAMUSCULAR; INTRAVENOUS; SUBCUTANEOUS ONCE AS NEEDED
Status: DISCONTINUED | OUTPATIENT
Start: 2023-08-22 | End: 2023-08-22 | Stop reason: HOSPADM

## 2023-08-22 RX ORDER — ONDANSETRON 2 MG/ML
INJECTION INTRAMUSCULAR; INTRAVENOUS
Status: DISCONTINUED | OUTPATIENT
Start: 2023-08-22 | End: 2023-08-22

## 2023-08-22 RX ORDER — PROPOFOL 10 MG/ML
VIAL (ML) INTRAVENOUS CONTINUOUS PRN
Status: DISCONTINUED | OUTPATIENT
Start: 2023-08-22 | End: 2023-08-22

## 2023-08-22 RX ORDER — CELECOXIB 200 MG/1
200 CAPSULE ORAL ONCE
Status: COMPLETED | OUTPATIENT
Start: 2023-08-22 | End: 2023-08-22

## 2023-08-22 RX ORDER — CEFAZOLIN SODIUM 2 G/50ML
2 SOLUTION INTRAVENOUS
Status: COMPLETED | OUTPATIENT
Start: 2023-08-22 | End: 2023-08-22

## 2023-08-22 RX ORDER — SODIUM CHLORIDE, SODIUM LACTATE, POTASSIUM CHLORIDE, CALCIUM CHLORIDE 600; 310; 30; 20 MG/100ML; MG/100ML; MG/100ML; MG/100ML
1000 INJECTION, SOLUTION INTRAVENOUS ONCE
Status: COMPLETED | OUTPATIENT
Start: 2023-08-22 | End: 2023-08-22

## 2023-08-22 RX ORDER — MIDAZOLAM HYDROCHLORIDE 1 MG/ML
INJECTION INTRAMUSCULAR; INTRAVENOUS
Status: DISCONTINUED | OUTPATIENT
Start: 2023-08-22 | End: 2023-08-22

## 2023-08-22 RX ORDER — PHENYLEPHRINE HYDROCHLORIDE 10 MG/ML
INJECTION INTRAVENOUS
Status: DISCONTINUED | OUTPATIENT
Start: 2023-08-22 | End: 2023-08-22

## 2023-08-22 RX ORDER — LIDOCAINE HYDROCHLORIDE 20 MG/ML
INJECTION INTRAVENOUS
Status: DISCONTINUED | OUTPATIENT
Start: 2023-08-22 | End: 2023-08-22

## 2023-08-22 RX ORDER — HYDROCODONE BITARTRATE AND ACETAMINOPHEN 5; 325 MG/1; MG/1
1 TABLET ORAL
Status: DISCONTINUED | OUTPATIENT
Start: 2023-08-22 | End: 2023-08-22 | Stop reason: HOSPADM

## 2023-08-22 RX ORDER — DIPHENHYDRAMINE HYDROCHLORIDE 50 MG/ML
12.5 INJECTION INTRAMUSCULAR; INTRAVENOUS EVERY 6 HOURS PRN
Status: DISCONTINUED | OUTPATIENT
Start: 2023-08-22 | End: 2023-09-21

## 2023-08-22 RX ORDER — FENTANYL CITRATE 50 UG/ML
25 INJECTION, SOLUTION INTRAMUSCULAR; INTRAVENOUS EVERY 5 MIN PRN
Status: DISCONTINUED | OUTPATIENT
Start: 2023-08-22 | End: 2023-08-22 | Stop reason: HOSPADM

## 2023-08-22 RX ORDER — HYDROMORPHONE HYDROCHLORIDE 2 MG/ML
0.2 INJECTION, SOLUTION INTRAMUSCULAR; INTRAVENOUS; SUBCUTANEOUS EVERY 5 MIN PRN
Status: DISCONTINUED | OUTPATIENT
Start: 2023-08-22 | End: 2023-08-22 | Stop reason: HOSPADM

## 2023-08-22 RX ORDER — ACETAMINOPHEN 500 MG
1000 TABLET ORAL
Status: COMPLETED | OUTPATIENT
Start: 2023-08-22 | End: 2023-08-22

## 2023-08-22 RX ORDER — OXYCODONE HYDROCHLORIDE 5 MG/1
5 TABLET ORAL
Status: DISCONTINUED | OUTPATIENT
Start: 2023-08-22 | End: 2023-09-21

## 2023-08-22 RX ORDER — MUPIROCIN 20 MG/G
OINTMENT TOPICAL
Status: DISCONTINUED | OUTPATIENT
Start: 2023-08-22 | End: 2023-08-22 | Stop reason: HOSPADM

## 2023-08-22 RX ORDER — PROPOFOL 10 MG/ML
VIAL (ML) INTRAVENOUS
Status: DISCONTINUED | OUTPATIENT
Start: 2023-08-22 | End: 2023-08-22

## 2023-08-22 RX ORDER — OXYCODONE HCL 10 MG/1
10 TABLET, FILM COATED, EXTENDED RELEASE ORAL ONCE
Status: DISCONTINUED | OUTPATIENT
Start: 2023-08-22 | End: 2023-09-21

## 2023-08-22 RX ORDER — FENTANYL CITRATE 50 UG/ML
25 INJECTION, SOLUTION INTRAMUSCULAR; INTRAVENOUS EVERY 5 MIN PRN
Status: DISCONTINUED | OUTPATIENT
Start: 2023-08-22 | End: 2023-09-21

## 2023-08-22 RX ORDER — DEXAMETHASONE SODIUM PHOSPHATE 4 MG/ML
INJECTION, SOLUTION INTRA-ARTICULAR; INTRALESIONAL; INTRAMUSCULAR; INTRAVENOUS; SOFT TISSUE
Status: DISCONTINUED | OUTPATIENT
Start: 2023-08-22 | End: 2023-08-22

## 2023-08-22 RX ORDER — FENTANYL CITRATE 50 UG/ML
INJECTION, SOLUTION INTRAMUSCULAR; INTRAVENOUS
Status: DISCONTINUED | OUTPATIENT
Start: 2023-08-22 | End: 2023-08-22

## 2023-08-22 RX ORDER — HYDROMORPHONE HYDROCHLORIDE 2 MG/ML
0.2 INJECTION, SOLUTION INTRAMUSCULAR; INTRAVENOUS; SUBCUTANEOUS EVERY 5 MIN PRN
Status: DISCONTINUED | OUTPATIENT
Start: 2023-08-22 | End: 2023-09-21

## 2023-08-22 RX ADMIN — MUPIROCIN: 20 OINTMENT TOPICAL at 06:08

## 2023-08-22 RX ADMIN — MEPIVACAINE HYDROCHLORIDE 4 ML: 15 INJECTION, SOLUTION EPIDURAL; INFILTRATION at 07:08

## 2023-08-22 RX ADMIN — CELECOXIB 200 MG: 200 CAPSULE ORAL at 06:08

## 2023-08-22 RX ADMIN — ACETAMINOPHEN 1000 MG: 500 TABLET ORAL at 06:08

## 2023-08-22 RX ADMIN — MIDAZOLAM HYDROCHLORIDE 1 MG: 1 INJECTION INTRAMUSCULAR; INTRAVENOUS at 07:08

## 2023-08-22 RX ADMIN — CEFAZOLIN 1 G: 1 INJECTION, POWDER, FOR SOLUTION INTRAMUSCULAR; INTRAVENOUS at 09:08

## 2023-08-22 RX ADMIN — SODIUM CHLORIDE, SODIUM LACTATE, POTASSIUM CHLORIDE, AND CALCIUM CHLORIDE: .6; .31; .03; .02 INJECTION, SOLUTION INTRAVENOUS at 07:08

## 2023-08-22 RX ADMIN — PHENYLEPHRINE HYDROCHLORIDE 100 MCG: 10 INJECTION INTRAVENOUS at 08:08

## 2023-08-22 RX ADMIN — CEFAZOLIN SODIUM 2 G: 2 SOLUTION INTRAVENOUS at 07:08

## 2023-08-22 RX ADMIN — HYDROCODONE BITARTRATE AND ACETAMINOPHEN 1 TABLET: 5; 325 TABLET ORAL at 10:08

## 2023-08-22 RX ADMIN — TRANEXAMIC ACID 1000 MG: 100 INJECTION, SOLUTION INTRAVENOUS at 09:08

## 2023-08-22 RX ADMIN — PROPOFOL 20 MG: 10 INJECTION, EMULSION INTRAVENOUS at 07:08

## 2023-08-22 RX ADMIN — SODIUM CHLORIDE, POTASSIUM CHLORIDE, SODIUM LACTATE AND CALCIUM CHLORIDE 1000 ML: 600; 310; 30; 20 INJECTION, SOLUTION INTRAVENOUS at 07:08

## 2023-08-22 RX ADMIN — PROPOFOL 75 MCG/KG/MIN: 10 INJECTION, EMULSION INTRAVENOUS at 07:08

## 2023-08-22 RX ADMIN — ONDANSETRON 4 MG: 2 INJECTION, SOLUTION INTRAMUSCULAR; INTRAVENOUS at 08:08

## 2023-08-22 RX ADMIN — FENTANYL CITRATE 25 MCG: 50 INJECTION, SOLUTION INTRAMUSCULAR; INTRAVENOUS at 07:08

## 2023-08-22 RX ADMIN — FENTANYL CITRATE 25 MCG: 50 INJECTION, SOLUTION INTRAMUSCULAR; INTRAVENOUS at 08:08

## 2023-08-22 RX ADMIN — PROPOFOL 30 MG: 10 INJECTION, EMULSION INTRAVENOUS at 08:08

## 2023-08-22 RX ADMIN — LIDOCAINE HYDROCHLORIDE 100 MG: 20 INJECTION INTRAVENOUS at 07:08

## 2023-08-22 RX ADMIN — DEXAMETHASONE SODIUM PHOSPHATE 8 MG: 4 INJECTION, SOLUTION INTRAMUSCULAR; INTRAVENOUS at 07:08

## 2023-08-22 RX ADMIN — FENTANYL CITRATE 25 MCG: 50 INJECTION, SOLUTION INTRAMUSCULAR; INTRAVENOUS at 09:08

## 2023-08-22 RX ADMIN — TRANEXAMIC ACID 1000 MG: 100 INJECTION, SOLUTION INTRAVENOUS at 07:08

## 2023-08-22 NOTE — PLAN OF CARE
[facility]  Physical Therapy Initial Evaluation      Date: 8/22/2023   Name: Esther Segura  Clinic Number: [id number]    Therapy Diagnosis:   Encounter Diagnoses   Name Primary?    Primary osteoarthritis of right hip     Decreased strength of lower extremity      Physician: Eliceo Tolbert MD    Physician Orders: PT Eval and Treat   Medical Diagnosis from Referral: Primary osteoarthritis of right hip   Evaluation Date: 8/22/2023  Authorization Period Expiration: 10/17/2023  Plan of Care Expiration: 08/22/2023  Visit # / Visits authorized: 1/ 1     Precautions: Standard, Fall, and Weightbearing     Time In: 1155  Time Out: 1215  Total Appointment Time (timed & untimed codes): 20 minutes      SUBJECTIVE   Date of onset: 08/22/2023    History of current condition - Hong reports: she underwent a left THELMA this morning. Pt states she received RW training prior to surgery. She notes she can feel bilateral feet and move legs in the bed. Patient is agreeble to participate with PT Evaluation.     Imaging, x-ray: see imaging    Prior Therapy: none  Social History:  lives with their family  Occupation: n/a  Prior Level of Function: ambulating without AD   Current Level of Function: ambulating with RW    Pain:  Current 1/10,  (Faces scale)   Location: left hip  Description: Aching  Aggravating Factors: Sitting  Easing Factors: pain medication    Patients goals: To go home     Medical History:   Past Medical History:   Diagnosis Date    Fever blister     Hyperlipidemia     Osteopenia     Parkinson's disease     Squamous cell carcinoma 01/18/2019    right forearm       Surgical History:   Esther Segura  has a past surgical history that includes achiles tendon ; broken hand (Right); Colonoscopy; Esophagogastroduodenoscopy (N/A, 7/9/2020); Colonoscopy (N/A, 7/9/2020); and Liver biopsy (N/A, 11/19/2020).    Medications:   Esther has a current medication list which includes the following  prescription(s): calcium carbonate-vitamin d3, carbidopa, carbidopa-levodopa  mg, cephalexin, covid-19 at-home test, ibuprofen, lactobacillus rhamnosus gg, lorazepam, magnesium citrate, multivit 33-mtfolate-nac-chrom, oxycodone-acetaminophen, rasagiline, and warfarin, and the following Facility-Administered Medications: diphenhydramine, fentanyl, hydromorphone (pf), meperidine, oxycodone, and oxycodone.    Allergies:   Review of patient's allergies indicates:   Allergen Reactions    Bee sting [allergen ext-venom-honey bee] Swelling    Fosamax [alendronate] Other (See Comments)     Jaw pain        Objective     Patient found in bathroom with RN. RN reports patient is medically appropriate for PT.    Cognitive Exam:  Oriented to: Person, Place, Time, and Situation    Physical Exam:    Lower Extremity Range of Motion:  Right Lower Extremity: WNL  Left Lower Extremity: WFL    Lower Extremity Strength:  Right Lower Extremity: WNL  Left Lower Extremity: WFL    Functional Mobility:    Transfers:  Sit <> stand: supervision cueing for sequencing and hand placement      Gait:   Ambulated 150 ft with RW and SBA assist. Pt demo Pt demo appropriate weight shift and quad control during stance.     Neuromuscular Control to initiate LE musculature activation for 10 minutes:   Exercise to lower risk of DVT , weight shifting, and education to safely negotiate home environment     Home Exercises and Patient Education Provided    Education provided:   - Safety with RW  - Safety with mobility     Assessment   Esther is a 73 y.o. female referred to outpatient Physical Therapy with a medical diagnosis of Primary osteoarthritis of right hip . Pt presents with decreased ROM, decreased LE strength, gait instability, impaired balance, pain, and impaired functional mobility. She required supervision to SBA for functional mobility today with min cueing for safety with transfers and ambulation.    Pt prognosis is Good.   Pt will benefit  from skilled outpatient Physical Therapy to address the deficits stated above and in the chart below, provide pt/family education, and to maximize pt's level of independence.     Plan of care discussed with patient: Yes  Pt's spiritual, cultural and educational needs considered and patient is agreeable to the plan of care and goals as stated below:     Anticipated Barriers for therapy: none      Medical Necessity is demonstrated by the following   History  Co-morbidities and personal factors that may impact the plan of care [x] LOW: no personal factors / co-morbidities  [] MODERATE: 1-2 personal factors / co-morbidities  [] HIGH: 3+ personal factors / co-morbidities    Moderate / High Support Documentation:   Co-morbidities affecting plan of care: see above     Personal Factors:   age     Examination  Body Structures and Functions, activity limitations and participation restrictions that may impact the plan of care [x] LOW: addressing 1-2 elements  [] MODERATE: 3+ elements  [] HIGH: 4+ elements (please support below)    Moderate / High Support Documentation: see above      Clinical Presentation [x] LOW: stable  [] MODERATE: Evolving  [] HIGH: Unstable     Decision Making/ Complexity Score: low               Plan     Discharge Home with family with Home Health Physical Therapy when medically appropriate     Siva Martin, PT

## 2023-08-22 NOTE — OP NOTE
8/22/2023      PREOPERATIVE DIAGNOSIS: Pain of right hip [M25.551]  Primary osteoarthritis of right hip [M16.11]    POSTOPERATIVE DIAGNOSIS:  Pain of right hip [M25.551]  Primary osteoarthritis of right hip [M16.11]    PROCEDURE: right Total Hip Arthroplasty    SURGEON: Eliceo Tolbert M.D.    ASSISTANT: Warren Perry RN CFA     ANESTHESIA: Spinal    BLOOD LOSS: 200cc    FLUIDS:  Crystalloid    DRAINS: None      INDICATIONS FOR PROCEDURE:   [unfilled] is a 73 y.o.-year-old female who has had Debilitating right-sided hip pain despite nonoperative measures, it was felt that She would benefit from total hip arthroplasty.     PROCEDURE IN DETAIL:  After obtaining informed consent and starting the patient   on preoperative IV antibiotics, the patient was taken back to the Operating   Room.  Regional / General  anesthesia was performed by the Anesthesia Team.  Rodríguez catheter   was placed.  The patient was then positioned into the lateral decubitus   position and stabilized with Stulberg hip positioner.  The right hip and lower extremity were prepped and draped in normal sterile fashion.  A longitudinal   incision was made beginning at the greater trochanter extending proximally   toward the PSIS for about 9cm, cut down to the dermal layer, spread through the adipose tissue, elevated the soft tissues off the fascia.  We then split the fascia the length of the skin incision.  We then spread bluntlythrough the gluteus pratik fibers and then placed the retractor deep to the gluteus medius, placed the retractor deep to the piriformis, elevated the minimus off the capsule and then opened the capsule superiorly with electrocautery and placed retractors deep to the capsule. We then opened the medullary canal of the femur beginning at the zenith of the femoral neck with a large drill bit. A ball tip guide wire was used to confirm placement in the medullary canal. We sequentially reamed up to a size 10 after lateralizing.  We  then made a cut in the femoral head to accommodate our broaches. We then sequentially broached the canal up to a size 10  following the native version of the canal at about 20 degrees.  We then went ahead and made a femoral neck cutoff of the  broach, removed the femoral head, sized it  To    48.   We then placed the outrigger device to place the cannula posterior to the femur to assist with reaming. We then sequentially reamed the acetabulum beginning at the size  48  all the way up to a size  50 to get good cancellous bleeding bone.  We then press-fit a 50  cup in 45 degrees of abduction and about   25 degrees of anteversion. 1 25 screw in the superior quadradrant was used for supplemental fixation. We then went ahead and trialed different   head and neck length combinations and felt that a   short Varus neck with a  extra long  head provided restoration of equal leg lengths and good stability in both flexion and extension.  Hip did not dislocate until it was fully flexed and internally rotated to well beyond 60 degrees.  It was stable in extension and   external rotation as well.  We then removed the trial liner, replaced this with   a final liner. Locking the final liner, we then removed the  10 broach and replaced it with a  10 monoblock   down with the short varus neck and mated this to a  extra long  length ceramic head in   situ.  We were satisfied with leg lengths, had excellent range of motion and   stability.  This was confirmed with intraoperative images.  We then went ahead   and irrigated the wound and closed the fascia with #1 Vicryl figure-eight   suture, subcutaneous tissue closed with 2-0 Vicryl, subcuticular layer closed   with running 3-0 Prolene.  Sterile dressing applied

## 2023-08-22 NOTE — INTERVAL H&P NOTE
The patient has been examined and the H&P has been reviewed:    I concur with the findings and no changes have occurred since H&P was written.    Surgery risks, benefits and alternative options discussed and understood by patient/family.          There are no hospital problems to display for this patient.  Imaging studies ordered and reviewed by me    Further History  Aching pain  Worse with activity  Relieved with rest  No other associated symptoms  No other radiation    Further Exam  Alert and oriented  Pleasant  Contralateral limb has appropriate range of motion for age and condition  Contralateral limb has appropriate strength for age and condition  Contralateral limb has appropriate stability  for age and condition  No adenopathy  Pulses are appropriate for current condition  Skin is intact        Chief Complaint    No chief complaint on file.      VITO Segura is a 73 y.o.  female who presents with       Past Medical History  Past Medical History:   Diagnosis Date    Fever blister     Hyperlipidemia     Osteopenia     Parkinson's disease     Squamous cell carcinoma 01/18/2019    right forearm       Past Surgical History  Past Surgical History:   Procedure Laterality Date    achiles tendon       broken hand Right     COLONOSCOPY      COLONOSCOPY N/A 7/9/2020    Procedure: COLONOSCOPY;  Surgeon: Markus Piña MD;  Location: [facility];  Service: Endoscopy;  Laterality: N/A;    ESOPHAGOGASTRODUODENOSCOPY N/A 7/9/2020    Procedure: EGD (ESOPHAGOGASTRODUODENOSCOPY);  Surgeon: Markus Piña MD;  Location: [facility];  Service: Endoscopy;  Laterality: N/A;    LIVER BIOPSY N/A 11/19/2020    Procedure: BIOPSY, LIVER;  Surgeon: Yovany Diagnostic Provider;  Location: [facility];  Service: General;  Laterality: N/A;       Medications  Current Facility-Administered Medications   Medication    cefazolin (ANCEF) 2 gram in dextrose 5% 50 mL IVPB (premix)    mupirocin 2 % ointment    oxyCODONE 12 hr tablet 10 mg     tranexamic acid (CYKLOKAPRON) 1,000 mg in sodium chloride 0.9 % 100 mL IVPB (MB+)       Allergies  Review of patient's allergies indicates:   Allergen Reactions    Bee sting [allergen ext-venom-honey bee] Swelling    Fosamax [alendronate] Other (See Comments)     Jaw pain       Family History  Family History   Problem Relation Age of Onset    Cancer Mother         lymphoma    Osteoporosis Father     Breast cancer Maternal Aunt     Ovarian cancer Neg Hx     Melanoma Neg Hx     Multiple sclerosis Neg Hx     Psoriasis Neg Hx     Eczema Neg Hx     Lupus Neg Hx     Acne Neg Hx     Depression Neg Hx     Suicidality Neg Hx        Social History  Social History     Socioeconomic History    Marital status:    Occupational History     Employer: [employer]   Tobacco Use    Smoking status: Never    Smokeless tobacco: Never   Substance and Sexual Activity    Alcohol use: Yes     Alcohol/week: 1.0 standard drink of alcohol     Types: 1 Glasses of wine per week     Comment: ocasionally    Drug use: No    Sexual activity: Never     Social Determinants of Health     Financial Resource Strain: Low Risk  (8/11/2020)    Overall Financial Resource Strain (CARDIA)     Difficulty of Paying Living Expenses: Not hard at all   Food Insecurity: No Food Insecurity (8/11/2020)    Hunger Vital Sign     Worried About Running Out of Food in the Last Year: Never true     Ran Out of Food in the Last Year: Never true   Transportation Needs: No Transportation Needs (8/11/2020)    PRAPARE - Transportation     Lack of Transportation (Medical): No     Lack of Transportation (Non-Medical): No   Physical Activity: Sufficiently Active (8/11/2020)    Exercise Vital Sign     Days of Exercise per Week: 7 days     Minutes of Exercise per Session: 80 min   Stress: Stress Concern Present (8/11/2020)    [employer] - Occupational Stress Questionnaire     Feeling of Stress : To some extent   Social Connections:  Unknown (8/11/2020)    Social Connection and Isolation Panel [NHANES]     Frequency of Communication with Friends and Family: More than three times a week     Frequency of Social Gatherings with Friends and Family: Three times a week     Active Member of Clubs or Organizations: Yes     Attends Club or Organization Meetings: More than 4 times per year     Marital Status:                Review of Systems     Constitutional: Negative    HENT: Negative  Eyes: Negative  Respiratory: Negative  Cardiovascular: Negative  Musculoskeletal: HPI  Skin: Negative  Neurological: Negative  Hematological: Negative  Endocrine: Negative                 Physical Exam    Vitals:    08/22/23 0651   BP:    Pulse:    Resp: 12   Temp:      Body mass index is 18.29 kg/m².  Physical Examination:     General appearance -  well appearing, and in no distress  Mental status - awake  Neck - supple  Chest -  symmetric air entry  Heart - normal rate   Abdomen - soft      Assessment     1. Primary osteoarthritis of right hip    2. Pain of right hip    3. Osteoarthritis of right hip          Plan

## 2023-08-22 NOTE — ANESTHESIA PREPROCEDURE EVALUATION
08/22/2023  Esther Segura is a 73 y.o., female.    Pre-op Assessment    I have reviewed the Patient Summary Reports.    I have reviewed the Nursing Notes. I have reviewed the NPO Status.   I have reviewed the Medications.     Review of Systems  Anesthesia Hx:  No problems with previous Anesthesia    Social:  Non-Smoker, Social Alcohol Use    Hematology/Oncology:  Hematology Normal   Oncology Normal     EENT/Dental:EENT/Dental Normal   Cardiovascular:   hyperlipidemia    Pulmonary:  Pulmonary Normal    Hepatic/GI:   Bowel Prep.    Musculoskeletal:   Arthritis  hip   Neurological:  Neurology Normal Parkinson's disease    Endocrine:  Endocrine Normal    Dermatological:  Skin Normal    Psych:  Psychiatric Normal           Physical Exam  General:  Well nourished      Airway/Jaw/Neck:  Airway Findings: Mouth Opening: Normal   Tongue: Normal   General Airway Assessment: Adult Mallampati: III  Improves to II with phonation.  TM Distance: Normal, at least 6 cm         Eyes/Ears/Nose:Pt with laceration to nose and lip s/p fall in tub this am.     Dental:  Dental Findings: In tact     Chest/Lungs:  Chest/Lungs Findings: Clear to auscultation, Normal Respiratory Rate      Heart/Vascular:  Heart Findings: Rate: Normal  Rhythm: Regular Rhythm        Mental Status:  Mental Status Findings:  Cooperative, Alert and Oriented         Anesthesia Plan  Type of Anesthesia, risks & benefits discussed:  Anesthesia Type:  Spinal, Gen ETT    Patient's Preference:   Plan Factors:          Intra-op Monitoring Plan: standard ASA monitors  Intra-op Monitoring Plan Comments:   Post Op Pain Control Plan: multimodal analgesia and IV/PO Opioids PRN  Post Op Pain Control Plan Comments:     Induction:   IV  Beta Blocker:  Patient is on a Beta-Blocker and has received one dose within the past 24 hours (No further documentation required).        Informed Consent: Informed consent signed with the Patient and all parties understand the risks and agree with anesthesia plan.  All questions answered.  Anesthesia consent signed with patient.  ASA Score: 2     Day of Surgery Review of History & Physical: I have interviewed and examined the patient. I have reviewed the patient's H&P dated:              Ready For Surgery From Anesthesia Perspective.           Physical Exam  General: Well nourished    Airway:  Mallampati: III / II  Mouth Opening: Normal  TM Distance: Normal, at least 6 cm  Tongue: Normal    Dental:  In tact    Chest/Lungs:  Clear to auscultation, Normal Respiratory Rate    Heart:  Rate: Normal  Rhythm: Regular Rhythm          Anesthesia Plan  Type of Anesthesia, risks & benefits discussed:    Anesthesia Type: Spinal, Gen ETT  Intra-op Monitoring Plan: standard ASA monitors  Post Op Pain Control Plan: multimodal analgesia and IV/PO Opioids PRN  Induction:  IV  Informed Consent: Informed consent signed with the Patient and all parties understand the risks and agree with anesthesia plan.  All questions answered.   ASA Score: 2  Day of Surgery Review of History & Physical: I have interviewed and examined the patient. I have reviewed the patient's H&P dated:     Ready For Surgery From Anesthesia Perspective.       .

## 2023-08-22 NOTE — ANESTHESIA POSTPROCEDURE EVALUATION
Anesthesia Post Evaluation    Patient: Esther Segura    Procedure(s) Performed: Procedure(s) (LRB):  ARTHROPLASTY, HIP RIGHT (Right)    Final Anesthesia Type: spinal      Patient location during evaluation: PACU  Patient participation: Yes- Able to Participate  Level of consciousness: sedated and awake  Post-procedure vital signs: reviewed and stable  Pain management: adequate  Airway patency: patent    PONV status at discharge: No PONV  Anesthetic complications: no      Cardiovascular status: blood pressure returned to baseline and hypertensive  Respiratory status: spontaneous ventilation  Hydration status: euvolemic  Follow-up not needed.          Vitals Value Taken Time   /72 08/22/23 1005   Temp 36.3 °C (97.4 °F) 08/22/23 0909   Pulse 66 08/22/23 1005   Resp 16 08/22/23 1057   SpO2 98 % 08/22/23 1005         Event Time   Out of Recovery 10:00:00         Pain/Isaac Score: Pain Rating Prior to Med Admin: 5 (8/22/2023 10:57 AM)  Pain Rating Post Med Admin: 1 (8/22/2023 12:06 PM)  Isaac Score: 10 (8/22/2023 12:06 PM)

## 2023-08-22 NOTE — DISCHARGE SUMMARY
Discharge Note  Short Stay      SUMMARY     Admit Date: 8/22/2023    Attending Physician: Eliceo Tolbert MD     Discharge Physician: Eliceo Tolbert MD    Discharge Date: 8/22/2023 9:01 AM    Final Diagnosis: Pain of right hip [M25.551]  Primary osteoarthritis of right hip [M16.11]    Hospital Course: Patient tolerated procedure well.     Disposition: Home or Self Care    Patient Instructions:   Current Discharge Medication List        CONTINUE these medications which have NOT CHANGED    Details   calcium carbonate-vitamin D3 500 mg(1,250mg) -400 unit Tab       carbidopa (LODOSYN) 25 mg tablet Take 1 tablet (25 mg total) by mouth 3 (three) times daily.  Qty: 270 tablet, Refills: 03    Associated Diagnoses: Parkinson's disease      carbidopa-levodopa  mg (SINEMET)  mg per tablet TAKE 1 AND 1/2 TABLETS BY MOUTH AM, 2 tabs in afternoon and 1.5 tabs in the evening.  Qty: 450 tablet, Refills: 03    Comments: **Patient requests 90 days supply**  Associated Diagnoses: Parkinson's disease      ibuprofen (ADVIL,MOTRIN) 600 MG tablet Take 1 tablet (600 mg total) by mouth every 6 (six) hours as needed for Pain (knee pain).  Qty: 30 tablet, Refills: 0    Associated Diagnoses: Acute pain of right knee      Lactobacillus rhamnosus GG (CULTURELLE) 10 billion cell capsule Take 1 capsule by mouth once daily. 15 billion count      LORazepam (ATIVAN) 0.5 MG tablet Take 1 tablet (0.5 mg total) by mouth every other day.  Qty: 30 tablet, Refills: 5      MAGNESIUM CITRATE ORAL Take by mouth once. Taking 448mg OTC capsule      multivit 33-mtfolate-nac-chrom 2.5-200-1 mg-mg-mg Cap       rasagiline (AZILECT) 1 mg Tab TAKE 1 TABLET BY MOUTH ONCE DAILY IN THE EVENING  Qty: 90 tablet, Refills: 0    Associated Diagnoses: Parkinson's disease      warfarin (COUMADIN) 5 MG tablet Take 1 tablet (5 mg total) by mouth Daily.  Qty: 30 tablet, Refills: 11    Comments: Take first dose the night before surgery between 5-6pm.  Associated  Diagnoses: Primary osteoarthritis of right hip      cephALEXin (KEFLEX) 500 MG capsule Take 1 capsule (500 mg total) by mouth every 6 (six) hours.  Qty: 20 capsule, Refills: 0    Associated Diagnoses: Primary osteoarthritis of right hip      COVID-19 AT-HOME TEST Kit use as directed      oxyCODONE-acetaminophen (PERCOCET) 5-325 mg per tablet Take 1 tablet by mouth every 4 to 6 hours as needed for Pain.  Qty: 42 tablet, Refills: 0    Comments: Quantity prescribed more than 7 day supply? Yes, quantity medically necessary  Associated Diagnoses: Primary osteoarthritis of right hip           STOP taking these medications       augmented betamethasone dipropionate (DIPROLENE-AF) 0.05 % cream Comments:   Reason for Stopping:         tiZANidine (ZANAFLEX) 4 MG tablet Comments:   Reason for Stopping:               Discharge Procedure Orders (must include Diet, Follow-up, Activity):   Discharge Procedure Orders (must include Diet, Follow-up, Activity)   Ice to affected area     Remove dressing in 48 hours     Activity as tolerated   Order Comments: Use walker, hip dislocation precautions     Weight bearing restrictions (specify):   Order Comments: Use walker, hip dislocation precautions        Follow Up:  Follow up as scheduled.  Resume routine diet.  Activity as tolerated.    No driving day of procedure.

## 2023-08-22 NOTE — TRANSFER OF CARE
"Anesthesia Transfer of Care Note    Patient: Esther Segura    Procedure(s) Performed: Procedure(s) (LRB):  ARTHROPLASTY, HIP RIGHT (Right)    Patient location: PACU    Anesthesia Type: MAC    Transport from OR: Transported from OR on room air with adequate spontaneous ventilation    Post pain: adequate analgesia    Post assessment: no apparent anesthetic complications and tolerated procedure well    Post vital signs: stable    Level of consciousness: awake, alert and oriented    Nausea/Vomiting: no nausea/vomiting    Complications: none    Transfer of care protocol was followed      Last vitals:   Visit Vitals  /65 (BP Location: Left arm, Patient Position: Lying)   Pulse 75   Temp 36.4 °C (97.5 °F) (Skin)   Resp 12   Ht 5' 2" (1.575 m)   Wt 45.4 kg (100 lb)   SpO2 98%   Breastfeeding No   BMI 18.29 kg/m²     "

## 2023-08-22 NOTE — DISCHARGE INSTRUCTIONS
Joint Replacement     Hip  After surgery until first follow-up visit:    DO:  Keep leg elevated (toes above your nose) for several hours per day while recovering from surgery.  Use walker for comfort. You may put weight on affected leg as tolerated.  Minimal activity and advance diet as tolerated.  Keep operative site clean and dry for 48 hours.  Keep ice pack on the knee for 48-72 hours. Ice on for 30 minutes of each hour while awake to reduce pain and swelling.  Resume home medications.   Home health will call the day following surgery to provide follow up.   Physical therapy will be done by home health for two weeks after surgery.      DO NOT:  Do not soak in tub.  Do not drive for 24 hours or while taking narcotic pain medication.  Do not take additional tylenol/acetaminophen while taking narcotic pain medication that contains tylenol/acetaminophen.    CALL PHYSICIAN FOR ANY QUESTIONS OR CONCERNS.    You will have a follow-up appointment in 2 weeks.

## 2023-08-23 VITALS
HEART RATE: 66 BPM | DIASTOLIC BLOOD PRESSURE: 72 MMHG | OXYGEN SATURATION: 98 % | BODY MASS INDEX: 18.4 KG/M2 | RESPIRATION RATE: 16 BRPM | WEIGHT: 100 LBS | TEMPERATURE: 97 F | HEIGHT: 62 IN | SYSTOLIC BLOOD PRESSURE: 154 MMHG

## 2023-08-23 PROCEDURE — G0180 MD CERTIFICATION HHA PATIENT: HCPCS | Mod: ,,, | Performed by: ORTHOPAEDIC SURGERY

## 2023-08-23 PROCEDURE — G0180 PR HOME HEALTH MD CERTIFICATION: ICD-10-PCS | Mod: ,,, | Performed by: ORTHOPAEDIC SURGERY

## 2023-08-23 NOTE — OR NURSING
Spoke to Ms. Imelda for her post-op call. She states she is doing well. Pain at rest is 0/10, pain with activity is 4/10 and tolerable. No drainage to surgical dressing today. She states she believes her percocet kept her awake last night and that she has only had maybe 2 hours of sleep. She would like to take tylenol only as needed for pain. Advised tylenol is OK to take per instructions on label. Informed max dose tylenol should be 3-4 grams within 24 hours. Advised to contact Dr. Tolbert's clinic with any uncontrolled pain. Advised to contact clinic with any questions/concerns. She verbalized understanding of this.

## 2023-08-28 ENCOUNTER — PATIENT OUTREACH (OUTPATIENT)
Dept: ADMINISTRATIVE | Facility: HOSPITAL | Age: 73
End: 2023-08-28
Payer: MEDICARE

## 2023-08-28 ENCOUNTER — DOCUMENT SCAN (OUTPATIENT)
Dept: HOME HEALTH SERVICES | Facility: HOSPITAL | Age: 73
End: 2023-08-28
Payer: MEDICARE

## 2023-08-28 ENCOUNTER — TELEPHONE (OUTPATIENT)
Dept: ORTHOPEDICS | Facility: CLINIC | Age: 73
End: 2023-08-28
Payer: MEDICARE

## 2023-08-28 NOTE — TELEPHONE ENCOUNTER
----- Message from Hong Araujo MA sent at 8/28/2023  3:03 PM CDT -----  Contact: patient  Patient wants to know if she can take Ibuprofen instead of the Tylenol?    Call back number is [phone]

## 2023-08-28 NOTE — TELEPHONE ENCOUNTER
Contacted patient. All questions and concerns have been answered concerning medication and coumadin levels.

## 2023-08-28 NOTE — PROGRESS NOTES
Population Health Chart Review & Patient Outreach Details:     Reason for Outreach Encounter:     []  Non-Compliant Report   []  Payor Report ([employer], PHN, BCBS, MSSP, MCIP, UHC, etc.)   [x]   Chart Review     Updates Requested / Reviewed:     [x]  Care Everywhere    [x]     [x]  External Sources ([facility], [facility], DIS, etc.)   []  Care Team Updated    Patient Outreach Method:    []  Telephone Outreach Completed   [] Successful   [] Left Voicemail   [] Unable to Contact (wrong number, no voicemail)  []  [facility] Portal Outreach Sent  []  Letter Outreach Mailed  []  Fax Sent for External Records  []  External Records Upload    Health Maintenance Topics Addressed and Outreach Outcomes / Actions Taken:        []      Breast Cancer Screening []  Mammo Scheduled      []  External Records Requested     []  Added Reminder to Complete to Upcoming Primary Care Appt Notes     []  Patient Declined     []  Patient Will Call Back to Schedule     []  Patient Will Schedule with External Provider / Order Routed if Applicable             []       Cervical Cancer Screening []  Pap Scheduled      []  External Records Requested     []  Added Reminder to Complete to Upcoming Primary Care Appt Notes     []  Patient Declined     []  Patient Will Call Back to Schedule     []  Patient Will Schedule with External Provider               []          Colorectal Cancer Screening []  Colonoscopy Case Request or Referral Placed     []  External Records Requested     []  Added Reminder to Complete to Upcoming Primary Care Appt Notes     []  Patient Declined     []  Patient Will Call Back to Schedule     []  Patient Will Schedule with External Provider     []  Fit Kit Mailed (add the SmartPhrase under additional notes)     []  Reminded Patient to Complete Home Test             []      Diabetic Eye Exam []  Eye Camera Scheduled or Optometry Referral Placed     []  External Records Requested     []  Added Reminder to Complete to Upcoming  Primary Care Appt Notes     []  Patient Declined     []  Patient Will Call Back to Schedule     []  Patient Will Schedule with External Provider             []      Blood Pressure Control []  Primary Care Follow Up Visit Scheduled     []  Remote Blood Pressure Reading Captured     []  Added Reminder to Complete to Upcoming Primary Care Appt Notes     []  Patient Declined     []  Patient Will Call Back / Patient Will Send Portal Message with Reading     []  Patient Will Call Back to Schedule Provider Visit             []       HbA1c & Other Labs []  Lab Appt Scheduled for Due Labs     []  Primary Care Follow Up Visit Scheduled      []  Reminded Patient to Complete Home Test     []  Added Reminder to Complete to Upcoming Primary Care Appt Notes     []  Patient Declined     []  Patient Will Call Back to Schedule     []  Patient Will Schedule with External Provider / Order Routed if Applicable           [x]    Schedule Primary Care Appt [x]  Primary Care Appt Scheduled     []  Patient Declined     []  Patient Will Call Back to Schedule     []  Pt Established with External Provider & Updated Care Team             []      Medication Adherence []  Primary Care Appointment Scheduled     []  Added Reminder to Upcoming Primary Care Appt Notes     []  Patient Reminded to  Prescription     []  Patient Declined, Provider Notified if Needed     []  Sent Provider Message to Review and/or Add Exclusion to Problem List             []      Osteoporosis Screening []  DXA Appointment Scheduled     []  External Records Requested     []  Added Reminder to Complete to Upcoming Primary Care Appt Notes     []  Patient Declined     []  Patient Will Call Back to Schedule     []  Patient Will Schedule with External Provider / Order Routed if Applicable     Additional Care Coordinator Notes:         Further Action Needed If Patient Returns Outreach:

## 2023-08-31 ENCOUNTER — DOCUMENT SCAN (OUTPATIENT)
Dept: HOME HEALTH SERVICES | Facility: HOSPITAL | Age: 73
End: 2023-08-31
Payer: MEDICARE

## 2023-09-05 ENCOUNTER — DOCUMENT SCAN (OUTPATIENT)
Dept: HOME HEALTH SERVICES | Facility: HOSPITAL | Age: 73
End: 2023-09-05
Payer: MEDICARE

## 2023-09-07 ENCOUNTER — OFFICE VISIT (OUTPATIENT)
Dept: ORTHOPEDICS | Facility: CLINIC | Age: 73
End: 2023-09-07
Payer: MEDICARE

## 2023-09-07 ENCOUNTER — DOCUMENT SCAN (OUTPATIENT)
Dept: HOME HEALTH SERVICES | Facility: HOSPITAL | Age: 73
End: 2023-09-07
Payer: MEDICARE

## 2023-09-07 VITALS — HEIGHT: 62 IN | BODY MASS INDEX: 18.4 KG/M2 | WEIGHT: 100 LBS

## 2023-09-07 DIAGNOSIS — Z96.641 S/P TOTAL RIGHT HIP ARTHROPLASTY: Primary | ICD-10-CM

## 2023-09-07 PROCEDURE — 99024 PR POST-OP FOLLOW-UP VISIT: ICD-10-PCS | Mod: S$GLB,,, | Performed by: ORTHOPAEDIC SURGERY

## 2023-09-07 PROCEDURE — 99024 POSTOP FOLLOW-UP VISIT: CPT | Mod: S$GLB,,, | Performed by: ORTHOPAEDIC SURGERY

## 2023-09-11 ENCOUNTER — PATIENT MESSAGE (OUTPATIENT)
Dept: ORTHOPEDICS | Facility: CLINIC | Age: 73
End: 2023-09-11
Payer: MEDICARE

## 2023-09-11 ENCOUNTER — TELEPHONE (OUTPATIENT)
Dept: ORTHOPEDICS | Facility: CLINIC | Age: 73
End: 2023-09-11
Payer: MEDICARE

## 2023-09-11 DIAGNOSIS — Z96.641 S/P TOTAL RIGHT HIP ARTHROPLASTY: Primary | ICD-10-CM

## 2023-09-13 ENCOUNTER — DOCUMENT SCAN (OUTPATIENT)
Dept: HOME HEALTH SERVICES | Facility: HOSPITAL | Age: 73
End: 2023-09-13
Payer: MEDICARE

## 2023-09-14 ENCOUNTER — EXTERNAL HOME HEALTH (OUTPATIENT)
Dept: HOME HEALTH SERVICES | Facility: HOSPITAL | Age: 73
End: 2023-09-14
Payer: MEDICARE

## 2023-09-14 ENCOUNTER — CLINICAL SUPPORT (OUTPATIENT)
Dept: REHABILITATION | Facility: HOSPITAL | Age: 73
End: 2023-09-14
Attending: ORTHOPAEDIC SURGERY
Payer: MEDICARE

## 2023-09-14 DIAGNOSIS — R29.898 DECREASED STRENGTH OF LOWER EXTREMITY: Primary | ICD-10-CM

## 2023-09-14 DIAGNOSIS — M25.60 RANGE OF MOTION DEFICIT: ICD-10-CM

## 2023-09-14 DIAGNOSIS — R26.9 GAIT ABNORMALITY: ICD-10-CM

## 2023-09-14 DIAGNOSIS — Z96.641 S/P TOTAL RIGHT HIP ARTHROPLASTY: ICD-10-CM

## 2023-09-14 PROCEDURE — 97140 MANUAL THERAPY 1/> REGIONS: CPT | Mod: PN

## 2023-09-14 PROCEDURE — 97110 THERAPEUTIC EXERCISES: CPT | Mod: PN

## 2023-09-14 PROCEDURE — 97161 PT EVAL LOW COMPLEX 20 MIN: CPT | Mod: PN

## 2023-09-15 PROBLEM — M25.60 RANGE OF MOTION DEFICIT: Status: ACTIVE | Noted: 2023-09-15

## 2023-09-15 PROBLEM — R26.9 GAIT ABNORMALITY: Status: ACTIVE | Noted: 2023-09-15

## 2023-09-18 NOTE — PLAN OF CARE
[facility]  Physical Therapy Initial Evaluation    Date: 9/14/2023   Name: Esther Segura  Clinic Number: [id number]    Therapy Diagnosis:   Encounter Diagnoses   Name Primary?    S/P total right hip arthroplasty     Decreased strength of lower extremity Yes    Range of motion deficit     Gait abnormality      Physician: Eliceo Tolbert MD    Physician Orders: PT Eval and Treat   Medical Diagnosis from Referral:   Diagnosis   Z96.641 (ICD-10-CM) - S/P total right hip arthroplasty     Evaluation Date: 9/14/2023  Authorization Period Expiration: 12/31/2023  Plan of Care Expiration: 11/23/2023  Visit # / Visits authorized: 1/ 1    Time In: 805am  Time Out: 850am  Total Appointment Time (timed & untimed codes): 45 minutes    Precautions: Standard, Hip Precautions (lateral approach, no flexion past 90 degrees), Osteopenia, Parkinson's    Date of Surgery: 8/22/2023  Post Op Day: 3 weeks and 2 days as of 9/14  PROCEDURE: right Total Hip Arthroplasty  SURGEON: Eliceo Tolbert M.D.    Subjective   Date of onset: chronic hip pain  History of current condition - Hong reports: hip pain that led to her surgery. She is a  and has always been very bendy. She really misses doing that and wants to get back to it.   She says her doctor said she has a leg length discrepancy now. She is fine with considering a heel lift first but wants to try therapy and strengthening exercises first until this gives her a problem.  She has some pain but overall hasn't had much issues.      Medical History:   Past Medical History:   Diagnosis Date    Fever blister     Hyperlipidemia     Osteopenia     Parkinson's disease     Squamous cell carcinoma 01/18/2019    right forearm       Surgical History:   Esther Segura  has a past surgical history that includes achiles tendon ; broken hand (Right); Colonoscopy; Esophagogastroduodenoscopy (N/A, 7/9/2020); Colonoscopy (N/A, 7/9/2020); Liver biopsy  (N/A, 11/19/2020); and Hip Arthroplasty (Right, 8/22/2023).    Medications:   Esther has a current medication list which includes the following prescription(s): calcium carbonate-vitamin d3, carbidopa, carbidopa-levodopa  mg, cephalexin, covid-19 at-home test, ibuprofen, lactobacillus rhamnosus gg, lorazepam, magnesium citrate, multivit 33-mtfolate-nac-chrom, oxycodone-acetaminophen, rasagiline, and warfarin, and the following Facility-Administered Medications: diphenhydramine, fentanyl, hydromorphone (pf), oxycodone, and oxycodone.    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    Gait pattern:  Bilateral hip drop, trunk lean to the right during stance phase.    Hip Range of Motion:   Left Passive Right Passive   Flexion 110 90 (per precautions)   Extension 20 15   Ext. Rotation 45 20   Int. Rotation 35 20       Hip Joint Mobility: as expected post op    Lower Extremity Strength    Active SLR: able to do without quad lag    Right LE  Left LE    Knee extension: 4/5 Knee extension: 4/5   Knee flexion: 4-/5 Knee flexion: 4-/5   Hip flexion: 4/5 Hip flexion: 4/5   Hip extension:  3-/5 Hip extension: 3+/5   Hip abduction: 3-/5 Hip abduction: 3-/5     Special Tests:  Not performed due to post op  "status.    Flexibility:   Hip flexor and Hamstrings WNL    Palpation:   Non tender to palpation    Sensation: WNL     Functional Testing:   Unable to sit to stand without Upper extremity use due to pain. This decreased after manual interventions.    Balance Assessment:       Evaluation   Single Limb Stance R LE 12 seconds   (<10 sec = HIGH FALL RISK)   Single Limb Stance L LE 30+ seconds  (<10 sec = HIGH FALL RISK)           Evaluation   30 second Chair Rise  (adults > 61 y/o) Unable to complete due to discomfort          Limitation/Restriction for FOTO Hip Survey    Therapist reviewed FOTO scores for Esther Segura on 9/14/2023.   FOTO documents entered into [employer] - see Media section.    Limitation Score: See media section          TREATMENT     Total Treatment time (time-based codes) separate from Evaluation: 20 minutes    Hong received therapeutic exercises to develop strength, endurance, ROM, flexibility, posture, and core stabilization for 10 minutes including:    Bridges 2 x 15, 5"  Clamshell 2 x 15, red band  Straight leg raise 3 x 10    Hong received the following manual therapy techniques: Joint mobilizations were applied to the: right hip for 10 minutes, including:    Lateral and short axis hip distraction grade I-II    Home Exercises and Patient Education Provided    Education provided:   - Home Exercise Program, diagnosis, prognosis, Plan of care  -Hip precautions    Written Home Exercises Provided: yes.  Exercises were reviewed and Hong was able to demonstrate them prior to the end of the session.  Hong demonstrated good  understanding of the education provided.     See EMR under Patient Instructions for exercises provided 9/14/2023.    Assessment   Esther is a 73 y.o. female referred to outpatient Physical Therapy about 3 weeks post right THELMA. She presents with good mobility for her post op status. She demonstrates some increased weakness on the surgical side. She had pain limiting her " ability to sit to stand without Upper extremity support that decreased significantly after light manual interventions for pain control.    Pt prognosis is Good.   Pt will benefit from skilled outpatient Physical Therapy to address the deficits stated above and in the chart below, provide pt/family education, and to maximize pt's level of independence.     Plan of care discussed with patient: Yes  Pt's spiritual, cultural and educational needs considered and patient is agreeable to the plan of care and goals as stated below:     Anticipated Barriers for therapy: compliance to precautions, age, co-morbidities    Medical Necessity is demonstrated by the following  History  Co-morbidities and personal factors that may impact the plan of care Co-morbidities:   Parkinson's , age, low BMI    Personal Factors:   no deficits     moderate   Examination  Body Structures and Functions, activity limitations and participation restrictions that may impact the plan of care Body Regions:   lower extremities    Body Systems:    ROM  strength  balance  gait  transfers  motor control    Participation Restrictions:   none    Activity limitations:   Learning and applying knowledge  no deficits    General Tasks and Commands  no deficits    Communication  no deficits    Mobility  no deficits    Self care  no deficits    Domestic Life  no deficits    Interactions/Relationships  no deficits    Life Areas  no deficits    Community and Social Life  no deficits         moderate   Clinical Presentation stable and uncomplicated low   Decision Making/ Complexity Score: low     Goals:  GOALS: Short Term Goals:  6 weeks  1.Report decreased hip pain  < / =  2/10  to increase tolerance for work and gym activities  2. Increase hip ROM to full pain free in order to be able to perform ADLs without difficulty.  3. Increase strength by 1/3 MMT grade in quad strength  to increase tolerance for ADL and gym activities.  4. Pt to tolerate HEP to improve ROM  and independence with ADL's     Long Term Goals: 12 weeks  1.Report decreased hip pain < / = 0/10  to increase tolerance for work and running activities  2.Patient goal: return to being a   3.Increase strength to >/= 4+/5 in quad/hip  to increase tolerance for ADL and work activities.  4. Pt will report at predicted FOTO score to demonstrate increase in LE function with every day tasks.     Plan   Plan of care Certification: 9/14/2023 to 11/23/2023.    Outpatient Physical Therapy 1-2 times weekly for 10 weeks to include the following interventions: Gait Training, Manual Therapy, Moist Heat/ Ice, Neuromuscular Re-ed, Patient Education, Therapeutic Activities, and Therapeutic Exercise.     Maye Perdue, PT , DPT

## 2023-09-19 ENCOUNTER — DOCUMENT SCAN (OUTPATIENT)
Dept: HOME HEALTH SERVICES | Facility: HOSPITAL | Age: 73
End: 2023-09-19
Payer: MEDICARE

## 2023-09-19 NOTE — TELEPHONE ENCOUNTER
----- Message from Hong Araujo MA sent at 9/11/2023  9:11 AM CDT -----  Contact: patient  Need a outpatient PT referral.  [facility] [address] in [place].  Fax to [phone].    Call back number is [phone]         1. Await path results, the patient will be contacted in 7-10 days with biopsy results. 2.  Avoid NSAIDs continue PPI and anti-GERD measures    3. Repeat colonoscopy: In 5 years for colon cancer screening due to family history and based on coloscopy findings today including prep quality; sooner if her personal or family history as pertaining to colorectal cancer risk changes requiring an earlier exam or if the patient were to develop lower GI symptoms such as bleeding, abdominal pain, change in bowel habits or stool caliber or if the patient has anemia or unexplained weight loss in the future. 4.- Resume previous meds and diet  - GI clinic f/u 6-8 weeks with Ms. Ac Wang  - Keep scheduled f/u appts with other MDs     - NO ASA/NSAIDs x 2 weeks     NSAIDS Non-steroidal Anti-Inflammatory  You have been directed by your physician to avoid any NSAID's; the following medications are a list of those to avoid. If you think that you are taking any NSAID's notify your physician.    Over The Counter  Advil                      Motrin  Nuprin                   Ibuprofen  Midol                     Aleve  Naproxen              Orudis  Aspirin                   Kusum-Valarie  Prescriptions and Generics  Cataflam              Relafen  Voltaren               Clinoril  Indocin                 Naproxen  Arthrotec              Lodine  Daypro                 Nalfon  Toradol                Ansaid  Feldene               Meclofenamate  Fenoprofen          Ponstel  Mobic                   Celebrex  Vioxx

## 2023-09-21 ENCOUNTER — OFFICE VISIT (OUTPATIENT)
Dept: FAMILY MEDICINE | Facility: CLINIC | Age: 73
End: 2023-09-21
Payer: MEDICARE

## 2023-09-21 VITALS
HEART RATE: 75 BPM | HEIGHT: 62 IN | OXYGEN SATURATION: 97 % | WEIGHT: 97.69 LBS | BODY MASS INDEX: 17.98 KG/M2 | TEMPERATURE: 98 F | SYSTOLIC BLOOD PRESSURE: 110 MMHG | DIASTOLIC BLOOD PRESSURE: 70 MMHG

## 2023-09-21 DIAGNOSIS — Z12.31 ENCOUNTER FOR SCREENING MAMMOGRAM FOR MALIGNANT NEOPLASM OF BREAST: ICD-10-CM

## 2023-09-21 DIAGNOSIS — E16.2 HYPOGLYCEMIA: Primary | ICD-10-CM

## 2023-09-21 DIAGNOSIS — Z96.641 STATUS POST RIGHT HIP REPLACEMENT: ICD-10-CM

## 2023-09-21 PROCEDURE — 3074F SYST BP LT 130 MM HG: CPT | Mod: CPTII,S$GLB,, | Performed by: NURSE PRACTITIONER

## 2023-09-21 PROCEDURE — 3288F PR FALLS RISK ASSESSMENT DOCUMENTED: ICD-10-PCS | Mod: CPTII,S$GLB,, | Performed by: NURSE PRACTITIONER

## 2023-09-21 PROCEDURE — 1159F PR MEDICATION LIST DOCUMENTED IN MEDICAL RECORD: ICD-10-PCS | Mod: CPTII,S$GLB,, | Performed by: NURSE PRACTITIONER

## 2023-09-21 PROCEDURE — 99999 PR PBB SHADOW E&M-EST. PATIENT-LVL III: ICD-10-PCS | Mod: PBBFAC,,, | Performed by: NURSE PRACTITIONER

## 2023-09-21 PROCEDURE — 90694 VACC AIIV4 NO PRSRV 0.5ML IM: CPT | Mod: S$GLB,,, | Performed by: NURSE PRACTITIONER

## 2023-09-21 PROCEDURE — 1159F MED LIST DOCD IN RCRD: CPT | Mod: CPTII,S$GLB,, | Performed by: NURSE PRACTITIONER

## 2023-09-21 PROCEDURE — 90694 FLU VACCINE - QUADRIVALENT - ADJUVANTED: ICD-10-PCS | Mod: S$GLB,,, | Performed by: NURSE PRACTITIONER

## 2023-09-21 PROCEDURE — 3078F DIAST BP <80 MM HG: CPT | Mod: CPTII,S$GLB,, | Performed by: NURSE PRACTITIONER

## 2023-09-21 PROCEDURE — 1101F PR PT FALLS ASSESS DOC 0-1 FALLS W/OUT INJ PAST YR: ICD-10-PCS | Mod: CPTII,S$GLB,, | Performed by: NURSE PRACTITIONER

## 2023-09-21 PROCEDURE — G0008 FLU VACCINE - QUADRIVALENT - ADJUVANTED: ICD-10-PCS | Mod: S$GLB,,, | Performed by: NURSE PRACTITIONER

## 2023-09-21 PROCEDURE — 3074F PR MOST RECENT SYSTOLIC BLOOD PRESSURE < 130 MM HG: ICD-10-PCS | Mod: CPTII,S$GLB,, | Performed by: NURSE PRACTITIONER

## 2023-09-21 PROCEDURE — 3288F FALL RISK ASSESSMENT DOCD: CPT | Mod: CPTII,S$GLB,, | Performed by: NURSE PRACTITIONER

## 2023-09-21 PROCEDURE — G0008 ADMIN INFLUENZA VIRUS VAC: HCPCS | Mod: S$GLB,,, | Performed by: NURSE PRACTITIONER

## 2023-09-21 PROCEDURE — 99999 PR PBB SHADOW E&M-EST. PATIENT-LVL III: CPT | Mod: PBBFAC,,, | Performed by: NURSE PRACTITIONER

## 2023-09-21 PROCEDURE — 99214 OFFICE O/P EST MOD 30 MIN: CPT | Mod: S$GLB,,, | Performed by: NURSE PRACTITIONER

## 2023-09-21 PROCEDURE — 1101F PT FALLS ASSESS-DOCD LE1/YR: CPT | Mod: CPTII,S$GLB,, | Performed by: NURSE PRACTITIONER

## 2023-09-21 PROCEDURE — 1126F PR PAIN SEVERITY QUANTIFIED, NO PAIN PRESENT: ICD-10-PCS | Mod: CPTII,S$GLB,, | Performed by: NURSE PRACTITIONER

## 2023-09-21 PROCEDURE — 99214 PR OFFICE/OUTPT VISIT, EST, LEVL IV, 30-39 MIN: ICD-10-PCS | Mod: S$GLB,,, | Performed by: NURSE PRACTITIONER

## 2023-09-21 PROCEDURE — 1126F AMNT PAIN NOTED NONE PRSNT: CPT | Mod: CPTII,S$GLB,, | Performed by: NURSE PRACTITIONER

## 2023-09-21 PROCEDURE — 3078F PR MOST RECENT DIASTOLIC BLOOD PRESSURE < 80 MM HG: ICD-10-PCS | Mod: CPTII,S$GLB,, | Performed by: NURSE PRACTITIONER

## 2023-09-21 NOTE — PROGRESS NOTES
Subjective:       Patient ID: Esther Segura is a 73 y.o. female.    Chief Complaint: Blood Sugar Problem    HPI  Patient presents to discuss hypoglycemia    S/p right hip replacement 8/22/23  Preop labs noted hypoglycemia, 56    BMI 17.86. fast metabolism. Patient repots sometimes feeling lightheaded before lunch. No syncope     Vitals:    09/21/23 0919   BP: 110/70   Pulse:    Temp:      Review of Systems   Constitutional:  Negative for fever.   Neurological:  Negative for syncope.       Past Medical History:   Diagnosis Date    Fever blister     Hyperlipidemia     Osteopenia     Parkinson's disease     Squamous cell carcinoma 01/18/2019    right forearm     Objective:      Physical Exam  Constitutional:       General: She is not in acute distress.     Appearance: She is well-developed. She is not ill-appearing, toxic-appearing or diaphoretic.   HENT:      Right Ear: Hearing normal.      Left Ear: Hearing normal.   Pulmonary:      Effort: No tachypnea or respiratory distress.   Skin:     Coloration: Skin is not pale.   Neurological:      Mental Status: She is alert and oriented to person, place, and time.   Psychiatric:         Speech: Speech normal.         Behavior: Behavior normal.         Thought Content: Thought content normal.         Judgment: Judgment normal.         Assessment:       1. Hypoglycemia    2. BMI less than 19,adult    3. Encounter for screening mammogram for malignant neoplasm of breast        Plan:       Hypoglycemia   Fast metabolism. Encouraged healthy snacks between meals. Discussed long and short acting carbs, monitoring parameters, s/s hypoglycemia     BMI less than 19,adult    Encounter for screening mammogram for malignant neoplasm of breast  -     Mammo Digital Screening Bilat w/ Tod; Future; Expected date: 09/21/2023    S/p hip replacement, right   Doing great, FU with Dr Tolbert 11/15/23       Former patient of Dr Zheng, scheduled to establish with physician in [place] 5  months. FU between with me as needed      Medication List with Changes/Refills   Current Medications    CALCIUM CARBONATE-VITAMIN D3 500 MG(1,250MG) -400 UNIT TAB        CARBIDOPA (LODOSYN) 25 MG TABLET    Take 1 tablet (25 mg total) by mouth 3 (three) times daily.    CARBIDOPA-LEVODOPA  MG (SINEMET)  MG PER TABLET    TAKE 1 AND 1/2 TABLETS BY MOUTH AM, 2 tabs in afternoon and 1.5 tabs in the evening.    IBUPROFEN (ADVIL,MOTRIN) 600 MG TABLET    Take 1 tablet (600 mg total) by mouth every 6 (six) hours as needed for Pain (knee pain).    LACTOBACILLUS RHAMNOSUS GG (CULTURELLE) 10 BILLION CELL CAPSULE    Take 1 capsule by mouth once daily. 15 billion count    LORAZEPAM (ATIVAN) 0.5 MG TABLET    Take 1 tablet (0.5 mg total) by mouth every other day.    MAGNESIUM CITRATE ORAL    Take by mouth once. Taking 448mg OTC capsule    MULTIVIT 33-MTFOLATE-NAC-CHROM 2.5-200-1 MG-MG-MG CAP        RASAGILINE (AZILECT) 1 MG TAB    TAKE 1 TABLET BY MOUTH ONCE DAILY IN THE EVENING   Discontinued Medications    CEPHALEXIN (KEFLEX) 500 MG CAPSULE    Take 1 capsule (500 mg total) by mouth every 6 (six) hours.    COVID-19 AT-HOME TEST KIT    use as directed    OXYCODONE-ACETAMINOPHEN (PERCOCET) 5-325 MG PER TABLET    Take 1 tablet by mouth every 4 to 6 hours as needed for Pain.    WARFARIN (COUMADIN) 5 MG TABLET    Take 1 tablet (5 mg total) by mouth Daily.

## 2023-09-25 ENCOUNTER — CLINICAL SUPPORT (OUTPATIENT)
Dept: REHABILITATION | Facility: HOSPITAL | Age: 73
End: 2023-09-25
Payer: MEDICARE

## 2023-09-25 DIAGNOSIS — R26.9 GAIT ABNORMALITY: ICD-10-CM

## 2023-09-25 DIAGNOSIS — M25.60 RANGE OF MOTION DEFICIT: Primary | ICD-10-CM

## 2023-09-25 PROCEDURE — 97530 THERAPEUTIC ACTIVITIES: CPT | Mod: PN

## 2023-09-25 PROCEDURE — 97140 MANUAL THERAPY 1/> REGIONS: CPT | Mod: PN

## 2023-09-25 NOTE — PROGRESS NOTES
[facility]   Physical Therapy Treatment Note     Name: Esther Macario [facility] Number: [id number]    Therapy Diagnosis:   Encounter Diagnoses   Name Primary?    Range of motion deficit Yes    Gait abnormality      Physician: Eliceo Tolbert MD    Visit Date: 9/25/2023    Physician Orders: PT Eval and Treat   Medical Diagnosis from Referral:   Diagnosis   Z96.641 (ICD-10-CM) - S/P total right hip arthroplasty      Evaluation Date: 9/14/2023  Authorization Period Expiration: 12/31/2023  Plan of Care Expiration: 11/23/2023  Visit # / Visits authorized: 1/ 24      FOTO 1st:   FOTO 3rd:  FOTO 10th:    Time in: 300pm  Time out: 355pm  Total Billable Time: 30 minutes    Precautions:  Standard, Hip Precautions (lateral approach, no flexion past 90 degrees), Osteopenia, Parkinson's     Date of Surgery: 8/22/2023  Post Op Day: 3 weeks and 2 days as of 9/14  PROCEDURE: right Total Hip Arthroplasty  SURGEON: Eliceo Tolbert M.D.      SUBJECTIVE     Pt reports: exercises are going fine. She feels okay.     She was compliant with home exercise program.  Response to previous treatment: ongoing  Functional change: ongoing    Pain: 2/10  Location: right hip     OBJECTIVE     Objective Measures updated at progress report unless specified.     Treatment       Hong received the treatments listed below:      Manual therapy techniques: Joint mobilizations and Soft tissue Mobilization were applied to the: right hip for 8 minutes, including:    Lateral and short axis distraction with belt grade II    Therapeutic exercises to develop strength, endurance, ROM, flexibility, posture, and core stabilization for 0 minutes including:    Neuromuscular re-education activities to improve: Balance, Coordination, Kinesthetic, Sense, Proprioception, and Posture for 17 minutes. The following activities were included:    Bridges staggered stance 2 x 15 Bilateral    Straight leg raise with 2# ankle weight 2 x 15  Red  band resisted side steps 3 laps of 10 yards    Therapeutic activities to improve functional performance for 30  minutes, including:    Lateral step ups 3 x 15 Bilateral   Shuttle press 3 x 10 at 50#  Shuttle press single leg 25# 2 x 10  Nustep for activity tolerance 10' level 2      Patient Education and Home Exercises     Home Exercises Provided and Patient Education Provided     Education provided:   - Home Exercise Program     Written Home Exercises Provided: Patient instructed to cont prior HEP. Exercises were reviewed and Hong was able to demonstrate them prior to the end of the session.  Hong demonstrated good  understanding of the education provided. See EMR under Patient Instructions for exercises provided during therapy sessions    ASSESSMENT     Hong tolerated treatment well today that focused on progressing Home Exercise Program exercises and initiating functional strengthening interventions. She tolerated all of this well with no pain. She was feeling better after the session and less stiff.    Hong is progressing well towards her goals.     Pt prognosis is Good.     Pt will continue to benefit from skilled outpatient physical therapy to address the deficits listed in the problem list box on initial evaluation, provide pt/family education and to maximize pt's level of independence in the home and community environment.     Pt's spiritual, cultural and educational needs considered and pt agreeable to plan of care and goals.     Anticipated barriers to physical therapy: compliance to precautions, age, co-morbidities    Goals:   Short Term Goals:  6 weeks  1.Report decreased hip pain  < / =  2/10  to increase tolerance for work and gym activities  2. Increase hip ROM to full pain free in order to be able to perform ADLs without difficulty.  3. Increase strength by 1/3 MMT grade in quad strength  to increase tolerance for ADL and gym activities.  4. Pt to tolerate HEP to improve ROM and independence with  ADL's     Long Term Goals: 12 weeks  1.Report decreased hip pain < / = 0/10  to increase tolerance for work and running activities  2.Patient goal: return to being a   3.Increase strength to >/= 4+/5 in quad/hip  to increase tolerance for ADL and work activities.  4. Pt will report at predicted FOTO score to demonstrate increase in LE function with every day tasks.     PLAN   Plan of care Certification: 9/14/2023 to 11/23/2023.    Progress as tolerated per total hip protocol.    Maye Perdue, PT , DPT

## 2023-09-27 ENCOUNTER — CLINICAL SUPPORT (OUTPATIENT)
Dept: REHABILITATION | Facility: HOSPITAL | Age: 73
End: 2023-09-27
Payer: MEDICARE

## 2023-09-27 DIAGNOSIS — R26.9 GAIT ABNORMALITY: ICD-10-CM

## 2023-09-27 DIAGNOSIS — M25.60 RANGE OF MOTION DEFICIT: Primary | ICD-10-CM

## 2023-09-27 PROCEDURE — 97140 MANUAL THERAPY 1/> REGIONS: CPT | Mod: PN

## 2023-09-27 PROCEDURE — 97112 NEUROMUSCULAR REEDUCATION: CPT | Mod: PN

## 2023-09-27 PROCEDURE — 97530 THERAPEUTIC ACTIVITIES: CPT | Mod: PN

## 2023-09-27 NOTE — PROGRESS NOTES
[facility]   Physical Therapy Treatment Note     Name: Esther Macario [facility] Number: [id number]    Therapy Diagnosis:   Encounter Diagnoses   Name Primary?    Range of motion deficit Yes    Gait abnormality        Physician: Eliceo Tolbert MD    Visit Date: 9/27/2023    Physician Orders: PT Eval and Treat   Medical Diagnosis from Referral:   Diagnosis   Z96.641 (ICD-10-CM) - S/P total right hip arthroplasty      Evaluation Date: 9/14/2023  Authorization Period Expiration: 12/31/2023  Plan of Care Expiration: 11/23/2023  Visit # / Visits authorized: 2/ 24      FOTO 1st:   FOTO 3rd:  FOTO 10th:    Time in: 300pm  Time out: 355pm  Total Billable Time: 55 minutes    Precautions:  Standard, Hip Precautions (lateral approach, no flexion past 90 degrees), Osteopenia, Parkinson's     Date of Surgery: 8/22/2023  Post Op Day: 3 weeks and 2 days as of 9/14  PROCEDURE: right Total Hip Arthroplasty  SURGEON: Eliceo Tolbert M.D.      SUBJECTIVE     Pt reports: feeling better every day. Almost feels normal except when she twists a certain way. Sees the doctor next week. Wants to be able to pull her knees to her chest and bend forward and touch the ground normally.    She was compliant with home exercise program.  Response to previous treatment: ongoing  Functional change: ongoing    Pain: 2/10  Location: right hip     OBJECTIVE     Objective Measures updated at progress report unless specified.     Treatment       Hong received the treatments listed below:      Manual therapy techniques: Joint mobilizations and Soft tissue Mobilization were applied to the: right hip for 8 minutes, including:    Lateral and short axis distraction with belt grade II    Therapeutic exercises to develop strength, endurance, ROM, flexibility, posture, and core stabilization for 0 minutes including:    Neuromuscular re-education activities to improve: Balance, Coordination, Kinesthetic, Sense, Proprioception,  and Posture for 27 minutes. The following activities were included:    Bridges with kick out 2 x 15 Bilateral    Straight leg raise with 2# ankle weight x 30 Bilateral   Red band resisted side steps 3 laps of 10 yards    Therapeutic activities to improve functional performance for 20  minutes, including:    Shuttle press 3 x 10 at 50#  Upright bike to improve tolerance of biking 10' (seat high enough to avoid hip flexion>90)    Not Performed Today:  Lateral step ups 3 x 15 Bilateral   Shuttle press single leg 25# 2 x 10  Nustep for activity tolerance 10' level 2      Patient Education and Home Exercises     Home Exercises Provided and Patient Education Provided     Education provided:   - Home Exercise Program     Written Home Exercises Provided: Patient instructed to cont prior HEP. Exercises were reviewed and Hong was able to demonstrate them prior to the end of the session.  Hong demonstrated good  understanding of the education provided. See EMR under Patient Instructions for exercises provided during therapy sessions    ASSESSMENT     Hong tolerated treatment well today. She was having some fatigue in her hip flexor that may be due to quad weakness and compensation. Upright bike done today since she wants to get back to riding her road bike. She tolerated well. She will see the surgeon soon and ask about hip precautions.    Hong is progressing well towards her goals.     Pt prognosis is Good.     Pt will continue to benefit from skilled outpatient physical therapy to address the deficits listed in the problem list box on initial evaluation, provide pt/family education and to maximize pt's level of independence in the home and community environment.     Pt's spiritual, cultural and educational needs considered and pt agreeable to plan of care and goals.     Anticipated barriers to physical therapy: compliance to precautions, age, co-morbidities    Goals:   Short Term Goals:  6 weeks  1.Report decreased hip  pain  < / =  2/10  to increase tolerance for work and gym activities  2. Increase hip ROM to full pain free in order to be able to perform ADLs without difficulty.  3. Increase strength by 1/3 MMT grade in quad strength  to increase tolerance for ADL and gym activities.  4. Pt to tolerate HEP to improve ROM and independence with ADL's     Long Term Goals: 12 weeks  1.Report decreased hip pain < / = 0/10  to increase tolerance for work and running activities  2.Patient goal: return to being a   3.Increase strength to >/= 4+/5 in quad/hip  to increase tolerance for ADL and work activities.  4. Pt will report at predicted FOTO score to demonstrate increase in LE function with every day tasks.     PLAN   Plan of care Certification: 9/14/2023 to 11/23/2023.    Progress as tolerated per total hip protocol.    Maye Perdue, PT , DPT

## 2023-09-29 DIAGNOSIS — Z96.641 S/P TOTAL RIGHT HIP ARTHROPLASTY: Primary | ICD-10-CM

## 2023-10-02 ENCOUNTER — CLINICAL SUPPORT (OUTPATIENT)
Dept: REHABILITATION | Facility: HOSPITAL | Age: 73
End: 2023-10-02
Payer: MEDICARE

## 2023-10-02 DIAGNOSIS — M25.60 RANGE OF MOTION DEFICIT: Primary | ICD-10-CM

## 2023-10-02 DIAGNOSIS — R26.9 GAIT ABNORMALITY: ICD-10-CM

## 2023-10-02 PROCEDURE — 97530 THERAPEUTIC ACTIVITIES: CPT | Mod: PN

## 2023-10-02 PROCEDURE — 97140 MANUAL THERAPY 1/> REGIONS: CPT | Mod: PN

## 2023-10-02 NOTE — PROGRESS NOTES
[facility]   Physical Therapy Treatment Note     Name: Esther Macario [facility] Number: [id number]    Therapy Diagnosis:   Encounter Diagnoses   Name Primary?    Range of motion deficit Yes    Gait abnormality        Physician: Eliceo Tolbert MD    Visit Date: 10/2/2023    Physician Orders: PT Eval and Treat   Medical Diagnosis from Referral:   Diagnosis   Z96.641 (ICD-10-CM) - S/P total right hip arthroplasty      Evaluation Date: 9/14/2023  Authorization Period Expiration: 12/31/2023  Plan of Care Expiration: 11/23/2023  Visit # / Visits authorized: 2/ 24      FOTO 1st:   FOTO 3rd:  FOTO 10th:    Time in: 300pm  Time out: 355pm  Total Billable Time: 55 minutes    Precautions:  Standard, Hip Precautions (lateral approach, no flexion past 90 degrees), Osteopenia, Parkinson's     Date of Surgery: 8/22/2023  Post Op Day: 3 weeks and 2 days as of 9/14  PROCEDURE: right Total Hip Arthroplasty  SURGEON: Eliceo Tolbert M.D.      SUBJECTIVE     Pt reports: sometimes she forgets what hip she had surgery on so that's nice. Still sore though.    She was compliant with home exercise program.  Response to previous treatment: ongoing  Functional change: ongoing    Pain: 2/10  Location: right hip     OBJECTIVE     Objective Measures updated at progress report unless specified.     Treatment       Hong received the treatments listed below:      Manual therapy techniques: Joint mobilizations and Soft tissue Mobilization were applied to the: right hip for 8 minutes, including:    Lateral and short axis distraction with belt grade II    Therapeutic exercises to develop strength, endurance, ROM, flexibility, posture, and core stabilization for 0 minutes including:    Neuromuscular re-education activities to improve: Balance, Coordination, Kinesthetic, Sense, Proprioception, and Posture for 12 minutes. The following activities were included:    Bridges with kick out 2 x 15 Bilateral    Straight  leg raise with 2# ankle weight x 30 Bilateral   Red band resisted side steps 3 laps of 10 yards    Therapeutic activities to improve functional performance for 2  minutes, including:    Shuttle press 3 x 10 at 50#  Shuttle press single leg 3 x 10 at 12.5#  Upright bike to improve tolerance of biking 10' (seat high enough to avoid hip flexion>90)  Sit to stand 3 x 10 5#    Not Performed Today:  Lateral step ups 3 x 15 Bilateral   Shuttle press single leg 25# 2 x 10  Nustep for activity tolerance 10' level 2      Patient Education and Home Exercises     Home Exercises Provided and Patient Education Provided     Education provided:   - Home Exercise Program     Written Home Exercises Provided: Patient instructed to cont prior HEP. Exercises were reviewed and Hong was able to demonstrate them prior to the end of the session.  Hong demonstrated good  understanding of the education provided. See EMR under Patient Instructions for exercises provided during therapy sessions    ASSESSMENT     Hong tolerated treatment well today. Will progress as able. She sees the doc on Wednesday to get an updated about her precautions. She wants to stretch really bad.    Hong is progressing well towards her goals.     Pt prognosis is Good.     Pt will continue to benefit from skilled outpatient physical therapy to address the deficits listed in the problem list box on initial evaluation, provide pt/family education and to maximize pt's level of independence in the home and community environment.     Pt's spiritual, cultural and educational needs considered and pt agreeable to plan of care and goals.     Anticipated barriers to physical therapy: compliance to precautions, age, co-morbidities    Goals:   Short Term Goals:  6 weeks  1.Report decreased hip pain  < / =  2/10  to increase tolerance for work and gym activities  2. Increase hip ROM to full pain free in order to be able to perform ADLs without difficulty.  3. Increase strength  by 1/3 MMT grade in quad strength  to increase tolerance for ADL and gym activities.  4. Pt to tolerate HEP to improve ROM and independence with ADL's     Long Term Goals: 12 weeks  1.Report decreased hip pain < / = 0/10  to increase tolerance for work and running activities  2.Patient goal: return to being a   3.Increase strength to >/= 4+/5 in quad/hip  to increase tolerance for ADL and work activities.  4. Pt will report at predicted FOTO score to demonstrate increase in LE function with every day tasks.     PLAN   Plan of care Certification: 9/14/2023 to 11/23/2023.    Progress as tolerated per total hip protocol.    Maye Perdue, PT , DPT

## 2023-10-04 ENCOUNTER — OFFICE VISIT (OUTPATIENT)
Dept: ORTHOPEDICS | Facility: CLINIC | Age: 73
End: 2023-10-04
Payer: MEDICARE

## 2023-10-04 ENCOUNTER — CLINICAL SUPPORT (OUTPATIENT)
Dept: REHABILITATION | Facility: HOSPITAL | Age: 73
End: 2023-10-04
Payer: MEDICARE

## 2023-10-04 ENCOUNTER — HOSPITAL ENCOUNTER (OUTPATIENT)
Dept: RADIOLOGY | Facility: HOSPITAL | Age: 73
Discharge: HOME | End: 2023-10-04
Attending: ORTHOPAEDIC SURGERY
Payer: MEDICARE

## 2023-10-04 VITALS — WEIGHT: 97 LBS | BODY MASS INDEX: 17.85 KG/M2 | HEIGHT: 62 IN

## 2023-10-04 DIAGNOSIS — R26.9 GAIT ABNORMALITY: ICD-10-CM

## 2023-10-04 DIAGNOSIS — Z96.641 S/P TOTAL RIGHT HIP ARTHROPLASTY: ICD-10-CM

## 2023-10-04 DIAGNOSIS — M25.60 RANGE OF MOTION DEFICIT: Primary | ICD-10-CM

## 2023-10-04 DIAGNOSIS — Z96.641 S/P TOTAL RIGHT HIP ARTHROPLASTY: Primary | ICD-10-CM

## 2023-10-04 PROCEDURE — 1101F PR PT FALLS ASSESS DOC 0-1 FALLS W/OUT INJ PAST YR: ICD-10-PCS | Mod: CPTII,S$GLB,, | Performed by: ORTHOPAEDIC SURGERY

## 2023-10-04 PROCEDURE — 97140 MANUAL THERAPY 1/> REGIONS: CPT | Mod: PN

## 2023-10-04 PROCEDURE — 1101F PT FALLS ASSESS-DOCD LE1/YR: CPT | Mod: CPTII,S$GLB,, | Performed by: ORTHOPAEDIC SURGERY

## 2023-10-04 PROCEDURE — 1126F PR PAIN SEVERITY QUANTIFIED, NO PAIN PRESENT: ICD-10-PCS | Mod: CPTII,S$GLB,, | Performed by: ORTHOPAEDIC SURGERY

## 2023-10-04 PROCEDURE — 73502 X-RAY EXAM HIP UNI 2-3 VIEWS: CPT | Mod: TC,PO,RT

## 2023-10-04 PROCEDURE — 99024 PR POST-OP FOLLOW-UP VISIT: ICD-10-PCS | Mod: S$GLB,,, | Performed by: ORTHOPAEDIC SURGERY

## 2023-10-04 PROCEDURE — 99999 PR PBB SHADOW E&M-EST. PATIENT-LVL III: CPT | Mod: PBBFAC,,, | Performed by: ORTHOPAEDIC SURGERY

## 2023-10-04 PROCEDURE — 1159F MED LIST DOCD IN RCRD: CPT | Mod: CPTII,S$GLB,, | Performed by: ORTHOPAEDIC SURGERY

## 2023-10-04 PROCEDURE — 3288F FALL RISK ASSESSMENT DOCD: CPT | Mod: CPTII,S$GLB,, | Performed by: ORTHOPAEDIC SURGERY

## 2023-10-04 PROCEDURE — 73502 XR ORTHO PELVIS_HIP POST OP RIGHT: ICD-10-PCS | Mod: 26,RT,, | Performed by: RADIOLOGY

## 2023-10-04 PROCEDURE — 97530 THERAPEUTIC ACTIVITIES: CPT | Mod: PN

## 2023-10-04 PROCEDURE — 1159F PR MEDICATION LIST DOCUMENTED IN MEDICAL RECORD: ICD-10-PCS | Mod: CPTII,S$GLB,, | Performed by: ORTHOPAEDIC SURGERY

## 2023-10-04 PROCEDURE — 99024 POSTOP FOLLOW-UP VISIT: CPT | Mod: S$GLB,,, | Performed by: ORTHOPAEDIC SURGERY

## 2023-10-04 PROCEDURE — 1126F AMNT PAIN NOTED NONE PRSNT: CPT | Mod: CPTII,S$GLB,, | Performed by: ORTHOPAEDIC SURGERY

## 2023-10-04 PROCEDURE — 3288F PR FALLS RISK ASSESSMENT DOCUMENTED: ICD-10-PCS | Mod: CPTII,S$GLB,, | Performed by: ORTHOPAEDIC SURGERY

## 2023-10-04 PROCEDURE — 73502 X-RAY EXAM HIP UNI 2-3 VIEWS: CPT | Mod: 26,RT,, | Performed by: RADIOLOGY

## 2023-10-04 PROCEDURE — 99999 PR PBB SHADOW E&M-EST. PATIENT-LVL III: ICD-10-PCS | Mod: PBBFAC,,, | Performed by: ORTHOPAEDIC SURGERY

## 2023-10-04 NOTE — PROGRESS NOTES
[facility]   Physical Therapy Treatment Note     Name: Esther Macario [facility] Number: [id number]    Therapy Diagnosis:   Encounter Diagnoses   Name Primary?    Range of motion deficit Yes    Gait abnormality        Physician: Eliceo Tolbert MD    Visit Date: 10/4/2023    Physician Orders: PT Eval and Treat   Medical Diagnosis from Referral:   Diagnosis   Z96.641 (ICD-10-CM) - S/P total right hip arthroplasty      Evaluation Date: 9/14/2023  Authorization Period Expiration: 12/31/2023  Plan of Care Expiration: 11/23/2023  Visit # / Visits authorized: 4/ 24      FOTO 1st:   FOTO 3rd:  FOTO 10th:    Time in: 400pm  Time out: 455pm  Total Billable Time: 55 minutes    Precautions:  Standard, Hip Precautions (lateral approach, no flexion past 90 degrees), Osteopenia, Parkinson's     Date of Surgery: 8/22/2023  Post Op Day: 3 weeks and 2 days as of 9/14  PROCEDURE: right Total Hip Arthroplasty  SURGEON: Eliceo Tolbert M.D.      SUBJECTIVE     Pt reports: saw the doctor this morning and he removed all of her precautions. She will try walking in the pool tomorrow and try yoga on Friday. She plans to take it easy and ease back into things.    She was compliant with home exercise program.  Response to previous treatment: felt better  Functional change: improved functional ability    Pain: 2/10  Location: right hip     OBJECTIVE     Objective Measures updated at progress report unless specified.     Right hip mobility improved.  Range of motion less painful.    Treatment       Hong received the treatments listed below:      Manual therapy techniques: Joint mobilizations and Soft tissue Mobilization were applied to the: right hip for 8 minutes, including:    Lateral and short axis distraction with belt grade II    Therapeutic exercises to develop strength, endurance, ROM, flexibility, posture, and core stabilization for 0 minutes including:    Neuromuscular re-education activities to  improve: Balance, Coordination, Kinesthetic, Sense, Proprioception, and Posture for 12 minutes. The following activities were included:    Bridges with kick out 2 x 15 Bilateral    Straight leg raise with 2# ankle weight x 30 Bilateral   Red band resisted side steps 3 laps of 10 yards    Therapeutic activities to improve functional performance for 2  minutes, including:    Shuttle press 3 x 10 at 50#  Shuttle press single leg 3 x 10 at 12.5#  Upright bike to improve tolerance of biking 10' (seat high enough to avoid hip flexion>90)  Sit to stand 3 x 10 5#    Not Performed Today:  Lateral step ups 3 x 15 Bilateral   Shuttle press single leg 25# 2 x 10  Nustep for activity tolerance 10' level 2      Patient Education and Home Exercises     Home Exercises Provided and Patient Education Provided     Education provided:   - Home Exercise Program     Written Home Exercises Provided: Patient instructed to cont prior HEP. Exercises were reviewed and Hong was able to demonstrate them prior to the end of the session.  Hong demonstrated good  understanding of the education provided. See EMR under Patient Instructions for exercises provided during therapy sessions    ASSESSMENT     Hong tolerated treatment well today. She plans to progress exercise outside of therapy with pool exercises and yoga. She reports feeling the most relief in therapy with the lateral hip distraction. She is doing well, progress as tolerated.    Hong is progressing well towards her goals.     Pt prognosis is Good.     Pt will continue to benefit from skilled outpatient physical therapy to address the deficits listed in the problem list box on initial evaluation, provide pt/family education and to maximize pt's level of independence in the home and community environment.     Pt's spiritual, cultural and educational needs considered and pt agreeable to plan of care and goals.     Anticipated barriers to physical therapy: compliance to precautions,  age, co-morbidities    Goals:   Short Term Goals:  6 weeks  1.Report decreased hip pain  < / =  2/10  to increase tolerance for work and gym activities  2. Increase hip ROM to full pain free in order to be able to perform ADLs without difficulty.  3. Increase strength by 1/3 MMT grade in quad strength  to increase tolerance for ADL and gym activities.  4. Pt to tolerate HEP to improve ROM and independence with ADL's     Long Term Goals: 12 weeks  1.Report decreased hip pain < / = 0/10  to increase tolerance for work and running activities  2.Patient goal: return to being a   3.Increase strength to >/= 4+/5 in quad/hip  to increase tolerance for ADL and work activities.  4. Pt will report at predicted FOTO score to demonstrate increase in LE function with every day tasks.     PLAN   Plan of care Certification: 9/14/2023 to 11/23/2023.    Progress as tolerated per total hip protocol.    Maye Perdue, PT , DPT

## 2023-10-05 NOTE — PLAN OF CARE
[facility]   Physical Therapy Treatment Note     Name: Esther Macario [facility] Number: [id number]    Therapy Diagnosis:   Encounter Diagnoses   Name Primary?    Range of motion deficit Yes    Gait abnormality        Physician: Eliceo Tolbert MD    Visit Date: 10/4/2023    Physician Orders: PT Eval and Treat   Medical Diagnosis from Referral:   Diagnosis   Z96.641 (ICD-10-CM) - S/P total right hip arthroplasty      Evaluation Date: 9/14/2023  Authorization Period Expiration: 12/31/2023  Plan of Care Expiration: 11/23/2023  Visit # / Visits authorized: 4/ 24      FOTO 1st:   FOTO 3rd:  FOTO 10th:    Time in: 400pm  Time out: 455pm  Total Billable Time: 55 minutes    Precautions:  Standard, Hip Precautions (lateral approach, no flexion past 90 degrees), Osteopenia, Parkinson's     Date of Surgery: 8/22/2023  Post Op Day: 3 weeks and 2 days as of 9/14  PROCEDURE: right Total Hip Arthroplasty  SURGEON: Elicoe Tolbert M.D.      SUBJECTIVE     Pt reports: saw the doctor this morning and he removed all of her precautions. She will try walking in the pool tomorrow and try yoga on Friday. She plans to take it easy and ease back into things.    She was compliant with home exercise program.  Response to previous treatment: felt better  Functional change: improved functional ability    Pain: 2/10  Location: right hip     OBJECTIVE     Objective Measures updated at progress report unless specified.     Right hip mobility improved.  Range of motion less painful.    Treatment       Hong received the treatments listed below:      Manual therapy techniques: Joint mobilizations and Soft tissue Mobilization were applied to the: right hip for 8 minutes, including:    Lateral and short axis distraction with belt grade II    Therapeutic exercises to develop strength, endurance, ROM, flexibility, posture, and core stabilization for 0 minutes including:    Neuromuscular re-education activities to  improve: Balance, Coordination, Kinesthetic, Sense, Proprioception, and Posture for 12 minutes. The following activities were included:    Bridges with kick out 2 x 15 Bilateral    Straight leg raise with 2# ankle weight x 30 Bilateral   Red band resisted side steps 3 laps of 10 yards    Therapeutic activities to improve functional performance for 2  minutes, including:    Shuttle press 3 x 10 at 50#  Shuttle press single leg 3 x 10 at 12.5#  Upright bike to improve tolerance of biking 10' (seat high enough to avoid hip flexion>90)  Sit to stand 3 x 10 5#    Not Performed Today:  Lateral step ups 3 x 15 Bilateral   Shuttle press single leg 25# 2 x 10  Nustep for activity tolerance 10' level 2      Patient Education and Home Exercises     Home Exercises Provided and Patient Education Provided     Education provided:   - Home Exercise Program     Written Home Exercises Provided: Patient instructed to cont prior HEP. Exercises were reviewed and Hong was able to demonstrate them prior to the end of the session.  Hong demonstrated good  understanding of the education provided. See EMR under Patient Instructions for exercises provided during therapy sessions    ASSESSMENT     Hong tolerated treatment well today. She plans to progress exercise outside of therapy with pool exercises and yoga. She reports feeling the most relief in therapy with the lateral hip distraction. She is doing well, progress as tolerated.    Hong is progressing well towards her goals.     Pt prognosis is Good.     Pt will continue to benefit from skilled outpatient physical therapy to address the deficits listed in the problem list box on initial evaluation, provide pt/family education and to maximize pt's level of independence in the home and community environment.     Pt's spiritual, cultural and educational needs considered and pt agreeable to plan of care and goals.     Anticipated barriers to physical therapy: compliance to precautions,  age, co-morbidities    Goals:   Short Term Goals:  6 weeks  1.Report decreased hip pain  < / =  2/10  to increase tolerance for work and gym activities  2. Increase hip ROM to full pain free in order to be able to perform ADLs without difficulty.  3. Increase strength by 1/3 MMT grade in quad strength  to increase tolerance for ADL and gym activities.  4. Pt to tolerate HEP to improve ROM and independence with ADL's     Long Term Goals: 12 weeks  1.Report decreased hip pain < / = 0/10  to increase tolerance for work and running activities  2.Patient goal: return to being a   3.Increase strength to >/= 4+/5 in quad/hip  to increase tolerance for ADL and work activities.  4. Pt will report at predicted FOTO score to demonstrate increase in LE function with every day tasks.     PLAN   Plan of care Certification: 9/14/2023 to 11/23/2023.    Progress as tolerated per total hip protocol.    Maye Perdue, PT , DPT

## 2023-10-11 ENCOUNTER — CLINICAL SUPPORT (OUTPATIENT)
Dept: REHABILITATION | Facility: HOSPITAL | Age: 73
End: 2023-10-11
Payer: MEDICARE

## 2023-10-11 DIAGNOSIS — M25.60 RANGE OF MOTION DEFICIT: Primary | ICD-10-CM

## 2023-10-11 DIAGNOSIS — R26.9 GAIT ABNORMALITY: ICD-10-CM

## 2023-10-11 PROCEDURE — 97530 THERAPEUTIC ACTIVITIES: CPT | Mod: PN,CQ

## 2023-10-11 PROCEDURE — 97112 NEUROMUSCULAR REEDUCATION: CPT | Mod: PN,CQ

## 2023-10-11 PROCEDURE — 97140 MANUAL THERAPY 1/> REGIONS: CPT | Mod: PN,CQ

## 2023-10-11 NOTE — PROGRESS NOTES
[facility]   Physical Therapy Treatment Note     Name: Esther Macario [facility] Number: [id number]    Therapy Diagnosis:   Encounter Diagnoses   Name Primary?    Range of motion deficit Yes    Gait abnormality        Physician: Eliceo Tolbert MD    Visit Date: 10/11/2023    Physician Orders: PT Eval and Treat   Medical Diagnosis from Referral:   Diagnosis   Z96.641 (ICD-10-CM) - S/P total right hip arthroplasty      Evaluation Date: 9/14/2023  Authorization Period Expiration: 12/31/2023  Plan of Care Expiration: 11/23/2023  Visit # / Visits authorized: 4/ 24      FOTO 1st:   FOTO 3rd:  FOTO 10th:    Time in: 400pm  Time out: 455pm  Total Billable Time: 55 minutes    Precautions:  Standard, Hip Precautions (lateral approach, no flexion past 90 degrees), Osteopenia, Parkinson's     Date of Surgery: 8/22/2023  Post Op Day: 3 weeks and 2 days as of 9/14  PROCEDURE: right Total Hip Arthroplasty  SURGEON: Eliceo Tolbert M.D.      SUBJECTIVE     Pt reports: She is so glad she has been cleared to work on flexibility.     She was compliant with home exercise program.  Response to previous treatment: felt better  Functional change: improved functional ability    Pain: 2/10  Location: right hip     OBJECTIVE     Objective Measures updated at progress report unless specified.     Right hip mobility improved.  Range of motion less painful.    Treatment       Hong received the treatments listed below:      Manual therapy techniques: Joint mobilizations and Soft tissue Mobilization were applied to the: right hip for 15 minutes, including:    Lateral and short axis distraction with belt grade II  Roller on right adductors      Therapeutic exercises to develop strength, endurance, ROM, flexibility, posture, and core stabilization for 0 minutes including:    Neuromuscular re-education activities to improve: Balance, Coordination, Kinesthetic, Sense, Proprioception, and Posture for 13 minutes.  The following activities were included:    Bridges with kick out 2 x 15 Bilateral    Straight leg raise with 2# ankle weight x 30 Bilateral   Red band resisted side steps 3 laps of 10 yards    Therapeutic activities to improve functional performance for 25  minutes, including:    Elliptical 6 minutes    Butterfly stretch 3 x 30 sec   Shuttle press 3 x 10 at 50#  Shuttle press single leg 3 x 10 at 25#  Upright bike to improve tolerance of biking 10' (seat high enough to avoid hip flexion>90)  Sit to stand 3 x 10 5#    Not Performed Today:  Lateral step ups 3 x 15 Bilateral   Shuttle press single leg 25# 2 x 10  Nustep for activity tolerance 10' level 2      Patient Education and Home Exercises     Home Exercises Provided and Patient Education Provided     Education provided:   - Home Exercise Program     Written Home Exercises Provided: Patient instructed to cont prior HEP. Exercises were reviewed and Hong was able to demonstrate them prior to the end of the session.  Hong demonstrated good  understanding of the education provided. See EMR under Patient Instructions for exercises provided during therapy sessions    ASSESSMENT     Hong tolerated treatment well today. She reported adductor tightness on right lower extremity following some of her exercises so butterfly stretches and roller for soft tissue manipulation were added to treatment to address this complaint. She was encouraged to perform this stretch to home exercise program. She stated that she will return to a yoga routine soon.      Hong is progressing well towards her goals.     Pt prognosis is Good.     Pt will continue to benefit from skilled outpatient physical therapy to address the deficits listed in the problem list box on initial evaluation, provide pt/family education and to maximize pt's level of independence in the home and community environment.     Pt's spiritual, cultural and educational needs considered and pt agreeable to plan of care  and goals.     Anticipated barriers to physical therapy: compliance to precautions, age, co-morbidities    Goals:   Short Term Goals:  6 weeks  1.Report decreased hip pain  < / =  2/10  to increase tolerance for work and gym activities  2. Increase hip ROM to full pain free in order to be able to perform ADLs without difficulty.  3. Increase strength by 1/3 MMT grade in quad strength  to increase tolerance for ADL and gym activities.  4. Pt to tolerate HEP to improve ROM and independence with ADL's     Long Term Goals: 12 weeks  1.Report decreased hip pain < / = 0/10  to increase tolerance for work and running activities  2.Patient goal: return to being a   3.Increase strength to >/= 4+/5 in quad/hip  to increase tolerance for ADL and work activities.  4. Pt will report at predicted FOTO score to demonstrate increase in LE function with every day tasks.     PLAN   Plan of care Certification: 9/14/2023 to 11/23/2023.    Progress as tolerated per total hip protocol.    Zully Allison, PTA , DPT

## 2023-10-12 ENCOUNTER — DOCUMENTATION ONLY (OUTPATIENT)
Dept: REHABILITATION | Facility: HOSPITAL | Age: 73
End: 2023-10-12
Payer: MEDICARE

## 2023-10-18 ENCOUNTER — CLINICAL SUPPORT (OUTPATIENT)
Dept: REHABILITATION | Facility: HOSPITAL | Age: 73
End: 2023-10-18
Payer: MEDICARE

## 2023-10-18 DIAGNOSIS — R26.9 GAIT ABNORMALITY: ICD-10-CM

## 2023-10-18 DIAGNOSIS — M25.60 RANGE OF MOTION DEFICIT: Primary | ICD-10-CM

## 2023-10-18 PROCEDURE — 97110 THERAPEUTIC EXERCISES: CPT | Mod: PN,CQ

## 2023-10-18 PROCEDURE — 97112 NEUROMUSCULAR REEDUCATION: CPT | Mod: PN,CQ

## 2023-10-18 PROCEDURE — 97530 THERAPEUTIC ACTIVITIES: CPT | Mod: PN,CQ

## 2023-10-18 NOTE — PROGRESS NOTES
[facility]   Physical Therapy Treatment Note     Name: Esther Macario [facility] Number: [id number]    Therapy Diagnosis:   Encounter Diagnoses   Name Primary?    Range of motion deficit Yes    Gait abnormality        Physician: Eliceo Tolbert MD    Visit Date: 10/18/2023    Physician Orders: PT Eval and Treat   Medical Diagnosis from Referral:   Diagnosis   Z96.641 (ICD-10-CM) - S/P total right hip arthroplasty      Evaluation Date: 9/14/2023  Authorization Period Expiration: 12/31/2023  Plan of Care Expiration: 11/23/2023  Visit # / Visits authorized: 5/ 24      FOTO 1st:   FOTO 3rd:  FOTO 10th:    Time in: 100pm  Time out: 200 pm  Total Billable Time: 55 minutes    Precautions:  Standard, Hip Precautions (lateral approach, no flexion past 90 degrees), Osteopenia, Parkinson's     Date of Surgery: 8/22/2023  Post Op Day: 3 weeks and 2 days as of 9/14  PROCEDURE: right Total Hip Arthroplasty  SURGEON: Eliceo Tolbert M.D.      SUBJECTIVE     Pt reports: She was able to go to a yoga class without incident today.     She was compliant with home exercise program.  Response to previous treatment: felt better  Functional change: return to yoga class     Pain: 2/10  Location: right hip     OBJECTIVE     Objective Measures updated at progress report unless specified.     Right hip mobility improved.  Range of motion less painful.    Treatment     Hong received the treatments listed below:      Manual therapy techniques: Joint mobilizations and Soft tissue Mobilization were applied to the: right hip for 0 minutes, including:    Lateral and short axis distraction with belt grade II  Roller on right adductors      Therapeutic exercises to develop strength, endurance, ROM, flexibility, posture, and core stabilization for 10 minutes including:    Hamstring stretch 3 x 30   Butterfly stretch 3 x 30   ITBand stretch 3 x 30     Neuromuscular re-education activities to improve: Balance,  Coordination, Kinesthetic, Sense, Proprioception, and Posture for 13 minutes. The following activities were included:    Bridges with kick out 2 x 15 Bilateral    Straight leg raise with 2# ankle weight x 30 Bilateral   Red band resisted side steps 3 laps of 10 yards    Therapeutic activities to improve functional performance for 25  minutes, including:    Elliptical 6 minutes    Butterfly stretch 3 x 30 sec   Shuttle press 3 x 10 at 62 #  Shuttle press single leg 2 x 10 at 25#  Upright bike to improve tolerance of biking 10' (seat high enough to avoid hip flexion>90)  Sit to stand 3 x 10 5#    Not Performed Today:  Lateral step ups 3 x 15 Bilateral   Shuttle press single leg 25# 2 x 10  Nustep for activity tolerance 10' level 2      Patient Education and Home Exercises     Home Exercises Provided and Patient Education Provided     Education provided:   - Home Exercise Program     Written Home Exercises Provided: Patient instructed to cont prior HEP. Exercises were reviewed and Hong was able to demonstrate them prior to the end of the session.  Hong demonstrated good  understanding of the education provided. See EMR under Patient Instructions for exercises provided during therapy sessions    ASSESSMENT     Hong tolerated treatment well today. Improved flexibility noted as compared to previous visit. She is somewhat limited by knee pain reported following SLRs and donkey kicks were attempted but ceased due to knee pain. Despite this limitation pt is slowly but steadily gaining strength and functional ability.   Hong is progressing well towards her goals.     Pt prognosis is Good.     Pt will continue to benefit from skilled outpatient physical therapy to address the deficits listed in the problem list box on initial evaluation, provide pt/family education and to maximize pt's level of independence in the home and community environment.     Pt's spiritual, cultural and educational needs considered and pt agreeable  to plan of care and goals.     Anticipated barriers to physical therapy: compliance to precautions, age, co-morbidities    Goals:   Short Term Goals:  6 weeks  1.Report decreased hip pain  < / =  2/10  to increase tolerance for work and gym activities  2. Increase hip ROM to full pain free in order to be able to perform ADLs without difficulty.  3. Increase strength by 1/3 MMT grade in quad strength  to increase tolerance for ADL and gym activities.  4. Pt to tolerate HEP to improve ROM and independence with ADL's     Long Term Goals: 12 weeks  1.Report decreased hip pain < / = 0/10  to increase tolerance for work and running activities  2.Patient goal: return to being a   3.Increase strength to >/= 4+/5 in quad/hip  to increase tolerance for ADL and work activities.  4. Pt will report at predicted FOTO score to demonstrate increase in LE function with every day tasks.     PLAN   Plan of care Certification: 9/14/2023 to 11/23/2023.    Progress as tolerated per total hip protocol.    Zully Allison, PTA

## 2023-10-25 ENCOUNTER — CLINICAL SUPPORT (OUTPATIENT)
Dept: REHABILITATION | Facility: HOSPITAL | Age: 73
End: 2023-10-25
Payer: MEDICARE

## 2023-10-25 DIAGNOSIS — R26.9 GAIT ABNORMALITY: ICD-10-CM

## 2023-10-25 DIAGNOSIS — M25.60 RANGE OF MOTION DEFICIT: Primary | ICD-10-CM

## 2023-10-25 PROCEDURE — 97110 THERAPEUTIC EXERCISES: CPT | Mod: PN,CQ

## 2023-10-25 PROCEDURE — 97530 THERAPEUTIC ACTIVITIES: CPT | Mod: PN,CQ

## 2023-10-25 PROCEDURE — 97112 NEUROMUSCULAR REEDUCATION: CPT | Mod: PN,CQ

## 2023-10-25 NOTE — PROGRESS NOTES
[facility]   Physical Therapy Treatment Note     Name: Esther Macario [facility] Number: [id number]    Therapy Diagnosis:   Encounter Diagnoses   Name Primary?    Range of motion deficit Yes    Gait abnormality        Physician: Eliceo Tolbret MD    Visit Date: 10/25/2023    Physician Orders: PT Eval and Treat   Medical Diagnosis from Referral:   Diagnosis   Z96.641 (ICD-10-CM) - S/P total right hip arthroplasty      Evaluation Date: 9/14/2023  Authorization Period Expiration: 12/31/2023  Plan of Care Expiration: 11/23/2023  Visit # / Visits authorized: 5/ 24      FOTO 1st:   FOTO 3rd: 10/25/23  FOTO 10th:    Time in: 100pm  Time out: 200 pm  Total Billable Time: 55 minutes    Precautions:  Standard, Hip Precautions (lateral approach, no flexion past 90 degrees), Osteopenia, Parkinson's     Date of Surgery: 8/22/2023  Post Op Day: 3 weeks and 2 days as of 9/14  PROCEDURE: right Total Hip Arthroplasty  SURGEON: Eliceo Tolbert M.D.      SUBJECTIVE     Pt reports: She is performing all her normal activities of daily living but still feels tight when performing her yoga classes.     She was compliant with home exercise program.  Response to previous treatment: felt better  Functional change: return to yoga class     Pain: 2/10  Location: right hip     OBJECTIVE     Objective Measures updated at progress report unless specified.     Right hip mobility improved.  Range of motion less painful.    Treatment     Hong received the treatments listed below:      Manual therapy techniques: Joint mobilizations and Soft tissue Mobilization were applied to the: right hip for 0 minutes, including:    Lateral and short axis distraction with belt grade II  Roller on right adductors      Therapeutic exercises to develop strength, endurance, ROM, flexibility, posture, and core stabilization for 10 minutes including:    Hamstring stretch 3 x 30   Butterfly stretch 3 x 30   ITBand stretch 3 x 30      Neuromuscular re-education activities to improve: Balance, Coordination, Kinesthetic, Sense, Proprioception, and Posture for 13 minutes. The following activities were included:    Bridges with kick out 2 x 15 Bilateral    Straight leg raise with 2# ankle weight x 30 Bilateral   Red band resisted side steps 3 laps of 10 yards    Therapeutic activities to improve functional performance for 25  minutes, including:    Elliptical 6 minutes    Butterfly stretch 3 x 30 sec   Shuttle press 3 x 10 at 62 #  Shuttle press single leg 2 x 10 at 25#  Upright bike to improve tolerance of biking 10' (seat high enough to avoid hip flexion>90)  Sit to stand 3 x 10 5#    Not Performed Today:  Lateral step ups 3 x 15 Bilateral   Shuttle press single leg 25# 2 x 10  Nustep for activity tolerance 10' level 2      Patient Education and Home Exercises     Home Exercises Provided and Patient Education Provided     Education provided:   - Home Exercise Program     Written Home Exercises Provided: Patient instructed to cont prior HEP. Exercises were reviewed and Hong was able to demonstrate them prior to the end of the session.  Hong demonstrated good  understanding of the education provided. See EMR under Patient Instructions for exercises provided during therapy sessions    ASSESSMENT     Hong tolerated treatment well today. She was able to perform all recommended therapeutic exercises without onset of pain. She feels like she is ready to be discharged at this time.   Hong is progressing well towards her goals.     Pt prognosis is Good.     Pt will continue to benefit from skilled outpatient physical therapy to address the deficits listed in the problem list box on initial evaluation, provide pt/family education and to maximize pt's level of independence in the home and community environment.     Pt's spiritual, cultural and educational needs considered and pt agreeable to plan of care and goals.     Anticipated barriers to physical  therapy: compliance to precautions, age, co-morbidities    Goals:   Short Term Goals:  6 weeks  1.Report decreased hip pain  < / =  2/10  to increase tolerance for work and gym activities  2. Increase hip ROM to full pain free in order to be able to perform ADLs without difficulty.  3. Increase strength by 1/3 MMT grade in quad strength  to increase tolerance for ADL and gym activities.  4. Pt to tolerate HEP to improve ROM and independence with ADL's     Long Term Goals: 12 weeks  1.Report decreased hip pain < / = 0/10  to increase tolerance for work and running activities  2.Patient goal: return to being a   3.Increase strength to >/= 4+/5 in quad/hip  to increase tolerance for ADL and work activities.  4. Pt will report at predicted FOTO score to demonstrate increase in LE function with every day tasks.     PLAN   Plan of care Certification: 9/14/2023 to 11/23/2023.    Progress as tolerated per total hip protocol.    Zully Allison, PTA

## 2023-11-13 DIAGNOSIS — Z96.641 S/P TOTAL RIGHT HIP ARTHROPLASTY: Primary | ICD-10-CM

## 2023-11-15 ENCOUNTER — OFFICE VISIT (OUTPATIENT)
Dept: ORTHOPEDICS | Facility: CLINIC | Age: 73
End: 2023-11-15
Payer: MEDICARE

## 2023-11-15 ENCOUNTER — HOSPITAL ENCOUNTER (OUTPATIENT)
Dept: RADIOLOGY | Facility: HOSPITAL | Age: 73
Discharge: HOME | End: 2023-11-15
Attending: ORTHOPAEDIC SURGERY
Payer: MEDICARE

## 2023-11-15 VITALS — WEIGHT: 97 LBS | HEIGHT: 62 IN | BODY MASS INDEX: 17.85 KG/M2

## 2023-11-15 DIAGNOSIS — Z96.641 S/P TOTAL RIGHT HIP ARTHROPLASTY: ICD-10-CM

## 2023-11-15 DIAGNOSIS — Z96.641 S/P TOTAL RIGHT HIP ARTHROPLASTY: Primary | ICD-10-CM

## 2023-11-15 PROCEDURE — 99999 PR PBB SHADOW E&M-EST. PATIENT-LVL III: CPT | Mod: PBBFAC,,, | Performed by: ORTHOPAEDIC SURGERY

## 2023-11-15 PROCEDURE — 1126F AMNT PAIN NOTED NONE PRSNT: CPT | Mod: CPTII,S$GLB,, | Performed by: ORTHOPAEDIC SURGERY

## 2023-11-15 PROCEDURE — 73502 X-RAY EXAM HIP UNI 2-3 VIEWS: CPT | Mod: TC,PO,RT

## 2023-11-15 PROCEDURE — 1101F PR PT FALLS ASSESS DOC 0-1 FALLS W/OUT INJ PAST YR: ICD-10-PCS | Mod: CPTII,S$GLB,, | Performed by: ORTHOPAEDIC SURGERY

## 2023-11-15 PROCEDURE — 99999 PR PBB SHADOW E&M-EST. PATIENT-LVL III: ICD-10-PCS | Mod: PBBFAC,,, | Performed by: ORTHOPAEDIC SURGERY

## 2023-11-15 PROCEDURE — 99024 POSTOP FOLLOW-UP VISIT: CPT | Mod: S$GLB,,, | Performed by: ORTHOPAEDIC SURGERY

## 2023-11-15 PROCEDURE — 1159F MED LIST DOCD IN RCRD: CPT | Mod: CPTII,S$GLB,, | Performed by: ORTHOPAEDIC SURGERY

## 2023-11-15 PROCEDURE — 73502 XR ORTHO PELVIS_HIP POST OP RIGHT: ICD-10-PCS | Mod: 26,RT,, | Performed by: RADIOLOGY

## 2023-11-15 PROCEDURE — 73502 X-RAY EXAM HIP UNI 2-3 VIEWS: CPT | Mod: 26,RT,, | Performed by: RADIOLOGY

## 2023-11-15 PROCEDURE — 1126F PR PAIN SEVERITY QUANTIFIED, NO PAIN PRESENT: ICD-10-PCS | Mod: CPTII,S$GLB,, | Performed by: ORTHOPAEDIC SURGERY

## 2023-11-15 PROCEDURE — 1101F PT FALLS ASSESS-DOCD LE1/YR: CPT | Mod: CPTII,S$GLB,, | Performed by: ORTHOPAEDIC SURGERY

## 2023-11-15 PROCEDURE — 99024 PR POST-OP FOLLOW-UP VISIT: ICD-10-PCS | Mod: S$GLB,,, | Performed by: ORTHOPAEDIC SURGERY

## 2023-11-15 PROCEDURE — 1159F PR MEDICATION LIST DOCUMENTED IN MEDICAL RECORD: ICD-10-PCS | Mod: CPTII,S$GLB,, | Performed by: ORTHOPAEDIC SURGERY

## 2023-11-15 PROCEDURE — 3288F FALL RISK ASSESSMENT DOCD: CPT | Mod: CPTII,S$GLB,, | Performed by: ORTHOPAEDIC SURGERY

## 2023-11-15 PROCEDURE — 3288F PR FALLS RISK ASSESSMENT DOCUMENTED: ICD-10-PCS | Mod: CPTII,S$GLB,, | Performed by: ORTHOPAEDIC SURGERY

## 2023-11-15 NOTE — PROGRESS NOTES
73 years old 3 months out from hip replacement surgery on the right reporting minimal to no pain.  She does describe tightness in the hamstrings and some discomfort in her lower back.  She does not feel as flexible as she did prior to the surgery         Exam shows leg length discrepancy right leg being longer than the left.  She is nontender greater trochanter on the left         X-rays show well placed components benign-appearing lucency noted greater trochanter on the left     Assessment: 3 months out from hip replacement surgery with leg length discrepancy     Plan: We will get her fitted with a lift, we did discuss the possibility of head exchange.  Follow-up in a few months' time with repeat x-rays of her right hip

## 2023-12-04 ENCOUNTER — TELEPHONE (OUTPATIENT)
Dept: NEUROLOGY | Facility: CLINIC | Age: 73
End: 2023-12-04
Payer: MEDICARE

## 2023-12-04 NOTE — TELEPHONE ENCOUNTER
----- Message from Jared Rousseau MA sent at 2023  8:46 AM CST -----  Regardinmo recall appt  Contact: pt at [phone]  Pt is calling to speak with someone in provider office is asking for a return regarding her 6 month follow  up call please call pt at [phone]

## 2023-12-15 ENCOUNTER — TELEPHONE (OUTPATIENT)
Dept: FAMILY MEDICINE | Facility: CLINIC | Age: 73
End: 2023-12-15
Payer: MEDICARE

## 2023-12-15 NOTE — TELEPHONE ENCOUNTER
----- Message from Tessa Kearns sent at 12/15/2023  4:45 PM CST -----  Contact: self  Type: Needs Medical Advice  Who Called:  Patient    Best Call Back Number: [phone]    Additional Information: States she would like to speak with office regarding wanting to see if her and  Jorge Luis Segura can change appt dates.Please call back and advice

## 2023-12-19 ENCOUNTER — TELEPHONE (OUTPATIENT)
Dept: FAMILY MEDICINE | Facility: CLINIC | Age: 73
End: 2023-12-19
Payer: MEDICARE

## 2023-12-19 NOTE — TELEPHONE ENCOUNTER
----- Message from Lesa Rojas sent at 12/19/2023  2:29 PM CST -----  Regarding: call back  Contact: patient  Type: Needs Medical Advice  Who Called:  patient   Symptoms (please be specific):    How long has patient had these symptoms:    Pharmacy name and phone #:    Best Call Back Number: [phone]     Additional Information:   ashley salazar i evaristo summers MRN: [id number]  Returning ur call are u available

## 2023-12-22 DIAGNOSIS — G20.A1 PARKINSON'S DISEASE: ICD-10-CM

## 2023-12-22 RX ORDER — RASAGILINE 1 MG/1
TABLET ORAL
Qty: 90 TABLET | Refills: 0 | Status: SHIPPED | OUTPATIENT
Start: 2023-12-22

## 2023-12-22 NOTE — TELEPHONE ENCOUNTER
Received notice in inbasket that individual needs refill of Rasagiline 1 mg tabs. Order is pending.

## 2024-01-08 ENCOUNTER — OFFICE VISIT (OUTPATIENT)
Dept: DERMATOLOGY | Facility: CLINIC | Age: 74
End: 2024-01-08
Payer: MEDICARE

## 2024-01-08 VITALS — WEIGHT: 97 LBS | HEIGHT: 62 IN | BODY MASS INDEX: 17.85 KG/M2

## 2024-01-08 DIAGNOSIS — L82.0 INFLAMED SEBORRHEIC KERATOSIS: Primary | ICD-10-CM

## 2024-01-08 DIAGNOSIS — Z08 ENCOUNTER FOR FOLLOW-UP SURVEILLANCE OF SKIN CANCER: ICD-10-CM

## 2024-01-08 DIAGNOSIS — L82.1 SEBORRHEIC KERATOSES: ICD-10-CM

## 2024-01-08 DIAGNOSIS — D22.9 MULTIPLE BENIGN NEVI: ICD-10-CM

## 2024-01-08 DIAGNOSIS — Z85.828 ENCOUNTER FOR FOLLOW-UP SURVEILLANCE OF SKIN CANCER: ICD-10-CM

## 2024-01-08 DIAGNOSIS — D18.01 CHERRY ANGIOMA: ICD-10-CM

## 2024-01-08 DIAGNOSIS — L81.4 SOLAR LENTIGO: ICD-10-CM

## 2024-01-08 PROCEDURE — 17110 DESTRUCTION B9 LES UP TO 14: CPT | Mod: S$GLB,,, | Performed by: DERMATOLOGY

## 2024-01-08 PROCEDURE — 99213 OFFICE O/P EST LOW 20 MIN: CPT | Mod: 25,S$GLB,, | Performed by: DERMATOLOGY

## 2024-01-08 NOTE — PROGRESS NOTES
Subjective:      Patient ID:  Esther Segura is a 73 y.o. female who presents for   Chief Complaint   Patient presents with    Skin Check    Spot     On back     LOV- 4/3/23- sk, angioma,    Here today for a TBSC  C/o spot lower left back x 2 weeks that itches    Interim hip replacement (R)      Derm HX:   2/11/2019 for excision of SCC/KA R forearm, complicated by suture granuloma  Has no fhx of MM    Current Outpatient Medications:   ·  calcium carbonate-vitamin D3 500 mg(1,250mg) -400 unit Tab, , Disp: , Rfl:   ·  carbidopa (LODOSYN) 25 mg tablet, Take 1 tablet (25 mg total) by mouth 3 (three) times daily., Disp: 270 tablet, Rfl: 03  ·  carbidopa-levodopa  mg (SINEMET)  mg per tablet, TAKE 1 AND 1/2 TABLETS BY MOUTH AM, 2 tabs in afternoon and 1.5 tabs in the evening., Disp: 450 tablet, Rfl: 03  ·  ibuprofen (ADVIL,MOTRIN) 600 MG tablet, Take 1 tablet (600 mg total) by mouth every 6 (six) hours as needed for Pain (knee pain)., Disp: 30 tablet, Rfl: 0  ·  Lactobacillus rhamnosus GG (CULTURELLE) 10 billion cell capsule, Take 1 capsule by mouth once daily. 15 billion count, Disp: , Rfl:   ·  LORazepam (ATIVAN) 0.5 MG tablet, Take 1 tablet (0.5 mg total) by mouth every other day., Disp: 30 tablet, Rfl: 5  ·  MAGNESIUM CITRATE ORAL, Take by mouth once. Taking 448mg OTC capsule, Disp: , Rfl:   ·  multivit 33-mtfolate-nac-chrom 2.5-200-1 mg-mg-mg Cap, , Disp: , Rfl:   ·  rasagiline (AZILECT) 1 mg Tab, TAKE 1 TABLET BY MOUTH ONCE DAILY IN THE EVENING, Disp: 90 tablet, Rfl: 0            Review of Systems   Constitutional:  Negative for fever, chills and fatigue.   Respiratory:  Negative for cough and shortness of breath.    Skin:  Positive for daily sunscreen use, activity-related sunscreen use and wears hat. Negative for itching, rash and dry skin.   Hematologic/Lymphatic: Does not bruise/bleed easily.       Objective:   Physical Exam   Constitutional: She appears well-developed and well-nourished. No  distress.   HENT:   Mouth/Throat: Lips normal.    Eyes: Lids are normal.    Neurological: She is alert and oriented to person, place, and time. She is not disoriented.   Psychiatric: She has a normal mood and affect. She is not agitated.   Skin:   Areas Examined (abnormalities noted in diagram):   Scalp / Hair Palpated and Inspected  Head / Face Inspection Performed  Neck Inspection Performed  Chest / Axilla Inspection Performed  Abdomen Inspection Performed  Genitals / Buttocks / Groin Inspection Performed  Back Inspection Performed  RUE Inspected  LUE Inspection Performed  RLE Inspected  LLE Inspection Performed  Nails and Digits Inspection Performed                     Diagram Legend     Erythematous scaling macule/papule c/w actinic keratosis       Vascular papule c/w angioma      Pigmented verrucoid papule/plaque c/w seborrheic keratosis      Yellow umbilicated papule c/w sebaceous hyperplasia      Irregularly shaped tan macule c/w lentigo     1-2 mm smooth white papules consistent with Milia      Movable subcutaneous cyst with punctum c/w epidermal inclusion cyst      Subcutaneous movable cyst c/w pilar cyst      Firm pink to brown papule c/w dermatofibroma      Pedunculated fleshy papule(s) c/w skin tag(s)      Evenly pigmented macule c/w junctional nevus     Mildly variegated pigmented, slightly irregular-bordered macule c/w mildly atypical nevus      Flesh colored to evenly pigmented papule c/w intradermal nevus       Pink pearly papule/plaque c/w basal cell carcinoma      Erythematous hyperkeratotic cursted plaque c/w SCC      Surgical scar with no sign of skin cancer recurrence      Open and closed comedones      Inflammatory papules and pustules      Verrucoid papule consistent consistent with wart     Erythematous eczematous patches and plaques     Dystrophic onycholytic nail with subungual debris c/w onychomycosis     Umbilicated papule    Erythematous-base heme-crusted tan verrucoid plaque consistent  with inflamed seborrheic keratosis     Erythematous Silvery Scaling Plaque c/w Psoriasis     See annotation      Assessment / Plan:        Inflamed seborrheic keratosis  Cryosurgery procedure note:    Verbal consent from the patient is obtained. Liquid nitrogen cryosurgery is applied to 1 lesions to produce a freeze injury. The patient is aware that blisters may form and is instructed on wound care with gentle cleansing and use of vaseline ointment to keep moist until healed. The patient is supplied a handout on cryosurgery and is instructed to call if lesions do not completely resolve.    Seborrheic keratoses  These are benign inherited growths without a malignant potential. Reassurance given to patient. No treatment is necessary.     Encounter for follow-up surveillance of skin cancer  Area of previous SCC (R forearm) examined. Site well healed with no signs of recurrence     Total body skin examination performed today including at least 12 points as noted in physical examination. No lesions suspicious for malignancy noted.    Solar lentigo  This is a benign hyperpigmented sun induced lesion. Daily sun protection will reduce the number of new lesions. Treatment of these benign lesions are considered cosmetic.    Multiple benign nevi  Careful dermoscopy evaluation of nevi performed with none identified as needing biopsy today  Monitor for new mole or moles that are becoming bigger, darker, irritated, or developing irregular borders.     Cherry angioma  This is a benign vascular lesion. Reassurance given. No treatment required.     Patient instructed in importance in daily broad spectrum sun protection of at least spf 30. Mineral sunscreen ingredients preferred (Zinc +/- Titanium) and can be found OTC.   Recommend Elta MD for daily use on face and neck.  Patient encouraged to wear hat for all outdoor exposure.   Also discussed sun avoidance and use of protective clothing.             Follow up in about 1 year  (around 1/8/2025).

## 2024-02-07 ENCOUNTER — PATIENT MESSAGE (OUTPATIENT)
Dept: RESEARCH | Facility: HOSPITAL | Age: 74
End: 2024-02-07
Payer: MEDICARE

## 2024-02-12 DIAGNOSIS — M25.551 RIGHT HIP PAIN: Primary | ICD-10-CM

## 2024-02-21 DIAGNOSIS — Z96.641 S/P TOTAL RIGHT HIP ARTHROPLASTY: Primary | ICD-10-CM

## 2024-02-26 ENCOUNTER — OFFICE VISIT (OUTPATIENT)
Dept: ORTHOPEDICS | Facility: CLINIC | Age: 74
End: 2024-02-26
Payer: MEDICARE

## 2024-02-26 ENCOUNTER — HOSPITAL ENCOUNTER (OUTPATIENT)
Dept: RADIOLOGY | Facility: HOSPITAL | Age: 74
Discharge: HOME | End: 2024-02-26
Attending: ORTHOPAEDIC SURGERY
Payer: MEDICARE

## 2024-02-26 VITALS — HEIGHT: 62 IN | BODY MASS INDEX: 17.85 KG/M2 | WEIGHT: 97 LBS

## 2024-02-26 DIAGNOSIS — Z96.641 S/P TOTAL RIGHT HIP ARTHROPLASTY: Primary | ICD-10-CM

## 2024-02-26 DIAGNOSIS — M25.551 RIGHT HIP PAIN: ICD-10-CM

## 2024-02-26 PROCEDURE — 73502 X-RAY EXAM HIP UNI 2-3 VIEWS: CPT | Mod: 26,RT,, | Performed by: RADIOLOGY

## 2024-02-26 PROCEDURE — 73502 X-RAY EXAM HIP UNI 2-3 VIEWS: CPT | Mod: TC,PO,RT

## 2024-02-26 PROCEDURE — 99999 PR PBB SHADOW E&M-EST. PATIENT-LVL II: CPT | Mod: PBBFAC,,, | Performed by: ORTHOPAEDIC SURGERY

## 2024-02-26 PROCEDURE — 99213 OFFICE O/P EST LOW 20 MIN: CPT | Mod: S$GLB,,, | Performed by: ORTHOPAEDIC SURGERY

## 2024-02-26 NOTE — PROGRESS NOTES
73 years old 6 months out from hip replacement surgery on the right reporting minimal to no pain.  She does describe tightness in the hamstrings and some discomfort in her lower back.  States that her pain has improved since surgery, feels that the lift helps with her leg length discrepancy  She does not feel as flexible as she did prior to the surgery         Exam shows leg length discrepancy right leg being longer than the left.  She is nontender greater trochanter on the left         X-rays show well placed components benign-appearing lucency noted greater trochanter on the left     Assessment: 6 months out from hip replacement surgery with leg length discrepancy     Plan:  Continue shoe lift, we did discuss the possibility of head exchange.  Follow-up as needed

## 2024-03-01 ENCOUNTER — OFFICE VISIT (OUTPATIENT)
Dept: FAMILY MEDICINE | Facility: CLINIC | Age: 74
End: 2024-03-01
Payer: MEDICARE

## 2024-03-01 ENCOUNTER — LAB VISIT (OUTPATIENT)
Dept: LAB | Facility: HOSPITAL | Age: 74
End: 2024-03-01
Attending: STUDENT IN AN ORGANIZED HEALTH CARE EDUCATION/TRAINING PROGRAM
Payer: MEDICARE

## 2024-03-01 VITALS
OXYGEN SATURATION: 99 % | HEIGHT: 62 IN | WEIGHT: 97 LBS | SYSTOLIC BLOOD PRESSURE: 132 MMHG | HEART RATE: 76 BPM | TEMPERATURE: 98 F | BODY MASS INDEX: 17.85 KG/M2 | DIASTOLIC BLOOD PRESSURE: 72 MMHG | RESPIRATION RATE: 17 BRPM

## 2024-03-01 DIAGNOSIS — R79.9 ABNORMAL FINDING OF BLOOD CHEMISTRY, UNSPECIFIED: ICD-10-CM

## 2024-03-01 DIAGNOSIS — G20.A1 PARKINSON'S DISEASE, UNSPECIFIED WHETHER DYSKINESIA PRESENT, UNSPECIFIED WHETHER MANIFESTATIONS FLUCTUATE: ICD-10-CM

## 2024-03-01 DIAGNOSIS — E16.2 HYPOGLYCEMIA: ICD-10-CM

## 2024-03-01 DIAGNOSIS — Z00.00 HEALTHCARE MAINTENANCE: Primary | ICD-10-CM

## 2024-03-01 DIAGNOSIS — R53.83 FATIGUE, UNSPECIFIED TYPE: ICD-10-CM

## 2024-03-01 DIAGNOSIS — M85.80 OSTEOPENIA, UNSPECIFIED LOCATION: ICD-10-CM

## 2024-03-01 DIAGNOSIS — Z12.31 ENCOUNTER FOR SCREENING MAMMOGRAM FOR MALIGNANT NEOPLASM OF BREAST: ICD-10-CM

## 2024-03-01 DIAGNOSIS — E78.5 HYPERLIPIDEMIA, UNSPECIFIED HYPERLIPIDEMIA TYPE: ICD-10-CM

## 2024-03-01 LAB
25(OH)D3+25(OH)D2 SERPL-MCNC: 48 NG/ML (ref 30–96)
BASOPHILS # BLD AUTO: 0.07 K/UL (ref 0–0.2)
BASOPHILS NFR BLD: 1.4 % (ref 0–1.9)
CHOLEST SERPL-MCNC: 257 MG/DL (ref 120–199)
CHOLEST/HDLC SERPL: 2.4 {RATIO} (ref 2–5)
DIFFERENTIAL METHOD BLD: ABNORMAL
EOSINOPHIL # BLD AUTO: 0.1 K/UL (ref 0–0.5)
EOSINOPHIL NFR BLD: 2.7 % (ref 0–8)
ERYTHROCYTE [DISTWIDTH] IN BLOOD BY AUTOMATED COUNT: 13.1 % (ref 11.5–14.5)
ESTIMATED AVG GLUCOSE: 111 MG/DL (ref 68–131)
FERRITIN SERPL-MCNC: 104 NG/ML (ref 20–300)
HBA1C MFR BLD: 5.5 % (ref 4–5.6)
HCT VFR BLD AUTO: 37.7 % (ref 37–48.5)
HDLC SERPL-MCNC: 105 MG/DL (ref 40–75)
HDLC SERPL: 40.9 % (ref 20–50)
HGB BLD-MCNC: 12.2 G/DL (ref 12–16)
IMM GRANULOCYTES # BLD AUTO: 0.02 K/UL (ref 0–0.04)
IMM GRANULOCYTES NFR BLD AUTO: 0.4 % (ref 0–0.5)
IRON SERPL-MCNC: 81 UG/DL (ref 30–160)
LDLC SERPL CALC-MCNC: 137 MG/DL (ref 63–159)
LYMPHOCYTES # BLD AUTO: 1.4 K/UL (ref 1–4.8)
LYMPHOCYTES NFR BLD: 27.8 % (ref 18–48)
MCH RBC QN AUTO: 33.3 PG (ref 27–31)
MCHC RBC AUTO-ENTMCNC: 32.4 G/DL (ref 32–36)
MCV RBC AUTO: 103 FL (ref 82–98)
MONOCYTES # BLD AUTO: 0.6 K/UL (ref 0.3–1)
MONOCYTES NFR BLD: 11.4 % (ref 4–15)
NEUTROPHILS # BLD AUTO: 2.9 K/UL (ref 1.8–7.7)
NEUTROPHILS NFR BLD: 56.3 % (ref 38–73)
NONHDLC SERPL-MCNC: 152 MG/DL
NRBC BLD-RTO: 0 /100 WBC
PLATELET # BLD AUTO: 325 K/UL (ref 150–450)
PMV BLD AUTO: 10.2 FL (ref 9.2–12.9)
RBC # BLD AUTO: 3.66 M/UL (ref 4–5.4)
SATURATED IRON: 19 % (ref 20–50)
TOTAL IRON BINDING CAPACITY: 425 UG/DL (ref 250–450)
TRANSFERRIN SERPL-MCNC: 287 MG/DL (ref 200–375)
TRIGL SERPL-MCNC: 75 MG/DL (ref 30–150)
TSH SERPL DL<=0.005 MIU/L-ACNC: 1.84 UIU/ML (ref 0.4–4)
WBC # BLD AUTO: 5.1 K/UL (ref 3.9–12.7)

## 2024-03-01 PROCEDURE — 82941 ASSAY OF GASTRIN: CPT | Performed by: STUDENT IN AN ORGANIZED HEALTH CARE EDUCATION/TRAINING PROGRAM

## 2024-03-01 PROCEDURE — 83036 HEMOGLOBIN GLYCOSYLATED A1C: CPT | Performed by: STUDENT IN AN ORGANIZED HEALTH CARE EDUCATION/TRAINING PROGRAM

## 2024-03-01 PROCEDURE — 82728 ASSAY OF FERRITIN: CPT | Performed by: STUDENT IN AN ORGANIZED HEALTH CARE EDUCATION/TRAINING PROGRAM

## 2024-03-01 PROCEDURE — 83540 ASSAY OF IRON: CPT | Performed by: STUDENT IN AN ORGANIZED HEALTH CARE EDUCATION/TRAINING PROGRAM

## 2024-03-01 PROCEDURE — 84443 ASSAY THYROID STIM HORMONE: CPT | Performed by: STUDENT IN AN ORGANIZED HEALTH CARE EDUCATION/TRAINING PROGRAM

## 2024-03-01 PROCEDURE — 86340 INTRINSIC FACTOR ANTIBODY: CPT | Performed by: STUDENT IN AN ORGANIZED HEALTH CARE EDUCATION/TRAINING PROGRAM

## 2024-03-01 PROCEDURE — 83921 ORGANIC ACID SINGLE QUANT: CPT | Performed by: STUDENT IN AN ORGANIZED HEALTH CARE EDUCATION/TRAINING PROGRAM

## 2024-03-01 PROCEDURE — 99214 OFFICE O/P EST MOD 30 MIN: CPT | Mod: S$GLB,,, | Performed by: STUDENT IN AN ORGANIZED HEALTH CARE EDUCATION/TRAINING PROGRAM

## 2024-03-01 PROCEDURE — 99999 PR PBB SHADOW E&M-EST. PATIENT-LVL IV: CPT | Mod: PBBFAC,,, | Performed by: STUDENT IN AN ORGANIZED HEALTH CARE EDUCATION/TRAINING PROGRAM

## 2024-03-01 PROCEDURE — 80061 LIPID PANEL: CPT | Performed by: STUDENT IN AN ORGANIZED HEALTH CARE EDUCATION/TRAINING PROGRAM

## 2024-03-01 PROCEDURE — 82607 VITAMIN B-12: CPT | Performed by: STUDENT IN AN ORGANIZED HEALTH CARE EDUCATION/TRAINING PROGRAM

## 2024-03-01 PROCEDURE — 82306 VITAMIN D 25 HYDROXY: CPT | Performed by: STUDENT IN AN ORGANIZED HEALTH CARE EDUCATION/TRAINING PROGRAM

## 2024-03-01 PROCEDURE — 85025 COMPLETE CBC W/AUTO DIFF WBC: CPT | Performed by: STUDENT IN AN ORGANIZED HEALTH CARE EDUCATION/TRAINING PROGRAM

## 2024-03-01 PROCEDURE — G2211 COMPLEX E/M VISIT ADD ON: HCPCS | Mod: S$GLB,,, | Performed by: STUDENT IN AN ORGANIZED HEALTH CARE EDUCATION/TRAINING PROGRAM

## 2024-03-01 RX ORDER — INSULIN PUMP SYRINGE, 3 ML
EACH MISCELLANEOUS
Qty: 1 EACH | Refills: 0 | Status: SHIPPED | OUTPATIENT
Start: 2024-03-01

## 2024-03-01 RX ORDER — LANOLIN ALCOHOL/MO/W.PET/CERES
400 CREAM (GRAM) TOPICAL DAILY
COMMUNITY

## 2024-03-01 NOTE — PROGRESS NOTES
[facility] Note    Subjective:    The HPI and pertinent ROS is included in the Diagnostic Impression Remarks section at the end of the note. Please see below for further details. Chief complaint is at end of note.     Hong is a pleasant intelligent patient who is here for evaluation.     Modified Medications    No medications on file       Data reviewed 274}  Previous medical records reviewed and summarized in plan section at end of note.      If you are due for any health screening(s) below please notify me so we can arrange them to be ordered and scheduled. Most healthy patients at your age complete them, but you are free to accept or refuse. If you can't do it, I'll definitely understand. If you can, I'd certainly appreciate it!     Tests to Keep You Healthy    Mammogram: ORDERED BUT NOT SCHEDULED      The following portions of the patient's history were reviewed and updated as appropriate: allergies, current medications, past family history, past medical history, past social history, past surgical history and problem list.    She  has a past medical history of Fever blister, Hyperlipidemia, Osteopenia, Parkinson's disease, and Squamous cell carcinoma (01/18/2019).  She  has a past surgical history that includes achiles tendon ; broken hand (Right); Colonoscopy; Esophagogastroduodenoscopy (N/A, 7/9/2020); Colonoscopy (N/A, 7/9/2020); Liver biopsy (N/A, 11/19/2020); and Hip Arthroplasty (Right, 8/22/2023).    She  reports that she has never smoked. She has never been exposed to tobacco smoke. She has never used smokeless tobacco. She reports current alcohol use of about 1.0 standard drink of alcohol per week. She reports that she does not use drugs.  She family history includes Breast cancer in her maternal aunt; Cancer in her mother; Osteoporosis in her father.    Review of patient's allergies indicates:   Allergen Reactions    Bee sting [allergen ext-venom-honey bee] Swelling    Fosamax  "[alendronate] Other (See Comments)     Jaw pain       Tobacco Use: Low Risk  (3/1/2024)    Patient History     Smoking Tobacco Use: Never     Smokeless Tobacco Use: Never     Passive Exposure: Never     Physical Examination  General appearance: alert, cooperative, no distress  Neck: no thyromegaly, no neck stiffness  Lungs: clear to auscultation, no wheezes, rales or rhonchi, symmetric air entry  Heart: normal rate, regular rhythm, normal S1, S2, no murmurs, rubs, clicks or gallops  Abdomen: soft, nontender, nondistended, no rigidity, rebound, or guarding.   Back: no point tenderness over spine  Extremities: peripheral pulses normal, no unilateral leg swelling or calf tenderness   Neurological:alert, oriented, normal speech, no new focal findings or movement disorder noted from baseline    BP Readings from Last 3 Encounters:   03/01/24 132/72   09/21/23 110/70   08/22/23 (!) 154/72     Wt Readings from Last 3 Encounters:   03/01/24 44 kg (97 lb)   02/26/24 44 kg (97 lb)   01/08/24 44 kg (97 lb)     /72 (BP Location: Right arm, Patient Position: Sitting, BP Method: Small (Manual))   Pulse 76   Temp 97.5 °F (36.4 °C) (Oral)   Resp 17   Ht 5' 2" (1.575 m)   Wt 44 kg (97 lb)   SpO2 99%   BMI 17.74 kg/m²    274}  Laboratory: I have reviewed old labs below:    274}    Lab Results   Component Value Date    WBC 4.75 07/26/2023    HGB 11.9 (L) 07/26/2023    HCT 37.7 07/26/2023     (H) 07/26/2023     07/26/2023     07/26/2023    K 4.1 07/26/2023     07/26/2023    CALCIUM 10.0 07/26/2023    CO2 29 07/26/2023    GLU 59 (L) 07/26/2023    BUN 25 (H) 07/26/2023    CREATININE 0.9 07/26/2023    EGFRNORACEVR >60.0 07/26/2023    ANIONGAP 11 07/26/2023    PROT 7.3 05/25/2023    ALBUMIN 4.0 05/25/2023    BILITOT 0.4 05/25/2023    ALKPHOS 76 05/25/2023    ALT <5 (L) 05/25/2023    AST 17 05/25/2023    INR 3.0 09/05/2023    CHOL 255 (H) 01/10/2023    TRIG 85 01/10/2023    HDL 93 (H) 01/10/2023    " "LDLCALC 145.0 01/10/2023    TSH 1.542 01/10/2023      Lab reviewed by me: Particular labs of significance that I will monitor, workup, or treat to improve are mentioned below in diagnostic impression remarks.      Imaging/EKG: I have reviewed the pertinent results and my findings are noted in remarks.  274}    CC:   Chief Complaint   Patient presents with    Annual Exam    Establish Care        274}    Assessment/Plan  Esther Segura is a 73 y.o. female who presents to clinic with:  1. Healthcare maintenance    2. Hypoglycemia    3. Hyperlipidemia, unspecified hyperlipidemia type    4. Parkinson's disease, unspecified whether dyskinesia present, unspecified whether manifestations fluctuate    5. Fatigue, unspecified type    6. Abnormal finding of blood chemistry, unspecified    7. Encounter for screening mammogram for malignant neoplasm of breast    8. Osteopenia, unspecified location       274}  Diagnostic Impression Remarks + HPI     Documentation entered by me for this encounter may have been done in part using speech-recognition technology. Although I have made an effort to ensure accuracy, "sound like" errors may exist and should be interpreted in context.     Hypoglycemia-patient has had a few episodes of hypoglycemia and she has taking 10 g glucose with resolution of symptoms eats every 4 hours she has not diabetic and will get her a glucometer went over red flags in the rule of 10s could consider a continuous glucose monitor patient does not want this   HLD- Patient was on a statin in the past due to side effects does not want to take it has a risk   Parkinson's disease-stable continue current meds monitor it is very active which is excellent   Osteopenia-patient had side effects with Fosamax will check vitamin-D recommend weight-bearing activities   Health maintenance-will put him mammogram check blood work for fatigue likely multifactorial    This is the extent of this pleasant patient's concerns at " this present time. She did not feel chest pain upon exertion, dyspnea, nausea, vomiting, diaphoresis, or syncope. No pleuritic chest pain, unilateral leg swelling, calf tenderness, or calf pain. Negative for unintentional weight loss night sweats, hematuria, and fevers.     Additional workup planned: see labs ordered below.    See below for labs and meds ordered with associated diagnosis      1. Healthcare maintenance    2. Hypoglycemia  - blood-glucose meter kit; Use to check glucose 1 time daily. To use insurance preferred meter  Dispense: 1 each; Refill: 0  - lancets 31 gauge Misc; Use to check glucose 1 time daily. To use with patient/insurance preferred meter  Dispense: 100 each; Refill: 3  - blood sugar diagnostic Strp; Use to check glucose 1 time daily. To use with patient/insurance preferred meter  Dispense: 100 strip; Refill: 3    3. Hyperlipidemia, unspecified hyperlipidemia type    4. Parkinson's disease, unspecified whether dyskinesia present, unspecified whether manifestations fluctuate    5. Fatigue, unspecified type    6. Abnormal finding of blood chemistry, unspecified  - CBC Auto Differential; Future  - Hemoglobin A1C; Future  - TSH; Future  - Lipid Panel; Future  - Iron and TIBC; Future  - Ferritin; Future  - Vitamin B12 Deficiency Panel; Future  - Microalbumin/Creatinine Ratio, Urine; Future  - Vitamin D; Future    7. Encounter for screening mammogram for malignant neoplasm of breast  - Mammo Digital Screening Bilat w/ Tod; Future    8. Osteopenia, unspecified location  - Vitamin D; Future      Gilberto Cole MD   274}    If you are due for any health screening(s) below please notify me so we can arrange them to be ordered and scheduled. Most healthy patients at your age complete them, but you are free to accept or refuse.     If you can't do it, I'll definitely understand. If you can, I'd certainly appreciate it!   Tests to Keep You Healthy    Mammogram: ORDERED BUT NOT SCHEDULED

## 2024-03-05 LAB — VIT B12 SERPL-MCNC: 317 NG/L (ref 180–914)

## 2024-03-06 DIAGNOSIS — Z78.0 MENOPAUSE: ICD-10-CM

## 2024-03-22 LAB
ANNOTATION COMMENT IMP: NORMAL
GASTRIN P FAST SERPL-MCNC: 35 PG/ML
IF BLOCK AB SER QL: NEGATIVE
METHYLMALONATE SERPL-SCNC: 0.62 NMOL/ML

## 2024-03-22 NOTE — PROGRESS NOTES
Please let the patient know that she had a low B12 level will seen on the blood work which could be does not some of her fatigue and symptoms would recommend for her to supplement there has an oral tab once a day or a once a month injection depending on what she wants can send it in

## 2024-03-24 ENCOUNTER — TELEPHONE (OUTPATIENT)
Dept: FAMILY MEDICINE | Facility: CLINIC | Age: 74
End: 2024-03-24
Payer: MEDICARE

## 2024-03-24 DIAGNOSIS — E53.8 B12 DEFICIENCY: Primary | ICD-10-CM

## 2024-03-24 RX ORDER — LANOLIN ALCOHOL/MO/W.PET/CERES
1000 CREAM (GRAM) TOPICAL DAILY
Qty: 90 TABLET | Refills: 3 | Status: SHIPPED | OUTPATIENT
Start: 2024-03-24 | End: 2025-03-24

## 2024-03-25 ENCOUNTER — PATIENT MESSAGE (OUTPATIENT)
Dept: FAMILY MEDICINE | Facility: CLINIC | Age: 74
End: 2024-03-25
Payer: MEDICARE

## 2024-03-26 ENCOUNTER — HOSPITAL ENCOUNTER (OUTPATIENT)
Dept: RADIOLOGY | Facility: HOSPITAL | Age: 74
Discharge: HOME | End: 2024-03-26
Attending: STUDENT IN AN ORGANIZED HEALTH CARE EDUCATION/TRAINING PROGRAM
Payer: MEDICARE

## 2024-03-26 VITALS — WEIGHT: 97 LBS | BODY MASS INDEX: 17.85 KG/M2 | HEIGHT: 62 IN

## 2024-03-26 DIAGNOSIS — Z00.00 ENCOUNTER FOR MEDICARE ANNUAL WELLNESS EXAM: ICD-10-CM

## 2024-03-26 DIAGNOSIS — Z12.31 ENCOUNTER FOR SCREENING MAMMOGRAM FOR MALIGNANT NEOPLASM OF BREAST: ICD-10-CM

## 2024-03-26 PROCEDURE — 77067 SCR MAMMO BI INCL CAD: CPT | Mod: TC,PO

## 2024-04-16 ENCOUNTER — TELEPHONE (OUTPATIENT)
Dept: NEUROLOGY | Facility: CLINIC | Age: 74
End: 2024-04-16
Payer: MEDICARE

## 2024-04-16 NOTE — TELEPHONE ENCOUNTER
----- Message from Deepali Noyola sent at 4/16/2024  8:17 AM CDT -----  Regarding: Pt needs to resche appt  Contact: [phone]  Name of Who is Calling:ALESSIA SHARMA [[id number]]        What is the request in detail:Pt called states her appt was cancelled by the dr. Need to resche no appts avail. Please advise         Can the clinic reply by [facility]:Yes        What Number to [facility] Back [facility]: Telephone Information:  Mobile          [phone]

## 2024-04-17 NOTE — TELEPHONE ENCOUNTER
Spoke to pt to assist in scheduling appt. Pt declined VV. Pt has a in-person spot saved for Aug 6, 2024 at 2:40. I will schedule and mail appt letter once May 1st comes.

## 2024-05-22 ENCOUNTER — PATIENT MESSAGE (OUTPATIENT)
Dept: NEUROLOGY | Facility: CLINIC | Age: 74
End: 2024-05-22
Payer: MEDICARE

## 2024-05-23 ENCOUNTER — TELEPHONE (OUTPATIENT)
Dept: ADMINISTRATIVE | Facility: CLINIC | Age: 74
End: 2024-05-23
Payer: MEDICARE

## 2024-05-24 ENCOUNTER — OFFICE VISIT (OUTPATIENT)
Dept: FAMILY MEDICINE | Facility: CLINIC | Age: 74
End: 2024-05-24
Payer: MEDICARE

## 2024-05-24 VITALS
RESPIRATION RATE: 16 BRPM | DIASTOLIC BLOOD PRESSURE: 60 MMHG | WEIGHT: 99.88 LBS | SYSTOLIC BLOOD PRESSURE: 120 MMHG | HEIGHT: 62 IN | OXYGEN SATURATION: 98 % | HEART RATE: 86 BPM | BODY MASS INDEX: 18.38 KG/M2

## 2024-05-24 DIAGNOSIS — R63.6 UNDERWEIGHT: ICD-10-CM

## 2024-05-24 DIAGNOSIS — M85.80 OSTEOPENIA, UNSPECIFIED LOCATION: ICD-10-CM

## 2024-05-24 DIAGNOSIS — E78.5 HYPERLIPIDEMIA, UNSPECIFIED HYPERLIPIDEMIA TYPE: ICD-10-CM

## 2024-05-24 DIAGNOSIS — Z96.641 STATUS POST RIGHT HIP REPLACEMENT: ICD-10-CM

## 2024-05-24 DIAGNOSIS — G20.A1 PARKINSON'S DISEASE, UNSPECIFIED WHETHER DYSKINESIA PRESENT, UNSPECIFIED WHETHER MANIFESTATIONS FLUCTUATE: ICD-10-CM

## 2024-05-24 DIAGNOSIS — Z00.00 ENCOUNTER FOR MEDICARE ANNUAL WELLNESS EXAM: ICD-10-CM

## 2024-05-24 DIAGNOSIS — Z00.00 ENCOUNTER FOR PREVENTIVE HEALTH EXAMINATION: Primary | ICD-10-CM

## 2024-05-24 PROCEDURE — 3078F DIAST BP <80 MM HG: CPT | Mod: CPTII,S$GLB,,

## 2024-05-24 PROCEDURE — 1158F ADVNC CARE PLAN TLK DOCD: CPT | Mod: CPTII,S$GLB,,

## 2024-05-24 PROCEDURE — 99999 PR PBB SHADOW E&M-EST. PATIENT-LVL IV: CPT | Mod: PBBFAC,,,

## 2024-05-24 PROCEDURE — 1101F PT FALLS ASSESS-DOCD LE1/YR: CPT | Mod: CPTII,S$GLB,,

## 2024-05-24 PROCEDURE — 1160F RVW MEDS BY RX/DR IN RCRD: CPT | Mod: CPTII,S$GLB,,

## 2024-05-24 PROCEDURE — 1126F AMNT PAIN NOTED NONE PRSNT: CPT | Mod: CPTII,S$GLB,,

## 2024-05-24 PROCEDURE — 1170F FXNL STATUS ASSESSED: CPT | Mod: CPTII,S$GLB,,

## 2024-05-24 PROCEDURE — 3288F FALL RISK ASSESSMENT DOCD: CPT | Mod: CPTII,S$GLB,,

## 2024-05-24 PROCEDURE — 3074F SYST BP LT 130 MM HG: CPT | Mod: CPTII,S$GLB,,

## 2024-05-24 PROCEDURE — G0439 PPPS, SUBSEQ VISIT: HCPCS | Mod: S$GLB,,,

## 2024-05-24 PROCEDURE — 1159F MED LIST DOCD IN RCRD: CPT | Mod: CPTII,S$GLB,,

## 2024-05-24 NOTE — PROGRESS NOTES
"  Esther Segura presented for a  Medicare AWV and comprehensive Health Risk Assessment today. The following components were reviewed and updated:    Medical history  Family History  Social history  Allergies and Current Medications  Health Risk Assessment  Health Maintenance  Care Team         ** See Completed Assessments for Annual Wellness Visit within the encounter summary.**         The following assessments were completed:  Living Situation  CAGE  Depression Screening  Timed Get Up and Go  Whisper Test  Cognitive Function Screening  Nutrition Screening  ADL Screening  PAQ Screening        Opioid documentation:      Patient does not have a current opioid prescription.        Vitals:    05/24/24 1356   BP: 120/60   BP Location: Right arm   Patient Position: Sitting   BP Method: Medium (Manual)   Pulse: 86   Resp: 16   SpO2: 98%   Weight: 45.3 kg (99 lb 13.9 oz)   Height: 5' 2" (1.575 m)     Body mass index is 18.27 kg/m².  Physical Exam  Vitals reviewed.   Constitutional:       General: She is not in acute distress.     Appearance: Normal appearance. She is not ill-appearing, toxic-appearing or diaphoretic.      Comments: Underweight     HENT:      Head: Normocephalic.      Right Ear: External ear normal.      Left Ear: External ear normal.      Nose: Nose normal. No congestion or rhinorrhea.      Mouth/Throat:      Mouth: Mucous membranes are moist.      Pharynx: Oropharynx is clear.   Eyes:      General: No scleral icterus.        Right eye: No discharge.         Left eye: No discharge.      Extraocular Movements: Extraocular movements intact.      Conjunctiva/sclera: Conjunctivae normal.   Cardiovascular:      Rate and Rhythm: Normal rate and regular rhythm.      Pulses: Normal pulses.      Heart sounds: Normal heart sounds. No murmur heard.     No friction rub. No gallop.   Pulmonary:      Effort: Pulmonary effort is normal. No respiratory distress.      Breath sounds: Normal breath sounds. No wheezing, " rhonchi or rales.   Chest:      Chest wall: No tenderness.   Musculoskeletal:         General: No swelling, tenderness or deformity. Normal range of motion.      Cervical back: Normal range of motion.      Right lower leg: No edema.      Left lower leg: No edema.   Skin:     General: Skin is warm and dry.      Capillary Refill: Capillary refill takes less than 2 seconds.      Coloration: Skin is not jaundiced.      Findings: No bruising, erythema, lesion or rash.   Neurological:      Mental Status: She is alert and oriented to person, place, and time.      Gait: Gait normal.   Psychiatric:         Mood and Affect: Mood normal.         Behavior: Behavior normal.         Thought Content: Thought content normal.         Judgment: Judgment normal.           Diagnoses and health risks identified today and associated recommendations/orders:    1. Encounter for preventive health examination  Discussed health maintenance guidelines appropriate for age.      2. Encounter for Medicare annual wellness exam  Discussed health maintenance guidelines appropriate for age.    - Ambulatory Referral/Consult to Enhanced Annual Wellness Visit (eAWV)    3. Parkinson's disease, unspecified whether dyskinesia present, unspecified whether manifestations fluctuate        -    Stable. Continue meds. Will continue to monitor.       4. Hyperlipidemia, unspecified hyperlipidemia type        -    Stable. Continue meds. Will continue to monitor.       5. Underweight       -     Patient would likely benefit from slight weight gain. Appetite is good, she is physically active.     6. Osteopenia, unspecified location        -    Continue weight bearing exercises. She is not interested in scheduling DXA at this time. She does not plan to initiate therapy for treatment any time in the future. We discussed the importance of fall risk reduction in the future    7. Status post right hip replacement       -   No complications       Provided Esther with a  5-10 year written screening schedule and personal prevention plan. Recommendations were developed using the USPSTF age appropriate recommendations. Education, counseling, and referrals were provided as needed. After Visit Summary printed and given to patient which includes a list of additional screenings\tests needed.    No follow-ups on file.    Marin Matthew PA-C      I offered to discuss advanced care planning, including how to pick a person who would make decisions for you if you were unable to make them for yourself, called a health care power of , and what kind of decisions you might make such as use of life sustaining treatments such as ventilators and tube feeding when faced with a life limiting illness recorded on a living will that they will need to know. (How you want to be cared for as you near the end of your natural life)     X Patient is interested in learning more about how to make advanced directives.  I provided them paperwork and offered to discuss this with them.  Esther Segura presented for a  Medicare AWV and comprehensive Health Risk Assessment today.

## 2024-05-24 NOTE — PATIENT INSTRUCTIONS
Counseling and Referral of Other Preventative  (Italic type indicates deductible and co-insurance are waived)    Patient Name: Esther Segura  Today's Date: 5/24/2024    Health Maintenance       Date Due Completion Date    RSV Vaccine (Age 60+ and Pregnant patients) (1 - 1-dose 60+ series) Never done ---    DEXA Scan 09/01/2023 9/1/2020    COVID-19 Vaccine (7 - 2023-24 season) 02/19/2024 10/19/2023    TETANUS VACCINE 03/11/2024 3/11/2014    Mammogram 03/26/2025 3/26/2024    Lipid Panel 03/01/2029 3/1/2024        No orders of the defined types were placed in this encounter.    The following information is provided to all patients.  This information is to help you find resources for any of the problems found today that may be affecting your health:                  Living healthy guide: www.[facility].[place].gov      Understanding Diabetes: www.diabetes.org      Eating healthy: www.cdc.gov/healthyweight      [facility] home safety checklist: www.cdc.gov/steadi/patient.html      Agency on Aging: www.goea.[place].[facility]      Alcoholics anonymous (AA): www.aa.org      Physical Activity: www.nikolas.nih.gov/yb4iudo      Tobacco use: www.quitwithusla.org

## 2024-07-03 DIAGNOSIS — E53.8 B12 DEFICIENCY: ICD-10-CM

## 2024-07-03 DIAGNOSIS — G20.A1 PARKINSON'S DISEASE: ICD-10-CM

## 2024-07-03 RX ORDER — RASAGILINE 1 MG/1
TABLET ORAL
Qty: 90 TABLET | Refills: 0 | Status: SHIPPED | OUTPATIENT
Start: 2024-07-03

## 2024-07-03 RX ORDER — CARBIDOPA AND LEVODOPA 25; 100 MG/1; MG/1
TABLET ORAL
Qty: 450 TABLET | Refills: 3 | Status: SHIPPED | OUTPATIENT
Start: 2024-07-03

## 2024-07-03 RX ORDER — LANOLIN ALCOHOL/MO/W.PET/CERES
1000 CREAM (GRAM) TOPICAL DAILY
Qty: 90 TABLET | Refills: 3 | Status: SHIPPED | OUTPATIENT
Start: 2024-07-03 | End: 2025-07-03

## 2024-07-03 RX ORDER — CARBIDOPA 25 MG/1
1 TABLET ORAL 3 TIMES DAILY
Qty: 270 TABLET | Refills: 3 | Status: SHIPPED | OUTPATIENT
Start: 2024-07-03

## 2024-07-11 DIAGNOSIS — G20.A1 PARKINSON'S DISEASE: ICD-10-CM

## 2024-07-11 RX ORDER — RASAGILINE 1 MG/1
TABLET ORAL
Qty: 90 TABLET | Refills: 0 | Status: SHIPPED | OUTPATIENT
Start: 2024-07-11

## 2024-08-06 ENCOUNTER — OFFICE VISIT (OUTPATIENT)
Dept: NEUROLOGY | Facility: CLINIC | Age: 74
End: 2024-08-06
Payer: MEDICARE

## 2024-08-06 VITALS
WEIGHT: 96.25 LBS | HEIGHT: 62 IN | HEART RATE: 76 BPM | DIASTOLIC BLOOD PRESSURE: 72 MMHG | SYSTOLIC BLOOD PRESSURE: 129 MMHG | BODY MASS INDEX: 17.71 KG/M2

## 2024-08-06 DIAGNOSIS — G20.A1 PARKINSON'S DISEASE, UNSPECIFIED WHETHER DYSKINESIA PRESENT, UNSPECIFIED WHETHER MANIFESTATIONS FLUCTUATE: Primary | ICD-10-CM

## 2024-08-06 PROCEDURE — 3078F DIAST BP <80 MM HG: CPT | Mod: CPTII,S$GLB,, | Performed by: PSYCHIATRY & NEUROLOGY

## 2024-08-06 PROCEDURE — 3074F SYST BP LT 130 MM HG: CPT | Mod: CPTII,S$GLB,, | Performed by: PSYCHIATRY & NEUROLOGY

## 2024-08-06 PROCEDURE — 3288F FALL RISK ASSESSMENT DOCD: CPT | Mod: CPTII,S$GLB,, | Performed by: PSYCHIATRY & NEUROLOGY

## 2024-08-06 PROCEDURE — 1126F AMNT PAIN NOTED NONE PRSNT: CPT | Mod: CPTII,S$GLB,, | Performed by: PSYCHIATRY & NEUROLOGY

## 2024-08-06 PROCEDURE — 99999 PR PBB SHADOW E&M-EST. PATIENT-LVL III: CPT | Mod: PBBFAC,,, | Performed by: PSYCHIATRY & NEUROLOGY

## 2024-08-06 PROCEDURE — 1159F MED LIST DOCD IN RCRD: CPT | Mod: CPTII,S$GLB,, | Performed by: PSYCHIATRY & NEUROLOGY

## 2024-08-06 PROCEDURE — 99214 OFFICE O/P EST MOD 30 MIN: CPT | Mod: S$GLB,,, | Performed by: PSYCHIATRY & NEUROLOGY

## 2024-08-06 PROCEDURE — 1101F PT FALLS ASSESS-DOCD LE1/YR: CPT | Mod: CPTII,S$GLB,, | Performed by: PSYCHIATRY & NEUROLOGY

## 2024-08-06 RX ORDER — SELEGILINE HYDROCHLORIDE 5 MG/1
5 TABLET ORAL 2 TIMES DAILY WITH MEALS
Qty: 60 TABLET | Refills: 11 | Status: SHIPPED | OUTPATIENT
Start: 2024-08-06 | End: 2025-08-06

## 2024-08-07 DIAGNOSIS — G20.A1 PARKINSON'S DISEASE: ICD-10-CM

## 2024-08-07 RX ORDER — CARBIDOPA 25 MG/1
1 TABLET ORAL 3 TIMES DAILY
Qty: 270 TABLET | Refills: 3 | Status: SHIPPED | OUTPATIENT
Start: 2024-08-07

## 2024-08-07 RX ORDER — CARBIDOPA AND LEVODOPA 25; 100 MG/1; MG/1
TABLET ORAL
Qty: 450 TABLET | Refills: 3 | Status: SHIPPED | OUTPATIENT
Start: 2024-08-07 | End: 2024-08-08 | Stop reason: SDUPTHER

## 2024-08-08 ENCOUNTER — PATIENT MESSAGE (OUTPATIENT)
Dept: NEUROLOGY | Facility: CLINIC | Age: 74
End: 2024-08-08
Payer: MEDICARE

## 2024-08-08 DIAGNOSIS — G20.A1 PARKINSON'S DISEASE: ICD-10-CM

## 2024-08-09 DIAGNOSIS — G20.A1 PARKINSON'S DISEASE: ICD-10-CM

## 2024-08-09 RX ORDER — CARBIDOPA AND LEVODOPA 25; 100 MG/1; MG/1
TABLET ORAL
Qty: 450 TABLET | Refills: 3 | Status: SHIPPED | OUTPATIENT
Start: 2024-08-09

## 2024-08-12 ENCOUNTER — PATIENT MESSAGE (OUTPATIENT)
Dept: NEUROLOGY | Facility: CLINIC | Age: 74
End: 2024-08-12
Payer: MEDICARE

## 2024-08-12 ENCOUNTER — TELEPHONE (OUTPATIENT)
Dept: NEUROLOGY | Facility: CLINIC | Age: 74
End: 2024-08-12
Payer: MEDICARE

## 2024-08-12 RX ORDER — CARBIDOPA AND LEVODOPA 25; 100 MG/1; MG/1
TABLET ORAL
Qty: 450 TABLET | Refills: 3 | Status: SHIPPED | OUTPATIENT
Start: 2024-08-12

## 2024-08-12 NOTE — TELEPHONE ENCOUNTER
Called pt who informed me she cannot get her cd/ld filled.  Called [facility] pharmacy and left message on voicemail to renew prescription.

## 2024-08-12 NOTE — TELEPHONE ENCOUNTER
----- Message from Magui Ellison sent at 8/12/2024 11:04 AM CDT -----  Regarding: Auth Needed  Contact: Wai@ [phone]  Caller is calling to speak to someone in the office to get clarification on pt's rx. Please call to advise. Thanks.                  Pharmacy Name: [facility]         RX needing Clarification:carbidopa-levodopa  mg (SINEMET)  mg per tablet         Best call back number:  [phone]         Additional Information: Have not received combination drug authorization. Letter of denial was received  this morning.

## 2024-09-23 DIAGNOSIS — G20.A1 PARKINSON'S DISEASE: ICD-10-CM

## 2024-09-23 RX ORDER — SELEGILINE HYDROCHLORIDE 5 MG/1
5 TABLET ORAL 2 TIMES DAILY WITH MEALS
Qty: 60 TABLET | Refills: 11 | Status: SHIPPED | OUTPATIENT
Start: 2024-09-23 | End: 2025-09-23

## 2024-09-23 RX ORDER — CARBIDOPA 25 MG/1
1 TABLET ORAL 3 TIMES DAILY
Qty: 270 TABLET | Refills: 3 | Status: SHIPPED | OUTPATIENT
Start: 2024-09-23

## 2024-09-23 RX ORDER — CARBIDOPA AND LEVODOPA 25; 100 MG/1; MG/1
TABLET ORAL
Qty: 450 TABLET | Refills: 3 | Status: SHIPPED | OUTPATIENT
Start: 2024-09-23

## 2024-10-09 ENCOUNTER — PATIENT MESSAGE (OUTPATIENT)
Dept: NEUROLOGY | Facility: CLINIC | Age: 74
End: 2024-10-09
Payer: MEDICARE

## 2024-10-22 ENCOUNTER — PATIENT MESSAGE (OUTPATIENT)
Dept: NEUROLOGY | Facility: CLINIC | Age: 74
End: 2024-10-22
Payer: MEDICARE

## 2024-10-22 DIAGNOSIS — G20.A1 PARKINSON'S DISEASE: ICD-10-CM

## 2024-10-22 RX ORDER — CARBIDOPA 25 MG/1
1 TABLET ORAL 3 TIMES DAILY
Qty: 270 TABLET | Refills: 3 | Status: SHIPPED | OUTPATIENT
Start: 2024-10-22

## 2024-10-22 RX ORDER — CARBIDOPA AND LEVODOPA 25; 100 MG/1; MG/1
TABLET ORAL
Qty: 450 TABLET | Refills: 3 | Status: SHIPPED | OUTPATIENT
Start: 2024-10-22

## 2024-10-22 RX ORDER — SELEGILINE HYDROCHLORIDE 5 MG/1
5 TABLET ORAL 2 TIMES DAILY WITH MEALS
Qty: 60 TABLET | Refills: 11 | Status: SHIPPED | OUTPATIENT
Start: 2024-10-22 | End: 2025-10-22

## 2025-01-13 ENCOUNTER — HOSPITAL ENCOUNTER (OUTPATIENT)
Dept: RADIOLOGY | Facility: HOSPITAL | Age: 75
Discharge: HOME | End: 2025-01-13
Attending: FAMILY MEDICINE
Payer: MEDICARE

## 2025-01-13 ENCOUNTER — OFFICE VISIT (OUTPATIENT)
Dept: ORTHOPEDICS | Facility: CLINIC | Age: 75
End: 2025-01-13
Payer: MEDICARE

## 2025-01-13 VITALS — WEIGHT: 96.13 LBS | BODY MASS INDEX: 17.69 KG/M2 | HEIGHT: 62 IN

## 2025-01-13 DIAGNOSIS — M25.552 PAIN OF LEFT HIP: Primary | ICD-10-CM

## 2025-01-13 DIAGNOSIS — Z96.641 S/P TOTAL RIGHT HIP ARTHROPLASTY: ICD-10-CM

## 2025-01-13 DIAGNOSIS — M25.552 PAIN OF LEFT HIP: ICD-10-CM

## 2025-01-13 DIAGNOSIS — M70.62 TROCHANTERIC BURSITIS OF LEFT HIP: ICD-10-CM

## 2025-01-13 PROCEDURE — 73502 X-RAY EXAM HIP UNI 2-3 VIEWS: CPT | Mod: TC,PO,LT

## 2025-01-13 PROCEDURE — 1125F AMNT PAIN NOTED PAIN PRSNT: CPT | Mod: CPTII,S$GLB,, | Performed by: FAMILY MEDICINE

## 2025-01-13 PROCEDURE — 3288F FALL RISK ASSESSMENT DOCD: CPT | Mod: CPTII,S$GLB,, | Performed by: FAMILY MEDICINE

## 2025-01-13 PROCEDURE — 99214 OFFICE O/P EST MOD 30 MIN: CPT | Mod: 25,S$GLB,, | Performed by: FAMILY MEDICINE

## 2025-01-13 PROCEDURE — 99999 PR PBB SHADOW E&M-EST. PATIENT-LVL III: CPT | Mod: PBBFAC,,, | Performed by: FAMILY MEDICINE

## 2025-01-13 PROCEDURE — 73502 X-RAY EXAM HIP UNI 2-3 VIEWS: CPT | Mod: 26,LT,, | Performed by: RADIOLOGY

## 2025-01-13 PROCEDURE — 1101F PT FALLS ASSESS-DOCD LE1/YR: CPT | Mod: CPTII,S$GLB,, | Performed by: FAMILY MEDICINE

## 2025-01-13 PROCEDURE — 1159F MED LIST DOCD IN RCRD: CPT | Mod: CPTII,S$GLB,, | Performed by: FAMILY MEDICINE

## 2025-01-13 PROCEDURE — 20610 DRAIN/INJ JOINT/BURSA W/O US: CPT | Mod: LT,S$GLB,, | Performed by: FAMILY MEDICINE

## 2025-01-13 RX ORDER — METHYLPREDNISOLONE ACETATE 80 MG/ML
80 INJECTION, SUSPENSION INTRA-ARTICULAR; INTRALESIONAL; INTRAMUSCULAR; SOFT TISSUE
Status: DISCONTINUED | OUTPATIENT
Start: 2025-01-13 | End: 2025-01-13 | Stop reason: HOSPADM

## 2025-01-13 RX ADMIN — METHYLPREDNISOLONE ACETATE 80 MG: 80 INJECTION, SUSPENSION INTRA-ARTICULAR; INTRALESIONAL; INTRAMUSCULAR; SOFT TISSUE at 03:01

## 2025-01-13 NOTE — PROGRESS NOTES
Subjective     Patient ID: Esther Segura is a 74 y.o. female.    Chief Complaint: Pain of the Left Hip (Pt here w/ c/o chronic L) hip pain that has recently worsened. Pt states her pain is prominent w/ movement. Pt denies any recent fall/injury. Naproxen PRN)    74-year-old female here today with complaints of left-sided hip pain.  This is a chronic issue that has been ongoing for many years but has become acutely worse over the last two weeks.  Significant past history of a right total hip arthroplasty done by Dr. Mayfield last year.  She has had no issues with her right hip since then.  Most of the pain in the left hip she localizes to the lateral aspect of her hip.  She is very active frequently doing yoga but has not been unable to do so due to her hip pain over the last few weeks.  She has been taking naproxen over-the-counter for pain and has noticed some relief there.  She denies any groin pain and denies any radiation of her pain down her thigh or any low back pain.    Pain  Pertinent negatives include no chest pain, chills, congestion, coughing, headaches, rash or sore throat.       Review of Systems   Constitutional: Negative for chills and decreased appetite.   HENT:  Negative for congestion and sore throat.    Eyes:  Negative for blurred vision.   Cardiovascular:  Negative for chest pain, dyspnea on exertion and palpitations.   Respiratory:  Negative for cough and shortness of breath.    Skin:  Negative for rash.   Neurological:  Negative for difficulty with concentration, disturbances in coordination and headaches.   Psychiatric/Behavioral:  Negative for altered mental status, depression, hallucinations, memory loss and suicidal ideas.           Objective     General    Nursing note and vitals reviewed.  Constitutional: She is oriented to person, place, and time. She appears well-developed and well-nourished.   HENT:   Nose: Nose normal.   Eyes: EOM are normal. Pupils are equal, round, and reactive  to light.   Neck: Neck supple.   Cardiovascular:  Normal rate.            Pulmonary/Chest: Effort normal.   Abdominal: Soft.   Neurological: She is alert and oriented to person, place, and time. She has normal reflexes.   Psychiatric: She has a normal mood and affect. Her behavior is normal. Judgment and thought content normal.             Right Hip Exam   Right hip exam is normal.     Inspection   Scars: absent  Swelling: absent  No deformity of hip.    Range of Motion   The patient has normal right hip ROM.    Muscle Strength   The patient has normal right hip strength.    Other   Sensation: normal  Left Hip Exam   Left hip exam is normal.    Inspection   Swelling: absent  No deformity of hip.  Quadriceps Atrophy:  negative    Tenderness   The patient tender to palpation of the trochanteric bursa.    Crepitus   The patient has crepitus of the trochanteric bursa.    Range of Motion   Extension:  20   Flexion:  140   External rotation:  80   Internal rotation: 30     Muscle Strength   The patient has normal left hip strength.     Tests   Pain w/ forced internal rotation (CLAY): absent  Pain w/ forced external rotation (FADIR): present  Macie: negative  Circumduction test: negative  Log Roll: negative  Snapping Hip (internal): negative    Other   Sensation: normal          Vascular Exam     Right Pulses  Dorsalis Pedis:      2+          Left Pulses  Dorsalis Pedis:      2+          Physical Exam  Vitals and nursing note reviewed.   Constitutional:       Appearance: She is well-developed and well-nourished.   HENT:      Nose: Nose normal.   Eyes:      Extraocular Movements: EOM normal.      Pupils: Pupils are equal, round, and reactive to light.   Cardiovascular:      Rate and Rhythm: Normal rate.      Pulses:           Dorsalis pedis pulses are 2+ on the right side and 2+ on the left side.   Pulmonary:      Effort: Pulmonary effort is normal.   Abdominal:      Palpations: Abdomen is soft.   Musculoskeletal:       "Cervical back: Normal range of motion and neck supple.      Right hip: No swelling or deformity.      Left hip: No swelling or deformity.   Neurological:      Mental Status: She is alert and oriented to person, place, and time.      Deep Tendon Reflexes: Reflexes are normal and symmetric.   Psychiatric:         Mood and Affect: Mood and affect normal.         Behavior: Behavior normal.         Thought Content: Thought content normal.         Judgment: Judgment normal.   X-ray images ordered obtained interpreted by me.  They show evidence of prior right-sided total hip arthroplasty.  There is some mild degenerative changes noted of the left hip with no acute fractures present.  No evidence of soft tissue injury.          Assessment and Plan     Encounter Diagnoses   Name Primary?    Pain of left hip Yes    S/P total right hip arthroplasty     Trochanteric bursitis of left hip          Esther Ocampo" was seen today for pain.    Diagnoses and all orders for this visit:    Pain of left hip    S/P total right hip arthroplasty    Trochanteric bursitis of left hip    Has findings consistent with a trochanteric bursitis of the left hip.  Offered a steroid injection in the hip and she agreed with this.  We will have her follow up in three months for recheck of her hip as she seems to have a recurrent issue there.    Large Joint Aspiration/Injection: L greater trochanteric bursa    Date/Time: 1/13/2025 3:40 PM    Performed by: Gabe Alberto MD  Authorized by: Gabe Alberto MD    Consent Done?:  Yes (Verbal)  Indications:  Arthritis and pain  Site marked: the procedure site was marked    Timeout: prior to procedure the correct patient, procedure, and site was verified    Prep: patient was prepped and draped in usual sterile fashion      Local anesthesia used?: Yes    Local anesthetic:  Lidocaine 1% without epinephrine  Anesthetic total (ml):  2      Details:  Needle Size:  22 G  Ultrasonic Guidance for needle placement?: " No    Approach:  Anterolateral  Location:  Hip  Site:  L greater trochanteric bursa  Medications:  80 mg methylPREDNISolone acetate 80 mg/mL  Patient tolerance:  Patient tolerated the procedure well with no immediate complications

## 2025-03-12 DIAGNOSIS — Z78.0 MENOPAUSE: ICD-10-CM

## 2025-03-24 ENCOUNTER — OCHSNER VIRTUAL EMERGENCY DEPARTMENT (OUTPATIENT)
Facility: CLINIC | Age: 75
End: 2025-03-24
Payer: MEDICARE

## 2025-03-24 ENCOUNTER — PATIENT OUTREACH (OUTPATIENT)
Facility: OTHER | Age: 75
End: 2025-03-24
Payer: MEDICARE

## 2025-03-24 ENCOUNTER — OFFICE VISIT (OUTPATIENT)
Dept: URGENT CARE | Facility: CLINIC | Age: 75
End: 2025-03-24
Payer: MEDICARE

## 2025-03-24 ENCOUNTER — NURSE TRIAGE (OUTPATIENT)
Dept: ADMINISTRATIVE | Facility: CLINIC | Age: 75
End: 2025-03-24
Payer: MEDICARE

## 2025-03-24 VITALS
HEIGHT: 62 IN | SYSTOLIC BLOOD PRESSURE: 131 MMHG | RESPIRATION RATE: 16 BRPM | DIASTOLIC BLOOD PRESSURE: 80 MMHG | TEMPERATURE: 98 F | OXYGEN SATURATION: 100 % | BODY MASS INDEX: 18.4 KG/M2 | HEART RATE: 65 BPM | WEIGHT: 100 LBS

## 2025-03-24 DIAGNOSIS — R07.89 CHEST WALL PAIN: Primary | ICD-10-CM

## 2025-03-24 PROCEDURE — 71046 X-RAY EXAM CHEST 2 VIEWS: CPT | Mod: S$GLB,,, | Performed by: RADIOLOGY

## 2025-03-24 PROCEDURE — 71120 X-RAY EXAM BREASTBONE 2/>VWS: CPT | Mod: S$GLB,,, | Performed by: RADIOLOGY

## 2025-03-24 PROCEDURE — 93000 ELECTROCARDIOGRAM COMPLETE: CPT | Mod: S$GLB,,, | Performed by: EMERGENCY MEDICINE

## 2025-03-24 NOTE — PLAN OF CARE-OVED
[facility] Emergency Department Plan of Care Note  Referral Source: Nurse On-Call                               Chief Complaint   Patient presents with    Fall    Chest Pain     Sternal pain after fall       Recommendation: Urgent Care                        74-year-old female fell and has mild sternal pain only with with deep inspiration.  No shortness of breath.  No deformities or bruising.  Patient prefers not to go the ER and would like urgent care or PCP. PCP not available until tomorrow and patient plans to go out of town tomorrow.  Recommend urgent Care today.  If unable to get to urgent care or x-ray recommend ER.    No diagnosis found.

## 2025-03-24 NOTE — TELEPHONE ENCOUNTER
"Pt reports she is very active. Saturday she tripped and fell on her bicycle handlebars in the garage. Today when she takes a full deep breath she has pain. Pain is only with deep breaths and when it occurs is 3/10. Denies sob. No bruising or deformity noted but tender to palpation "at the bottom of my sternum". Was unable to take a deep enough breath while exerting herself on her bike today so at that time felt sob but not right now. Care advice is to go to ED/UCC now (or to office with pcp approval). Pt requests to not go to ER if possible as her  passed away 6 months ago and she had bad experiences in the ER. ASYA Dr Cottrell contacted. "I agree with advice. UC or PCP is appropriate. Needs CXR". Advised pt of recommendation and she verbalizes understanding. Called UC of choice for pt to verify xray capabilities today.             Reason for Disposition   Can't take a deep breath but no respiratory distress (e.g., hurts to take a deep breath)    Additional Information   Negative: Major injury from dangerous force or speed (e.g., MVA, fall > 10 feet or 3 meters)   Negative: Bullet wound, knife wound or other penetrating object   Negative: Puncture wound that sounds life-threatening to the triager   Negative: Severe difficulty breathing (e.g., struggling for each breath, speaks in single words)   Negative: Major bleeding (actively dripping or spurting) that can't be stopped   Negative: Open wound of the chest with sound of moving air (sucking wound) or visible air bubbles   Negative: Shock suspected (e.g., cold/pale/clammy skin, too weak to stand, low BP, rapid pulse)   Negative: Coughing or spitting up blood   Negative: Bluish (or gray) lips or face   Negative: Unconscious or was unconscious   Negative: Sounds like a life-threatening emergency to the triager   Negative: SEVERE chest pain   Negative: Difficulty breathing and not severe   Negative: Skin is split open or gaping   Negative: Bleeding won't stop " after 10 minutes of direct pressure (using correct technique)   Negative: Sounds like a serious injury to the triager    Protocols used: Chest Injury-A-OH

## 2025-03-24 NOTE — PROGRESS NOTES
"Subjective:      Patient ID: Esther Segura is a 74 y.o. female.    Vitals:  height is 5' 2" (1.575 m) and weight is 45.4 kg (100 lb). Her oral temperature is 97.8 °F (36.6 °C). Her blood pressure is 131/80 and her pulse is 65. Her respiration is 16 and oxygen saturation is 100%.     Chief Complaint: Liset Segura is a 74 year old female presenting to the clinic with c/o anterior chest wall pain. She reports a trip and fall 2 days ago where she landed on bike handles on her chest. She has had pain with inspiration today. She took a naproxen yesterday with improvement of pain.     Fall  The accident occurred 2 days ago.       Constitution: Negative.   HENT: Negative.     Cardiovascular:  Positive for chest trauma and chest pain.   Respiratory: Negative.     Gastrointestinal: Negative.    Musculoskeletal: Negative.    Skin: Negative.    Neurological: Negative.     Objective:     Physical Exam   Constitutional: She is oriented to person, place, and time. She appears well-developed. She is cooperative.  Non-toxic appearance. She does not appear ill. No distress.   HENT:   Head: Normocephalic and atraumatic.   Ears:   Right Ear: Hearing and external ear normal.   Left Ear: Hearing and external ear normal.   Nose: Nose normal. No mucosal edema, rhinorrhea or nasal deformity. No epistaxis. Right sinus exhibits no maxillary sinus tenderness and no frontal sinus tenderness. Left sinus exhibits no maxillary sinus tenderness and no frontal sinus tenderness.   Mouth/Throat: Uvula is midline, oropharynx is clear and moist and mucous membranes are normal. No trismus in the jaw. Normal dentition. No uvula swelling. No oropharyngeal exudate, posterior oropharyngeal edema or posterior oropharyngeal erythema.   Eyes: Conjunctivae and lids are normal. No scleral icterus.   Neck: Trachea normal and phonation normal. Neck supple. No edema present. No erythema present. No neck rigidity present.   Cardiovascular: Normal rate, " regular rhythm, normal heart sounds and normal pulses.   Pulmonary/Chest: Effort normal and breath sounds normal. No respiratory distress. She has no decreased breath sounds. She has no rhonchi. She exhibits tenderness. She exhibits no crepitus, no deformity and no swelling.   Abdominal: Normal appearance.   Musculoskeletal: Normal range of motion.         General: No deformity. Normal range of motion.   Neurological: She is alert and oriented to person, place, and time. She exhibits normal muscle tone. Coordination normal.   Skin: Skin is warm, dry, intact, not diaphoretic and not pale.   Psychiatric: Her speech is normal and behavior is normal. Judgment and thought content normal.   Nursing note and vitals reviewed.      Assessment:     1. Chest wall pain        Plan:   Xray of sternum and chest both reviewed by radiology with no acute process identified. No fracture. EKG normal sinus rhythm, left axis deviation, nonspecific ST abnormality, ventricular rate 78. Compared to prior and is similar.   Advised that if pain worsens or symptoms progress that she should go to the ED for emergent evaluation. Patient verbalized understanding and also has an appointment with PCP scheduled as well.      Chest wall pain  -     XR CHEST PA AND LATERAL; Future; Expected date: 03/24/2025  -     XR STERNUM PA AND LATERAL; Future; Expected date: 03/24/2025  -     EKG 12-lead; Future

## 2025-03-26 ENCOUNTER — PATIENT OUTREACH (OUTPATIENT)
Facility: OTHER | Age: 75
End: 2025-03-26
Payer: MEDICARE

## 2025-03-26 NOTE — PROGRESS NOTES
Patient spoke with OOC RN non 3/24/2025 with complaint of pain with deep breath.  OOC RN consulted with [place] provider, Dr. Cottrell, and disposition recommended was urgent care.  Patient checked in to [facility] urgent care on 3/24/2025.    Spoke with patient on today to assess for any additional concerns to be addressed since urgent care visit.  Patient denied no needs at this time.  Patient stated she has a follow up with her PCP, Gilberto Cole MD, 4/8/2025 at 10:30 am.

## 2025-04-05 NOTE — PROGRESS NOTES
"Patient spoke with OOC RN on 3/24/2025 with complaint of "Pt reports she is very active. Saturday she tripped and fell on her bicycle handlebars in the garage. Today when she takes a full deep breath she has pain. Pain is only with deep breaths and when it occurs is 3/10. Denies sob. No bruising or deformity noted but tender to palpation "at the bottom of my sternum". Was unable to take a deep enough breath while exerting herself on her bike today so at that time felt sob but not right now."    OOC RN consulted with [place] provider, Dr. Cottrell, and disposition recommended was urgent care.  Patient checked in to [facility] on today.  Will follow up with patient to assess if patient has any additional concerns or needs.       " no

## 2025-04-08 ENCOUNTER — LAB VISIT (OUTPATIENT)
Dept: LAB | Facility: HOSPITAL | Age: 75
End: 2025-04-08
Attending: STUDENT IN AN ORGANIZED HEALTH CARE EDUCATION/TRAINING PROGRAM
Payer: MEDICARE

## 2025-04-08 ENCOUNTER — OFFICE VISIT (OUTPATIENT)
Dept: FAMILY MEDICINE | Facility: CLINIC | Age: 75
End: 2025-04-08
Payer: MEDICARE

## 2025-04-08 VITALS
BODY MASS INDEX: 18.25 KG/M2 | OXYGEN SATURATION: 98 % | HEART RATE: 78 BPM | RESPIRATION RATE: 16 BRPM | WEIGHT: 99.19 LBS | SYSTOLIC BLOOD PRESSURE: 128 MMHG | HEIGHT: 62 IN | TEMPERATURE: 98 F | DIASTOLIC BLOOD PRESSURE: 76 MMHG

## 2025-04-08 DIAGNOSIS — G20.A1 PARKINSON'S DISEASE, UNSPECIFIED WHETHER DYSKINESIA PRESENT, UNSPECIFIED WHETHER MANIFESTATIONS FLUCTUATE: ICD-10-CM

## 2025-04-08 DIAGNOSIS — Z00.00 HEALTHCARE MAINTENANCE: Primary | ICD-10-CM

## 2025-04-08 DIAGNOSIS — E53.8 B12 DEFICIENCY: ICD-10-CM

## 2025-04-08 DIAGNOSIS — Z12.31 ENCOUNTER FOR SCREENING MAMMOGRAM FOR MALIGNANT NEOPLASM OF BREAST: ICD-10-CM

## 2025-04-08 DIAGNOSIS — K59.00 CONSTIPATION, UNSPECIFIED CONSTIPATION TYPE: ICD-10-CM

## 2025-04-08 DIAGNOSIS — R79.9 ABNORMAL FINDING OF BLOOD CHEMISTRY, UNSPECIFIED: ICD-10-CM

## 2025-04-08 LAB
ABSOLUTE EOSINOPHIL (OHS): 0.09 K/UL
ABSOLUTE MONOCYTE (OHS): 0.51 K/UL (ref 0.3–1)
ABSOLUTE NEUTROPHIL COUNT (OHS): 2.98 K/UL (ref 1.8–7.7)
ALBUMIN SERPL BCP-MCNC: 4.1 G/DL (ref 3.5–5.2)
ALBUMIN/CREAT UR: 22 UG/MG
ALP SERPL-CCNC: 68 UNIT/L (ref 40–150)
ALT SERPL W/O P-5'-P-CCNC: 8 UNIT/L (ref 10–44)
ANION GAP (OHS): 13 MMOL/L (ref 8–16)
AST SERPL-CCNC: 27 UNIT/L (ref 11–45)
BASOPHILS # BLD AUTO: 0.07 K/UL
BASOPHILS NFR BLD AUTO: 1.3 %
BILIRUB SERPL-MCNC: 0.4 MG/DL (ref 0.1–1)
BUN SERPL-MCNC: 24 MG/DL (ref 8–23)
CALCIUM SERPL-MCNC: 9.8 MG/DL (ref 8.7–10.5)
CHLORIDE SERPL-SCNC: 98 MMOL/L (ref 95–110)
CHOLEST SERPL-MCNC: 252 MG/DL (ref 120–199)
CHOLEST/HDLC SERPL: 2.6 {RATIO} (ref 2–5)
CO2 SERPL-SCNC: 27 MMOL/L (ref 23–29)
CREAT SERPL-MCNC: 0.9 MG/DL (ref 0.5–1.4)
CREAT UR-MCNC: 91 MG/DL (ref 15–325)
EAG (OHS): 103 MG/DL (ref 68–131)
ERYTHROCYTE [DISTWIDTH] IN BLOOD BY AUTOMATED COUNT: 12.9 % (ref 11.5–14.5)
GFR SERPLBLD CREATININE-BSD FMLA CKD-EPI: >60 ML/MIN/1.73/M2
GLUCOSE SERPL-MCNC: 92 MG/DL (ref 70–110)
HBA1C MFR BLD: 5.2 % (ref 4–5.6)
HCT VFR BLD AUTO: 38.3 % (ref 37–48.5)
HDLC SERPL-MCNC: 98 MG/DL (ref 40–75)
HDLC SERPL: 38.9 % (ref 20–50)
HGB BLD-MCNC: 12.2 GM/DL (ref 12–16)
IMM GRANULOCYTES # BLD AUTO: 0.01 K/UL (ref 0–0.04)
IMM GRANULOCYTES NFR BLD AUTO: 0.2 % (ref 0–0.5)
LDLC SERPL CALC-MCNC: 141 MG/DL (ref 63–159)
LYMPHOCYTES # BLD AUTO: 1.61 K/UL (ref 1–4.8)
MCH RBC QN AUTO: 33 PG (ref 27–31)
MCHC RBC AUTO-ENTMCNC: 31.9 G/DL (ref 32–36)
MCV RBC AUTO: 104 FL (ref 82–98)
MICROALBUMIN UR-MCNC: 20 UG/ML (ref ?–5000)
NONHDLC SERPL-MCNC: 154 MG/DL
NUCLEATED RBC (/100WBC) (OHS): 0 /100 WBC
PLATELET # BLD AUTO: 350 K/UL (ref 150–450)
PMV BLD AUTO: 10.3 FL (ref 9.2–12.9)
POTASSIUM SERPL-SCNC: 4.7 MMOL/L (ref 3.5–5.1)
PROT SERPL-MCNC: 7.5 GM/DL (ref 6–8.4)
RBC # BLD AUTO: 3.7 M/UL (ref 4–5.4)
RELATIVE EOSINOPHIL (OHS): 1.7 %
RELATIVE LYMPHOCYTE (OHS): 30.6 % (ref 18–48)
RELATIVE MONOCYTE (OHS): 9.7 % (ref 4–15)
RELATIVE NEUTROPHIL (OHS): 56.5 % (ref 38–73)
SODIUM SERPL-SCNC: 138 MMOL/L (ref 136–145)
TRIGL SERPL-MCNC: 65 MG/DL (ref 30–150)
TSH SERPL-ACNC: 2.12 UIU/ML (ref 0.4–4)
WBC # BLD AUTO: 5.27 K/UL (ref 3.9–12.7)

## 2025-04-08 PROCEDURE — 83036 HEMOGLOBIN GLYCOSYLATED A1C: CPT

## 2025-04-08 PROCEDURE — 85025 COMPLETE CBC W/AUTO DIFF WBC: CPT

## 2025-04-08 PROCEDURE — 80061 LIPID PANEL: CPT

## 2025-04-08 PROCEDURE — 82040 ASSAY OF SERUM ALBUMIN: CPT

## 2025-04-08 PROCEDURE — 99999 PR PBB SHADOW E&M-EST. PATIENT-LVL IV: CPT | Mod: PBBFAC,,, | Performed by: STUDENT IN AN ORGANIZED HEALTH CARE EDUCATION/TRAINING PROGRAM

## 2025-04-08 PROCEDURE — 36415 COLL VENOUS BLD VENIPUNCTURE: CPT | Mod: PO

## 2025-04-08 PROCEDURE — 84443 ASSAY THYROID STIM HORMONE: CPT

## 2025-04-08 PROCEDURE — 82570 ASSAY OF URINE CREATININE: CPT

## 2025-04-08 PROCEDURE — 84207 ASSAY OF VITAMIN B-6: CPT

## 2025-04-08 NOTE — PROGRESS NOTES
[facility] Note    Subjective:    The HPI and pertinent ROS is included in the Diagnostic Impression Remarks section at the end of the note. Please see below for further details. Chief complaint is at end of note.     Hong is a pleasant intelligent patient who is here for evaluation.     Modified Medications    No medications on file       Data reviewed 274}  Previous medical records reviewed and summarized in plan section at end of note.      If you are due for any health screening(s) below please notify me so we can arrange them to be ordered and scheduled. Most healthy patients at your age complete them, but you are free to accept or refuse. If you can't do it, I'll definitely understand. If you can, I'd certainly appreciate it!     Tests to Keep You Healthy    Mammogram: ORDERED BUT NOT SCHEDULED      The following portions of the patient's history were reviewed and updated as appropriate: allergies, current medications, past family history, past medical history, past social history, past surgical history and problem list.    She  has a past medical history of Fever blister, Hyperlipidemia, Osteopenia, Parkinson's disease, Squamous cell carcinoma (01/18/2019), and Traumatic arthritis of finger.  She  has a past surgical history that includes achiles tendon ; broken hand (Right); Colonoscopy; Esophagogastroduodenoscopy (N/A, 7/9/2020); Colonoscopy (N/A, 7/9/2020); Liver biopsy (N/A, 11/19/2020); and Hip Arthroplasty (Right, 8/22/2023).    She  reports that she has never smoked. She has never been exposed to tobacco smoke. She has never used smokeless tobacco. She reports current alcohol use of about 1.0 standard drink of alcohol per week. She reports that she does not use drugs.  She family history includes Breast cancer in her maternal aunt; Cancer in her mother; Osteoporosis in her father.    Review of patient's allergies indicates:   Allergen Reactions    Bee sting [allergen ext-venom-honey  "bee] Swelling    Fosamax [alendronate] Other (See Comments)     Jaw pain       Tobacco Use: Low Risk  (4/8/2025)    Patient History     Smoking Tobacco Use: Never     Smokeless Tobacco Use: Never     Passive Exposure: Never     Physical Examination  Physical Exam       General appearance: alert, cooperative, no distress  Neck: no thyromegaly, no neck stiffness  Lungs: clear to auscultation, no wheezes, rales or rhonchi, symmetric air entry  Heart: normal rate, regular rhythm, normal S1, S2, no murmurs, rubs, clicks or gallops  Abdomen: soft, nontender, nondistended, no rigidity, rebound, or guarding.   Back: no point tenderness over spine  Extremities: peripheral pulses normal, no unilateral leg swelling or calf tenderness   Neurological:alert, oriented, normal speech, no new focal findings or movement disorder noted from baseline      BP Readings from Last 3 Encounters:   04/08/25 128/76   03/24/25 131/80   08/06/24 129/72     Wt Readings from Last 3 Encounters:   04/08/25 45 kg (99 lb 3.3 oz)   03/24/25 45.4 kg (100 lb)   01/13/25 43.6 kg (96 lb 1.9 oz)     /76 (BP Location: Right arm, Patient Position: Sitting)   Pulse 78   Temp 98 °F (36.7 °C) (Oral)   Resp 16   Ht 5' 2" (1.575 m)   Wt 45 kg (99 lb 3.3 oz)   SpO2 98%   BMI 18.15 kg/m²    274}  Laboratory: I have reviewed old labs below:    274}    Lab Results   Component Value Date    WBC 5.10 03/01/2024    HGB 12.2 03/01/2024    HCT 37.7 03/01/2024     (H) 03/01/2024     03/01/2024     07/26/2023    K 4.1 07/26/2023     07/26/2023    CALCIUM 10.0 07/26/2023    CO2 29 07/26/2023    GLU 59 (L) 07/26/2023    BUN 25 (H) 07/26/2023    CREATININE 0.9 07/26/2023    EGFRNORACEVR >60.0 07/26/2023    ANIONGAP 11 07/26/2023    PROT 7.3 05/25/2023    ALBUMIN 4.0 05/25/2023    BILITOT 0.4 05/25/2023    ALKPHOS 76 05/25/2023    ALT <5 (L) 05/25/2023    AST 17 05/25/2023    INR 3.0 09/05/2023    CHOL 257 (H) 03/01/2024    TRIG 75 " 03/01/2024     (H) 03/01/2024    LDLCALC 137.0 03/01/2024    TSH 1.842 03/01/2024    HGBA1C 5.5 03/01/2024      Lab reviewed by me: Particular labs of significance that I will monitor, workup, or treat to improve are mentioned below in diagnostic impression remarks.      Results  Imaging  X-ray of sternum was negative.       Imaging/EKG: I have reviewed the pertinent results and my findings are noted in remarks.  274}    CC:   Chief Complaint   Patient presents with    Annual Exam    Fall        274}    History of Present Illness  The patient presents for evaluation of Parkinson's disease, constipation, B12 deficiency, macrocytosis, and hyperlipidemia.    She reports a general sense of well-being, with her Parkinson's disease being managed effectively through daily yoga practice and a stable medication regimen.    She experiences constipation. This is managed through a high-fiber diet and magnesium supplementation.    She is on a B12 supplement.    She had a fall and hit her sternum, resulting in some sternal pain that is tender to palpation. An x-ray at urgent care was negative, and the symptoms are improving.    MEDICATIONS  B12       Assessment/Plan  Esther Segura is a 75 y.o. female who presents to clinic with:  1. Healthcare maintenance    2. Parkinson's disease, unspecified whether dyskinesia present, unspecified whether manifestations fluctuate    3. B12 deficiency    4. Encounter for screening mammogram for malignant neoplasm of breast    5. Abnormal finding of blood chemistry, unspecified    6. Constipation, unspecified constipation type       274}    Assessment & Plan  1. Parkinson's disease.  Her condition remains stable. She will continue with her current medication regimen and ongoing monitoring.    2. Constipation.  She will use MiraLAX as needed, continue with magnesium supplementation, maintain a high-fiber diet, and monitor her condition.    3. B12 deficiency.  She will continue with  B12 supplementation and ongoing monitoring.    4. Macrocytosis.  B6 levels and other relevant blood work will be checked, and her condition will be monitored.    5. Hyperlipidemia.  Her good cholesterol levels are elevated. Blood work will be monitored.          This note was generated with the assistance of ambient listening technology. Verbal consent was obtained by the patient and accompanying visitor(s) for the recording of patient appointment to facilitate this note. I attest to having reviewed and edited the generated note for accuracy, though some syntax or spelling errors may persist. Please contact the author of this note for any clarification.      1. Healthcare maintenance    2. Parkinson's disease, unspecified whether dyskinesia present, unspecified whether manifestations fluctuate    3. B12 deficiency    4. Encounter for screening mammogram for malignant neoplasm of breast  - Mammo Digital Screening Bilat w/ Tod (XPD); Future    5. Abnormal finding of blood chemistry, unspecified  - CBC Auto Differential; Future  - Comprehensive Metabolic Panel; Future  - Hemoglobin A1C; Future  - Lipid Panel; Future  - Vitamin B6; Future  - TSH; Future    6. Constipation, unspecified constipation type  - TSH; Future      Gilberto Cole MD   274}    If you are due for any health screening(s) below please notify me so we can arrange them to be ordered and scheduled. Most healthy patients at your age complete them, but you are free to accept or refuse.     If you can't do it, I'll definitely understand. If you can, I'd certainly appreciate it!   Tests to Keep You Healthy    Mammogram: ORDERED BUT NOT SCHEDULED

## 2025-04-09 ENCOUNTER — HOSPITAL ENCOUNTER (OUTPATIENT)
Dept: RADIOLOGY | Facility: HOSPITAL | Age: 75
Discharge: HOME | End: 2025-04-09
Attending: STUDENT IN AN ORGANIZED HEALTH CARE EDUCATION/TRAINING PROGRAM
Payer: MEDICARE

## 2025-04-09 ENCOUNTER — RESULTS FOLLOW-UP (OUTPATIENT)
Dept: FAMILY MEDICINE | Facility: CLINIC | Age: 75
End: 2025-04-09

## 2025-04-09 VITALS — HEIGHT: 62 IN | WEIGHT: 99 LBS | BODY MASS INDEX: 18.22 KG/M2

## 2025-04-09 DIAGNOSIS — Z12.31 ENCOUNTER FOR SCREENING MAMMOGRAM FOR MALIGNANT NEOPLASM OF BREAST: ICD-10-CM

## 2025-04-09 PROCEDURE — 77067 SCR MAMMO BI INCL CAD: CPT | Mod: TC,PO

## 2025-04-09 PROCEDURE — 77067 SCR MAMMO BI INCL CAD: CPT | Mod: 26,,, | Performed by: RADIOLOGY

## 2025-04-09 PROCEDURE — 77063 BREAST TOMOSYNTHESIS BI: CPT | Mod: 26,,, | Performed by: RADIOLOGY

## 2025-04-15 LAB — W VITAMIN B6: 148 UG/L

## 2025-04-28 DIAGNOSIS — Z00.00 ENCOUNTER FOR MEDICARE ANNUAL WELLNESS EXAM: ICD-10-CM

## 2025-05-07 ENCOUNTER — PATIENT MESSAGE (OUTPATIENT)
Facility: CLINIC | Age: 75
End: 2025-05-07
Payer: MEDICARE

## 2025-05-08 ENCOUNTER — TELEPHONE (OUTPATIENT)
Facility: CLINIC | Age: 75
End: 2025-05-08
Payer: MEDICARE

## 2025-05-08 NOTE — TELEPHONE ENCOUNTER
Called patient to schedule virtual visit with Dr. Parsons as requested by Dr. Parsons. Mp confirmed appt date and time.

## 2025-05-12 ENCOUNTER — TELEPHONE (OUTPATIENT)
Facility: CLINIC | Age: 75
End: 2025-05-12
Payer: MEDICARE

## 2025-05-12 NOTE — TELEPHONE ENCOUNTER
Pt has been having leg cramps and self scheduled for [facility] 3 at 10:20.  Contacted pt and asked if she could come at 8 am instead due to scheduling issues. She agreed.

## 2025-05-16 ENCOUNTER — PATIENT MESSAGE (OUTPATIENT)
Facility: CLINIC | Age: 75
End: 2025-05-16
Payer: MEDICARE

## 2025-06-03 ENCOUNTER — OFFICE VISIT (OUTPATIENT)
Facility: CLINIC | Age: 75
End: 2025-06-03
Payer: MEDICARE

## 2025-06-03 VITALS
BODY MASS INDEX: 17.81 KG/M2 | HEART RATE: 87 BPM | DIASTOLIC BLOOD PRESSURE: 65 MMHG | SYSTOLIC BLOOD PRESSURE: 100 MMHG | WEIGHT: 96.81 LBS | HEIGHT: 62 IN

## 2025-06-03 DIAGNOSIS — G20.A1 PARKINSON'S DISEASE: ICD-10-CM

## 2025-06-03 DIAGNOSIS — E53.8 B12 DEFICIENCY: ICD-10-CM

## 2025-06-03 DIAGNOSIS — G20.A1 PARKINSON'S DISEASE, UNSPECIFIED WHETHER DYSKINESIA PRESENT, UNSPECIFIED WHETHER MANIFESTATIONS FLUCTUATE: Primary | ICD-10-CM

## 2025-06-03 PROCEDURE — 1126F AMNT PAIN NOTED NONE PRSNT: CPT | Mod: CPTII,S$GLB,, | Performed by: PSYCHIATRY & NEUROLOGY

## 2025-06-03 PROCEDURE — 1159F MED LIST DOCD IN RCRD: CPT | Mod: CPTII,S$GLB,, | Performed by: PSYCHIATRY & NEUROLOGY

## 2025-06-03 PROCEDURE — 3074F SYST BP LT 130 MM HG: CPT | Mod: CPTII,S$GLB,, | Performed by: PSYCHIATRY & NEUROLOGY

## 2025-06-03 PROCEDURE — 99214 OFFICE O/P EST MOD 30 MIN: CPT | Mod: S$GLB,,, | Performed by: PSYCHIATRY & NEUROLOGY

## 2025-06-03 PROCEDURE — 99999 PR PBB SHADOW E&M-EST. PATIENT-LVL III: CPT | Mod: PBBFAC,,, | Performed by: PSYCHIATRY & NEUROLOGY

## 2025-06-03 PROCEDURE — 3288F FALL RISK ASSESSMENT DOCD: CPT | Mod: CPTII,S$GLB,, | Performed by: PSYCHIATRY & NEUROLOGY

## 2025-06-03 PROCEDURE — 3078F DIAST BP <80 MM HG: CPT | Mod: CPTII,S$GLB,, | Performed by: PSYCHIATRY & NEUROLOGY

## 2025-06-03 PROCEDURE — 1101F PT FALLS ASSESS-DOCD LE1/YR: CPT | Mod: CPTII,S$GLB,, | Performed by: PSYCHIATRY & NEUROLOGY

## 2025-06-03 RX ORDER — CARBIDOPA AND LEVODOPA 25; 100 MG/1; MG/1
1.5 TABLET ORAL 4 TIMES DAILY
Qty: 540 TABLET | Refills: 12 | Status: SHIPPED | OUTPATIENT
Start: 2025-06-03

## 2025-06-03 RX ORDER — BACLOFEN 5 MG/1
5 TABLET ORAL 3 TIMES DAILY
Qty: 90 TABLET | Refills: 11 | Status: SHIPPED | OUTPATIENT
Start: 2025-06-03 | End: 2026-06-03

## 2025-06-13 ENCOUNTER — PATIENT MESSAGE (OUTPATIENT)
Facility: CLINIC | Age: 75
End: 2025-06-13
Payer: MEDICARE

## 2025-06-13 ENCOUNTER — TELEPHONE (OUTPATIENT)
Facility: CLINIC | Age: 75
End: 2025-06-13
Payer: MEDICARE

## 2025-06-13 DIAGNOSIS — G20.A1 PARKINSON'S DISEASE: ICD-10-CM

## 2025-06-13 RX ORDER — CARBIDOPA AND LEVODOPA 25; 100 MG/1; MG/1
1.5 TABLET ORAL 4 TIMES DAILY
Qty: 540 TABLET | Refills: 12 | Status: SHIPPED | OUTPATIENT
Start: 2025-06-13

## 2025-06-13 NOTE — TELEPHONE ENCOUNTER
Copied from CRM #[id number]. Topic: General Inquiry - Return Call  >> Jun 13, 2025 11:41 AM Latisha wrote:  Pt is returning a missed call from someone in the office and is asking for a return call back soon. Thanks.     Reason for call:  medication     Who Called: clinic      Patient requesting call back or [facility] msg Call Back  Called pt to inform her the rx has been called in

## 2025-06-13 NOTE — TELEPHONE ENCOUNTER
Called pt to clarify how she is taking carbidopa levodopa .  LOV states Changing timing of carbidopa/levodopa 25/100mg 1.5/1/1/1.5mg phoned in 90 day supply

## 2025-08-21 ENCOUNTER — PATIENT MESSAGE (OUTPATIENT)
Facility: CLINIC | Age: 75
End: 2025-08-21
Payer: MEDICARE

## 2025-08-22 ENCOUNTER — TELEPHONE (OUTPATIENT)
Facility: CLINIC | Age: 75
End: 2025-08-22
Payer: MEDICARE

## 2025-09-02 ENCOUNTER — PATIENT MESSAGE (OUTPATIENT)
Facility: CLINIC | Age: 75
End: 2025-09-02
Payer: MEDICARE

## 2025-09-03 ENCOUNTER — TELEPHONE (OUTPATIENT)
Facility: CLINIC | Age: 75
End: 2025-09-03
Payer: MEDICARE

## 2025-09-05 ENCOUNTER — TELEPHONE (OUTPATIENT)
Facility: CLINIC | Age: 75
End: 2025-09-05
Payer: MEDICARE

## (undated) DEVICE — SUT 1 36IN COATED VICRYL UN

## (undated) DEVICE — DRAPE U SPLIT SHEET 54X76IN

## (undated) DEVICE — ADHESIVE MASTISOL VIAL 48/BX

## (undated) DEVICE — ELECTRODE REM PLYHSV RETURN 9

## (undated) DEVICE — CLOSURE SKIN STERI STRIP 1/2X4

## (undated) DEVICE — DRAPE THREE-QTR REINF 53X77IN

## (undated) DEVICE — BNDG COFLEX FOAM LF2 ST 6X5YD

## (undated) DEVICE — TUBING SUC UNIV W/CONN 12FT

## (undated) DEVICE — SOL IRR NACL .9% 3000ML

## (undated) DEVICE — STRAP OR TABLE 5IN X 72IN

## (undated) DEVICE — WAVEGUIDE Y TAPER TIP

## (undated) DEVICE — COVER PROXIMA MAYO STAND

## (undated) DEVICE — SUT 2/0 36IN COATED VICRYL

## (undated) DEVICE — LINER GLOVE POWDERFREE SZ 7.5

## (undated) DEVICE — CANNULA PATH DRIVE SHAFT

## (undated) DEVICE — DRAPE INCISE IOBAN 2 23X17IN

## (undated) DEVICE — BIT DRILL SCREW SUPERPATH

## (undated) DEVICE — DURAPREP SURG SCRUB 26ML

## (undated) DEVICE — Device

## (undated) DEVICE — SUT PROLENE 3-0 FS-1 MONO18

## (undated) DEVICE — DRAPE HIP PCH 112X137X89IN

## (undated) DEVICE — TRAY SKIN SCRUB WET PREMIUM

## (undated) DEVICE — KIT SAHARA DRAPE DRAW/LIFT

## (undated) DEVICE — INTERPULSE SET

## (undated) DEVICE — BLADE ELECTRO EXTENDED.

## (undated) DEVICE — BLADE RECIP DOUBLE SIDED

## (undated) DEVICE — PENCIL ROCKER SWITCH 10FT CORD

## (undated) DEVICE — DRAPE C ARM 42 X 120 10/BX

## (undated) DEVICE — GOWN TOGA SYS PEELWY ZIP 2 XL

## (undated) DEVICE — DRAPE STERI U-SHAPED 47X51IN

## (undated) DEVICE — SPONGE DERMACEA 4X4IN 12PLY

## (undated) DEVICE — SUT ETHILON 3-0 FS-1 30

## (undated) DEVICE — COVER TABLE HVY DTY 60X90IN

## (undated) DEVICE — BLADE SURG CARBON STEEL #10

## (undated) DEVICE — DRESSING XEROFORM FOIL PK 1X8

## (undated) DEVICE — SEALER AQUAMANTYS 2.3 BIPOLAR

## (undated) DEVICE — SEE MEDLINE ITEM 157216

## (undated) DEVICE — PAD ABDOMINAL STERILE 8X10IN

## (undated) DEVICE — TOWEL OR DISP STRL BLUE 4/PK

## (undated) DEVICE — SPONGE LAP 18X18 PREWASHED